# Patient Record
Sex: MALE | Race: WHITE | NOT HISPANIC OR LATINO | Employment: OTHER | URBAN - METROPOLITAN AREA
[De-identification: names, ages, dates, MRNs, and addresses within clinical notes are randomized per-mention and may not be internally consistent; named-entity substitution may affect disease eponyms.]

---

## 2019-04-15 ENCOUNTER — OFFICE VISIT (OUTPATIENT)
Dept: ORTHOPEDIC SURGERY | Facility: CLINIC | Age: 84
End: 2019-04-15

## 2019-04-15 VITALS — HEART RATE: 56 BPM | OXYGEN SATURATION: 98 % | BODY MASS INDEX: 29.8 KG/M2 | WEIGHT: 220 LBS | HEIGHT: 72 IN

## 2019-04-15 DIAGNOSIS — M16.11 PRIMARY OSTEOARTHRITIS OF RIGHT HIP: Primary | ICD-10-CM

## 2019-04-15 PROCEDURE — 99203 OFFICE O/P NEW LOW 30 MIN: CPT | Performed by: ORTHOPAEDIC SURGERY

## 2019-04-15 RX ORDER — RANOLAZINE 500 MG/1
500 TABLET, EXTENDED RELEASE ORAL 2 TIMES DAILY
COMMUNITY

## 2019-04-15 RX ORDER — ASPIRIN 81 MG/1
81 TABLET, CHEWABLE ORAL DAILY
COMMUNITY

## 2019-04-15 RX ORDER — EZETIMIBE 10 MG/1
TABLET ORAL
COMMUNITY
Start: 2019-03-19

## 2019-04-15 RX ORDER — METOPROLOL SUCCINATE 25 MG
TABLET, EXTENDED RELEASE 24 HR ORAL
COMMUNITY
Start: 2019-04-10

## 2019-04-15 RX ORDER — ROSUVASTATIN CALCIUM 20 MG/1
TABLET, COATED ORAL
COMMUNITY
Start: 2019-03-19

## 2019-04-15 NOTE — PROGRESS NOTES
[facility] Note    Subjective     Chief Complaint   Patient presents with   • Right Hip - Pain     Hip Pain, had pain for years. No previous surg on right hip. No recent injections or physical therapy.        HPI    Mukund Pool is a 89 y.o. male.  Presents today for evaluation of right hip pain.  Pain has been present for at least 2 years, following no particular injury.  Pain is 8 out of 10, dull and achy, and worsening over time.  No improvement with Tylenol.  He is not interested in surgical intervention.      There is no problem list on file for this patient.    History reviewed. No pertinent past medical history.   History reviewed. No pertinent surgical history.   Family History   Problem Relation Age of Onset   • Cancer Mother      Social History     Socioeconomic History   • Marital status:      Spouse name: Not on file   • Number of children: Not on file   • Years of education: Not on file   • Highest education level: Not on file   Tobacco Use   • Smoking status: Never Smoker   • Smokeless tobacco: Never Used   Substance and Sexual Activity   • Alcohol use: No     Frequency: Never   • Drug use: No   • Sexual activity: Defer      Current Outpatient Medications on File Prior to Visit   Medication Sig Dispense Refill   • aspirin 81 MG chewable tablet Chew 81 mg Daily.     • ranolazine (RANEXA) 500 MG 12 hr tablet Take 500 mg by mouth 2 (Two) Times a Day.     • ezetimibe (ZETIA) 10 MG tablet      • rosuvastatin (CRESTOR) 20 MG tablet      • TOPROL XL 25 MG 24 hr tablet        No current facility-administered medications on file prior to visit.       No Known Allergies     Review of Systems   Constitutional: Positive for activity change.   HENT: Positive for hearing loss.    Eyes: Negative.    Respiratory: Negative.    Cardiovascular: Negative.    Gastrointestinal: Negative.    Endocrine: Negative.    Genitourinary: Negative.    Musculoskeletal: Positive for arthralgias (right  "hip) and joint swelling.   Skin: Negative.    Allergic/Immunologic: Positive for environmental allergies.   Neurological: Negative.    Hematological: Negative.    Psychiatric/Behavioral: Negative.         Objective      Physical Exam  Pulse 56   Ht 182.9 cm (72\")   Wt 99.8 kg (220 lb)   SpO2 98%   BMI 29.84 kg/m²     Body mass index is 29.84 kg/m².    General:   Mental Status:  Alert   Appearance: Cooperative, in no acute distress   Build and Nutrition: Well-nourished well-developed male   Orientation: Alert and oriented to person, place and time   Posture: Normal    Integument:   Right hip: No skin lesions, no rash, no ecchymosis    Neurologic:   Sensation:    Right foot: Intact to light touch on the dorsal and plantar aspect   Motor:  Right lower extremity: 5/5 quadriceps, hamstrings, ankle dorsiflexors, and ankle plantar flexors    Vascular:   Right lower extremity: 2+ dorsalis pedis pulse, prompt capillary refill    Lower Extremities:   Right Hip:    Tenderness:  None    Swelling: None    Crepitus:  None    Atrophy:  None    Range of motion:  External Rotation: 30°       Internal Rotation: 30°       Flexion:  100°       Extension:  0°   Instability:  None  Deformities:  None  Functional testing: Negative Stinchfield    No leg length discrepancy      Imaging/Studies      Imaging Results (last 24 hours)     Procedure Component Value Units Date/Time    XR Hip With or Without Pelvis 1 View Right [[id number]] Resulted:  04/15/19 1050     Updated:  04/15/19 1051    Narrative:       Right Hip Radiographs  Indication: right hip pain  Views: low AP pelvis and lateral of the right hip    Comparison: AP view only from 4/10/2015    Findings:   Arthritic changes are seen, with joint space narrowing, osteophyte   formation, no acute bony abnormalities.  Slight worsening compared to the   previous films.    Impression: Right hip arthritis.          Assessment and Plan     Mukund was seen today for pain.    Diagnoses and all " orders for this visit:    Primary osteoarthritis of right hip  -     XR Hip With or Without Pelvis 1 View Right  -     External Facility Surgical/Procedural Request; Future        1. Primary osteoarthritis of right hip        I reviewed my findings with the patient today.  He does have right hip arthritis, and we discussed treatment options today.  He would like to try an intra-articular injection, and we will schedule that at a mutually convenient time in the near future.  He may be a candidate for hip replacement surgery in the future.    Return in about 4 weeks (around 5/13/2019) for After Hip Injection.      Medical Decision Making  Management Options : prescription/IM medicine  Data/Risk: radiology tests and independent visualization of imaging, lab tests, or EMG/NCV      Roman Barth MD  04/15/19  12:22 PM

## 2020-03-09 ENCOUNTER — OFFICE (OUTPATIENT)
Dept: URBAN - METROPOLITAN AREA CLINIC 4 | Facility: CLINIC | Age: 85
End: 2020-03-09

## 2020-03-09 VITALS — SYSTOLIC BLOOD PRESSURE: 136 MMHG | WEIGHT: 216 LBS | DIASTOLIC BLOOD PRESSURE: 72 MMHG

## 2020-03-09 DIAGNOSIS — F45.8 OTHER SOMATOFORM DISORDERS: ICD-10-CM

## 2020-03-09 DIAGNOSIS — K21.9 GASTRO-ESOPHAGEAL REFLUX DISEASE WITHOUT ESOPHAGITIS: ICD-10-CM

## 2020-03-09 DIAGNOSIS — R07.89 OTHER CHEST PAIN: ICD-10-CM

## 2020-03-09 DIAGNOSIS — K30 FUNCTIONAL DYSPEPSIA: ICD-10-CM

## 2020-03-09 DIAGNOSIS — R13.10 DYSPHAGIA, UNSPECIFIED: ICD-10-CM

## 2020-03-09 DIAGNOSIS — R05 COUGH: ICD-10-CM

## 2020-03-09 PROCEDURE — 99214 OFFICE O/P EST MOD 30 MIN: CPT | Performed by: INTERNAL MEDICINE

## 2021-03-19 ENCOUNTER — APPOINTMENT (OUTPATIENT)
Dept: PREADMISSION TESTING | Facility: HOSPITAL | Age: 86
End: 2021-03-19

## 2021-03-19 PROCEDURE — U0004 COV-19 TEST NON-CDC HGH THRU: HCPCS

## 2021-03-19 PROCEDURE — C9803 HOPD COVID-19 SPEC COLLECT: HCPCS

## 2021-03-20 LAB — SARS-COV-2 RNA NOSE QL NAA+PROBE: NOT DETECTED

## 2021-03-22 ENCOUNTER — TRANSCRIBE ORDERS (OUTPATIENT)
Dept: ADMINISTRATIVE | Facility: HOSPITAL | Age: 86
End: 2021-03-22

## 2021-03-22 DIAGNOSIS — C32.1 MALIGNANT NEOPLASM OF SUPRAGLOTTIS (HCC): Primary | ICD-10-CM

## 2021-03-26 ENCOUNTER — HOSPITAL ENCOUNTER (OUTPATIENT)
Dept: PET IMAGING | Facility: HOSPITAL | Age: 86
Discharge: HOME | End: 2021-03-26

## 2021-03-26 DIAGNOSIS — C32.1 MALIGNANT NEOPLASM OF SUPRAGLOTTIS (HCC): ICD-10-CM

## 2021-03-26 LAB — GLUCOSE BLDC GLUCOMTR-MCNC: 90 MG/DL (ref 70–130)

## 2021-03-26 PROCEDURE — 82962 GLUCOSE BLOOD TEST: CPT

## 2021-03-26 PROCEDURE — 78815 PET IMAGE W/CT SKULL-THIGH: CPT

## 2021-03-26 PROCEDURE — 0 FLUDEOXYGLUCOSE F18 SOLUTION: Performed by: OTOLARYNGOLOGY

## 2021-03-26 PROCEDURE — A9552 F18 FDG: HCPCS | Performed by: OTOLARYNGOLOGY

## 2021-03-26 RX ADMIN — FLUDEOXYGLUCOSE F18 1 DOSE: 300 INJECTION INTRAVENOUS at 14:15

## 2021-03-31 ENCOUNTER — OFFICE VISIT (OUTPATIENT)
Dept: RADIATION ONCOLOGY | Facility: HOSPITAL | Age: 86
End: 2021-03-31

## 2021-03-31 ENCOUNTER — HOSPITAL ENCOUNTER (OUTPATIENT)
Dept: RADIATION ONCOLOGY | Facility: HOSPITAL | Age: 86
Setting detail: RADIATION/ONCOLOGY SERIES
Discharge: HOME | End: 2021-03-31

## 2021-03-31 VITALS
RESPIRATION RATE: 16 BRPM | WEIGHT: 183.2 LBS | HEIGHT: 72 IN | OXYGEN SATURATION: 94 % | TEMPERATURE: 97.6 F | HEART RATE: 63 BPM | SYSTOLIC BLOOD PRESSURE: 136 MMHG | BODY MASS INDEX: 24.81 KG/M2 | DIASTOLIC BLOOD PRESSURE: 63 MMHG

## 2021-03-31 DIAGNOSIS — C32.0 VOCAL CORD CANCER (HCC): Primary | ICD-10-CM

## 2021-03-31 PROCEDURE — G0463 HOSPITAL OUTPT CLINIC VISIT: HCPCS

## 2021-03-31 RX ORDER — MIRTAZAPINE 15 MG/1
TABLET, FILM COATED ORAL
COMMUNITY
Start: 2021-01-07 | End: 2022-02-25

## 2021-03-31 RX ORDER — GABAPENTIN 300 MG/1
CAPSULE ORAL
COMMUNITY
Start: 2021-01-07

## 2021-03-31 RX ORDER — OMEPRAZOLE 20 MG/1
CAPSULE, DELAYED RELEASE ORAL
COMMUNITY
Start: 2021-03-30

## 2021-03-31 RX ORDER — NITROGLYCERIN 0.4 MG/1
TABLET SUBLINGUAL
COMMUNITY
Start: 2021-03-16

## 2021-03-31 NOTE — PROGRESS NOTES
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 male  with recently diagnosed laryngeal cancer.  He presented with a 1 year history of persistent hoarseness which patient initially attributed to his acid reflux symptoms.  Direct laryngoscopy eventually revealed an exophytic tumor extensively involving the right true vocal cord and false cord extending from the anterior commissure to the posterior aspect.  There was fixation of the cord.  He underwent debulking biopsy 3/22/2021.  Pathology showed well-differentiated squamous cell carcinoma.  P16 pending.  He is healing well and voice has already improved modestly since the procedure.  PET/CT performed 3/26/2021 shows a solitary focus of hypermetabolic uptake, SUV 7.4, involving the area of thickening along the right true vocal cord.  There was no evidence of pathologic cervical lymphadenopathy.  No evidence of distant metastasis.    Allergy: No Known Allergies    Social History:   Social History     Socioeconomic History   • Marital status:      Spouse name: Not on file   • Number of children: Not on file   • Years of education: Not on file   • Highest education level: Not on file   Tobacco Use   • Smoking status: Never Smoker   • Smokeless tobacco: Never Used   Substance and Sexual Activity   • Alcohol use: No   • Drug use: No   • Sexual activity: Defer       Past Medical History:   Past Medical History:   Diagnosis Date   • Arthritis    • Chronic kidney disease     kidney stones   • GERD (gastroesophageal reflux disease)    • Prostate cancer (CMS/HCC)    • Vocal cord cancer (CMS/HCC)        Family History: family  "history includes Breast cancer in his mother; Cancer in his mother; Heart attack in his father; Ovarian cancer in his sister.     Surgical History:   Past Surgical History:   Procedure Laterality Date   • CHOLECYSTECTOMY     • CORONARY ARTERY BYPASS GRAFT     • PROSTATE SURGERY     • VOCAL CORD MASS EXCISION  03/22/2021        Review of Systems:   Review of Systems   Constitutional: Positive for appetite change, fatigue and unexpected weight change (down 40lbs since last summer).   HENT:   Positive for trouble swallowing.    Musculoskeletal: Positive for neck stiffness.   Neurological: Positive for dizziness, headaches and light-headedness.           Objective   VITAL SIGNS:   Vitals:    03/31/21 1306   BP: 136/63   Pulse: 63   Resp: 16   Temp: 97.6 °F (36.4 °C)   SpO2: 94%  Comment: RA   Weight: 83.1 kg (183 lb 3.2 oz)   Height: 182.9 cm (72\")   PainSc: 0-No pain        Karnofsky score: 80      Physical Exam:   Physical Exam  Vitals and nursing note reviewed.   Constitutional:       Appearance: He is well-developed.   HENT:      Head: Normocephalic and atraumatic.   Cardiovascular:      Rate and Rhythm: Normal rate and regular rhythm.      Heart sounds: Normal heart sounds. No murmur heard.     Pulmonary:      Effort: Pulmonary effort is normal.      Breath sounds: Normal breath sounds. No wheezing or rales.   Abdominal:      General: Bowel sounds are normal. There is no distension.      Palpations: Abdomen is soft.      Tenderness: There is no abdominal tenderness.   Musculoskeletal:         General: No tenderness. Normal range of motion.      Cervical back: Normal range of motion and neck supple. No tenderness ( Thyroid cartilage appears normal.  No fullness.  Normal mobility.).   Lymphadenopathy:      Cervical: No cervical adenopathy.      Upper Body:      Right upper body: No supraclavicular adenopathy.      Left upper body: No supraclavicular adenopathy.   Skin:     General: Skin is warm and dry. "   Neurological:      Mental Status: He is alert and oriented to person, place, and time.      Sensory: No sensory deficit.   Psychiatric:         Behavior: Behavior normal.         Thought Content: Thought content normal.         Judgment: Judgment normal.              The following portions of the patient's history were reviewed and updated as appropriate: allergies, current medications, past family history, past medical history, past social history, past surgical history and problem list.    Assessment:   Assessment      Right true vocal cord squamous carcinoma, clinical stage III (T3, N0, M0).    He appears to be healing well 1 1/2 weeks after debulking biopsy procedure.  Prognosis should be very good in spite of regional spread to the false cord and fixation of the vocal cord.  This is an exophytic tumor and is well differentiated.  There is no evidence of regional or distant metastasis on PET/CT.  He has been offered treatment options of partial laryngectomy versus radiotherapy.  We reviewed radiotherapy treatment option in detail today and all questions were answered.  Patient would like to proceed with definitive radiotherapy.    RECOMMENDATIONS: Return for CT simulation early next week.  Radiotherapy would likely begin the following week, at least 3 weeks following his biopsy procedure.  The larynx will receive a dose of 66 Gray delivered in 33 fractions over 6 weeks with IMRT technique.    Follow Up:   Return in about 6 days (around 4/6/2021) for Simulation.  Diagnoses and all orders for this visit:    1. Vocal cord cancer (CMS/HCC) (Primary)  -     Ambulatory Referral to Speech Therapy for Head/Neck Cancer Services  -     Ambulatory Referral to OP ONC Nutrition Services  -     FL Video Swallow With Speech; Future  -     Dental evaluation         Chano Licona MD

## 2021-04-06 ENCOUNTER — HOSPITAL ENCOUNTER (OUTPATIENT)
Dept: RADIATION ONCOLOGY | Facility: HOSPITAL | Age: 86
Discharge: HOME | End: 2021-04-06

## 2021-04-06 ENCOUNTER — HOSPITAL ENCOUNTER (OUTPATIENT)
Dept: RADIATION ONCOLOGY | Facility: HOSPITAL | Age: 86
Setting detail: RADIATION/ONCOLOGY SERIES
Discharge: HOME | End: 2021-04-06

## 2021-04-06 PROCEDURE — 77334 RADIATION TREATMENT AID(S): CPT | Performed by: RADIOLOGY

## 2021-04-07 ENCOUNTER — DOCUMENTATION (OUTPATIENT)
Dept: NUTRITION | Facility: HOSPITAL | Age: 86
End: 2021-04-07

## 2021-04-07 NOTE — PROGRESS NOTES
"Outpatient Oncology Nutrition     Reason for Visit:     Oncology Nutrition Screening, Nutritional Assessment and Patient Education    Patient Name:  Mukund Pool    :  1930    MRN:  [id number]    Date of Encounter: 2021    Nutrition Assessment     Cancer Dx:  Newly diagnosed laryngeal cancer - clinical stage III / exophytic tumor extensively involving the right true vocal cord and false cord extending from the anterior commissure to the posterior aspect / well-differentiated squamous cell carcinoma    Type of Cancer Treatment:     Surgery:  Debulking biopsy 3/22/2021   Radiation:   66 Choudhary delivered in 33 fractions over 6 weeks with IMRT technique       Patient Active Problem List   Diagnosis   • 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)       Food / Nutrition Related History       Hydration Status     Goal:  90 ounces per day    Enteral Feeding     N/A  Anthropometric Measurements     Height:    Ht Readings from Last 1 Encounters:   21 182.9 cm (72\")       Weight:    Wt Readings from Last 1 Encounters:   21 83.1 kg (183 lb 3.2 oz)       BMI: 24.85 / normal    Weight Change: Patient unintentionally lost 40 lbs last year / weight loss has been stabilized for past 6 months with no additional weight loss    Review of Lab Data (Time Frame - 1 month / 2 month)   Reviewed 21    Medication Review   Reviewed 21    Nutrition Focused Physical Findings       Nutrition Impact Symptoms   Appetite changes  Fatigue  Dysphagia  Physical Activity      Not feeling up to most things, but in bed less than half the day    Current Nutritional Intake     Oral diet:  Regular diet    Malnutrition Risk Assessment     Recent weight loss over the past 6 months:  Yes    How much weight loss:  4 = 34 or more lbs    Eating poorly because of a decreased appetite:  1 = Yes    Malnutrition Screening Score:     MST = 2 more Patient at risk for malnutrition     Nutrition Diagnosis     Problem    Etiology  "   Signs / Symptoms      Nutrition Intervention   Initial consultation with patient following completion of his simulation today.  Patient lives at [facility] with his wife.  Their meals are prepared for them and they eat in the dining room for breakfast, lunch and dinner.  They have a small kitchenette in their apartment for preparation of snacks.  He generally eats 3 meals per day / snacks very little throughout the day.    Discussed the nutritional considerations for H&N cancer diagnosis including importance of maintaining a healthy focus on food choices with small frequent meals and snacks throughout the day; weight maintenance.  Discussed the swallowing difficulties that the patient may experience with progression of treatment; indication for modification of consistencies and textures for ease of swallowing and possible indication for placement of a feeding tube in the event the patient is unable sustain nutritional / hydration goals with oral intake.  Taste changes and dry mouth were also discussed as potential symptoms that patient may experience.    Patient was here today for simulation and will return for treatment when plan has been completed.  Goal     1. To maintain weight and prevent additional weight loss during chemoradiation.  2. Achieve nutrient and hydration goals via oral intake and / or alternate source of nutritional support.  3. Provide patient education for management of nutritionally related side effects of treatment.  Monitoring / Evaluation   Will continue to follow closely.

## 2021-04-08 ENCOUNTER — HOSPITAL ENCOUNTER (OUTPATIENT)
Dept: SPEECH THERAPY | Facility: HOSPITAL | Age: 86
Setting detail: THERAPIES SERIES
Discharge: HOME | End: 2021-04-08

## 2021-04-08 DIAGNOSIS — C32.0 MALIGNANT NEOPLASM OF RIGHT VOCAL CORD (HCC): Primary | ICD-10-CM

## 2021-04-08 PROCEDURE — 77338 DESIGN MLC DEVICE FOR IMRT: CPT | Performed by: RADIOLOGY

## 2021-04-08 PROCEDURE — 77301 RADIOTHERAPY DOSE PLAN IMRT: CPT | Performed by: RADIOLOGY

## 2021-04-08 PROCEDURE — 92611 MOTION FLUOROSCOPY/SWALLOW: CPT

## 2021-04-08 PROCEDURE — 77300 RADIATION THERAPY DOSE PLAN: CPT | Performed by: RADIOLOGY

## 2021-04-08 NOTE — THERAPY EVALUATION
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 Pt states that he is having pain at times with swallowing and has increased mucus in the mornings. Pt does have hoarseness and that has been evident for almost a year.   -    Care Plan Review  evaluation/treatment results reviewed  -    Patient Effort  excellent  -       General Information    Patient Profile Reviewed  yes  -    Pertinent History Of Current Problem  Pt is a 89 year old male with recent dx of laryngeal cancer, involving the area of thickening along the right true vocal cord. Pt s/p a debulking procedure.  -HG    Current Method of Nutrition  soft textures;thin liquids  -    Precautions/Limitations, Vision  WFL with corrective lenses  -    Precautions/Limitations, Hearing  WFL;for purposes of eval  -    Prior Level of Function-Communication  WFL  -    Prior Level of Function-Swallowing   no diet consistency restrictions  -HG    Plans/Goals Discussed with  patient  -    Barriers to Rehab  none identified  -HG       Oral Motor Structure and Function    Dentition Assessment  natural, present and adequate  -HG    Secretion Management  WNL/WFL  -HG    Mucosal Quality  moist, healthy  -HG    Gag Response  WFL  -HG    Volitional Swallow  WFL  -HG    Volitional Cough  WFL  -HG       Oral Musculature and Cranial Nerve Assessment    Oral Motor General Assessment  WFL  -HG       General Eating/Swallowing Observations    Respiratory Support Currently in Use  room air  -HG    Eating/Swallowing Skills  self-fed  -HG    Positioning During Eating  upright 90 degree;upright in chair  -HG    Utensils Used  spoon;cup;straw  -HG    Consistencies Trialed  regular textures;mixed consistency;thin liquids  -HG       Clinical Swallow Eval    Oral Prep Phase  WFL  -HG    Oral Transit  WFL  -HG    Oral Residue  WFL  -HG    Pharyngeal Phase  suspected pharyngeal impairment  -HG    Esophageal Phase  unremarkable  -HG    Clinical Swallow Evaluation Summary  CSE completed this date. Pt tolerated all consistencies trialed this date with only throat clearing present on solids. Water via cup and straw, applesauce, mixed fruit all with no s/s of dysphagia or aspiration. Pt exhibited no difficulty masticating the josi cracker however caused pt to throat clear several times over the course of multiple swallows. Pt has chosen soft foods at this time. SLP RECs baseline MBS and prophylatic swallow tx.   -HG       Pharyngeal Phase Concerns    Pharyngeal Phase Concerns  throat clear  -HG    Throat Clear  regular consistencies  -    Row Name 04/08/21 1400       Rehab Evaluation    Document Type  --  -HG    Subjective Information  --  -HG    Patient Observations  --  -HG    Care Plan Review  --  -HG    Patient Effort  --  -HG       General Information    Patient Profile Reviewed  --  -HG    Pertinent History Of Current Problem  --  -HG     Current Method of Nutrition  --  -HG    Precautions/Limitations, Vision  --  -HG    Precautions/Limitations, Hearing  --  -HG    Prior Level of Function-Communication  --  -HG    Prior Level of Function-Swallowing  --  -HG    Plans/Goals Discussed with  --  -HG    Barriers to Rehab  --  -HG       Oral Motor Structure and Function    Dentition Assessment  --  -HG    Secretion Management  --  -HG    Mucosal Quality  --  -HG    Gag Response  --  -HG    Volitional Swallow  --  -HG    Volitional Cough  --  -HG       Oral Musculature and Cranial Nerve Assessment    Oral Motor General Assessment  --  -HG       General Eating/Swallowing Observations    Respiratory Support Currently in Use  --  -HG    Eating/Swallowing Skills  --  -HG    Positioning During Eating  --  -HG    Utensils Used  --  -HG    Consistencies Trialed  --  -HG       Clinical Swallow Eval    Oral Prep Phase  --  -HG    Oral Transit  --  -HG    Oral Residue  --  -HG    Pharyngeal Phase  --  -HG    Esophageal Phase  --  -HG    Clinical Swallow Evaluation Summary  --  -HG       Pharyngeal Phase Concerns    Pharyngeal Phase Concerns  --  -HG    Throat Clear  --  -HG      User Key  (r) = Recorded By, (t) = Taken By, (c) = Cosigned By    Initials Name Provider Type     Moni Mayes MS CCC-SLP Speech and Language Pathologist                        OP SLP Education     Row Name 04/08/21 1500       Education    Barriers to Learning  No barriers identified  -    Education Provided  Described results of evaluation;Patient expressed understanding of evaluation;Patient participated in establishing goals and treatment plan;Patient demonstrated recommended strategies;Patient requires further education on strategies, risks  -    Assessed  Learning needs;Learning motivation;Learning preferences;Learning readiness  -    Learning Motivation  Strong  -    Learning Method  Explanation;Demonstration;Teach back;Written materials  -    Teaching Response   Verbalized understanding;Demonstrated understanding;Reinforcement needed  -HG    Education Comments  Pt education for purpose of SLP services, plan for MBS and SLP demonstrated with teachback all prophylactic swallowing exercises with handout provided.   -HG      User Key  (r) = Recorded By, (t) = Taken By, (c) = Cosigned By    Initials Name Effective Dates    HG Moni Mayes MS [facility]-SLP 08/09/20 -           SLP OP Goals     Row Name 04/08/21 1500          Goal Type Needed    Goal Type Needed  Dysphagia;Other Adult Goals  -HG        Subjective Comments    Subjective Comments  Pt alert, cooperative, unsure the purpose of SLP services which was explained at beginning of session.  -HG        Dysphagia Goals    Patient will safely consume the recommended diet without complications such as aspiration pneumonia  soft diet and thin liquids.  -HG     Status: Patient will safely consume the recommended diet without complications such as aspiration pneumonia  New  -HG     Patient will increase laryngeal elevation to reduce residue that might fall into airway by completing  Mendelsohn maneuver;super-supraglottic swallow;falsetto/pitch glide;with cues  -HG     Status: Patient will increase laryngeal elevation to reduce residue that might fall into airway by completing  New  -HG     Patient will increase closure of larynx to keep food from falling into the airway by completing  super-supraglottic swallow;with cues  -HG     Status: Patient will increase closure of larynx to keep food from falling into the airway by completing  New  -HG     Patient will increase strength of tongue base and posterior pharyngeal walls to reduce residue that might fall into airway by completing  effotful swallow;Gi (tongue hold);with cues  -HG     Status: Patient will increase strength of tongue base and posterior pharyngeal walls to reduce residue that might fall into airway by completing  New  -HG     Patient will improve hyolaryngeal  elevation via completing Matt (head lift)  sustained lift (comment #/duration of lifts);repetitive lift (comment #/duration of lifts);with cues 30/15  -HG        Other Goals    Other Adult Goal- 1  Pt will complete stretching exercises in order to improve strength and flexibility of strap muscles.  -HG     Status: Other Adult Goal- 1  New  -HG     Other Adult Goal- 2  Pt will complete baseline MBS with recs to follow as indicated.   -HG        SLP Time Calculation    SLP Goal Re-Cert Due Date  05/08/21  -HG       User Key  (r) = Recorded By, (t) = Taken By, (c) = Cosigned By    Initials Name Provider Type    HG Moni Mayes MS CCC-SLP Speech and Language Pathologist          OP SLP Assessment/Plan - 04/08/21 1500        SLP Assessment    Functional Problems  Swallowing   -HG    Impact on Function: Swallowing  Risk of aspiration;Risk of pneumonia   -HG    Clinical Impression: Swallowing  pharyngeal signs and symptoms observed   -HG    Functional Problems Comment  Pt notes having difficulty with some solids and has voluntarily placed himself on soft foods, soups specifically.   -HG    Clinical Impression Comments  MASA score of 194/200.    -HG    Please refer to paper survey for additional self-reported information  Yes   -HG    Please refer to items scanned into chart for additional diagnostic informaiton and handouts as provided by clinician  Yes   -HG    SLP Diagnosis  Suspect pharyngeal dysphagia   -HG    Prognosis  Good (comment)   -HG    Patient/caregiver participated in establishment of treatment plan and goals  Yes   -HG    Patient would benefit from skilled therapy intervention  Yes   -HG       SLP Plan    Frequency  1x/week   -HG    Duration  12 weeks   -HG    Planned CPT's?  SLP CLINICAL SWALLOW EVAL: 96452;SLP SWALLOW THERAPY: 88237   -HG    Expected Duration of Therapy Session (SLP Eval)  45   -HG    Plan Comments  Complete MBS and initiate prophylactic swallow tx.    -HG      User Key  (r) =  Recorded By, (t) = Taken By, (c) = Cosigned By    Initials Name Provider Type     Moni Mayes MS CCC-SLP Speech and Language Pathologist                    Time Calculation:   SLP Start Time: 1500    Therapy Charges for Today     Code Description Service Date Service Provider Modifiers Qty    34660836480 HC ST MOTION FLUORO EVAL SWALLOW 4 4/8/2021 Moni Mayes MS CCC-SLP GN 1                   Moni Mayes MS CCC-SLP  4/8/2021

## 2021-04-13 ENCOUNTER — APPOINTMENT (OUTPATIENT)
Dept: PREADMISSION TESTING | Facility: HOSPITAL | Age: 86
End: 2021-04-13

## 2021-04-13 PROCEDURE — U0004 COV-19 TEST NON-CDC HGH THRU: HCPCS

## 2021-04-13 PROCEDURE — C9803 HOPD COVID-19 SPEC COLLECT: HCPCS

## 2021-04-14 ENCOUNTER — HOSPITAL ENCOUNTER (OUTPATIENT)
Dept: RADIATION ONCOLOGY | Facility: HOSPITAL | Age: 86
Discharge: HOME | End: 2021-04-14

## 2021-04-14 LAB — SARS-COV-2 RNA PNL SPEC NAA+PROBE: NOT DETECTED

## 2021-04-14 PROCEDURE — 77386: CPT | Performed by: RADIOLOGY

## 2021-04-15 ENCOUNTER — HOSPITAL ENCOUNTER (OUTPATIENT)
Dept: RADIATION ONCOLOGY | Facility: HOSPITAL | Age: 86
Discharge: HOME | End: 2021-04-15

## 2021-04-15 PROCEDURE — 77386: CPT | Performed by: RADIOLOGY

## 2021-04-16 ENCOUNTER — HOSPITAL ENCOUNTER (OUTPATIENT)
Dept: GENERAL RADIOLOGY | Facility: HOSPITAL | Age: 86
Discharge: HOME | End: 2021-04-16
Admitting: RADIOLOGY

## 2021-04-16 ENCOUNTER — HOSPITAL ENCOUNTER (OUTPATIENT)
Dept: RADIATION ONCOLOGY | Facility: HOSPITAL | Age: 86
Discharge: HOME | End: 2021-04-16

## 2021-04-16 DIAGNOSIS — R13.10 DYSPHAGIA, UNSPECIFIED TYPE: Primary | ICD-10-CM

## 2021-04-16 DIAGNOSIS — C32.0 VOCAL CORD CANCER (HCC): ICD-10-CM

## 2021-04-16 PROCEDURE — 77386: CPT | Performed by: RADIOLOGY

## 2021-04-16 PROCEDURE — 77336 RADIATION PHYSICS CONSULT: CPT | Performed by: RADIOLOGY

## 2021-04-16 PROCEDURE — 74230 X-RAY XM SWLNG FUNCJ C+: CPT

## 2021-04-16 PROCEDURE — 92611 MOTION FLUOROSCOPY/SWALLOW: CPT

## 2021-04-16 RX ADMIN — BARIUM SULFATE 100 ML: 0.81 POWDER, FOR SUSPENSION ORAL at 11:34

## 2021-04-16 RX ADMIN — BARIUM SULFATE 20 ML: 400 PASTE ORAL at 11:34

## 2021-04-16 NOTE — MBS/VFSS/FEES
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 Dysphagia, unspecified type  R13.10 787.20   2. Vocal cord cancer (CMS/HCC)  C32.0 161.0            SLP SLC Evaluation - 21 1120        General Information    Pertinent History Of Current Problem  Patient was referred for modified barium swallow study.  recent dx of laryngeal cancer, involving the area of thickening along the right true vocal cord. Patient s/p a debulking procedure. He has started radiation treatment. Complains of coughing up excess mucous at night. Reported history of GERD.   -AC    Precautions/Limitations, Vision  WFL;for purposes of eval   -AC    Precautions/Limitations, Hearing  WFL;for purposes of eval   -AC      User Key  (r) = Recorded By, (t) = Taken By, (c) = Cosigned By    Initials Name Provider Type    AC Pattie Giles MS CCC-SLP Speech and Language Pathologist          SLP Adult Swallow Evaluation     Row Name 21 1120       Rehab Evaluation    Document Type  evaluation  -AC    Subjective Information  no complaints  -AC    Patient Observations  alert;cooperative  -AC    Patient Effort  good  -AC       General Information    Patient Profile Reviewed  yes  -AC    Current Method of Nutrition  soft  textures;whole;thin liquids  -AC    Plans/Goals Discussed with  patient;agreed upon  -    Barriers to Rehab  none identified  -AC       Pain    Additional Documentation  Pain Scale: FACES Pre/Post-Treatment (Group)  -AC       Pain Scale: FACES Pre/Post-Treatment    Pain: FACES Scale, Pretreatment  0-->no hurt  -AC    Posttreatment Pain Rating  0-->no hurt  -AC       General Eating/Swallowing Observations    Respiratory Support Currently in Use  room air  -    Eating/Swallowing Skills  fed by SLP;self-fed;appropriate self-feeding skills observed  -    Positioning During Eating  upright 90 degree;upright in chair  -AC       MBS/VFSS    Utensils Used  spoon;cup;straw  -    Consistencies Trialed  thin liquids;pudding thick;regular textures  -AC       MBS/VFSS Interpretation    Oral Prep Phase  WFL  -AC    Oral Transit Phase  WFL  -AC    Oral Residue  WFL  -AC       Initiation of Pharyngeal Swallow    Initiation of Pharyngeal Swallow  WFL  -AC    Pharyngeal Phase  impaired pharyngeal phase of swallowing  -AC    Rosenbek's Scale  all consistencies:;1--->level 1  -AC    Pharyngeal Residue  all consistencies tested;diffuse within pharynx;secondary to reduced laryngeal elevation;secondary to reduced hyolaryngeal excursion;secondary to reduced posterior pharyngeal wall stripping  -AC    Response to Residue  cleared residue with spontaneous subsequent swallow  -AC    Pharyngeal Phase, Comment  Mastication adequate and no significant oral residue. Timing of pharyngeal swallow initiation was functional. No penetration/aspiration appreciated with any consistency tested, even when patient pushed with consecutive drinks. There was mild residual coating diffusely in pharynx following all trials; however, easily cleared with subequent swallows and did not appear to pose safety risk. Noted mildly occluded bolus flow through UES, which resulted in minimal retention/retrograde flow to pyriforms. Cleared with subsequent swallows.   -AC       Clinical Impression    SLP Swallowing Diagnosis  functional oral phase;mild;pharyngeal dysphagia  -AC    Functional Impact  risk of aspiration/pneumonia;risk of malnutrition;risk of dehydration during/after radiation therapy  -    Rehab Potential/Prognosis, Swallowing  good, to achieve stated therapy goals  -    Swallow Criteria for Skilled Therapeutic Interventions Met  demonstrates skilled criteria  -AC       Recommendations    SLP Diet Recommendation  regular textures;thin liquids;other (see comments) may be more comfortable with soft/cohesive textures  -    Recommended Precautions and Strategies  upright posture during/after eating;general aspiration precautions;reflux precautions  -    Oral Care Recommendations  Oral Care BID/PRN  -    SLP Rec. for Method of Medication Administration  meds whole;with thin liquids;with pudding or applesauce;as tolerated  -      User Key  (r) = Recorded By, (t) = Taken By, (c) = Cosigned By    Initials Name Provider Type    Pattie Jane MS CCC-SLP Speech and Language Pathologist            OP SLP Education     Row Name 04/16/21 1158       Education    Barriers to Learning  No barriers identified  -    Education Provided  Described results of evaluation;Patient expressed understanding of evaluation  -    Assessed  Learning needs;Learning motivation;Learning preferences;Learning readiness  -    Learning Motivation  Strong  -    Learning Method  Explanation;Written materials  -    Teaching Response  Verbalized understanding  -    Education Comments  Provided info re: behavioral/lifestyle mgt of reflux.  -      User Key  (r) = Recorded By, (t) = Taken By, (c) = Cosigned By    Initials Name Effective Dates    Pattie Jane MS CCC-SLP 07/27/17 -             OP SLP Assessment/Plan - 04/16/21 1158        SLP Assessment    Functional Problems  Swallowing   -    Impact on Function: Swallowing  Risk of malnourishment;Risk of dehydration;Risk  of aspiration;Risk of pneumonia   -AC    Clinical Impression: Swallowing  Mild:;pharyngeal phase dysphagia   -AC    Prognosis  Good (comment)   -AC    Patient would benefit from skilled therapy intervention  Yes   -AC      User Key  (r) = Recorded By, (t) = Taken By, (c) = Cosigned By    Initials Name Provider Type    AC Pattie Giles MS CCC-SLP Speech and Language Pathologist               Time Calculation:   SLP Start Time: 1120    Therapy Charges for Today     Code Description Service Date Service Provider Modifiers Qty    34667814506 HC ST MOTION FLUORO EVAL SWALLOW 3 4/16/2021 Pattie Giles MS CCC-SLP GN 1        Patient was not wearing a face mask and did not exhibit coughing during this therapy encounter.  Procedure performed was aerosolizing, involved close contact (within 6 feet for at least 15 minutes or longer), and did not involve contact with infectious secretions or specimens.  Therapist used appropriate personal protective equipment including gloves, standard procedure mask and eye protection.  Appropriate PPE was worn during the entire therapy session.  Hand hygiene was completed before and after therapy session.              Pattie Giles MS CCC-SLP  4/16/2021

## 2021-04-19 ENCOUNTER — HOSPITAL ENCOUNTER (OUTPATIENT)
Dept: RADIATION ONCOLOGY | Facility: HOSPITAL | Age: 86
Discharge: HOME | End: 2021-04-19

## 2021-04-19 PROCEDURE — 77386: CPT | Performed by: RADIOLOGY

## 2021-04-20 ENCOUNTER — HOSPITAL ENCOUNTER (OUTPATIENT)
Dept: RADIATION ONCOLOGY | Facility: HOSPITAL | Age: 86
Discharge: HOME | End: 2021-04-20

## 2021-04-20 VITALS — WEIGHT: 183.3 LBS | BODY MASS INDEX: 24.86 KG/M2

## 2021-04-20 PROCEDURE — 77386: CPT | Performed by: RADIOLOGY

## 2021-04-21 ENCOUNTER — DOCUMENTATION (OUTPATIENT)
Dept: NUTRITION | Facility: HOSPITAL | Age: 86
End: 2021-04-21

## 2021-04-21 ENCOUNTER — HOSPITAL ENCOUNTER (OUTPATIENT)
Dept: RADIATION ONCOLOGY | Facility: HOSPITAL | Age: 86
Discharge: HOME | End: 2021-04-21

## 2021-04-21 PROCEDURE — 77386: CPT | Performed by: RADIOLOGY

## 2021-04-21 NOTE — PROGRESS NOTES
ONC Nutrition     Diagnosis:  Newly diagnosed laryngeal cancer - clinical stage III / exophytic tumor extensively involving the right true vocal cord and false cord extending from the anterior commissure to the posterior aspect / well-differentiated squamous cell carcinoma  Surgery:  Debulking biopsy 3/22/2021  Radiation:   66 Choudhary delivered in 33 fractions over 6 weeks with IMRT technique / patient has completed 5/28    Weight 183.3 lbs / stable weight    Follow up consult with patient during RAD ONC status checks.  Patient doing well with 5 treatments completed / denies any side effects of treatment at this point.  Reviewed possible nutritional impact symptoms that may be experienced with treatment progression. Discussed and reinforced the importance of mouth care; patient presently using commercial mouth wash which may possibly contain alcohol.  Advised patient that alcohol containing mouthwashes should be avoided; recommended the use of the baking soda, salt and water mouth rinses numerous times during the day; written patient education with recipe provided.

## 2021-04-22 ENCOUNTER — HOSPITAL ENCOUNTER (OUTPATIENT)
Dept: RADIATION ONCOLOGY | Facility: HOSPITAL | Age: 86
Discharge: HOME | End: 2021-04-22

## 2021-04-22 PROCEDURE — 77386: CPT | Performed by: RADIOLOGY

## 2021-04-23 ENCOUNTER — HOSPITAL ENCOUNTER (OUTPATIENT)
Dept: RADIATION ONCOLOGY | Facility: HOSPITAL | Age: 86
Discharge: HOME | End: 2021-04-23

## 2021-04-23 PROCEDURE — 77386: CPT | Performed by: RADIOLOGY

## 2021-04-23 PROCEDURE — 77336 RADIATION PHYSICS CONSULT: CPT | Performed by: RADIOLOGY

## 2021-04-26 ENCOUNTER — HOSPITAL ENCOUNTER (OUTPATIENT)
Dept: SPEECH THERAPY | Facility: HOSPITAL | Age: 86
Setting detail: THERAPIES SERIES
Discharge: HOME | End: 2021-04-26

## 2021-04-26 ENCOUNTER — HOSPITAL ENCOUNTER (OUTPATIENT)
Dept: RADIATION ONCOLOGY | Facility: HOSPITAL | Age: 86
Discharge: HOME | End: 2021-04-26

## 2021-04-26 DIAGNOSIS — C32.0 MALIGNANT NEOPLASM OF RIGHT VOCAL CORD (HCC): Primary | ICD-10-CM

## 2021-04-26 PROCEDURE — 92526 ORAL FUNCTION THERAPY: CPT

## 2021-04-26 PROCEDURE — 77386: CPT | Performed by: RADIOLOGY

## 2021-04-26 NOTE — THERAPY TREATMENT NOTE
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  No pain reported at this time. Pt reports that his voice is better than it's been in a few weeks.   -HG        Dysphagia Goals    Patient will safely consume the recommended diet without complications such as aspiration pneumonia  soft diet and thin liquids.  -HG     Status: Patient will safely consume the recommended diet without complications such as aspiration pneumonia  Progressing as expected  -HG     Comments: Patient will safely consume the recommended diet without complications such as aspiration pneumonia  21: Pt is avoiding milk, some tomato based products and fried foods.   -HG     Patient will increase laryngeal elevation to reduce residue that might fall into airway by completing  Mendelsohn maneuver;super-supraglottic swallow;falsetto/pitch glide;with cues  -HG     Status: Patient will increase laryngeal elevation to reduce residue that might fall into airway by completing  Progressing as expected  -HG     Comments: Patient will increase laryngeal elevation to reduce residue that might fall into airway by completing  21: Reviewed exercises with pt and encouraged to complete at least once a day.   -HG     Patient will increase closure of larynx to keep food from falling into the airway by  completing  super-supraglottic swallow;with cues  -HG     Status: Patient will increase closure of larynx to keep food from falling into the airway by completing  Progressing as expected  -HG     Comments: Patient will increase closure of larynx to keep food from falling into the airway by completing  4/26/21: Reviewed exercises with pt and encouraged to complete at least once a day.  -HG     Patient will increase strength of tongue base and posterior pharyngeal walls to reduce residue that might fall into airway by completing  effotful swallow;Gi (tongue hold);with cues  -HG     Status: Patient will increase strength of tongue base and posterior pharyngeal walls to reduce residue that might fall into airway by completing  Progressing as expected  -HG     Comments: Patient will increase strength of tongue base and posterior pharyngeal walls to reduce residue that might fall into airway by completing  4/26/21: Reviewed exercises with pt and encouraged to complete at least once a day.  -HG     Patient will improve hyolaryngeal elevation via completing Matt (head lift)  sustained lift (comment #/duration of lifts);repetitive lift (comment #/duration of lifts);with cues 30/15  -HG     Status: Patient will improve hyolaryngeal elevation via completing Matt (head lift)  Progressing as expected  -HG     Comments: Patient will improve hyolaryngeal elevation via completing Matt (head lift)  4/26/21: Reviewed exercises with pt and encouraged to complete at least once a day. Demonstrated the CTAR for the pt.   -HG        Other Goals    Other Adult Goal- 1  Pt will complete stretching exercises in order to improve strength and flexibility of strap muscles.  -HG     Status: Other Adult Goal- 1  New  -HG     Comments: Other Adult Goal- 1  4/26/21: Pt reports not needing to complete this exercise as of yet.   -HG     Other Adult Goal- 2  Pt will complete baseline MBS with recs to follow as indicated.   -HG        SLP  Time Calculation    SLP Goal Re-Cert Due Date  05/08/21  -       User Key  (r) = Recorded By, (t) = Taken By, (c) = Cosigned By    Initials Name Provider Type    Moni Bell MS CCC-SLP Speech and Language Pathologist        OP SLP Education     Row Name 04/26/21 1500       Education    Education Comments  Education for completion of exercises.  Reviewed all exercises with pt.   -      User Key  (r) = Recorded By, (t) = Taken By, (c) = Cosigned By    Initials Name Effective Dates    Moni Bell MS CCC-SLP 08/09/20 -         OP SLP Assessment/Plan - 04/26/21 1500        SLP Plan    Plan Comments  Cont with Prophylactic swallowing tx.    -      User Key  (r) = Recorded By, (t) = Taken By, (c) = Cosigned By    Initials Name Provider Type    Moni Bell MS CCC-SLP Speech and Language Pathologist              Time Calculation:   SLP Start Time: 1500  Untimed Charges  [id number] Treatment Swallow Minutes: 15  Total Minutes  Untimed Charges Total Minutes: 15   Total Minutes: 15    Therapy Charges for Today     Code Description Service Date Service Provider Modifiers Qty    76936830824 HC ST TREATMENT SWALLOW 1 4/26/2021 Moni Mayes MS CCC-SLP GN 1                   MS ZAY Sanchez  4/26/2021

## 2021-04-27 ENCOUNTER — HOSPITAL ENCOUNTER (OUTPATIENT)
Dept: RADIATION ONCOLOGY | Facility: HOSPITAL | Age: 86
Discharge: HOME | End: 2021-04-27

## 2021-04-27 VITALS — WEIGHT: 184.7 LBS | BODY MASS INDEX: 25.05 KG/M2

## 2021-04-27 PROCEDURE — 77386: CPT | Performed by: RADIOLOGY

## 2021-04-28 ENCOUNTER — HOSPITAL ENCOUNTER (OUTPATIENT)
Dept: RADIATION ONCOLOGY | Facility: HOSPITAL | Age: 86
Discharge: HOME | End: 2021-04-28

## 2021-04-28 ENCOUNTER — DOCUMENTATION (OUTPATIENT)
Dept: NUTRITION | Facility: HOSPITAL | Age: 86
End: 2021-04-28

## 2021-04-28 PROCEDURE — 77386: CPT | Performed by: RADIOLOGY

## 2021-04-28 NOTE — PROGRESS NOTES
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  He states that he is becoming more fatigued with progression of treatment, but continues able to eat and swallow a soft diet.  Discussed the tips for management of fatigue including higher protein intake distributed throughout the day, adequate hydration and trying to stay as physically active as possible.    Will continue to follow closely.

## 2021-04-29 ENCOUNTER — HOSPITAL ENCOUNTER (OUTPATIENT)
Dept: RADIATION ONCOLOGY | Facility: HOSPITAL | Age: 86
Discharge: HOME | End: 2021-04-29

## 2021-04-29 PROCEDURE — 77386: CPT | Performed by: RADIOLOGY

## 2021-04-30 ENCOUNTER — HOSPITAL ENCOUNTER (OUTPATIENT)
Dept: RADIATION ONCOLOGY | Facility: HOSPITAL | Age: 86
Discharge: HOME | End: 2021-04-30

## 2021-04-30 PROCEDURE — 77386: CPT | Performed by: RADIOLOGY

## 2021-04-30 PROCEDURE — 77336 RADIATION PHYSICS CONSULT: CPT | Performed by: RADIOLOGY

## 2021-05-03 ENCOUNTER — HOSPITAL ENCOUNTER (OUTPATIENT)
Dept: RADIATION ONCOLOGY | Facility: HOSPITAL | Age: 86
Setting detail: RADIATION/ONCOLOGY SERIES
Discharge: HOME | End: 2021-05-03

## 2021-05-03 ENCOUNTER — HOSPITAL ENCOUNTER (OUTPATIENT)
Dept: RADIATION ONCOLOGY | Facility: HOSPITAL | Age: 86
Discharge: HOME | End: 2021-05-03

## 2021-05-03 PROCEDURE — 77386: CPT | Performed by: RADIOLOGY

## 2021-05-04 ENCOUNTER — HOSPITAL ENCOUNTER (OUTPATIENT)
Dept: RADIATION ONCOLOGY | Facility: HOSPITAL | Age: 86
Discharge: HOME | End: 2021-05-04

## 2021-05-04 ENCOUNTER — DOCUMENTATION (OUTPATIENT)
Dept: NUTRITION | Facility: HOSPITAL | Age: 86
End: 2021-05-04

## 2021-05-04 VITALS — WEIGHT: 184.1 LBS | BODY MASS INDEX: 24.97 KG/M2

## 2021-05-04 PROCEDURE — 77386: CPT | Performed by: RADIOLOGY

## 2021-05-04 NOTE — PROGRESS NOTES
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  He states that he maintains a good appetite and continues to eat well, \"just being a little more careful about certain foods\", though does not express difficulty swallowing He is experiencing \"pooling\" of secretions when he wakes up in the morning and in the evening; encouraged adequate hydration to keep thinned and easier to expectorate.     "

## 2021-05-05 ENCOUNTER — HOSPITAL ENCOUNTER (OUTPATIENT)
Dept: RADIATION ONCOLOGY | Facility: HOSPITAL | Age: 86
Discharge: HOME | End: 2021-05-05

## 2021-05-05 PROCEDURE — 77386: CPT | Performed by: RADIOLOGY

## 2021-05-06 ENCOUNTER — HOSPITAL ENCOUNTER (OUTPATIENT)
Dept: RADIATION ONCOLOGY | Facility: HOSPITAL | Age: 86
Discharge: HOME | End: 2021-05-06

## 2021-05-06 PROCEDURE — 77386: CPT | Performed by: RADIOLOGY

## 2021-05-07 PROCEDURE — 77336 RADIATION PHYSICS CONSULT: CPT | Performed by: RADIOLOGY

## 2021-05-10 ENCOUNTER — HOSPITAL ENCOUNTER (OUTPATIENT)
Dept: RADIATION ONCOLOGY | Facility: HOSPITAL | Age: 86
Discharge: HOME | End: 2021-05-10

## 2021-05-10 ENCOUNTER — HOSPITAL ENCOUNTER (OUTPATIENT)
Dept: SPEECH THERAPY | Facility: HOSPITAL | Age: 86
Setting detail: THERAPIES SERIES
Discharge: HOME | End: 2021-05-10

## 2021-05-10 DIAGNOSIS — C32.0 MALIGNANT NEOPLASM OF RIGHT VOCAL CORD (HCC): Primary | ICD-10-CM

## 2021-05-10 PROCEDURE — 77386: CPT | Performed by: RADIOLOGY

## 2021-05-10 PROCEDURE — 92526 ORAL FUNCTION THERAPY: CPT

## 2021-05-10 NOTE — THERAPY PROGRESS REPORT/RE-CERT
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   -HG        Dysphagia Goals    Patient will safely consume the recommended diet without complications such as aspiration pneumonia  soft diet and thin liquids.  -HG     Status: Patient will safely consume the recommended diet without complications such as aspiration pneumonia  Progressing as expected  -HG     Comments: Patient will safely consume the recommended diet without complications such as aspiration pneumonia  5/10/12: Pt is avoiding roast beef and pork unless very tender. This date, pt has a protein shake, chicken salad and oatmeal- no difficulties reported. 21: Pt is avoiding milk, some tomato based products and fried foods.   -HG     Patient will increase laryngeal elevation to reduce residue that might fall into airway by completing  Mendelsohn maneuver;super-supraglottic swallow;falsetto/pitch glide;with cues  -HG     Status: Patient will increase laryngeal elevation to reduce residue that might fall into airway by completing  Progressing as expected  -HG     Comments: Patient will increase laryngeal elevation to reduce residue that might fall into airway by completing  5/10/21: Pt reports completing exercises at home. 21: Reviewed exercises with pt and encouraged to complete at least once a day.   -HG     Patient will increase  closure of larynx to keep food from falling into the airway by completing  super-supraglottic swallow;with cues  -HG     Status: Patient will increase closure of larynx to keep food from falling into the airway by completing  Progressing as expected  -HG     Comments: Patient will increase closure of larynx to keep food from falling into the airway by completing  5/10/21: Pt reports completing exercises at home.  4/26/21: Reviewed exercises with pt and encouraged to complete at least once a day.  -HG     Patient will increase strength of tongue base and posterior pharyngeal walls to reduce residue that might fall into airway by completing  effotful swallow;Gi (tongue hold);with cues  -HG     Status: Patient will increase strength of tongue base and posterior pharyngeal walls to reduce residue that might fall into airway by completing  Progressing as expected  -HG     Comments: Patient will increase strength of tongue base and posterior pharyngeal walls to reduce residue that might fall into airway by completing  5/10/21: Pt reports completing exercises at home.  4/26/21: Reviewed exercises with pt and encouraged to complete at least once a day.  -HG     Patient will improve hyolaryngeal elevation via completing Matt (head lift)  sustained lift (comment #/duration of lifts);repetitive lift (comment #/duration of lifts);with cues 30/15  -HG     Status: Patient will improve hyolaryngeal elevation via completing Matt (head lift)  Progressing as expected  -HG     Comments: Patient will improve hyolaryngeal elevation via completing Matt (head lift)  5/10/21: Pt reports completing exercises at home.  4/26/21: Reviewed exercises with pt and encouraged to complete at least once a day. Demonstrated the CTAR for the pt.   -HG        Other Goals    Other Adult Goal- 1  Pt will complete stretching exercises in order to improve strength and flexibility of strap muscles.  -HG     Status: Other Adult Goal- 1  New  -HG      Comments: Other Adult Goal- 1  5/10/21: Pt reports completing exercises at home. 4/26/21: Pt reports not needing to complete this exercise as of yet.   -HG     Other Adult Goal- 2  Pt will complete baseline MBS with recs to follow as indicated.   -HG     Status: Other Adult Goal- 2  Progressing as expected  -HG     Comments: Other Adult Goal- 2  5/10/21: MBS completed on 4/16/21 and revealed Mastication adequate and no significant oral residue. Timing of pharyngeal swallow initiation was functional. No penetration/aspiration appreciated with any consistency tested, even when patient pushed with consecutive drinks. REC regular (soft if more comfortable) diet and thin liquids.   -HG        SLP Time Calculation    SLP Goal Re-Cert Due Date  06/09/21  -HG       User Key  (r) = Recorded By, (t) = Taken By, (c) = Cosigned By    Initials Name Provider Type    Moni Bell MS CCC-SLP Speech and Language Pathologist        OP SLP Education     Row Name 05/10/21 1430       Education    Education Comments  Education for continuation of swallowing exercises as well as encouargement to maintain water and solid intake as tolerated.  Educated about the product xylimelts for dry mouth.   -HG      User Key  (r) = Recorded By, (t) = Taken By, (c) = Cosigned By    Initials Name Effective Dates    Moni Bell MS CCC-SLP 08/09/20 -         OP SLP Assessment/Plan - 05/10/21 1430        SLP Assessment    Functional Problems  Swallowing   -    Impact on Function: Swallowing  Risk of dehydration;Risk of malnourishment   -HG    Functional Problems Comment  Pt is avoiding certains solids due to difficulty with swallowing.   -HG    Clinical Impression Comments  Recent MBS revealed functional swallow.   -HG       SLP Plan    Frequency  1x/week   -HG    Duration  8 weeks   -HG    Planned CPT's?  SLP SWALLOW THERAPY: 22611   -HG    Expected Duration of Therapy Session (SLP Eval)  30   -HG    Plan Comments  Cont with  Prophylactic swallowing tx.    -      User Key  (r) = Recorded By, (t) = Taken By, (c) = Cosigned By    Initials Name Provider Type     Moni Mayes MS CCC-SLP Speech and Language Pathologist              Time Calculation:   SLP Start Time: 1430  Untimed Charges  [id number] Treatment Swallow Minutes: 15  Total Minutes  Untimed Charges Total Minutes: 15   Total Minutes: 15    Therapy Charges for Today     Code Description Service Date Service Provider Modifiers Qty    [id number]  ST TREATMENT SWALLOW 1 5/10/2021 Moni Mayes MS CCC-SLP GN 1                   Moni Mayes MS CCC-SLP  5/10/2021

## 2021-05-11 ENCOUNTER — HOSPITAL ENCOUNTER (OUTPATIENT)
Dept: RADIATION ONCOLOGY | Facility: HOSPITAL | Age: 86
Discharge: HOME | End: 2021-05-11

## 2021-05-11 VITALS — WEIGHT: 182.4 LBS | BODY MASS INDEX: 24.74 KG/M2

## 2021-05-11 PROCEDURE — 77386: CPT | Performed by: RADIOLOGY

## 2021-05-12 ENCOUNTER — HOSPITAL ENCOUNTER (OUTPATIENT)
Dept: RADIATION ONCOLOGY | Facility: HOSPITAL | Age: 86
Discharge: HOME | End: 2021-05-12

## 2021-05-12 ENCOUNTER — DOCUMENTATION (OUTPATIENT)
Dept: NUTRITION | Facility: HOSPITAL | Age: 86
End: 2021-05-12

## 2021-05-12 PROCEDURE — 77386: CPT | Performed by: RADIOLOGY

## 2021-05-12 NOTE — PROGRESS NOTES
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  He states that he continues to eat fairly good, being careful about his food choices and choosing very soft moist consistencies and textures.  He states that living in the assisted living facility, it offers him the option of \"special requests\" for his meals.  He tries to supplement oral intake with ONS, but intake is generally limited to a meal when he is not going to eat anything else.  Strongly encouraged patient to increase hydration to approximately 90 ounces per day, especially in light of increased secretions and fatigue.  Fatigue was very evident today; encouragement provided.    Patient also expressed concern that he is waking up every night with a headache.  Anxious for treatment to complete.   "

## 2021-05-13 ENCOUNTER — HOSPITAL ENCOUNTER (OUTPATIENT)
Dept: RADIATION ONCOLOGY | Facility: HOSPITAL | Age: 86
Discharge: HOME | End: 2021-05-13

## 2021-05-13 PROCEDURE — 77386: CPT | Performed by: RADIOLOGY

## 2021-05-14 ENCOUNTER — HOSPITAL ENCOUNTER (OUTPATIENT)
Dept: RADIATION ONCOLOGY | Facility: HOSPITAL | Age: 86
Discharge: HOME | End: 2021-05-14

## 2021-05-14 PROCEDURE — 77336 RADIATION PHYSICS CONSULT: CPT | Performed by: RADIOLOGY

## 2021-05-14 PROCEDURE — 77386: CPT | Performed by: RADIOLOGY

## 2021-05-17 ENCOUNTER — HOSPITAL ENCOUNTER (OUTPATIENT)
Dept: RADIATION ONCOLOGY | Facility: HOSPITAL | Age: 86
Discharge: HOME | End: 2021-05-17

## 2021-05-17 PROCEDURE — 77386: CPT | Performed by: RADIOLOGY

## 2021-05-18 ENCOUNTER — HOSPITAL ENCOUNTER (OUTPATIENT)
Dept: RADIATION ONCOLOGY | Facility: HOSPITAL | Age: 86
Discharge: HOME | End: 2021-05-18

## 2021-05-18 VITALS — BODY MASS INDEX: 25.2 KG/M2 | WEIGHT: 185.8 LBS

## 2021-05-18 PROCEDURE — 77386: CPT | Performed by: RADIOLOGY

## 2021-05-19 ENCOUNTER — HOSPITAL ENCOUNTER (OUTPATIENT)
Dept: RADIATION ONCOLOGY | Facility: HOSPITAL | Age: 86
Discharge: HOME | End: 2021-05-19

## 2021-05-19 PROCEDURE — 77386: CPT | Performed by: RADIOLOGY

## 2021-05-20 ENCOUNTER — HOSPITAL ENCOUNTER (OUTPATIENT)
Dept: RADIATION ONCOLOGY | Facility: HOSPITAL | Age: 86
Discharge: HOME | End: 2021-05-20

## 2021-05-20 PROCEDURE — 77386: CPT | Performed by: RADIOLOGY

## 2021-05-20 PROCEDURE — 77336 RADIATION PHYSICS CONSULT: CPT | Performed by: RADIOLOGY

## 2021-05-21 ENCOUNTER — HOSPITAL ENCOUNTER (OUTPATIENT)
Dept: RADIATION ONCOLOGY | Facility: HOSPITAL | Age: 86
Discharge: HOME | End: 2021-05-21

## 2021-05-21 PROCEDURE — 77386: CPT | Performed by: RADIOLOGY

## 2021-05-24 ENCOUNTER — HOSPITAL ENCOUNTER (OUTPATIENT)
Dept: SPEECH THERAPY | Facility: HOSPITAL | Age: 86
Setting detail: THERAPIES SERIES
Discharge: HOME | End: 2021-05-24

## 2021-05-24 ENCOUNTER — HOSPITAL ENCOUNTER (OUTPATIENT)
Dept: RADIATION ONCOLOGY | Facility: HOSPITAL | Age: 86
Discharge: HOME | End: 2021-05-24

## 2021-05-24 DIAGNOSIS — C32.0 MALIGNANT NEOPLASM OF RIGHT VOCAL CORD (HCC): Primary | ICD-10-CM

## 2021-05-24 PROCEDURE — 77386: CPT | Performed by: RADIOLOGY

## 2021-05-24 PROCEDURE — 92526 ORAL FUNCTION THERAPY: CPT

## 2021-05-24 NOTE — THERAPY TREATMENT NOTE
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   -HG        Subjective Pain    Able to rate subjective pain?  yes  -HG     Pre-Treatment Pain Level  5  -HG     Subjective Pain Comment  Bilateral sides of neck.   -HG        Dysphagia Goals    Patient will safely consume the recommended diet without complications such as aspiration pneumonia  soft diet and thin liquids.  -HG     Status: Patient will safely consume the recommended diet without complications such as aspiration pneumonia  Progressing as expected  -HG     Comments: Patient will safely consume the recommended diet without complications such as aspiration pneumonia  21: Pt reports eating chicken casserole, chicken noodle soup, vegetable soup, scrambled eggs, pancakes, all tolerated. Pt avoiding tomato based due to spicy. 5/10/12: Pt is avoiding roast beef and pork unless very tender. This date, pt has a protein shake, chicken salad and oatmeal- no difficulties reported. 21: Pt is avoiding milk, some tomato based products and fried foods.   -HG     Patient will increase laryngeal elevation to reduce residue that might fall into airway by completing  Mendelsohn maneuver;super-supraglottic swallow;falsetto/pitch glide;with cues  -HG     Status: Patient will increase laryngeal elevation to reduce residue that might fall into airway by completing  Progressing  as expected  -HG     Comments: Patient will increase laryngeal elevation to reduce residue that might fall into airway by completing  5/24/21: Pt reports completing swallowing exercises at home as tolerated.  5/10/21: Pt reports completing exercises at home. 4/26/21: Reviewed exercises with pt and encouraged to complete at least once a day.   -HG     Patient will increase closure of larynx to keep food from falling into the airway by completing  super-supraglottic swallow;with cues  -HG     Status: Patient will increase closure of larynx to keep food from falling into the airway by completing  Progressing as expected  -HG     Comments: Patient will increase closure of larynx to keep food from falling into the airway by completing  5/24/21: Pt reports completing swallowing exercises at home as tolerated. 5/10/21: Pt reports completing exercises at home.  4/26/21: Reviewed exercises with pt and encouraged to complete at least once a day.  -HG     Patient will increase strength of tongue base and posterior pharyngeal walls to reduce residue that might fall into airway by completing  effotful swallow;Gi (tongue hold);with cues  -HG     Status: Patient will increase strength of tongue base and posterior pharyngeal walls to reduce residue that might fall into airway by completing  Progressing as expected  -HG     Comments: Patient will increase strength of tongue base and posterior pharyngeal walls to reduce residue that might fall into airway by completing  5/24/21: Pt reports completing swallowing exercises at home as tolerated. 5/10/21: Pt reports completing exercises at home.  4/26/21: Reviewed exercises with pt and encouraged to complete at least once a day.  -HG     Patient will improve hyolaryngeal elevation via completing Matt (head lift)  sustained lift (comment #/duration of lifts);repetitive lift (comment #/duration of lifts);with cues 30/15  -HG     Status: Patient will improve hyolaryngeal elevation via  completing Matt (head lift)  Progressing as expected  -HG     Comments: Patient will improve hyolaryngeal elevation via completing Matt (head lift)  5/24/21: Pt reports completing swallowing exercises at home as tolerated. 5/10/21: Pt reports completing exercises at home.  4/26/21: Reviewed exercises with pt and encouraged to complete at least once a day. Demonstrated the CTAR for the pt.   -HG        Other Goals    Other Adult Goal- 1  Pt will complete stretching exercises in order to improve strength and flexibility of strap muscles.  -HG     Status: Other Adult Goal- 1  New  -HG     Comments: Other Adult Goal- 1  5/24/21: Pt reports completing swallowing exercises at home as tolerated. 5/10/21: Pt reports completing exercises at home. 4/26/21: Pt reports not needing to complete this exercise as of yet.   -HG     Other Adult Goal- 2  Pt will complete baseline MBS with recs to follow as indicated.   -HG     Status: Other Adult Goal- 2  Progressing as expected  -HG     Comments: Other Adult Goal- 2  5/10/21: MBS completed on 4/16/21 and revealed Mastication adequate and no significant oral residue. Timing of pharyngeal swallow initiation was functional. No penetration/aspiration appreciated with any consistency tested, even when patient pushed with consecutive drinks. REC regular (soft if more comfortable) diet and thin liquids.   -HG        SLP Time Calculation    SLP Goal Re-Cert Due Date  06/09/21  -HG       User Key  (r) = Recorded By, (t) = Taken By, (c) = Cosigned By    Initials Name Provider Type     Moni Mayes MS CCC-SLP Speech and Language Pathologist        OP SLP Education     Row Name 05/24/21 7400       Education    Education Comments  Education for continuation of prophylactic swallowing exercises in order to maintain safe swallow fxn.  Education for previous MBS and if the need arises for SLP services, referring physician will re-consult.   -HG      User Key  (r) = Recorded By, (t) =  Taken By, (c) = Cosigned By    Initials Name Effective Dates    HG Moni Mayes MS CCC-SLP 08/09/20 -         OP SLP Assessment/Plan - 05/24/21 1430        SLP Plan    Plan Comments  Cont with Prophylactic swallowing exercise.  Plan to call pt in 2 weeks for follow up as this is pt's last day of radiation tx.    -      User Key  (r) = Recorded By, (t) = Taken By, (c) = Cosigned By    Initials Name Provider Type    Moni Bell MS CCC-SLP Speech and Language Pathologist              Time Calculation:   SLP Start Time: 1430  Untimed Charges  [id number] Treatment Swallow Minutes: 30  Total Minutes  Untimed Charges Total Minutes: 30   Total Minutes: 30    Therapy Charges for Today     Code Description Service Date Service Provider Modifiers Qty    00865162137 HC ST TREATMENT SWALLOW 2 5/24/2021 Moni Mayes MS CCC-SLP GN 1                   Moni Mayes MS CCC-SLP  5/24/2021

## 2021-05-25 NOTE — RADIATION COMPLETION NOTES
RADIATION ONCOLOGY COMPLETION NOTE    PATIENT:   Mukund Pool  MEDICAL RECORD:  [id number]  :    1930  COMPLETION DATE: 2021  DIAGNOSIS:   Laryngeal cancer  Stage III (cT3, cN0, cM0)      BRIEF HISTORY:  This 89 y.o. patient completed radiotherapy.  He has a diagnosis of right true vocal cord squamous carcinoma, clinical stage III (T3, N0, M0).        TREATMENT COURSE:  The larynx received a dose of 63 Choudhary delivered in 28 daily fractions of 2.25 Gray using 6 MV photons with helical Israel therapy technique.    DATES OF TREATMENT: 2021 through 2021    TOLERANCE:   typical for treatment site and no unexpected difficulties.     STATUS:  too early to determine response    DISPOSITION:  Follow up in Radiation Oncology in approximately 1 month.        Chano Licona MD

## 2021-06-07 ENCOUNTER — TELEPHONE (OUTPATIENT)
Dept: RADIATION ONCOLOGY | Facility: HOSPITAL | Age: 86
End: 2021-06-07

## 2021-06-07 DIAGNOSIS — C32.0 MALIGNANT NEOPLASM OF RIGHT VOCAL CORD (HCC): Primary | ICD-10-CM

## 2021-06-07 NOTE — TELEPHONE ENCOUNTER
Pt's wife called stating he has copious amounts of mucus.  She states he is unable to lay down at night.  He has to sleep in the recliner and is very uncomfortable.  Discussed with Dr. Licona and called wife back and explained he wants pt to try mucinex otc as directed on box.  Wife verbalized understanding.

## 2021-06-24 ENCOUNTER — OFFICE VISIT (OUTPATIENT)
Dept: RADIATION ONCOLOGY | Facility: HOSPITAL | Age: 86
End: 2021-06-24

## 2021-06-24 ENCOUNTER — HOSPITAL ENCOUNTER (OUTPATIENT)
Dept: RADIATION ONCOLOGY | Facility: HOSPITAL | Age: 86
Setting detail: RADIATION/ONCOLOGY SERIES
Discharge: HOME | End: 2021-06-24

## 2021-06-24 VITALS
BODY MASS INDEX: 25.17 KG/M2 | RESPIRATION RATE: 16 BRPM | HEART RATE: 67 BPM | WEIGHT: 185.8 LBS | DIASTOLIC BLOOD PRESSURE: 65 MMHG | TEMPERATURE: 98.9 F | SYSTOLIC BLOOD PRESSURE: 134 MMHG | HEIGHT: 72 IN | OXYGEN SATURATION: 98 %

## 2021-06-24 DIAGNOSIS — C32.0 MALIGNANT NEOPLASM OF RIGHT VOCAL CORD (HCC): ICD-10-CM

## 2021-06-24 PROCEDURE — G0463 HOSPITAL OUTPT CLINIC VISIT: HCPCS

## 2021-06-24 NOTE — PROGRESS NOTES
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  He underwent debulking biopsy 3/22/2021.  Pathology showed well-differentiated squamous cell carcinoma.  Staging PET/CT shows solitary focus of hypermetabolic uptake, SUV 7.4, involving the area of thickening along the right true vocal cord.  No evidence of pathologic cervical adenopathy or distant metastasis.  He underwent definitive radiotherapy to the larynx of 63 Choudhary in 28 fractions.  He tolerated treatment well.  Grade 1 fatigue is reportedly ongoing.  He continues to endorse increased thick white secretions, particularly with supine positioning.  He developed brisk erythema within the treatment field that has since resolved.  He reports hoarseness is gradually improving.  Mild dysphagia similarly is abating, and he is tolerating a wide range of solids and liquids.    He otherwise denies odynophagia, decreased appetite, weight loss, esophagitis, oral lesions, chest pain, or other acute concerns today.  Overall, he feels well.      MEDICATIONS: Medication reconciliation for the patient was reviewed and confirmed in the electronic medical record.    Review of Systems   Constitutional: Positive for fatigue.   HENT:   Positive for trouble swallowing (improved) and voice change (+dysphonia).    Respiratory: Positive for cough (white sputum).    Musculoskeletal: Positive for arthralgias and neck stiffness.   All other systems reviewed and are negative.      KPS 80%    Physical Exam  Vitals and nursing note reviewed.   Constitutional:       General: He is not in acute distress.     Appearance:  "He is well-developed.   HENT:      Head: Normocephalic and atraumatic.      Mouth/Throat:      Comments: Posterior oropharynx without erythema, lesions, or ulceration.  Mucous membranes moist.  Eyes:      Conjunctiva/sclera: Conjunctivae normal.      Pupils: Pupils are equal, round, and reactive to light.   Neck:      Comments: Thyroid cartilage appears normal.  No fullness.  Good ROM.  Cardiovascular:      Rate and Rhythm: Normal rate and regular rhythm.      Heart sounds: No murmur heard.   No friction rub.   Pulmonary:      Effort: Pulmonary effort is normal.      Breath sounds: Normal breath sounds. No wheezing.   Abdominal:      General: Bowel sounds are normal. There is no distension.      Palpations: Abdomen is soft. There is no mass.      Tenderness: There is no abdominal tenderness.   Musculoskeletal:         General: Normal range of motion.      Cervical back: Normal range of motion and neck supple.      Comments: +Kyphosis   Lymphadenopathy:      Cervical: No cervical adenopathy.      Upper Body:      Right upper body: No supraclavicular adenopathy.      Left upper body: No supraclavicular adenopathy.   Skin:     General: Skin is warm and dry.   Neurological:      Mental Status: He is alert and oriented to person, place, and time.   Psychiatric:         Behavior: Behavior normal.         Thought Content: Thought content normal.         Judgment: Judgment normal.         VITAL SIGNS:   Vitals:    06/24/21 1356   BP: 134/65   Pulse: 67   Resp: 16   Temp: 98.9 °F (37.2 °C)   SpO2: 98%  Comment: RA   Weight: 84.3 kg (185 lb 12.8 oz)   Height: 182.9 cm (72\")   PainSc: 0-No pain       The following portions of the patient's history were reviewed and updated as appropriate: allergies, current medications, past family history, past medical history, past social history, past surgical history and problem list.         Diagnoses and all orders for this visit:    1. 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  He is now 1 month status post radiotherapy to the larynx.  He tolerated treatment well.  Acute residual radiation-related toxicities continue to fiona.  Continue Mucinex and increased PO fluid intake for increased secretions.  He will return for 3-month restaging PET/CT scan to evaluate response to treatment.  In the interim, he is scheduled to return to Dr. Lu for repeat clinical exam and flexible laryngoscopy.  We reviewed follow-up intervals, surveillance recommendations, and expectations for response to treatment.    RECOMMENDATIONS:   Patient will keep his follow-up with Dr. Lu 7/22/2021.  He will return to our clinic in late August following repeat imaging for ongoing surveillance.    Return in about 2 months (around 8/24/2021) for Office Visit, Imaging - See orders.    Ann Chery, APRN

## 2021-07-29 ENCOUNTER — DOCUMENTATION (OUTPATIENT)
Dept: SPEECH THERAPY | Facility: HOSPITAL | Age: 86
End: 2021-07-29

## 2021-07-29 DIAGNOSIS — C32.0 MALIGNANT NEOPLASM OF RIGHT VOCAL CORD (HCC): Primary | ICD-10-CM

## 2021-08-25 ENCOUNTER — HOSPITAL ENCOUNTER (OUTPATIENT)
Dept: PET IMAGING | Facility: HOSPITAL | Age: 86
Discharge: HOME | End: 2021-08-25

## 2021-08-25 ENCOUNTER — OFFICE VISIT (OUTPATIENT)
Dept: RADIATION ONCOLOGY | Facility: HOSPITAL | Age: 86
End: 2021-08-25

## 2021-08-25 ENCOUNTER — HOSPITAL ENCOUNTER (OUTPATIENT)
Dept: RADIATION ONCOLOGY | Facility: HOSPITAL | Age: 86
Setting detail: RADIATION/ONCOLOGY SERIES
Discharge: HOME | End: 2021-08-25

## 2021-08-25 VITALS
TEMPERATURE: 96.6 F | BODY MASS INDEX: 25.61 KG/M2 | SYSTOLIC BLOOD PRESSURE: 190 MMHG | RESPIRATION RATE: 20 BRPM | WEIGHT: 188.8 LBS | HEART RATE: 78 BPM | OXYGEN SATURATION: 90 % | DIASTOLIC BLOOD PRESSURE: 86 MMHG

## 2021-08-25 DIAGNOSIS — C32.0 MALIGNANT NEOPLASM OF RIGHT VOCAL CORD (HCC): ICD-10-CM

## 2021-08-25 DIAGNOSIS — C32.0 MALIGNANT NEOPLASM OF RIGHT VOCAL CORD (HCC): Primary | ICD-10-CM

## 2021-08-25 LAB — GLUCOSE BLDC GLUCOMTR-MCNC: 100 MG/DL (ref 70–130)

## 2021-08-25 PROCEDURE — A9552 F18 FDG: HCPCS | Performed by: NURSE PRACTITIONER

## 2021-08-25 PROCEDURE — 78815 PET IMAGE W/CT SKULL-THIGH: CPT

## 2021-08-25 PROCEDURE — 0 FLUDEOXYGLUCOSE F18 SOLUTION: Performed by: NURSE PRACTITIONER

## 2021-08-25 PROCEDURE — G0463 HOSPITAL OUTPT CLINIC VISIT: HCPCS

## 2021-08-25 PROCEDURE — 82962 GLUCOSE BLOOD TEST: CPT

## 2021-08-25 RX ADMIN — FLUDEOXYGLUCOSE F18 1 DOSE: 300 INJECTION INTRAVENOUS at 12:09

## 2021-08-25 NOTE — PROGRESS NOTES
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  After debulking surgery he underwent a course of definitive radiotherapy.  He is currently swallowing most foods well in spite of chronic acid reflux.  Patient normally mouth is fairly moist.  He is maintaining body weight.  He is slowly regaining energy and strength.  He suffered a fall shortly after treatment and uses a rolling walker for stability.  Voice continues to increase in strength and quality.    PET/CT evaluation earlier today shows resolution of the previous hypermetabolic tumor in the right larynx.  There is now low-level diffuse activity more consistent with postradiation healing.  There is no pathologic lymphadenopathy.  No evidence of distant metastasis.    Blood pressure is elevated at the time of this visit.  Patient reports missing his daily dose of antihypertensive medication today while getting ready for his appointments.  He will take his medication when he gets home.    MEDICATIONS: Medication reconciliation for the patient was reviewed and confirmed in the electronic medical record.    Review of Systems   Constitutional: Positive for fatigue.   HENT:   Positive for voice change.    Neurological: Positive for dizziness, headaches and light-headedness.       KPS 70%    Physical Exam  Vitals and nursing note reviewed.   Constitutional:       Appearance: He is well-developed.   HENT:      Head: Normocephalic and atraumatic.      Comments: Skin of the neck is well-healed.  No dermatitis.  No intraoral lesions.  Thyroid cartilage is normal  and nontender without mass.  Cardiovascular:      Rate and Rhythm: Normal rate and regular rhythm.      Heart sounds: Normal heart sounds. No murmur heard.     Pulmonary:      Effort: Pulmonary effort is normal.      Breath sounds: Normal breath sounds. No wheezing or rales.   Abdominal:      General: Bowel sounds are normal. There is no distension.      Palpations: Abdomen is soft.      Tenderness: There is no abdominal tenderness.   Musculoskeletal:         General: No tenderness. Normal range of motion.      Cervical back: Normal range of motion and neck supple.   Lymphadenopathy:      Cervical: No cervical adenopathy.      Upper Body:      Right upper body: No supraclavicular adenopathy.      Left upper body: No supraclavicular adenopathy.   Skin:     General: Skin is warm and dry.   Neurological:      Mental Status: He is alert and oriented to person, place, and time.      Sensory: No sensory deficit.   Psychiatric:         Behavior: Behavior normal.         Thought Content: Thought content normal.         Judgment: Judgment normal.         VITAL SIGNS:   Vitals:    08/25/21 1342   BP: (!) 190/86   Pulse: 78   Resp: 20   Temp: 96.6 °F (35.9 °C)   TempSrc: Temporal   SpO2: 90%   Weight: 85.6 kg (188 lb 12.8 oz)   PainSc: 0-No pain                   The following portions of the patient's history were reviewed and updated as appropriate: allergies, current medications, past family history, past medical history, past social history, past surgical history and problem list.         Diagnoses and all orders for this visit:    1. 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  3-month status post radiotherapy.  He tolerated treatment fairly well, in spite of his age.  Swallowing is fairly good and voice quality continues to improve.  PET/CT restaging today shows complete resolution of the hypermetabolic right  laryngeal tumor indicating complete radiographic remission status.    RECOMMENDATIONS: He will continue to have close ENT follow-up with Dr. Lu.  I will see him back for follow-up in approximately 6 months.    Return in about 6 months (around 2/25/2022) for Office Visit.    Chano Licona MD

## 2022-02-25 ENCOUNTER — HOSPITAL ENCOUNTER (OUTPATIENT)
Dept: RADIATION ONCOLOGY | Facility: HOSPITAL | Age: 87
Setting detail: RADIATION/ONCOLOGY SERIES
Discharge: HOME | End: 2022-02-25

## 2022-02-25 ENCOUNTER — OFFICE VISIT (OUTPATIENT)
Dept: RADIATION ONCOLOGY | Facility: HOSPITAL | Age: 87
End: 2022-02-25

## 2022-02-25 VITALS
DIASTOLIC BLOOD PRESSURE: 78 MMHG | TEMPERATURE: 96.8 F | SYSTOLIC BLOOD PRESSURE: 181 MMHG | HEIGHT: 72 IN | BODY MASS INDEX: 25.76 KG/M2 | WEIGHT: 190.2 LBS | OXYGEN SATURATION: 97 % | RESPIRATION RATE: 18 BRPM | HEART RATE: 77 BPM

## 2022-02-25 DIAGNOSIS — C32.0: Primary | ICD-10-CM

## 2022-02-25 NOTE — PROGRESS NOTES
FOLLOW UP NOTE    PATIENT:                                                      Mukund Pool  MEDICAL RECORD #:                        [id number]  :                                                          1930  COMPLETION DATE:   2021  DIAGNOSIS:     Malignant neoplasm of right vocal cord (HCC)  - Stage III (cT3, cN0, cM0)      BRIEF HISTORY:    Routine follow-up visit.  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  After debulking surgery he underwent a course of definitive radiotherapy.  He is swallowing most foods well in spite of chronic acid reflux.  Mouth is fairly moist.  He is maintaining body weight.  He is still fatigued, but slowly regaining energy and strength, going to exercise classes 3 times weekly.  He uses a rolling walker for stability only on longer outings.  Voice continues to slowly increase in strength and quality.  Posttreatment PET/CT showed complete resolution of the hypermetabolic right laryngeal mass.  There was no evidence of hypermetabolic lymphadenopathy.  Specifically, the residual firm subcutaneous left upper neck nodule is not hypermetabolic.  He continues ENT surveillance under the care of Dr. Lu.  His last exam in 2021 showed no residual mass.  No evidence of neoplasm.  No leukoplakia or ulceration.    MEDICATIONS: Medication reconciliation for the patient was reviewed and confirmed in the electronic medical record.    Review of Systems   Constitutional: Positive for fatigue.   HENT:   Positive for trouble swallowing and voice change.    Respiratory: Positive for cough.    Musculoskeletal: Positive for gait problem.   Neurological: Positive for gait problem and headaches.             Physical Exam  Vitals and nursing note reviewed.   Constitutional:       Appearance: He is well-developed.   HENT:      Head: Normocephalic and atraumatic.      Comments: Skin on the neck is well-healed.  Thyroid cartilage is mobile and  "nontender.  No mass-effect.  No pathologic lymphadenopathy.  There is a firm ovoid 1 x 1.5 cm subcutaneous nodule below the left angle of the jaw overlying the submandibular gland.  This is mobile.  This is nontender.  Cardiovascular:      Rate and Rhythm: Normal rate and regular rhythm.      Heart sounds: Normal heart sounds. No murmur heard.      Pulmonary:      Effort: Pulmonary effort is normal.      Breath sounds: Normal breath sounds. No wheezing or rales.   Chest:   Breasts:      Right: No supraclavicular adenopathy.      Left: No supraclavicular adenopathy.       Abdominal:      General: Bowel sounds are normal. There is no distension.      Palpations: Abdomen is soft.      Tenderness: There is no abdominal tenderness.   Musculoskeletal:         General: No tenderness. Normal range of motion.      Cervical back: Normal range of motion and neck supple.   Lymphadenopathy:      Cervical: No cervical adenopathy.      Upper Body:      Right upper body: No supraclavicular adenopathy.      Left upper body: No supraclavicular adenopathy.   Skin:     General: Skin is warm and dry.   Neurological:      Mental Status: He is alert and oriented to person, place, and time.      Sensory: No sensory deficit.   Psychiatric:         Behavior: Behavior normal.         Thought Content: Thought content normal.         Judgment: Judgment normal.         VITAL SIGNS:   Vitals:    02/25/22 0856   BP: (!) 181/78   Pulse: 77   Resp: 18   Temp: 96.8 °F (36 °C)   SpO2: 97%  Comment: RA   Weight: 86.3 kg (190 lb 3.2 oz)   Height: 182.9 cm (72\")   PainSc: 0-No pain                   KSP %:  80    The following portions of the patient's history were reviewed and updated as appropriate: allergies, current medications, past family history, past medical history, past social history, past surgical history and problem list.         Diagnoses and all orders for this visit:    1. Malignant neoplasm of right vocal cord (HCC) (Primary)       "   IMPRESSION:  Right true vocal cord squamous carcinoma, clinical stage III (T3, N0, M0).  9-month status post radiotherapy.  He had a complete clinical and radiographic response to treatment.  He tolerated treatment fairly well, in spite of his age.  Swallowing is fairly good and voice quality continue to improve.  There is a stable, presumably benign, contralateral left upper neck nodule overlying the submandibular gland which patient reports has been stable for the past 2 years.  This was not hypermetabolic on PET imaging.    RECOMMENDATIONS: He will continue ENT surveillance under the care of Dr. Lu.  I will see him back again in 6 months.    Return in about 6 months (around 8/25/2022) for Office Visit.    Chano Licona MD    20 minutes was spent with patient and reviewing records.

## 2022-09-02 ENCOUNTER — HOSPITAL ENCOUNTER (OUTPATIENT)
Dept: RADIATION ONCOLOGY | Facility: HOSPITAL | Age: 87
Setting detail: RADIATION/ONCOLOGY SERIES
Discharge: HOME | End: 2022-09-02

## 2022-09-09 ENCOUNTER — OFFICE VISIT (OUTPATIENT)
Dept: RADIATION ONCOLOGY | Facility: HOSPITAL | Age: 87
End: 2022-09-09

## 2022-09-09 VITALS
DIASTOLIC BLOOD PRESSURE: 70 MMHG | SYSTOLIC BLOOD PRESSURE: 157 MMHG | TEMPERATURE: 97.4 F | RESPIRATION RATE: 16 BRPM | WEIGHT: 185 LBS | OXYGEN SATURATION: 92 % | BODY MASS INDEX: 25.06 KG/M2 | HEIGHT: 72 IN | HEART RATE: 71 BPM

## 2022-09-09 DIAGNOSIS — C32.0: Primary | ICD-10-CM

## 2022-09-09 NOTE — PROGRESS NOTES
FOLLOW UP NOTE    PATIENT:                                                      Mukund Pool  MEDICAL RECORD #:                        [id number]  :                                                          1930  COMPLETION DATE:   2021  DIAGNOSIS:     Malignant neoplasm of right vocal cord (HCC)  - Stage III (cT3, cN0, cM0)      BRIEF HISTORY:    Routine follow-up visit.  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  After debulking surgery he underwent a course of definitive radiotherapy.  He tolerated treatment well.  From a symptom standpoint, he notes stable voice quality.  He describes voice fatigue towards the end of the day.  He denies dysphagia or odynophagia.  He is swallowing most foods well in spite of chronic acid reflux.  Mouth is fairly moist.  He is maintaining body weight.  He notes recent extraction of several teeth and is waiting on a partial plate to be made.  He denies oral lesions or pain.  He reports chronic, daily fatigue.  Posttreatment PET/CTs showed complete resolution of the hypermetabolic right laryngeal mass.  There was no evidence of hypermetabolic lymphadenopathy.  Specifically, the residual firm subcutaneous left upper neck nodule is not hypermetabolic.  He continues ENT surveillance under the care of Dr. Lu.  His last exam in 2022 showed no residual mass.  No evidence of neoplasm.  No leukoplakia or ulceration.        MEDICATIONS: Medication reconciliation for the patient was reviewed and confirmed in the electronic medical record.    Review of Systems   Constitutional: Positive for fatigue.   HENT:   Positive for voice change (stable).    Musculoskeletal: Positive for gait problem (uses walker for distances).   Neurological: Positive for gait problem (uses walker for distances).   All other systems reviewed and are negative.           KPS 80%    Physical Exam  Vitals and nursing note reviewed.   Constitutional:       General: He is  "not in acute distress.     Appearance: Normal appearance. He is well-developed.   HENT:      Head: Normocephalic and atraumatic.      Mouth/Throat:      Mouth: Mucous membranes are moist.      Pharynx: No oropharyngeal exudate or posterior oropharyngeal erythema.   Eyes:      Conjunctiva/sclera: Conjunctivae normal.      Pupils: Pupils are equal, round, and reactive to light.   Neck:      Comments: Thyroid cartilage is mobile and nontender.  No suspicious lymphadenopathy.  There is a firm ovoid 1 x 1.5 cm subcutaneous nodule below the left angle of the jaw overlying the submandibular gland.  This is mobile, nontender, and stable.  Cardiovascular:      Rate and Rhythm: Normal rate and regular rhythm.      Heart sounds: No murmur heard.    No friction rub.   Pulmonary:      Effort: Pulmonary effort is normal.      Breath sounds: Normal breath sounds. No wheezing.   Abdominal:      General: Bowel sounds are normal. There is no distension.      Palpations: Abdomen is soft. There is no mass.      Tenderness: There is no abdominal tenderness.   Musculoskeletal:         General: Normal range of motion.      Cervical back: Normal range of motion and neck supple.      Comments: + kyphosis.  Uses cane with ambulation.   Lymphadenopathy:      Cervical: No cervical adenopathy.   Skin:     General: Skin is warm and dry.   Neurological:      Mental Status: He is alert and oriented to person, place, and time.   Psychiatric:         Behavior: Behavior normal.         Thought Content: Thought content normal.         Judgment: Judgment normal.         VITAL SIGNS:   Vitals:    09/09/22 1012   BP: 157/70   Pulse: 71   Resp: 16   Temp: 97.4 °F (36.3 °C)   TempSrc: Temporal   SpO2: 92%   Weight: 83.9 kg (185 lb)   Height: 182.9 cm (72\")   PainSc: 0-No pain             The following portions of the patient's history were reviewed and updated as appropriate: allergies, current medications, past family history, past medical history, past " social history, past surgical history and problem list.         Diagnoses and all orders for this visit:    1. Malignant neoplasm of right vocal cord (HCC) (Primary)         IMPRESSION:  Right true vocal cord squamous carcinoma, clinical stage III (T3, N0, M0).  1 year, 3 months status post radiotherapy.  He has had a complete clinical and radiographic response to treatment.  He tolerated treatment fairly well, in spite of his age.  Swallowing is fairly good and voice quality is stable.  There is a stable, presumably benign, contralateral left upper neck nodule overlying the submandibular gland which patient reports has been stable for the past several years.  This was not hypermetabolic on PET imaging.  We again reviewed follow-up intervals, ENT surveillance with repeat flexible laryngoscopy, and continued expectations for response to treatment.      RECOMMENDATIONS:  He will continue ENT surveillance under the care of Dr. Lu.  I will see him back again in 6 months.      Return in about 6 months (around 3/9/2023) for Office Visit.    CYNDEE Eckert      I spent a total of 30 minutes on today's visit, with more than 12 minutes in direct face to face communication, and the remainder of the time spent in reviewing the relevant history, records, available imaging, and for documentation.

## 2023-03-27 ENCOUNTER — HOSPITAL ENCOUNTER (OUTPATIENT)
Dept: RADIATION ONCOLOGY | Facility: HOSPITAL | Age: 88
Setting detail: RADIATION/ONCOLOGY SERIES
Discharge: HOME | End: 2023-03-27
Payer: MEDICARE

## 2023-04-19 ENCOUNTER — HOSPITAL ENCOUNTER (OUTPATIENT)
Dept: RADIATION ONCOLOGY | Facility: HOSPITAL | Age: 88
Setting detail: RADIATION/ONCOLOGY SERIES
Discharge: HOME | End: 2023-04-19
Payer: MEDICARE

## 2023-04-19 ENCOUNTER — OFFICE VISIT (OUTPATIENT)
Dept: RADIATION ONCOLOGY | Facility: HOSPITAL | Age: 88
End: 2023-04-19
Payer: MEDICARE

## 2023-04-19 VITALS
SYSTOLIC BLOOD PRESSURE: 101 MMHG | DIASTOLIC BLOOD PRESSURE: 68 MMHG | RESPIRATION RATE: 20 BRPM | TEMPERATURE: 97 F | OXYGEN SATURATION: 97 % | HEART RATE: 135 BPM | WEIGHT: 189 LBS | BODY MASS INDEX: 25.63 KG/M2

## 2023-04-19 DIAGNOSIS — C32.0: Primary | ICD-10-CM

## 2023-04-19 PROCEDURE — G0463 HOSPITAL OUTPT CLINIC VISIT: HCPCS

## 2023-04-19 RX ORDER — RANOLAZINE 500 MG/1
500 TABLET, EXTENDED RELEASE ORAL DAILY
COMMUNITY
Start: 2022-11-28

## 2023-04-19 RX ORDER — EZETIMIBE 10 MG/1
10 TABLET ORAL DAILY
COMMUNITY
Start: 2022-11-28

## 2023-04-19 NOTE — PROGRESS NOTES
"Spoke with JULI Mills, for , informed , /68 sitting, 119/76 standing, sat 97% on room air. Informed pt had episode earlier today and felt like he was going to \"blackout\".  Pt states he feels weak.  Gene states the office will close in 20min and pt needs to report to ER at [facility].  He states he will call ahead to hospitalist to have pt admitted for afib/flutter.   Pt taken to car in wheelchair.   from assisted living instructed to take pt to [facility] ER.   verbalized understanding.   "

## 2023-04-19 NOTE — PROGRESS NOTES
FOLLOW UP NOTE    PATIENT:                                                      Mukund Pool  MEDICAL RECORD #:                        [id number]  :                                                          1930  COMPLETION DATE:   2021  DIAGNOSIS:     Malignant neoplasm of right vocal cord  - Stage III (cT3, cN0, cM0)      BRIEF HISTORY:    Routine follow-up visit.  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  After debulking surgery he underwent a course of definitive radiotherapy.  He tolerated treatment well.  From a symptom standpoint, he notes stable voice quality.   He denies dysphagia or odynophagia.  He is swallowing most foods well in spite of chronic acid reflux.  Mouth is fairly moist.  He is maintaining body weight.  He notes recent extraction of several teeth and is waiting on a partial plate to be made.  He denies oral lesions or pain.  He reports chronic, daily fatigue.  Posttreatment PET/CTs showed complete resolution of the hypermetabolic right laryngeal mass.  There was no evidence of hypermetabolic lymphadenopathy.  Specifically, the residual firm subcutaneous left upper neck nodule is not hypermetabolic.  He continues ENT surveillance under the care of Dr. Lu.    ENT exams have shown no residual mass.  No evidence of neoplasm.  No leukoplakia or ulceration.    Patient has recently experienced several falls with near syncope.  He has seen cardiology and electrophysiologist at [facility].  He currently has a loop recorder device.  He has had significant fatigue and dyspnea on exertion today but without diaphoresis or chest pain.    MEDICATIONS: Medication reconciliation for the patient was reviewed and confirmed in the electronic medical record.    Review of Systems   Constitutional: Positive for appetite change and fatigue.   HENT:   Positive for hearing loss.    Cardiovascular:        Pt states he was in the hospital at [facility]  week and had to have a loop recorder placed because he had blacked out.  Pt reports earlier today he felt like he was going to black out.  Pulse thready, irregular and tachycardic.  Dr. Licona informed. Pt's cardiologist,  Dr. Childress's PA Gene kenney.    -135, BP sitting 101/68, standing 119/76,  Sat 97% on room air.    Musculoskeletal: Positive for gait problem.   Neurological: Positive for dizziness, gait problem and light-headedness.   All other systems reviewed and are negative.            Physical Exam  Vitals and nursing note reviewed.   Constitutional:       Appearance: He is well-developed.   HENT:      Head: Normocephalic and atraumatic.      Comments: Stable 1.5 cm soft subcutaneous left upper neck lesion below the leg along mandible.  No suspicious neck masses.  Thyroid cartilage is mobile and without tenderness or mass.  Intraoral lesion mouth is moist  Cardiovascular:      Rate and Rhythm: Normal rate and regular rhythm.      Heart sounds: Normal heart sounds. No murmur heard.  Pulmonary:      Effort: Pulmonary effort is normal.      Breath sounds: Normal breath sounds. No wheezing or rales.   Abdominal:      General: Bowel sounds are normal. There is no distension.      Palpations: Abdomen is soft.      Tenderness: There is no abdominal tenderness.   Musculoskeletal:         General: No tenderness. Normal range of motion.      Cervical back: Normal range of motion and neck supple.   Lymphadenopathy:      Cervical: No cervical adenopathy.      Upper Body:      Right upper body: No supraclavicular adenopathy.      Left upper body: No supraclavicular adenopathy.   Skin:     General: Skin is warm and dry.   Neurological:      Mental Status: He is alert and oriented to person, place, and time.      Sensory: No sensory deficit.   Psychiatric:         Behavior: Behavior normal.         Thought Content: Thought content normal.         Judgment: Judgment normal.         VITAL SIGNS:   Vitals:    04/19/23  1509   BP: 101/68  Comment: sitting, 119/76 standing.   Pulse: (!) 135  Comment: irregular pulse   Resp: 20   Temp: 97 °F (36.1 °C)   TempSrc: Skin   SpO2: 97%   Weight: 85.7 kg (189 lb)   PainSc: 0-No pain                   KSP %:  60    The following portions of the patient's history were reviewed and updated as appropriate: allergies, current medications, past family history, past medical history, past social history, past surgical history and problem list.         Diagnoses and all orders for this visit:    1. Malignant neoplasm of right vocal cord (Primary)         IMPRESSION:  Right true vocal cord squamous carcinoma, clinical stage III (T3, N0, M0).  1 year, 10 months status post radiotherapy.  He has had a complete clinical and radiographic response to treatment.  He tolerated treatment fairly well, in spite of his age.  Swallowing is fairly good and voice quality is stable.  There is a stable, presumably benign, contralateral left upper neck nodule overlying the submandibular gland which patient reports has been stable for the past several years.  This was not hypermetabolic on PET imaging.    He has had recent syncopal episodes, wearing a loop recorder, and today feels very weak.  He has tachyarrhythmia.  We contacted his cardiologist/electrophysiologist office and they advised to send him to the emergency room for evaluation since they were not able to see him today.  Patient and  were advised to do what they instructed.    RECOMMENDATIONS: Regarding the vocal cord tumor, in remission, I will be happy to be available if needed, since he is being followed closely by his ENT physician, Dr. Lu.    I spent a total of 15 minutes on todays visit, with more than 10 minutes in direct face to face communication, and the remainder of the time spent in reviewing the relevant history, records, available imaging, and for documentation.    Return if symptoms worsen or fail to improve.    Chano Licona,  MD

## 2023-09-29 ENCOUNTER — APPOINTMENT (OUTPATIENT)
Dept: CT IMAGING | Facility: HOSPITAL | Age: 88
DRG: 291 | End: 2023-09-29
Payer: MEDICARE

## 2023-09-29 ENCOUNTER — APPOINTMENT (OUTPATIENT)
Dept: GENERAL RADIOLOGY | Facility: HOSPITAL | Age: 88
DRG: 291 | End: 2023-09-29
Payer: MEDICARE

## 2023-09-29 ENCOUNTER — HOSPITAL ENCOUNTER (INPATIENT)
Facility: HOSPITAL | Age: 88
LOS: 1 days | Discharge: HOME HEALTH | DRG: 291 | End: 2023-10-05
Attending: EMERGENCY MEDICINE | Admitting: INTERNAL MEDICINE
Payer: MEDICARE

## 2023-09-29 DIAGNOSIS — J81.0 ACUTE PULMONARY EDEMA: ICD-10-CM

## 2023-09-29 DIAGNOSIS — D63.8 ANEMIA, CHRONIC DISEASE: ICD-10-CM

## 2023-09-29 DIAGNOSIS — R91.8 BILATERAL PULMONARY INFILTRATES: ICD-10-CM

## 2023-09-29 DIAGNOSIS — J18.9 PNEUMONIA OF BOTH LOWER LOBES DUE TO INFECTIOUS ORGANISM: Primary | ICD-10-CM

## 2023-09-29 DIAGNOSIS — N28.9 RENAL INSUFFICIENCY: ICD-10-CM

## 2023-09-29 DIAGNOSIS — I48.0 PAROXYSMAL ATRIAL FIBRILLATION: ICD-10-CM

## 2023-09-29 DIAGNOSIS — R13.12 OROPHARYNGEAL DYSPHAGIA: ICD-10-CM

## 2023-09-29 DIAGNOSIS — Z86.79 HISTORY OF ATRIAL FIBRILLATION: ICD-10-CM

## 2023-09-29 DIAGNOSIS — I25.119 CORONARY ARTERY DISEASE INVOLVING NATIVE CORONARY ARTERY OF NATIVE HEART WITH ANGINA PECTORIS: ICD-10-CM

## 2023-09-29 DIAGNOSIS — R55 SYNCOPE, UNSPECIFIED SYNCOPE TYPE: ICD-10-CM

## 2023-09-29 DIAGNOSIS — Z74.09 IMPAIRED FUNCTIONAL MOBILITY, BALANCE, GAIT, AND ENDURANCE: ICD-10-CM

## 2023-09-29 DIAGNOSIS — I50.21 ACUTE HFREF (HEART FAILURE WITH REDUCED EJECTION FRACTION): ICD-10-CM

## 2023-09-29 DIAGNOSIS — R07.9 CHEST PAIN, UNSPECIFIED TYPE: ICD-10-CM

## 2023-09-29 DIAGNOSIS — R09.02 HYPOXIA: ICD-10-CM

## 2023-09-29 DIAGNOSIS — I50.23 ACUTE ON CHRONIC HFREF (HEART FAILURE WITH REDUCED EJECTION FRACTION): ICD-10-CM

## 2023-09-29 PROBLEM — N18.32 STAGE 3B CHRONIC KIDNEY DISEASE: Status: ACTIVE | Noted: 2023-09-29

## 2023-09-29 PROBLEM — Z85.89 HISTORY OF HEAD AND NECK CANCER: Status: ACTIVE | Noted: 2021-01-01

## 2023-09-29 PROBLEM — R63.4 WEIGHT LOSS: Status: ACTIVE | Noted: 2023-09-29

## 2023-09-29 LAB
ALBUMIN SERPL-MCNC: 3.9 G/DL (ref 3.5–5.2)
ALBUMIN/GLOB SERPL: 1.1 G/DL
ALP SERPL-CCNC: 103 U/L (ref 39–117)
ALT SERPL W P-5'-P-CCNC: 17 U/L (ref 1–41)
ANION GAP SERPL CALCULATED.3IONS-SCNC: 10 MMOL/L (ref 5–15)
AST SERPL-CCNC: 19 U/L (ref 1–40)
BASOPHILS # BLD AUTO: 0.07 10*3/MM3 (ref 0–0.2)
BASOPHILS NFR BLD AUTO: 0.9 % (ref 0–1.5)
BILIRUB SERPL-MCNC: 1.1 MG/DL (ref 0–1.2)
BUN SERPL-MCNC: 22 MG/DL (ref 8–23)
BUN/CREAT SERPL: 13.4 (ref 7–25)
CALCIUM SPEC-SCNC: 8.9 MG/DL (ref 8.2–9.6)
CHLORIDE SERPL-SCNC: 103 MMOL/L (ref 98–107)
CO2 SERPL-SCNC: 25 MMOL/L (ref 22–29)
CREAT SERPL-MCNC: 1.64 MG/DL (ref 0.76–1.27)
CRP SERPL-MCNC: 0.54 MG/DL (ref 0–0.5)
D DIMER PPP FEU-MCNC: 0.6 MCGFEU/ML (ref 0–0.93)
D-LACTATE SERPL-SCNC: 1.8 MMOL/L (ref 0.5–2)
DEPRECATED RDW RBC AUTO: 53.1 FL (ref 37–54)
EGFRCR SERPLBLD CKD-EPI 2021: 38.8 ML/MIN/1.73
EOSINOPHIL # BLD AUTO: 0.03 10*3/MM3 (ref 0–0.4)
EOSINOPHIL NFR BLD AUTO: 0.4 % (ref 0.3–6.2)
ERYTHROCYTE [DISTWIDTH] IN BLOOD BY AUTOMATED COUNT: 13.5 % (ref 12.3–15.4)
FERRITIN SERPL-MCNC: 129.8 NG/ML (ref 30–400)
FLUAV RNA RESP QL NAA+PROBE: NOT DETECTED
FLUBV RNA RESP QL NAA+PROBE: NOT DETECTED
GLOBULIN UR ELPH-MCNC: 3.4 GM/DL
GLUCOSE SERPL-MCNC: 133 MG/DL (ref 65–99)
HCT VFR BLD AUTO: 35.2 % (ref 37.5–51)
HGB BLD-MCNC: 11.3 G/DL (ref 13–17.7)
HOLD SPECIMEN: NORMAL
IMM GRANULOCYTES # BLD AUTO: 0.04 10*3/MM3 (ref 0–0.05)
IMM GRANULOCYTES NFR BLD AUTO: 0.5 % (ref 0–0.5)
LDH SERPL-CCNC: 178 U/L (ref 135–225)
LYMPHOCYTES # BLD AUTO: 1.39 10*3/MM3 (ref 0.7–3.1)
LYMPHOCYTES NFR BLD AUTO: 17.5 % (ref 19.6–45.3)
MCH RBC QN AUTO: 34.1 PG (ref 26.6–33)
MCHC RBC AUTO-ENTMCNC: 32.1 G/DL (ref 31.5–35.7)
MCV RBC AUTO: 106.3 FL (ref 79–97)
MONOCYTES # BLD AUTO: 0.68 10*3/MM3 (ref 0.1–0.9)
MONOCYTES NFR BLD AUTO: 8.5 % (ref 5–12)
NEUTROPHILS NFR BLD AUTO: 5.75 10*3/MM3 (ref 1.7–7)
NEUTROPHILS NFR BLD AUTO: 72.2 % (ref 42.7–76)
NRBC BLD AUTO-RTO: 0 /100 WBC (ref 0–0.2)
NT-PROBNP SERPL-MCNC: 5453 PG/ML (ref 0–1800)
PLATELET # BLD AUTO: 258 10*3/MM3 (ref 140–450)
PMV BLD AUTO: 9.1 FL (ref 6–12)
POTASSIUM SERPL-SCNC: 5.1 MMOL/L (ref 3.5–5.2)
PROCALCITONIN SERPL-MCNC: 0.05 NG/ML (ref 0–0.25)
PROT SERPL-MCNC: 7.3 G/DL (ref 6–8.5)
RBC # BLD AUTO: 3.31 10*6/MM3 (ref 4.14–5.8)
SARS-COV-2 RNA RESP QL NAA+PROBE: NOT DETECTED
SODIUM SERPL-SCNC: 138 MMOL/L (ref 136–145)
TROPONIN T SERPL HS-MCNC: 40 NG/L
TSH SERPL DL<=0.05 MIU/L-ACNC: 3.14 UIU/ML (ref 0.27–4.2)
WBC NRBC COR # BLD: 7.96 10*3/MM3 (ref 3.4–10.8)
WHOLE BLOOD HOLD COAG: NORMAL
WHOLE BLOOD HOLD SPECIMEN: NORMAL

## 2023-09-29 PROCEDURE — 71250 CT THORAX DX C-: CPT

## 2023-09-29 PROCEDURE — 82607 VITAMIN B-12: CPT | Performed by: INTERNAL MEDICINE

## 2023-09-29 PROCEDURE — 84443 ASSAY THYROID STIM HORMONE: CPT | Performed by: INTERNAL MEDICINE

## 2023-09-29 PROCEDURE — G0378 HOSPITAL OBSERVATION PER HR: HCPCS

## 2023-09-29 PROCEDURE — 84484 ASSAY OF TROPONIN QUANT: CPT | Performed by: EMERGENCY MEDICINE

## 2023-09-29 PROCEDURE — 80053 COMPREHEN METABOLIC PANEL: CPT | Performed by: EMERGENCY MEDICINE

## 2023-09-29 PROCEDURE — 25010000002 FUROSEMIDE PER 20 MG: Performed by: PHYSICIAN ASSISTANT

## 2023-09-29 PROCEDURE — 83615 LACTATE (LD) (LDH) ENZYME: CPT | Performed by: INTERNAL MEDICINE

## 2023-09-29 PROCEDURE — 25010000002 CEFTRIAXONE PER 250 MG: Performed by: PHYSICIAN ASSISTANT

## 2023-09-29 PROCEDURE — 94799 UNLISTED PULMONARY SVC/PX: CPT

## 2023-09-29 PROCEDURE — 82746 ASSAY OF FOLIC ACID SERUM: CPT | Performed by: INTERNAL MEDICINE

## 2023-09-29 PROCEDURE — 74176 CT ABD & PELVIS W/O CONTRAST: CPT

## 2023-09-29 PROCEDURE — 93005 ELECTROCARDIOGRAM TRACING: CPT | Performed by: INTERNAL MEDICINE

## 2023-09-29 PROCEDURE — 87040 BLOOD CULTURE FOR BACTERIA: CPT | Performed by: PHYSICIAN ASSISTANT

## 2023-09-29 PROCEDURE — 83880 ASSAY OF NATRIURETIC PEPTIDE: CPT | Performed by: EMERGENCY MEDICINE

## 2023-09-29 PROCEDURE — 93010 ELECTROCARDIOGRAM REPORT: CPT | Performed by: INTERNAL MEDICINE

## 2023-09-29 PROCEDURE — 94664 DEMO&/EVAL PT USE INHALER: CPT

## 2023-09-29 PROCEDURE — 93005 ELECTROCARDIOGRAM TRACING: CPT | Performed by: EMERGENCY MEDICINE

## 2023-09-29 PROCEDURE — 94640 AIRWAY INHALATION TREATMENT: CPT

## 2023-09-29 PROCEDURE — 85379 FIBRIN DEGRADATION QUANT: CPT | Performed by: INTERNAL MEDICINE

## 2023-09-29 PROCEDURE — 85025 COMPLETE CBC W/AUTO DIFF WBC: CPT | Performed by: EMERGENCY MEDICINE

## 2023-09-29 PROCEDURE — 99285 EMERGENCY DEPT VISIT HI MDM: CPT

## 2023-09-29 PROCEDURE — 86140 C-REACTIVE PROTEIN: CPT | Performed by: INTERNAL MEDICINE

## 2023-09-29 PROCEDURE — 87636 SARSCOV2 & INF A&B AMP PRB: CPT | Performed by: PHYSICIAN ASSISTANT

## 2023-09-29 PROCEDURE — 83605 ASSAY OF LACTIC ACID: CPT | Performed by: PHYSICIAN ASSISTANT

## 2023-09-29 PROCEDURE — 92610 EVALUATE SWALLOWING FUNCTION: CPT

## 2023-09-29 PROCEDURE — 82728 ASSAY OF FERRITIN: CPT | Performed by: INTERNAL MEDICINE

## 2023-09-29 PROCEDURE — 36415 COLL VENOUS BLD VENIPUNCTURE: CPT

## 2023-09-29 PROCEDURE — 71045 X-RAY EXAM CHEST 1 VIEW: CPT

## 2023-09-29 PROCEDURE — 84145 PROCALCITONIN (PCT): CPT | Performed by: PHYSICIAN ASSISTANT

## 2023-09-29 RX ORDER — GABAPENTIN 300 MG/1
300 CAPSULE ORAL 3 TIMES DAILY
Status: DISCONTINUED | OUTPATIENT
Start: 2023-09-29 | End: 2023-10-05 | Stop reason: HOSPADM

## 2023-09-29 RX ORDER — CHOLECALCIFEROL (VITAMIN D3) 125 MCG
5 CAPSULE ORAL NIGHTLY PRN
Status: DISCONTINUED | OUTPATIENT
Start: 2023-09-29 | End: 2023-10-05 | Stop reason: HOSPADM

## 2023-09-29 RX ORDER — AMOXICILLIN 250 MG
2 CAPSULE ORAL 2 TIMES DAILY
Status: DISCONTINUED | OUTPATIENT
Start: 2023-09-29 | End: 2023-10-05 | Stop reason: HOSPADM

## 2023-09-29 RX ORDER — MULTIPLE VITAMINS W/ MINERALS TAB 9MG-400MCG
1 TAB ORAL DAILY
COMMUNITY

## 2023-09-29 RX ORDER — NITROGLYCERIN 0.4 MG/1
0.4 TABLET SUBLINGUAL
Status: DISCONTINUED | OUTPATIENT
Start: 2023-09-29 | End: 2023-10-05 | Stop reason: HOSPADM

## 2023-09-29 RX ORDER — BISACODYL 10 MG
10 SUPPOSITORY, RECTAL RECTAL DAILY PRN
Status: DISCONTINUED | OUTPATIENT
Start: 2023-09-29 | End: 2023-10-05 | Stop reason: HOSPADM

## 2023-09-29 RX ORDER — ASPIRIN 81 MG/1
81 TABLET ORAL DAILY
Status: DISCONTINUED | OUTPATIENT
Start: 2023-09-29 | End: 2023-10-05 | Stop reason: HOSPADM

## 2023-09-29 RX ORDER — METOPROLOL SUCCINATE 25 MG/1
37.5 TABLET, EXTENDED RELEASE ORAL DAILY
Status: DISCONTINUED | OUTPATIENT
Start: 2023-09-29 | End: 2023-09-30

## 2023-09-29 RX ORDER — PANTOPRAZOLE SODIUM 40 MG/1
40 TABLET, DELAYED RELEASE ORAL
Status: DISCONTINUED | OUTPATIENT
Start: 2023-09-30 | End: 2023-10-05 | Stop reason: HOSPADM

## 2023-09-29 RX ORDER — SODIUM CHLORIDE 0.9 % (FLUSH) 0.9 %
10 SYRINGE (ML) INJECTION AS NEEDED
Status: DISCONTINUED | OUTPATIENT
Start: 2023-09-29 | End: 2023-10-05 | Stop reason: HOSPADM

## 2023-09-29 RX ORDER — METOPROLOL SUCCINATE 25 MG/1
37.5 TABLET, EXTENDED RELEASE ORAL DAILY
COMMUNITY
End: 2023-10-05 | Stop reason: HOSPADM

## 2023-09-29 RX ORDER — IPRATROPIUM BROMIDE AND ALBUTEROL SULFATE 2.5; .5 MG/3ML; MG/3ML
3 SOLUTION RESPIRATORY (INHALATION)
Status: DISCONTINUED | OUTPATIENT
Start: 2023-09-29 | End: 2023-10-03

## 2023-09-29 RX ORDER — POLYETHYLENE GLYCOL 3350 17 G/17G
17 POWDER, FOR SOLUTION ORAL DAILY PRN
Status: DISCONTINUED | OUTPATIENT
Start: 2023-09-29 | End: 2023-10-05 | Stop reason: HOSPADM

## 2023-09-29 RX ORDER — BISACODYL 5 MG/1
5 TABLET, DELAYED RELEASE ORAL DAILY PRN
Status: DISCONTINUED | OUTPATIENT
Start: 2023-09-29 | End: 2023-10-05 | Stop reason: HOSPADM

## 2023-09-29 RX ORDER — ACETAMINOPHEN 325 MG/1
650 TABLET ORAL EVERY 6 HOURS PRN
Status: DISCONTINUED | OUTPATIENT
Start: 2023-09-29 | End: 2023-10-05 | Stop reason: HOSPADM

## 2023-09-29 RX ORDER — FUROSEMIDE 10 MG/ML
40 INJECTION INTRAMUSCULAR; INTRAVENOUS ONCE
Status: COMPLETED | OUTPATIENT
Start: 2023-09-29 | End: 2023-09-29

## 2023-09-29 RX ORDER — AMIODARONE HYDROCHLORIDE 200 MG/1
100 TABLET ORAL
Status: DISCONTINUED | OUTPATIENT
Start: 2023-09-29 | End: 2023-10-05 | Stop reason: HOSPADM

## 2023-09-29 RX ORDER — MULTIPLE VITAMINS W/ MINERALS TAB 9MG-400MCG
1 TAB ORAL DAILY
Status: DISCONTINUED | OUTPATIENT
Start: 2023-09-29 | End: 2023-10-05 | Stop reason: HOSPADM

## 2023-09-29 RX ORDER — AMIODARONE HYDROCHLORIDE 100 MG/1
1 TABLET ORAL DAILY
COMMUNITY

## 2023-09-29 RX ADMIN — Medication 5 MG: at 23:32

## 2023-09-29 RX ADMIN — DOXYCYCLINE 100 MG: 100 INJECTION, POWDER, LYOPHILIZED, FOR SOLUTION INTRAVENOUS at 18:22

## 2023-09-29 RX ADMIN — MULTIPLE VITAMINS W/ MINERALS TAB 1 TABLET: TAB at 16:53

## 2023-09-29 RX ADMIN — METOPROLOL SUCCINATE 37.5 MG: 25 TABLET, EXTENDED RELEASE ORAL at 16:54

## 2023-09-29 RX ADMIN — NITROGLYCERIN 1 INCH: 20 OINTMENT TOPICAL at 14:26

## 2023-09-29 RX ADMIN — SENNOSIDES AND DOCUSATE SODIUM 2 TABLET: 50; 8.6 TABLET ORAL at 21:32

## 2023-09-29 RX ADMIN — GABAPENTIN 300 MG: 300 CAPSULE ORAL at 16:53

## 2023-09-29 RX ADMIN — FUROSEMIDE 40 MG: 10 INJECTION, SOLUTION INTRAMUSCULAR; INTRAVENOUS at 14:21

## 2023-09-29 RX ADMIN — IPRATROPIUM BROMIDE AND ALBUTEROL SULFATE 3 ML: 2.5; .5 SOLUTION RESPIRATORY (INHALATION) at 19:42

## 2023-09-29 RX ADMIN — CEFTRIAXONE 2000 MG: 2 INJECTION, POWDER, FOR SOLUTION INTRAMUSCULAR; INTRAVENOUS at 14:47

## 2023-09-29 RX ADMIN — ACETAMINOPHEN 650 MG: 325 TABLET ORAL at 21:32

## 2023-09-29 RX ADMIN — ASPIRIN 81 MG: 81 TABLET, COATED ORAL at 16:53

## 2023-09-29 RX ADMIN — APIXABAN 2.5 MG: 2.5 TABLET, FILM COATED ORAL at 20:34

## 2023-09-29 RX ADMIN — GABAPENTIN 300 MG: 300 CAPSULE ORAL at 20:34

## 2023-09-29 RX ADMIN — AMIODARONE HYDROCHLORIDE 100 MG: 200 TABLET ORAL at 16:53

## 2023-09-29 NOTE — H&P
[facility]  HISTORY AND PHYSICAL    Patient Name: Mukund Polo  : 1930  MRN: [id number]  Primary Care Physician: Slick Hubbard MD    Subjective   Subjective     Chief Complaint:chest pain    HPI:  Mukund Pool is a 89 y.o. male who  has a past medical history of Arthritis, Chronic kidney disease, CAD with CABG 20 years ago, GERD (gastroesophageal reflux disease), History of radiation therapy (2021), Prostate cancer, and Vocal cord cancer. who presented to the ED with several days of feeling poorly and one day of chest pain that made it impossible to rest last night. He was found to be hypoxic in the ED. He denies productive cough or fever.   In the ED his troponin was mildly elevated, BNP elevated as well as his creatinine and potassium. He is anemic with an MCV of 016      Review of Systems   Patient denies  headaches, changes in vision, fever, chills, sore throat,  nausea or vomiting, diarrhea, abdominal pain or distension, change in urine output or habits, joint pain, rash, itching, numbness/tingling, weakness or bleeding.  He has lost 40 pounds since spring 2023  Otherwise complete ROS is negative except as mentioned in the HPI.    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       PMH: He  has a past medical history of Arthritis, Chronic kidney disease, GERD  (gastroesophageal reflux disease), History of radiation therapy (05/24/2021), Prostate cancer, and Vocal cord cancer.   PSxH: He  has a past surgical history that includes Prostate surgery; Coronary artery bypass graft; Cholecystectomy; VOCAL CORD MASS EXCISION (03/22/2021); and Cardiac electrophysiology procedure (04/2023).         FH: His family history includes Breast cancer in his mother; Cancer in his mother; Heart attack in his father; Ovarian cancer in his sister.   SH: He  reports that he has never smoked. He has never used smokeless tobacco. He reports that he does not drink alcohol and does not use drugs.   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 His nephew is with him in the ED    Medications:  amiodarone, apixaban, aspirin, ezetimibe, gabapentin, metoprolol succinate XL, multivitamin with minerals, nitroglycerin, and omeprazole    No Known Allergies    Objective   Objective     Vital Signs:   Temp:  [97.6 °F (36.4 °C)] 97.6 °F (36.4 °C)  Heart Rate:  [109-111] 110  Resp:  [18] 18  BP: (128-148)/(90-95) 133/94  Flow (L/min):  [2] 2    Constitutional: Awake, alert, interactive and pleasant, younger than his stated age, bright and cheerful  Eyes: clear sclerae, no conjunctival injection  HENT: NCAT, mucous membranes dry  Neck: no masses or lymphadenopathy, trachea midline  Respiratory: coarse breath sounds bilaterally, respirations slightly labored, better with oxygen  Cardiovascular: RRR, no murmurs appreciated, palpable peripheral pulses  Abdomen:  soft, no HSM or masses palpable, not tender or distended  Musculoskeletal: No peripheral edema, clubbing or cyanosis  Neurologic: Oriented x 3,                       Strength symmetric in all extremities                     Cranial Nerves grossly intact, speech clear  Skin: No rashes or jaundice  Psychiatric: Appropriate mood, insight      Result Review:  I have personally  reviewed the results from the time of this admission   to 9/29/2023 16:03 EDT and agree with these findings:  [x]  Laboratory  [x]  Microbiology  [x]  Radiology  [x]  EKG/Telemetry   []  Cardiology/Vascular   []  Pathology  []  Old records  []  Other:  Most notable findings include: chronic anemia and creatinine elevation as well as elevated troponin, impressive bilateral pulmonary infiltrates  MRCP 6/23  Cystic lesions associated with the pancreas which likely communicates   to the pancreatic duct. 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   4/23  NITIN/CV DX paroxysmal afib i48.0    Normal sized left ventricle. Moderate left ventricular hypertrophy. Normal    left ventricular systolic function. Visually estimated ejection fraction    55% +/- 5%.No left ventricular masses or thrombi.    Direct current cardioversion performed.Successful cardioversion with    restoration of sinus rhythm with 200 joules of biphasic energy X 1 attempt   LAB RESULTS:      Lab 09/29/23  1425 09/29/23  1257   WBC  --  7.96   HEMOGLOBIN  --  11.3*   HEMATOCRIT  --  35.2*   PLATELETS  --  258   NEUTROS ABS  --  5.75   IMMATURE GRANS (ABS)  --  0.04   LYMPHS ABS  --  1.39   MONOS ABS  --  0.68   EOS ABS  --  0.03   MCV  --  106.3*   CRP  --  0.54*   PROCALCITONIN  --  0.05   LACTATE 1.8  --    LDH  --  178   D DIMER QUANT  --  0.60         Lab 09/29/23  1257   SODIUM 138   POTASSIUM 5.1   CHLORIDE 103   CO2 25.0   ANION GAP 10.0   BUN 22   CREATININE 1.64*   EGFR 38.8*   GLUCOSE 133*   CALCIUM 8.9         Lab 09/29/23  1257   TOTAL PROTEIN 7.3   ALBUMIN 3.9   GLOBULIN 3.4   ALT (SGPT) 17   AST (SGOT) 19   BILIRUBIN 1.1   ALK PHOS 103         Lab 09/29/23  1257   PROBNP 5,453.0*   HSTROP T 40*             Lab 09/29/23  1257   FERRITIN 129.80         Brief Urine Lab Results       None          COVID19   Date Value Ref Range Status   09/29/2023 Not Detected Not Detected - Ref. Range Final       XR Chest 1  View    Result Date: 9/29/2023  XR CHEST 1 VW Date of Exam: 9/29/2023 1:01 PM EDT Indication: SOA triage protocol Comparison: PET/CT 8/25/2021  Findings: Prior sternotomy and CABG. Patient rotated side to left. Cardiac loop recorder noted. Central pulmonary vascular congestion with interstitial thickening most compatible with pulmonary edema. Bibasilar airspace opacities with small bilateral pleural effusions. No pneumothorax.     Impression: Impression: 1. Pulmonary edema. 2. Bibasilar airspace disease may relate to atelectasis and/or pneumonia with small bilateral pleural effusions. Electronically Signed: Soto Matos MD  9/29/2023 1:31 PM EDT  Workstation ID: [id number]         The patient has started, but not completed, their COVID-19 vaccination series.    Assessment & Plan   Assessment / Plan       Chest pain    Bilateral pulmonary infiltrates    Hypoxia    Weight loss    History of head and neck cancer    Paroxysmal atrial fibrillation    Stage 3b chronic kidney disease    Anemia, chronic disease      Assessment & Plan:  Delightful 89-year-old man with history of coronary artery disease, head neck cancer 2021, coronary disease with prior CABG, paroxysmal A-fib on amiodarone who presents with chest pain, shortness of breath, hypoxia and 40 pound weight loss      Chest pain with history of coronary disease, CABG  -Continue aspirin, trend troponin, EKG as needed chest pain, continue Nitropaste    Acute hypoxia  Bilateral pulmonary infiltrates  Differential includes amiodarone toxicity, pneumonia, possible aspiration, heart failure, malignancy  -Check CT scan of the chest  -Check echocardiogram  -COVID is negative  -Diuresis tolerated  -Try neb-  -wean oxygen as tolerated    40 pound weight loss  Previous abnormal pancreas on outside scan  -Check CA 19-9  -CT of the abdomen and pelvis    Paroxysmal A-fib  -Rate controlled, on chronic Eliquis    DVT prophylaxis:  Medical DVT prophylaxis orders are present.    CODE  STATUS:  Code Status (Patient has no pulse and is not breathing): CPR (Attempt to Resuscitate)  Medical Interventions (Patient has pulse or is breathing): Full    Expected Discharge  Expected Discharge Date: 10/2/2023; Expected Discharge Time:     Electronically signed by Lacey Brooks MD 09/29/23 16:03 EDT

## 2023-09-29 NOTE — ED PROVIDER NOTES
Subjective   History of Present Illness  Pt is a 89 yo male presenting to ED with complaints of SOB and chest pain. PMHx significant for CABG (1980), PAfib (Eliquis), HTN, HLD, CKD, LBBB, Prostate cancer, Vocal cord cancer, Kidney stones and Arthritis. Pt and family report intermittent SOB and chest pain for the past 2-3 weeks. He has had multiple syncopal episodes recently as well with most recent 2 weeks ago. He had mid sternal chest pain that began last night around 11pm and continued until 6am today but resolved after taking Nitro. He currently only complains of SOB in ED. He does not wear O2 at home and on RA 90-92%. He reports some increased swelling to bilateral lower legs but no leg pain. His family reports he is followed by Cardiologist Dr. Rodriguez at [facility] there were medication changes yesterday at the office. He came to [facility] for a 2nd opinion. He denies tobacco, drug or ETOH use.     History provided by:  Patient, relative and medical records    Review of Systems   Constitutional:  Positive for fatigue. Negative for chills and fever.   HENT:  Negative for congestion.    Eyes:  Negative for visual disturbance.   Respiratory:  Positive for shortness of breath. Negative for cough.    Cardiovascular:  Positive for chest pain and leg swelling.   Gastrointestinal:  Negative for abdominal pain, diarrhea, nausea and vomiting.   Genitourinary:  Negative for difficulty urinating and dysuria.   Musculoskeletal:  Negative for arthralgias and back pain.   Neurological:  Positive for syncope. Negative for dizziness, weakness, numbness and headaches.   Psychiatric/Behavioral:  Negative for confusion.      Past Medical History:   Diagnosis Date    Arthritis     Chronic kidney disease     kidney stones    GERD (gastroesophageal reflux disease)     History of radiation therapy 05/24/2021    laryngeal mass    Prostate cancer     Vocal cord cancer        No Known Allergies    Past Surgical History:   Procedure Laterality  Date    CARDIAC ELECTROPHYSIOLOGY PROCEDURE  04/2023    cardiac loop recorder    CHOLECYSTECTOMY      CORONARY ARTERY BYPASS GRAFT      PROSTATE SURGERY      VOCAL CORD MASS EXCISION  03/22/2021       Family History   Problem Relation Age of Onset    Cancer Mother     Breast cancer Mother     Heart attack Father     Ovarian cancer Sister        Social History     Socioeconomic History    Marital status:    Tobacco Use    Smoking status: Never    Smokeless tobacco: Never   Substance and Sexual Activity    Alcohol use: No    Drug use: No    Sexual activity: Defer           Objective   Physical Exam  Vitals and nursing note reviewed.   Constitutional:       Appearance: He is well-developed.   HENT:      Head: Atraumatic.      Nose: Nose normal.   Eyes:      General: Lids are normal.      Conjunctiva/sclera: Conjunctivae normal.      Pupils: Pupils are equal, round, and reactive to light.   Cardiovascular:      Rate and Rhythm: Normal rate and regular rhythm.      Heart sounds: Normal heart sounds.   Pulmonary:      Effort: Pulmonary effort is normal. No tachypnea.      Breath sounds: Normal breath sounds. No decreased breath sounds or wheezing.   Abdominal:      General: There is no distension.      Palpations: Abdomen is soft.      Tenderness: There is no abdominal tenderness. There is no guarding or rebound.   Musculoskeletal:         General: No tenderness or deformity. Normal range of motion.      Cervical back: Normal range of motion and neck supple.   Skin:     General: Skin is warm and dry.   Neurological:      Mental Status: He is alert and oriented to person, place, and time.      Sensory: No sensory deficit.   Psychiatric:         Behavior: Behavior normal.       Procedures           ED Course      Recent Results (from the past 24 hour(s))   Comprehensive Metabolic Panel    Collection Time: 09/29/23 12:57 PM    Specimen: Blood   Result Value Ref Range    Glucose 133 (H) 65 - 99 mg/dL    BUN 22 8 - 23  mg/dL    Creatinine 1.64 (H) 0.76 - 1.27 mg/dL    Sodium 138 136 - 145 mmol/L    Potassium 5.1 3.5 - 5.2 mmol/L    Chloride 103 98 - 107 mmol/L    CO2 25.0 22.0 - 29.0 mmol/L    Calcium 8.9 8.2 - 9.6 mg/dL    Total Protein 7.3 6.0 - 8.5 g/dL    Albumin 3.9 3.5 - 5.2 g/dL    ALT (SGPT) 17 1 - 41 U/L    AST (SGOT) 19 1 - 40 U/L    Alkaline Phosphatase 103 39 - 117 U/L    Total Bilirubin 1.1 0.0 - 1.2 mg/dL    Globulin 3.4 gm/dL    A/G Ratio 1.1 g/dL    BUN/Creatinine Ratio 13.4 7.0 - 25.0    Anion Gap 10.0 5.0 - 15.0 mmol/L    eGFR 38.8 (L) >60.0 mL/min/1.73   BNP    Collection Time: 09/29/23 12:57 PM    Specimen: Blood   Result Value Ref Range    proBNP 5,453.0 (H) 0.0 - 1,800.0 pg/mL   Single High Sensitivity Troponin T    Collection Time: 09/29/23 12:57 PM    Specimen: Blood   Result Value Ref Range    HS Troponin T 40 (H) <15 ng/L   Green Top (Gel)    Collection Time: 09/29/23 12:57 PM   Result Value Ref Range    Extra Tube Hold for add-ons.    Lavender Top    Collection Time: 09/29/23 12:57 PM   Result Value Ref Range    Extra Tube hold for add-on    Gold Top - SST    Collection Time: 09/29/23 12:57 PM   Result Value Ref Range    Extra Tube Hold for add-ons.    Light Blue Top    Collection Time: 09/29/23 12:57 PM   Result Value Ref Range    Extra Tube Hold for add-ons.    CBC Auto Differential    Collection Time: 09/29/23 12:57 PM    Specimen: Blood   Result Value Ref Range    WBC 7.96 3.40 - 10.80 10*3/mm3    RBC 3.31 (L) 4.14 - 5.80 10*6/mm3    Hemoglobin 11.3 (L) 13.0 - 17.7 g/dL    Hematocrit 35.2 (L) 37.5 - 51.0 %    .3 (H) 79.0 - 97.0 fL    MCH 34.1 (H) 26.6 - 33.0 pg    MCHC 32.1 31.5 - 35.7 g/dL    RDW 13.5 12.3 - 15.4 %    RDW-SD 53.1 37.0 - 54.0 fl    MPV 9.1 6.0 - 12.0 fL    Platelets 258 140 - 450 10*3/mm3    Neutrophil % 72.2 42.7 - 76.0 %    Lymphocyte % 17.5 (L) 19.6 - 45.3 %    Monocyte % 8.5 5.0 - 12.0 %    Eosinophil % 0.4 0.3 - 6.2 %    Basophil % 0.9 0.0 - 1.5 %    Immature Grans % 0.5  0.0 - 0.5 %    Neutrophils, Absolute 5.75 1.70 - 7.00 10*3/mm3    Lymphocytes, Absolute 1.39 0.70 - 3.10 10*3/mm3    Monocytes, Absolute 0.68 0.10 - 0.90 10*3/mm3    Eosinophils, Absolute 0.03 0.00 - 0.40 10*3/mm3    Basophils, Absolute 0.07 0.00 - 0.20 10*3/mm3    Immature Grans, Absolute 0.04 0.00 - 0.05 10*3/mm3    nRBC 0.0 0.0 - 0.2 /100 WBC   Procalcitonin    Collection Time: 09/29/23 12:57 PM    Specimen: Blood   Result Value Ref Range    Procalcitonin 0.05 0.00 - 0.25 ng/mL   C-reactive Protein    Collection Time: 09/29/23 12:57 PM    Specimen: Blood   Result Value Ref Range    C-Reactive Protein 0.54 (H) 0.00 - 0.50 mg/dL   Lactate Dehydrogenase    Collection Time: 09/29/23 12:57 PM    Specimen: Blood   Result Value Ref Range     135 - 225 U/L   D-dimer, Quantitative    Collection Time: 09/29/23 12:57 PM    Specimen: Blood   Result Value Ref Range    D-Dimer, Quantitative 0.60 0.00 - 0.93 MCGFEU/mL   Ferritin    Collection Time: 09/29/23 12:57 PM    Specimen: Blood   Result Value Ref Range    Ferritin 129.80 30.00 - 400.00 ng/mL   COVID-19 and FLU A/B PCR - Swab, Nasopharynx    Collection Time: 09/29/23 12:59 PM    Specimen: Nasopharynx; Swab   Result Value Ref Range    COVID19 Not Detected Not Detected - Ref. Range    Influenza A PCR Not Detected Not Detected    Influenza B PCR Not Detected Not Detected   ECG 12 Lead ED Triage Standing Order; SOA    Collection Time: 09/29/23  1:56 PM   Result Value Ref Range    QT Interval 428 ms    QTC Interval 582 ms   Lactic Acid, Plasma    Collection Time: 09/29/23  2:25 PM    Specimen: Blood   Result Value Ref Range    Lactate 1.8 0.5 - 2.0 mmol/L     Note: In addition to lab results from this visit, the labs listed above may include labs taken at another facility or during a different encounter within the last 24 hours. Please correlate lab times with ED admission and discharge times for further clarification of the services performed during this visit.    XR  Chest 1 View   Final Result   Impression:   1. Pulmonary edema.   2. Bibasilar airspace disease may relate to atelectasis and/or pneumonia with small bilateral pleural effusions.            Electronically Signed: Soto Matos MD     9/29/2023 1:31 PM EDT     Workstation ID: [id number]      CT Abdomen Pelvis Without Contrast    (Results Pending)   CT Chest Without Contrast Diagnostic    (Results Pending)     Vitals:    09/29/23 1300 09/29/23 1358 09/29/23 1430 09/29/23 1459   BP: 144/90 128/94 133/94    BP Location:       Patient Position:       Pulse: 111 111 109 110   Resp:    18   Temp:       TempSrc:       SpO2: 92% 95% 94% 95%   Weight:       Height:         Medications   sodium chloride 0.9 % flush 10 mL (has no administration in time range)   doxycycline (VIBRAMYCIN) 100 mg in sodium chloride 0.9 % 100 mL IVPB-VTB (has no administration in time range)   ipratropium-albuterol (DUO-NEB) nebulizer solution 3 mL (has no administration in time range)   aspirin EC tablet 81 mg (has no administration in time range)   gabapentin (NEURONTIN) capsule 300 mg (has no administration in time range)   nitroglycerin (NITROSTAT) SL tablet 0.4 mg (has no administration in time range)   multivitamin with minerals 1 tablet (has no administration in time range)   metoprolol succinate XL (TOPROL-XL) 24 hr tablet 37.5 mg (has no administration in time range)   apixaban (ELIQUIS) tablet 2.5 mg (has no administration in time range)   amiodarone (PACERONE) tablet 100 mg (has no administration in time range)   pantoprazole (PROTONIX) EC tablet 40 mg (has no administration in time range)   cefTRIAXone (ROCEPHIN) 1000 mg/100 mL 0.9% NS (MBP) (has no administration in time range)   furosemide (LASIX) injection 40 mg (40 mg Intravenous Given 9/29/23 1421)   nitroglycerin (NITROSTAT) ointment 1 inch (1 inch Topical Given 9/29/23 1426)   cefTRIAXone (ROCEPHIN) 2000 mg/100 mL 0.9% NS IVPB (MBP) (2,000 mg Intravenous New Bag 9/29/23 1447)      ECG/EMG Results (last 24 hours)       Procedure Component Value Units Date/Time    ECG 12 Lead ED Triage Standing Order; SOA [[id number]] Collected: 09/29/23 1356     Updated: 09/29/23 1353     QT Interval 428 ms      QTC Interval 582 ms     Narrative:      Test Reason : ED Triage Standing Order~  Blood Pressure :   */*   mmHG  Vent. Rate : 111 BPM     Atrial Rate : 111 BPM     P-R Int : 140 ms          QRS Dur : 168 ms      QT Int : 428 ms       P-R-T Axes :  20  31 218 degrees     QTc Int : 582 ms    Sinus tachycardia  Left bundle branch block  Abnormal ECG  No previous ECGs available    Referred By: EDMD           Confirmed By:           ECG 12 Lead ED Triage Standing Order; SOA   Preliminary Result   Test Reason : ED Triage Standing Order~   Blood Pressure :   */*   mmHG   Vent. Rate : 111 BPM     Atrial Rate : 111 BPM      P-R Int : 140 ms          QRS Dur : 168 ms       QT Int : 428 ms       P-R-T Axes :  20  31 218 degrees      QTc Int : 582 ms      Sinus tachycardia   Left bundle branch block   Abnormal ECG   No previous ECGs available      Referred By: EDMD           Confirmed By:                                                Medical Decision Making  Pt is a 89 yo male presenting to ED with complaints of SOB and CP. Labs notable for WBC 7.4, Cr 1.64, Glucose 133, HS Trop 40 and BNP 5,453. CXR pulmonary edema and bibasilar pneumonia vs atelectasis. EKG with LBBB and sinus tach. O2 on RA 90%. Discussed results and tx plan for admission for further evaluation. Patient and family agreeable with plan. Started on Nitro paste and Lasix in ED. Started on Rocephin and Doxycyline.     Discussed patient with Dr. Cronin who is agreeable with ED course and tx plan.   Discussed admission with hospitalist Dr. Brooks    DDx  ACS, NSTEMI, CHF, Pneumonia, Covid, Flu, Electrolyte abnormality, Renal failure, Arrhythmia     Problems Addressed:  Acute pulmonary edema: complicated acute illness or injury  History of atrial  fibrillation: chronic illness or injury  Pneumonia of both lower lobes due to infectious organism: complicated acute illness or injury  Renal insufficiency: complicated acute illness or injury  Syncope, unspecified syncope type: complicated acute illness or injury    Amount and/or Complexity of Data Reviewed  Independent Historian:      Details: Nephew  External Data Reviewed: labs, radiology, ECG and notes.     Details: [facility] Cards, PCP, [facility] Admission  Labs: ordered. Decision-making details documented in ED Course.  Radiology: ordered. Decision-making details documented in ED Course.  ECG/medicine tests: ordered. Decision-making details documented in ED Course.    Risk  Prescription drug management.  Decision regarding hospitalization.        Final diagnoses:   Pneumonia of both lower lobes due to infectious organism   Acute pulmonary edema   Renal insufficiency   History of atrial fibrillation   Syncope, unspecified syncope type       ED Disposition  ED Disposition       ED Disposition   Decision to Admit    Condition   --    Comment   Level of Care: Telemetry [5]   Diagnosis: Chest pain [[id number]]   Admitting Physician: JOLENE KERNS [[id number]]   Attending Physician: JOLENE KERNS [[id number]]                 No follow-up provider specified.       Medication List        ASK your doctor about these medications      metoprolol succinate XL 25 MG 24 hr tablet  Commonly known as: TOPROL-XL  Ask about: Which instructions should I use?                 July Mena PA  09/29/23 2429

## 2023-09-29 NOTE — THERAPY EVALUATION
Acute Care - Speech Language Pathology   Swallow Initial Evaluation [facility]  Clinical Swallow Evaluation       Patient Name: Mukund Pool  : 1930  MRN: [id number]  Today's Date: 2023               Admit Date: 2023    Visit Dx:     ICD-10-CM ICD-9-CM   1. Pneumonia of both lower lobes due to infectious organism  J18.9 486   2. Acute pulmonary edema  J81.0 518.4   3. Renal insufficiency  N28.9 593.9   4. History of atrial fibrillation  Z86.79 V12.59   5. Syncope, unspecified syncope type  R55 780.2     Patient Active Problem List   Diagnosis    Malignant neoplasm of right vocal cord    Chest pain    History of head and neck cancer    Bilateral pulmonary infiltrates    Paroxysmal atrial fibrillation    Hypoxia    Stage 3b chronic kidney disease    Anemia, chronic disease    Weight loss     Past Medical History:   Diagnosis Date    Arthritis     Chronic kidney disease     kidney stones    GERD (gastroesophageal reflux disease)     History of radiation therapy 2021    laryngeal mass    Prostate cancer     Vocal cord cancer      Past Surgical History:   Procedure Laterality Date    CARDIAC ELECTROPHYSIOLOGY PROCEDURE  2023    cardiac loop recorder    CHOLECYSTECTOMY      CORONARY ARTERY BYPASS GRAFT      PROSTATE SURGERY      VOCAL CORD MASS EXCISION  2021       SLP Recommendation and Plan  SLP Swallowing Diagnosis: suspected pharyngeal dysphagia (23)  SLP Diet Recommendation: regular textures, nectar thick liquids, no mixed consistencies (23)  Recommended Precautions and Strategies: upright posture during/after eating, general aspiration precautions (23)  SLP Rec. for Method of Medication Administration: with thick liquids, with puree, as tolerated (23)     Monitor for Signs of Aspiration: notify SLP if any concerns (23)  Recommended Diagnostics: VFSS (MBS) (23)  Swallow Criteria for Skilled Therapeutic  Interventions Met: demonstrates skilled criteria (09/29/23 1545)  Anticipated Discharge Disposition (SLP): anticipate therapy at next level of care (09/29/23 1545)        Predicted Duration Therapy Intervention (Days): until discharge (09/29/23 1545)  Oral Care Recommendations: Oral Care BID/PRN (09/29/23 1545)                                      Oral Care Recommendations: Oral Care BID/PRN (09/29/23 1545)    Plan of Care Reviewed With: patient      SWALLOW EVALUATION (last 72 hours)       SLP Adult Swallow Evaluation       Row Name 09/29/23 1545                   Rehab Evaluation    Document Type evaluation  -DV        Subjective Information no complaints  -DV        Patient Observations alert;cooperative  -DV        Patient/Family/Caregiver Comments/Observations family present for few minutes before going home  -DV        Patient Effort excellent  -DV        Symptoms Noted During/After Treatment none  -DV           General Information    Patient Profile Reviewed yes  -DV        Pertinent History Of Current Problem 89yoM adm w/ chest pain - imaging shows PNA vs atelectasis vs pleural effusions. 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  -DV        Current Method of Nutrition regular textures;thin liquids  -DV        Precautions/Limitations, Vision WFL with corrective lenses;for purposes of eval  -DV        Precautions/Limitations, Hearing WFL;for purposes of eval  -DV        Prior Level of Function-Communication WFL  -DV        Prior Level of Function-Swallowing no diet consistency restrictions  -DV        Plans/Goals Discussed with patient;agreed upon  -DV        Barriers to Rehab none identified  -DV        Patient's Goals for Discharge patient did not state  -DV           Pain    Additional Documentation Pain Scale: Numbers Pre/Post-Treatment (Group)  -DV           Pain Scale: Numbers Pre/Post-Treatment    Pretreatment Pain Rating 0/10 - no pain   -DV        Posttreatment Pain Rating 0/10 - no pain  -DV           Oral Motor Structure and Function    Dentition Assessment natural, present and adequate  -DV        Secretion Management wet vocal quality;other (see comments)  vs rough vocal quality  -DV        Mucosal Quality moist, healthy  -DV        Volitional Cough WFL  -DV           Oral Musculature and Cranial Nerve Assessment    Oral Motor General Assessment WFL  -DV           General Eating/Swallowing Observations    Respiratory Support Currently in Use nasal cannula  -DV        O2 Liters 2L  -DV        Eating/Swallowing Skills self-fed  -DV        Positioning During Eating upright 90 degree  -DV        Utensils Used spoon;cup;straw  -DV        Consistencies Trialed thin liquids;pureed;regular textures  -DV           Respiratory    Respiratory Status WFL  -DV           Clinical Swallow Eval    Oral Prep Phase WFL  -DV        Oral Transit WFL  -DV        Oral Residue WFL  -DV        Pharyngeal Phase suspected pharyngeal impairment  -DV        Esophageal Phase unremarkable  -DV        Clinical Swallow Evaluation Summary Pt presented w/ ?wet vocal quality at baseline vs rough vocal quality, but wet vocal quality did appear to increase w/ trials of thin liquid. Recommend nectar-thick liquids until MBS tomorrow.  -DV           Pharyngeal Phase Concerns    Pharyngeal Phase Concerns wet vocal quality  -DV        Wet Vocal Quality thin  -DV           SLP Evaluation Clinical Impression    SLP Swallowing Diagnosis suspected pharyngeal dysphagia  -DV        Functional Impact risk of aspiration/pneumonia  -DV        Swallow Criteria for Skilled Therapeutic Interventions Met demonstrates skilled criteria  -DV           Recommendations    Predicted Duration Therapy Intervention (Days) until discharge  -DV        SLP Diet Recommendation regular textures;nectar thick liquids;no mixed consistencies  -DV        Recommended Diagnostics VFSS (MBS)  -DV        Recommended  Precautions and Strategies upright posture during/after eating;general aspiration precautions  -DV        Oral Care Recommendations Oral Care BID/PRN  -DV        SLP Rec. for Method of Medication Administration with thick liquids;with puree;as tolerated  -DV        Monitor for Signs of Aspiration notify SLP if any concerns  -DV        Anticipated Discharge Disposition (SLP) anticipate therapy at next level of care  -DV                  User Key  (r) = Recorded By, (t) = Taken By, (c) = Cosigned By      Initials Name Effective Dates    Rach Almonte MS CCC-SLP 06/16/21 -                     EDUCATION  The patient has been educated in the following areas:   Dysphagia (Swallowing Impairment) Modified Diet Instruction.              Time Calculation:    Time Calculation- SLP       Row Name 09/29/23 1635             Time Calculation- SLP    SLP Start Time 1345  -DV      SLP Received On 09/29/23  -DV         Untimed Charges    SLP Eval/Re-eval  ST Eval Oral Pharyng Swallow - 17838  -DV      [id number] Eval Oral Pharyng Swallow Minutes 55  -DV         Total Minutes    Untimed Charges Total Minutes 55  -DV       Total Minutes 55  -DV                User Key  (r) = Recorded By, (t) = Taken By, (c) = Cosigned By      Initials Name Provider Type    Rach Almonte MS CCC-SLP Speech and Language Pathologist                    Therapy Charges for Today       Code Description Service Date Service Provider Modifiers Qty    [id number] HC ST EVAL ORAL PHARYNG SWALLOW 4 9/29/2023 Rach Licea MS CCC-SLP GN 1                 MS ZAY Mckay  9/29/2023

## 2023-09-29 NOTE — Clinical Note
Level of Care: Telemetry [5]   Diagnosis: Chest pain [548325]   Admitting Physician: JOLENE KERNS [[id number]]   Attending Physician: JOLENE KERNS [[id number]]

## 2023-09-29 NOTE — PLAN OF CARE
Problem: Adult Inpatient Plan of Care  Goal: Plan of Care Review  Outcome: Ongoing, Progressing  Flowsheets (Taken 9/29/2023 1633)  Plan of Care Reviewed With: patient   Goal Outcome Evaluation:  Plan of Care Reviewed With: patient            SLP evaluation completed. Will address dysphagia concerns with MBS tomorrow. Please see note for further details and recommendations.

## 2023-09-30 ENCOUNTER — APPOINTMENT (OUTPATIENT)
Dept: GENERAL RADIOLOGY | Facility: HOSPITAL | Age: 88
DRG: 291 | End: 2023-09-30
Payer: MEDICARE

## 2023-09-30 ENCOUNTER — APPOINTMENT (OUTPATIENT)
Dept: CARDIOLOGY | Facility: HOSPITAL | Age: 88
DRG: 291 | End: 2023-09-30
Payer: MEDICARE

## 2023-09-30 PROBLEM — R09.02 HYPOXIA: Status: RESOLVED | Noted: 2023-09-29 | Resolved: 2023-09-30

## 2023-09-30 PROBLEM — I50.21 ACUTE HFREF (HEART FAILURE WITH REDUCED EJECTION FRACTION): Status: ACTIVE | Noted: 2023-09-30

## 2023-09-30 PROBLEM — I44.7 LBBB (LEFT BUNDLE BRANCH BLOCK): Status: ACTIVE | Noted: 2023-09-30

## 2023-09-30 PROBLEM — E78.5 HYPERLIPIDEMIA LDL GOAL <70: Status: ACTIVE | Noted: 2023-09-30

## 2023-09-30 PROBLEM — I25.10 CORONARY ARTERY DISEASE INVOLVING NATIVE CORONARY ARTERY OF NATIVE HEART: Status: ACTIVE | Noted: 2023-09-30

## 2023-09-30 PROBLEM — R07.9 CHEST PAIN: Status: RESOLVED | Noted: 2023-09-29 | Resolved: 2023-09-30

## 2023-09-30 PROBLEM — I25.119 CORONARY ARTERY DISEASE INVOLVING NATIVE CORONARY ARTERY OF NATIVE HEART WITH ANGINA PECTORIS: Status: ACTIVE | Noted: 2023-09-30

## 2023-09-30 PROBLEM — I50.31 ACUTE HEART FAILURE WITH PRESERVED EJECTION FRACTION (HFPEF): Status: ACTIVE | Noted: 2023-09-30

## 2023-09-30 PROBLEM — R91.8 BILATERAL PULMONARY INFILTRATES: Status: RESOLVED | Noted: 2023-09-29 | Resolved: 2023-09-30

## 2023-09-30 LAB
ANION GAP SERPL CALCULATED.3IONS-SCNC: 7 MMOL/L (ref 5–15)
BASOPHILS # BLD AUTO: 0.04 10*3/MM3 (ref 0–0.2)
BASOPHILS NFR BLD AUTO: 0.8 % (ref 0–1.5)
BH CV ECHO MEAS - AO MAX PG: 6.9 MMHG
BH CV ECHO MEAS - AO MEAN PG: 4 MMHG
BH CV ECHO MEAS - AO ROOT AREA (BSA CORRECTED): 1.9 CM2
BH CV ECHO MEAS - AO ROOT DIAM: 3.8 CM
BH CV ECHO MEAS - AO V2 MAX: 131 CM/SEC
BH CV ECHO MEAS - AO V2 VTI: 16 CM
BH CV ECHO MEAS - AVA(I,D): 2 CM2
BH CV ECHO MEAS - EDV(CUBED): 175.6 ML
BH CV ECHO MEAS - EDV(MOD-SP2): 141 ML
BH CV ECHO MEAS - EDV(MOD-SP4): 130 ML
BH CV ECHO MEAS - EF(MOD-BP): 23.2 %
BH CV ECHO MEAS - EF(MOD-SP2): 20.6 %
BH CV ECHO MEAS - EF(MOD-SP4): 23.4 %
BH CV ECHO MEAS - ESV(CUBED): 91.1 ML
BH CV ECHO MEAS - ESV(MOD-SP2): 112 ML
BH CV ECHO MEAS - ESV(MOD-SP4): 99.6 ML
BH CV ECHO MEAS - FS: 19.6 %
BH CV ECHO MEAS - IVS/LVPW: 1.18 CM
BH CV ECHO MEAS - IVSD: 1.3 CM
BH CV ECHO MEAS - LA DIMENSION: 5.3 CM
BH CV ECHO MEAS - LV DIASTOLIC VOL/BSA (35-75): 63.8 CM2
BH CV ECHO MEAS - LV MASS(C)D: 280.5 GRAMS
BH CV ECHO MEAS - LV MAX PG: 2.8 MMHG
BH CV ECHO MEAS - LV MEAN PG: 1 MMHG
BH CV ECHO MEAS - LV SYSTOLIC VOL/BSA (12-30): 48.9 CM2
BH CV ECHO MEAS - LV V1 MAX: 84.4 CM/SEC
BH CV ECHO MEAS - LV V1 VTI: 12.6 CM
BH CV ECHO MEAS - LVIDD: 5.6 CM
BH CV ECHO MEAS - LVIDS: 4.5 CM
BH CV ECHO MEAS - LVOT AREA: 2.5 CM2
BH CV ECHO MEAS - LVOT DIAM: 1.8 CM
BH CV ECHO MEAS - LVPWD: 1.1 CM
BH CV ECHO MEAS - MR MAX PG: 74 MMHG
BH CV ECHO MEAS - MR MAX VEL: 430 CM/SEC
BH CV ECHO MEAS - MR MEAN PG: 47 MMHG
BH CV ECHO MEAS - MR MEAN VEL: 322 CM/SEC
BH CV ECHO MEAS - MR VTI: 119 CM
BH CV ECHO MEAS - MV MAX PG: 6.3 MMHG
BH CV ECHO MEAS - MV MEAN PG: 3 MMHG
BH CV ECHO MEAS - MV V2 VTI: 18.3 CM
BH CV ECHO MEAS - MVA(VTI): 1.75 CM2
BH CV ECHO MEAS - PA ACC TIME: 0.07 SEC
BH CV ECHO MEAS - PI END-D VEL: 241 CM/SEC
BH CV ECHO MEAS - RAP SYSTOLE: 8 MMHG
BH CV ECHO MEAS - RVSP: 31 MMHG
BH CV ECHO MEAS - SI(MOD-SP2): 14.2 ML/M2
BH CV ECHO MEAS - SI(MOD-SP4): 14.9 ML/M2
BH CV ECHO MEAS - SV(LVOT): 32.1 ML
BH CV ECHO MEAS - SV(MOD-SP2): 29 ML
BH CV ECHO MEAS - SV(MOD-SP4): 30.4 ML
BH CV ECHO MEAS - TAPSE (>1.6): 0.99 CM
BH CV ECHO MEAS - TR MAX PG: 22.7 MMHG
BH CV ECHO MEAS - TR MAX VEL: 237.7 CM/SEC
BH CV XLRA - RV BASE: 4.8 CM
BH CV XLRA - RV LENGTH: 8.2 CM
BH CV XLRA - RV MID: 3.5 CM
BH CV XLRA - TDI S': 4.8 CM/SEC
BUN SERPL-MCNC: 27 MG/DL (ref 8–23)
BUN/CREAT SERPL: 17.9 (ref 7–25)
CALCIUM SPEC-SCNC: 8.5 MG/DL (ref 8.2–9.6)
CANCER AG19-9 SERPL-ACNC: 10.8 U/ML
CHLORIDE SERPL-SCNC: 105 MMOL/L (ref 98–107)
CHOLEST SERPL-MCNC: 132 MG/DL (ref 0–200)
CO2 SERPL-SCNC: 26 MMOL/L (ref 22–29)
CREAT SERPL-MCNC: 1.51 MG/DL (ref 0.76–1.27)
DEPRECATED RDW RBC AUTO: 51.1 FL (ref 37–54)
EGFRCR SERPLBLD CKD-EPI 2021: 42.8 ML/MIN/1.73
EOSINOPHIL # BLD AUTO: 0.06 10*3/MM3 (ref 0–0.4)
EOSINOPHIL NFR BLD AUTO: 1.3 % (ref 0.3–6.2)
ERYTHROCYTE [DISTWIDTH] IN BLOOD BY AUTOMATED COUNT: 13.2 % (ref 12.3–15.4)
FOLATE SERPL-MCNC: 9.59 NG/ML (ref 4.78–24.2)
GEN 5 2HR TROPONIN T REFLEX: 45 NG/L
GLUCOSE SERPL-MCNC: 96 MG/DL (ref 65–99)
HCT VFR BLD AUTO: 30.1 % (ref 37.5–51)
HDLC SERPL-MCNC: 38 MG/DL (ref 40–60)
HGB BLD-MCNC: 9.8 G/DL (ref 13–17.7)
IMM GRANULOCYTES # BLD AUTO: 0.01 10*3/MM3 (ref 0–0.05)
IMM GRANULOCYTES NFR BLD AUTO: 0.2 % (ref 0–0.5)
LDLC SERPL CALC-MCNC: 83 MG/DL (ref 0–100)
LDLC/HDLC SERPL: 2.21 {RATIO}
LEFT ATRIUM VOLUME INDEX: 45.7 ML/M2
LV EF 2D ECHO EST: 25 %
LYMPHOCYTES # BLD AUTO: 1.52 10*3/MM3 (ref 0.7–3.1)
LYMPHOCYTES NFR BLD AUTO: 32.3 % (ref 19.6–45.3)
MACROCYTES BLD QL SMEAR: NORMAL
MAGNESIUM SERPL-MCNC: 2.1 MG/DL (ref 1.7–2.3)
MCH RBC QN AUTO: 33.9 PG (ref 26.6–33)
MCHC RBC AUTO-ENTMCNC: 32.6 G/DL (ref 31.5–35.7)
MCV RBC AUTO: 104.2 FL (ref 79–97)
MONOCYTES # BLD AUTO: 0.55 10*3/MM3 (ref 0.1–0.9)
MONOCYTES NFR BLD AUTO: 11.7 % (ref 5–12)
NEUTROPHILS NFR BLD AUTO: 2.53 10*3/MM3 (ref 1.7–7)
NEUTROPHILS NFR BLD AUTO: 53.7 % (ref 42.7–76)
NRBC BLD AUTO-RTO: 0 /100 WBC (ref 0–0.2)
PLAT MORPH BLD: NORMAL
PLATELET # BLD AUTO: 215 10*3/MM3 (ref 140–450)
PMV BLD AUTO: 9.2 FL (ref 6–12)
POTASSIUM SERPL-SCNC: 4.6 MMOL/L (ref 3.5–5.2)
QT INTERVAL: 428 MS
QTC INTERVAL: 582 MS
RBC # BLD AUTO: 2.89 10*6/MM3 (ref 4.14–5.8)
SODIUM SERPL-SCNC: 138 MMOL/L (ref 136–145)
TRIGL SERPL-MCNC: 51 MG/DL (ref 0–150)
TROPONIN T DELTA: 0 NG/L
TROPONIN T SERPL HS-MCNC: 45 NG/L
VIT B12 BLD-MCNC: 1278 PG/ML (ref 211–946)
VLDLC SERPL-MCNC: 11 MG/DL (ref 5–40)
WBC MORPH BLD: NORMAL
WBC NRBC COR # BLD: 4.71 10*3/MM3 (ref 3.4–10.8)

## 2023-09-30 PROCEDURE — 80048 BASIC METABOLIC PNL TOTAL CA: CPT | Performed by: INTERNAL MEDICINE

## 2023-09-30 PROCEDURE — 94799 UNLISTED PULMONARY SVC/PX: CPT

## 2023-09-30 PROCEDURE — 85007 BL SMEAR W/DIFF WBC COUNT: CPT | Performed by: INTERNAL MEDICINE

## 2023-09-30 PROCEDURE — 93306 TTE W/DOPPLER COMPLETE: CPT

## 2023-09-30 PROCEDURE — 80061 LIPID PANEL: CPT | Performed by: INTERNAL MEDICINE

## 2023-09-30 PROCEDURE — 84484 ASSAY OF TROPONIN QUANT: CPT | Performed by: INTERNAL MEDICINE

## 2023-09-30 PROCEDURE — 93306 TTE W/DOPPLER COMPLETE: CPT | Performed by: INTERNAL MEDICINE

## 2023-09-30 PROCEDURE — 74230 X-RAY XM SWLNG FUNCJ C+: CPT

## 2023-09-30 PROCEDURE — 25010000002 FUROSEMIDE PER 20 MG: Performed by: INTERNAL MEDICINE

## 2023-09-30 PROCEDURE — G0378 HOSPITAL OBSERVATION PER HR: HCPCS

## 2023-09-30 PROCEDURE — 83735 ASSAY OF MAGNESIUM: CPT | Performed by: INTERNAL MEDICINE

## 2023-09-30 PROCEDURE — 85025 COMPLETE CBC W/AUTO DIFF WBC: CPT | Performed by: INTERNAL MEDICINE

## 2023-09-30 PROCEDURE — 86301 IMMUNOASSAY TUMOR CA 19-9: CPT | Performed by: INTERNAL MEDICINE

## 2023-09-30 PROCEDURE — 25010000002 CEFTRIAXONE PER 250 MG: Performed by: INTERNAL MEDICINE

## 2023-09-30 PROCEDURE — 99222 1ST HOSP IP/OBS MODERATE 55: CPT | Performed by: INTERNAL MEDICINE

## 2023-09-30 PROCEDURE — 94761 N-INVAS EAR/PLS OXIMETRY MLT: CPT

## 2023-09-30 PROCEDURE — 92611 MOTION FLUOROSCOPY/SWALLOW: CPT

## 2023-09-30 RX ORDER — METOPROLOL SUCCINATE 25 MG/1
25 TABLET, EXTENDED RELEASE ORAL DAILY
Status: DISCONTINUED | OUTPATIENT
Start: 2023-10-01 | End: 2023-10-03

## 2023-09-30 RX ORDER — FUROSEMIDE 10 MG/ML
40 INJECTION INTRAMUSCULAR; INTRAVENOUS EVERY 12 HOURS
Status: DISCONTINUED | OUTPATIENT
Start: 2023-09-30 | End: 2023-10-02

## 2023-09-30 RX ORDER — FUROSEMIDE 10 MG/ML
40 INJECTION INTRAMUSCULAR; INTRAVENOUS ONCE
Status: DISCONTINUED | OUTPATIENT
Start: 2023-09-30 | End: 2023-09-30

## 2023-09-30 RX ADMIN — ASPIRIN 81 MG: 81 TABLET, COATED ORAL at 08:30

## 2023-09-30 RX ADMIN — PANTOPRAZOLE SODIUM 40 MG: 40 TABLET, DELAYED RELEASE ORAL at 05:45

## 2023-09-30 RX ADMIN — GABAPENTIN 300 MG: 300 CAPSULE ORAL at 20:25

## 2023-09-30 RX ADMIN — BARIUM SULFATE 100 ML: 0.81 POWDER, FOR SUSPENSION ORAL at 11:10

## 2023-09-30 RX ADMIN — SODIUM CHLORIDE 1000 MG: 900 INJECTION INTRAVENOUS at 08:29

## 2023-09-30 RX ADMIN — FUROSEMIDE 40 MG: 10 INJECTION, SOLUTION INTRAMUSCULAR; INTRAVENOUS at 16:34

## 2023-09-30 RX ADMIN — IPRATROPIUM BROMIDE AND ALBUTEROL SULFATE 3 ML: 2.5; .5 SOLUTION RESPIRATORY (INHALATION) at 19:18

## 2023-09-30 RX ADMIN — GABAPENTIN 300 MG: 300 CAPSULE ORAL at 08:30

## 2023-09-30 RX ADMIN — APIXABAN 2.5 MG: 2.5 TABLET, FILM COATED ORAL at 08:29

## 2023-09-30 RX ADMIN — APIXABAN 2.5 MG: 2.5 TABLET, FILM COATED ORAL at 20:25

## 2023-09-30 RX ADMIN — SENNOSIDES AND DOCUSATE SODIUM 2 TABLET: 50; 8.6 TABLET ORAL at 08:30

## 2023-09-30 RX ADMIN — IPRATROPIUM BROMIDE AND ALBUTEROL SULFATE 3 ML: 2.5; .5 SOLUTION RESPIRATORY (INHALATION) at 17:10

## 2023-09-30 RX ADMIN — Medication 10 ML: at 20:26

## 2023-09-30 RX ADMIN — GABAPENTIN 300 MG: 300 CAPSULE ORAL at 16:34

## 2023-09-30 RX ADMIN — Medication 5 MG: at 20:25

## 2023-09-30 RX ADMIN — MULTIPLE VITAMINS W/ MINERALS TAB 1 TABLET: TAB at 08:30

## 2023-09-30 RX ADMIN — IPRATROPIUM BROMIDE AND ALBUTEROL SULFATE 3 ML: 2.5; .5 SOLUTION RESPIRATORY (INHALATION) at 13:46

## 2023-09-30 RX ADMIN — AMIODARONE HYDROCHLORIDE 100 MG: 200 TABLET ORAL at 08:30

## 2023-09-30 RX ADMIN — METOPROLOL SUCCINATE 37.5 MG: 25 TABLET, EXTENDED RELEASE ORAL at 08:29

## 2023-09-30 RX ADMIN — BARIUM SULFATE 20 ML: 400 PASTE ORAL at 11:10

## 2023-09-30 NOTE — CONSULTS
Inpatient Cardiology Consult  Consult performed by: Wallace Espinoza IV, MD  Consult ordered by: Lacey Rivera MD  Reason for consult: CAD with chest painCHF        [facility]  Cardiovascular Consultation Note    Primary cardiologist: Domingo Rodriguez MD    Active Hospital Problems    Diagnosis  POA    **Acute HFrEF (heart failure with reduced ejection fraction) [I50.21]  Yes     Priority: High     Echo (2/23): LVEF EF 60%.  LVH with grade 2 DD.  No significant valvular disease  [facility] admission with FC IV heart failure symptoms, pulmonary edema on chest x-ray, and elevated proBNP, 9/30/2023  Echo (9/30/2023): LVEF 25%.  Moderate MR.  RVSP <35 mmHg      Coronary artery disease involving native coronary artery of native heart with angina pectoris [I25.119]  Yes     Priority: Medium     CABG 1980  Echo (2/23): LVEF EF 60%.  LVH with grade 2 DD.  No significant valvular disease  Pharmacologic nuclear stress (2/10/2023): Inferior infarct.  No ischemia.  LVEF 55%      Hyperlipidemia LDL goal <70 [E78.5]  Yes     Priority: Medium    Paroxysmal atrial fibrillation [I48.0]  Yes     Priority: Medium     [id number] 4 (age >75, CAD, HTN)  4% A-fib burden on recent loop recorder interrogation      Anemia, chronic disease [D63.8]  Yes     Priority: Medium    LBBB (left bundle branch block) [I44.7]  Yes    Stage 3b chronic kidney disease [N18.32]  Yes    Weight loss [R63.4]  Yes    History of head and neck cancer [Z85.89]  Not Applicable            Patient is a 89-year-old gentleman with a history of coronary artery disease status post CABG approximately 20 years ago, chronic kidney disease.  Over the last month, he has been experiencing increased shortness of breath with ambulation and orthopnea at night.  Last evening, the patient came so scared about his breathing that he presented to the emergency room.  He does not take diuretics at home.  In the ER, proBNP elevated and  chest x-ray with evidence of pulmonary vascular congestion and pleural effusions.    Cardiac risk factors: Advanced age,    Past medical and surgical history, social and family history reviewed in EMR.    REVIEW OF SYSTEMS:   H&P ROS reviewed and pertinent CV ROS as noted in HPI.         Vital Sign Min/Max for last 24 hours  Temp  Min: 97.2 °F (36.2 °C)  Max: 97.9 °F (36.6 °C)   BP  Min: 98/65  Max: 132/89   Pulse  Min: 95  Max: 110   Resp  Min: 18  Max: 20   SpO2  Min: 90 %  Max: 97 %   No data recorded      Intake/Output Summary (Last 24 hours) at 9/30/2023 1614  Last data filed at 9/30/2023 1300  Gross per 24 hour   Intake 720 ml   Output 600 ml   Net 120 ml           Constitutional:       Appearance: Healthy appearance.   Eyes:      General: No scleral icterus.  Neck:      Thyroid: No thyroid mass.      Vascular: No carotid bruit or JVD. JVD normal.   Pulmonary:      Effort: Pulmonary effort is normal.      Breath sounds: Normal breath sounds.   Cardiovascular:      Normal rate. Regular rhythm.      Murmurs: There is no murmur.      No gallop.    Edema:     Peripheral edema absent.   Skin:     General: Skin is warm. There is no cyanosis.   Neurological:      General: No focal deficit present.      Mental Status: Alert.   Psychiatric:         Attention and Perception: Attention normal.        EKG (9/29/2023): Sinus at 109 bpm.  LBBB.    Echo (9/30/2023): LVEF 25% with dilated left ventricle.  Moderate mitral regurgitation (functional MR).  Normal RVSP    Chest x-ray (9/29/2023): Increased pulmonary vasculature and bilateral pleural effusions        Lab Review:   Labs reviewed in the electronic medical record.  Pertinent findings include:    Lab Results   Component Value Date    GLUCOSE 96 09/30/2023    BUN 27 (H) 09/30/2023    CREATININE 1.51 (H) 09/30/2023    EGFR 42.8 (L) 09/30/2023    BCR 17.9 09/30/2023    K 4.6 09/30/2023    CO2 26.0 09/30/2023    CALCIUM 8.5 09/30/2023    ALBUMIN 3.9 09/29/2023    BILITOT  1.1 09/29/2023    AST 19 09/29/2023    ALT 17 09/29/2023     Lab Results   Component Value Date    WBC 4.71 09/30/2023    HGB 9.8 (L) 09/30/2023    HCT 30.1 (L) 09/30/2023    .2 (H) 09/30/2023     09/30/2023     Lab Results   Component Value Date    CHOL 132 09/30/2023    TRIG 51 09/30/2023    HDL 38 (L) 09/30/2023    LDL 83 09/30/2023     Lab Results   Component Value Date    TROPONINT 45 (H) 09/30/2023    TROPONINT 45 (H) 09/30/2023    TROPONINT 40 (H) 09/29/2023         Lab 09/29/23  1257   PROBNP 5,453.0*              Acute HFrEF with LVEF 25%  Newly reduced ejection fraction compared to February 2023  Functional class IV symptoms  Elevated proBNP and pulmonary edema on chest x-ray  Start IV diuretics for HFpEF  Monitor renal function closely  Continue metoprolol succinate 25 mg daily  Consider SGL 2 inhibitor if eGFR remains >30    Coronary artery disease  Flat troponin elevation not consistent with ACS  Ischemic evaluation in February consistent with inferior infarct  Invasive ischemic evaluation be considered once euvolemic.  High risk for PCI given age, CKD, reduced LVEF, anemia, and ancient bypass grafts.  High risk of JARAD    Paroxysmal atrial fibrillation  Presently in sinus  Continue amiodarone  Continue apixaban 2.5 mg twice daily    Hyperlipidemia with goal LDL <70  Statin intolerant  Resume ezetimibe at discharge           Furosemide 40 mg IV twice daily  Monitor renal function closely  Will consider eventual invasive ischemic evaluation once euvolemic

## 2023-09-30 NOTE — PLAN OF CARE
Problem: Adult Inpatient Plan of Care  Goal: Plan of Care Review  Outcome: Ongoing, Progressing  Flowsheets (Taken 9/30/2023 1539)  Progress: improving  Plan of Care Reviewed With: patient  Goal: Patient-Specific Goal (Individualized)  Outcome: Ongoing, Progressing  Goal: Absence of Hospital-Acquired Illness or Injury  Outcome: Ongoing, Progressing  Intervention: Identify and Manage Fall Risk  Recent Flowsheet Documentation  Taken 9/30/2023 1400 by Ailyn Collins RN  Safety Promotion/Fall Prevention: activity supervised  Taken 9/30/2023 1200 by Ailyn Collins RN  Safety Promotion/Fall Prevention:   safety round/check completed   room organization consistent   fall prevention program maintained   assistive device/personal items within reach   activity supervised   clutter free environment maintained  Taken 9/30/2023 1000 by Ailyn Collins RN  Safety Promotion/Fall Prevention:   safety round/check completed   room organization consistent   nonskid shoes/slippers when out of bed   assistive device/personal items within reach   clutter free environment maintained  Taken 9/30/2023 0800 by Ailyn Collins RN  Safety Promotion/Fall Prevention:   activity supervised   assistive device/personal items within reach   clutter free environment maintained   fall prevention program maintained   safety round/check completed   room organization consistent  Intervention: Prevent Skin Injury  Recent Flowsheet Documentation  Taken 9/30/2023 1400 by Ailyn Collins RN  Body Position: position changed independently  Skin Protection:   adhesive use limited   incontinence pads utilized   tubing/devices free from skin contact   transparent dressing maintained  Taken 9/30/2023 1200 by Ailyn Collins RN  Body Position: position changed independently  Skin Protection:   adhesive use limited   incontinence pads utilized   tubing/devices free from skin contact   transparent dressing maintained  Taken 9/30/2023 1000 by  McElfresh, Ailyn, RN  Body Position: position changed independently  Skin Protection:   adhesive use limited   incontinence pads utilized   tubing/devices free from skin contact   transparent dressing maintained  Taken 9/30/2023 0800 by Ailyn Collins RN  Body Position: position changed independently  Skin Protection:   adhesive use limited   incontinence pads utilized   transparent dressing maintained   tubing/devices free from skin contact  Intervention: Prevent and Manage VTE (Venous Thromboembolism) Risk  Recent Flowsheet Documentation  Taken 9/30/2023 1400 by Ailyn Collins RN  Activity Management: activity encouraged  Taken 9/30/2023 1000 by Ailyn Collins RN  Activity Management: ambulated in room  Taken 9/30/2023 0800 by Ailyn Collins RN  Activity Management: activity encouraged  VTE Prevention/Management:   bilateral   sequential compression devices off  Range of Motion: active ROM (range of motion) encouraged  Intervention: Prevent Infection  Recent Flowsheet Documentation  Taken 9/30/2023 1400 by Ailyn Collins RN  Infection Prevention: environmental surveillance performed  Taken 9/30/2023 1200 by Ailyn Collins RN  Infection Prevention: environmental surveillance performed  Taken 9/30/2023 1000 by Ailyn Collins RN  Infection Prevention: environmental surveillance performed  Taken 9/30/2023 0800 by Ailyn Collins RN  Infection Prevention: environmental surveillance performed  Goal: Optimal Comfort and Wellbeing  Outcome: Ongoing, Progressing  Intervention: Provide Person-Centered Care  Recent Flowsheet Documentation  Taken 9/30/2023 0800 by Ailyn Collins RN  Trust Relationship/Rapport:   care explained   choices provided   emotional support provided   empathic listening provided   questions encouraged   questions answered   reassurance provided   thoughts/feelings acknowledged  Goal: Readiness for Transition of Care  Outcome: Ongoing, Progressing     Problem:  Osteoarthritis Comorbidity  Goal: Maintenance of Osteoarthritis Symptom Control  Outcome: Ongoing, Progressing  Intervention: Maintain Osteoarthritis Symptom Control  Recent Flowsheet Documentation  Taken 9/30/2023 1400 by Ailyn Collins RN  Activity Management: activity encouraged  Medication Review/Management: medications reviewed  Taken 9/30/2023 1200 by Ailyn Collins RN  Medication Review/Management: medications reviewed  Taken 9/30/2023 1000 by Ailyn Collins RN  Activity Management: ambulated in room  Medication Review/Management: medications reviewed  Taken 9/30/2023 0800 by Ailyn Collins RN  Activity Management: activity encouraged  Assistive Device Utilized: walker  Medication Review/Management: medications reviewed     Problem: Pain Acute  Goal: Acceptable Pain Control and Functional Ability  Outcome: Ongoing, Progressing  Intervention: Prevent or Manage Pain  Recent Flowsheet Documentation  Taken 9/30/2023 1400 by Ailyn Collins RN  Medication Review/Management: medications reviewed     Problem: Fall Injury Risk  Goal: Absence of Fall and Fall-Related Injury  Outcome: Ongoing, Progressing  Intervention: Identify and Manage Contributors  Recent Flowsheet Documentation  Taken 9/30/2023 1400 by Ailyn Collins RN  Medication Review/Management: medications reviewed  Intervention: Promote Injury-Free Environment  Recent Flowsheet Documentation  Taken 9/30/2023 1400 by Ailyn Collins RN  Safety Promotion/Fall Prevention: activity supervised     Problem: Skin Injury Risk Increased  Goal: Skin Health and Integrity  Outcome: Ongoing, Progressing  Intervention: Optimize Skin Protection  Recent Flowsheet Documentation  Taken 9/30/2023 1400 by Ailyn Collins RN  Head of Bed (HOB) Positioning: HOB elevated  Skin Protection:   adhesive use limited   incontinence pads utilized   tubing/devices free from skin contact   transparent dressing maintained   Goal Outcome Evaluation:  Plan of  Care Reviewed With: patient        Progress: improving

## 2023-09-30 NOTE — MBS/VFSS/FEES
Acute Care - Speech Language Pathology   Swallow Re-Evaluation  [place]  Modified Barium Swallow Study (MBS)       Patient Name: Mukund Pool  : 1930  MRN: [id number]  Today's Date: 2023               Admit Date: 2023    Visit Dx:     ICD-10-CM ICD-9-CM   1. Pneumonia of both lower lobes due to infectious organism  J18.9 486   2. Acute pulmonary edema  J81.0 518.4   3. Renal insufficiency  N28.9 593.9   4. History of atrial fibrillation  Z86.79 V12.59   5. Syncope, unspecified syncope type  R55 780.2   6. Oropharyngeal dysphagia  R13.12 787.22     Patient Active Problem List   Diagnosis    Malignant neoplasm of right vocal cord    Chest pain    History of head and neck cancer    Bilateral pulmonary infiltrates    Paroxysmal atrial fibrillation    Hypoxia    Stage 3b chronic kidney disease    Anemia, chronic disease    Weight loss     Past Medical History:   Diagnosis Date    Arthritis     Chronic kidney disease     kidney stones    GERD (gastroesophageal reflux disease)     History of radiation therapy 2021    laryngeal mass    Prostate cancer     Vocal cord cancer      Past Surgical History:   Procedure Laterality Date    CARDIAC ELECTROPHYSIOLOGY PROCEDURE  2023    cardiac loop recorder    CHOLECYSTECTOMY      CORONARY ARTERY BYPASS GRAFT      PROSTATE SURGERY      VOCAL CORD MASS EXCISION  2021       SLP Recommendation and Plan  SLP Swallowing Diagnosis: mild, oral dysphagia, pharyngeal dysphagia (23 1040)  SLP Diet Recommendation: regular textures, thin liquids (23 1040)  Recommended Precautions and Strategies: upright posture during/after eating, general aspiration precautions (23 1040)  SLP Rec. for Method of Medication Administration: meds whole, with thin liquids, as tolerated (23 1040)     Monitor for Signs of Aspiration: yes, notify SLP if any concerns (23 1040)     Swallow Criteria for Skilled Therapeutic Interventions Met:  demonstrates skilled criteria (09/30/23 1040)  Anticipated Discharge Disposition (SLP): home with OP services, anticipate therapy at next level of care (09/30/23 1040)     Therapy Frequency (Swallow): 3 days per week (09/30/23 1040)  Predicted Duration Therapy Intervention (Days): until discharge (09/30/23 1040)  Oral Care Recommendations: Oral Care BID/PRN, Toothbrush (09/30/23 1040)                                      Oral Care Recommendations: Oral Care BID/PRN, Toothbrush (09/30/23 1040)    Plan of Care Reviewed With: patient      SWALLOW EVALUATION (last 72 hours)       SLP Adult Swallow Evaluation       Row Name 09/30/23 1040 09/29/23 1277                Rehab Evaluation    Document Type re-evaluation  -VO evaluation  -DV       Subjective Information no complaints  -VO no complaints  -DV       Patient Observations -- alert;cooperative  -DV       Patient/Family/Caregiver Comments/Observations -- family present for few minutes before going home  -DV       Patient Effort good  -VO excellent  -DV       Symptoms Noted During/After Treatment -- none  -DV          General Information    Patient Profile Reviewed -- yes  -DV       Pertinent History Of Current Problem -- 89yoM adm w/ chest pain - imaging shows PNA vs atelectasis vs pleural effusions. 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  -DV       Current Method of Nutrition -- regular textures;thin liquids  -DV       Precautions/Limitations, Vision -- WFL with corrective lenses;for purposes of eval  -DV       Precautions/Limitations, Hearing -- WFL;for purposes of eval  -DV       Prior Level of Function-Communication -- WFL  -DV       Prior Level of Function-Swallowing -- no diet consistency restrictions  -DV       Plans/Goals Discussed with -- patient;agreed upon  -DV       Barriers to Rehab -- none identified  -DV       Patient's Goals for Discharge -- patient did not state  -DV          Pain     Additional Documentation -- Pain Scale: Numbers Pre/Post-Treatment (Group)  -DV          Pain Scale: Numbers Pre/Post-Treatment    Pretreatment Pain Rating 0/10 - no pain  -VO 0/10 - no pain  -DV       Posttreatment Pain Rating 0/10 - no pain  -VO 0/10 - no pain  -DV          Oral Motor Structure and Function    Dentition Assessment -- natural, present and adequate  -DV       Secretion Management -- wet vocal quality;other (see comments)  vs rough vocal quality  -DV       Mucosal Quality -- moist, healthy  -DV       Volitional Cough -- WFL  -DV          Oral Musculature and Cranial Nerve Assessment    Oral Motor General Assessment -- WFL  -DV          General Eating/Swallowing Observations    Respiratory Support Currently in Use -- nasal cannula  -DV       O2 Liters -- 2L  -DV       Eating/Swallowing Skills -- self-fed  -DV       Positioning During Eating -- upright 90 degree  -DV       Utensils Used -- spoon;cup;straw  -DV       Consistencies Trialed -- thin liquids;pureed;regular textures  -DV          Respiratory    Respiratory Status -- WFL  -DV          Clinical Swallow Eval    Oral Prep Phase -- WFL  -DV       Oral Transit -- WFL  -DV       Oral Residue -- WFL  -DV       Pharyngeal Phase -- suspected pharyngeal impairment  -DV       Esophageal Phase -- unremarkable  -DV       Clinical Swallow Evaluation Summary -- Pt presented w/ ?wet vocal quality at baseline vs rough vocal quality, but wet vocal quality did appear to increase w/ trials of thin liquid. Recommend nectar-thick liquids until MBS tomorrow.  -DV          Pharyngeal Phase Concerns    Pharyngeal Phase Concerns -- wet vocal quality  -DV       Wet Vocal Quality -- thin  -DV          MBS/VFSS    Utensils Used spoon;cup;straw  -VO --       Consistencies Trialed regular textures;thin liquids;pudding thick  -VO --          MBS/VFSS Interpretation    Oral Prep Phase WFL  -VO --       Oral Transit Phase WFL  -VO --       Oral Residue WFL  -VO --       VFSS  Summary MBS completed this AM. Pt w/ mild oral and pharyngeal phase of swallow that are safe and adequate for regular diet textures, thin liquids. There was one instance of laryngeal penetration w/ thin liquids via straw during the swallow that cleared w/ completion of the swallow. There was mild vallecular residue with all consistencies 2' reduced base of tongue retraction that partially cleared w/ spontaneous 2nd swallow and did not pose aspiration risk. Will upgrade from nectar thick liquids. Would benefit from general/prophylactic pharyngeal strengthening given medical history and current deficits.  -VO --          Initiation of Pharyngeal Swallow    Initiation of Pharyngeal Swallow bolus in valleculae  -VO --       Pharyngeal Phase impaired pharyngeal phase of swallowing  -VO --       Penetration During the Swallow thin liquids;secondary to delayed swallow initiation or mistiming;secondary to reduced vestibular closure  w/ straw x1 only  -VO --       Response to Penetration Yes  -VO --       Responsed to penetration with --  cleared w/ completion of swallow  -VO --       Rosenbek's Scale thin:;2--->level 2;pudding/puree:;regular textures:;1--->level 1  -VO --       Pharyngeal Residue thin liquids;pudding/puree;regular textures;valleculae;secondary to reduced base of tongue retraction  -VO --       Response to Residue partial residue clearance;cleared residue with spontaneous subsequent swallow  -VO --          SLP Evaluation Clinical Impression    SLP Swallowing Diagnosis mild;oral dysphagia;pharyngeal dysphagia  -VO suspected pharyngeal dysphagia  -DV       Functional Impact risk of aspiration/pneumonia  -VO risk of aspiration/pneumonia  -DV       Swallow Criteria for Skilled Therapeutic Interventions Met demonstrates skilled criteria  -VO demonstrates skilled criteria  -DV          Recommendations    Therapy Frequency (Swallow) 3 days per week  -VO --       Predicted Duration Therapy Intervention (Days) until  discharge  -VO until discharge  -DV       SLP Diet Recommendation regular textures;thin liquids  -VO regular textures;nectar thick liquids;no mixed consistencies  -DV       Recommended Diagnostics -- VFSS (MBS)  -DV       Recommended Precautions and Strategies upright posture during/after eating;general aspiration precautions  -VO upright posture during/after eating;general aspiration precautions  -DV       Oral Care Recommendations Oral Care BID/PRN;Toothbrush  -VO Oral Care BID/PRN  -DV       SLP Rec. for Method of Medication Administration meds whole;with thin liquids;as tolerated  -VO with thick liquids;with puree;as tolerated  -DV       Monitor for Signs of Aspiration yes;notify SLP if any concerns  -VO notify SLP if any concerns  -DV       Anticipated Discharge Disposition (SLP) home with OP services;anticipate therapy at next level of care  -VO anticipate therapy at next level of care  -DV                 User Key  (r) = Recorded By, (t) = Taken By, (c) = Cosigned By      Initials Name Effective Dates    VO Yanna Bey MA,CCC-SLP 06/16/21 -     Rach Almonte MS CCC-SLP 06/16/21 -                     EDUCATION  The patient has been educated in the following areas:   Dysphagia (Swallowing Impairment).        SLP GOALS       Row Name 09/30/23 1040             (LTG) Patient will demonstrate functional swallow for    Diet Texture (Demonstrate functional swallow) regular textures  -VO      Liquid viscosity (Demonstrate functional swallow) thin liquids  -VO      [place] (Demonstrate functional swallow) independently (over 90% accuracy)  -VO      Time Frame (Demonstrate functional swallow) by discharge  -VO      Progress/Outcomes (Demonstrate functional swallow) new goal  -VO         (STG) Pharyngeal Strengthening Exercise Goal 1 (SLP)    Activity (Pharyngeal Strengthening Goal 1, SLP) increase timing;increase closure at entrance to airway/closure of airway at glottis;increase tongue base retraction   -VO      Increase Timing prepping - 3 second prep or suck swallow or 3-step swallow  -VO      Increase Closure at Entrance to Airway/Closure of Airway at Glottis supraglottic swallow  -VO      Increase Tongue Base Retraction hard effortful swallow  -VO      [place]/Accuracy (Pharyngeal Strengthening Goal 1, SLP) independently (over 90% accuracy)  -VO      Time Frame (Pharyngeal Strengthening Goal 1, SLP) short term goal (STG)  -VO      Progress/Outcomes (Pharyngeal Strengthening Goal 1, SLP) new goal  -VO                User Key  (r) = Recorded By, (t) = Taken By, (c) = Cosigned By      Initials Name Provider Type    VO Yanna Bey MA,CCC-SLP Speech and Language Pathologist                       Time Calculation:    Time Calculation- SLP       Row Name 09/30/23 1311             Time Calculation- SLP    SLP Start Time 1040  -VO      SLP Received On 09/30/23  -VO         Untimed Charges    14842-DZ Motion Fluoro Eval Swallow Minutes 67  -VO         Total Minutes    Untimed Charges Total Minutes 67  -VO       Total Minutes 67  -VO                User Key  (r) = Recorded By, (t) = Taken By, (c) = Cosigned By      Initials Name Provider Type    VO Yanna Bey MA,CCC-SLP Speech and Language Pathologist                    Therapy Charges for Today       Code Description Service Date Service Provider Modifiers Qty    99191509212 HC ST MOTION FLUORO EVAL SWALLOW 4 9/30/2023 Yanna Bey MA,CCC-SLP GN 1                 Yanna Bey MA,CCC-SLP  9/30/2023

## 2023-09-30 NOTE — PROGRESS NOTES
Clinical Nutrition   Nutrition Support Assessment  Reason for Visit: Identified at risk by screening criteria, MST score 2+      Patient Name: Mukund Pool  YOB: 1930  MRN: [id number]  Date of Encounter: 09/30/23 12:51 EDT  Admission date: 9/29/2023    Comments:  Nutrition consult for weight loss of 40lb.  Patient reports weight loss was in 2021 during initial cancer dx and not recent. Please change in problem list to history of weight loss vs recent.  This was confirmed with notes from oncology RD and documented weight in EMR.  Weight stable x 1 year (few pounds changes).  Overall good appetite.       No nutrition concerns at this time.     Nutrition Assessment   Admission Diagnosis:  Chest pain [R07.9]      Problem List:    Chest pain    History of head and neck cancer    Bilateral pulmonary infiltrates    Paroxysmal atrial fibrillation    Hypoxia    Stage 3b chronic kidney disease    Anemia, chronic disease    Weight loss    PMH:   He  has a past medical history of Arthritis, Chronic kidney disease, GERD (gastroesophageal reflux disease), History of radiation therapy (05/24/2021), Prostate cancer, and Vocal cord cancer.    PSH:  He  has a past surgical history that includes Prostate surgery; Coronary artery bypass graft; Cholecystectomy; VOCAL CORD MASS EXCISION (03/22/2021); and Cardiac electrophysiology procedure (04/2023).    Applicable Nutrition Concerns:   Skin:  Oral:  GI:    Applicable Interval History:   Head and neck cancer; Dx 2021. .  Received radiation therapy.   (9/29) SLP eval - Regular diet, NTL  (9/30) MBS- Regular diet, thin liquids     Reported/Observed/Food/Nutrition Related History:   9/30  Just finished eating lunch, watching the [employer] game on TV.  Reports good appetite, tolerating meals.  Denies any recent weight changes.  Reported  40lb weight loss was during initial dx of cancer in 2021. RD confirmed this with notes from oncology RD as well as  "documented weight in EMR.    SLP evaluated patient yesterday, order for regular diet with NTL. MBS completed today with recommendation for regular diet with thin liquids.     Anthropometrics     Flowsheet Rows      Flowsheet Row First Filed Value   Admission Height 182.9 cm (72\") Documented at 09/29/2023 1251   Admission Weight 81.6 kg (180 lb) Documented at 09/29/2023 1251     Height: Height: 182.9 cm (72\")  Last Filed Weight: Weight: 81.6 kg (180 lb) (09/29/23 1251)  Method: Weight Method: Stated  BMI: BMI (Calculated): 24.4  BMI classification: Normal: 18.5-24.9kg/m2  IBW:  178lb    UBW:     Weight       Weight (kg) Weight (lbs) Weight Method Visit Report   4/15/2019 99.791 kg  220 lb   --    3/31/2021 83.099 kg  183 lb 3.2 oz   --    4/20/2021 83.144 kg  183 lb 4.8 oz      4/27/2021 83.779 kg  184 lb 11.2 oz      5/4/2021 83.507 kg  184 lb 1.6 oz      5/11/2021 82.736 kg  182 lb 6.4 oz      5/18/2021 84.278 kg  185 lb 12.8 oz      6/24/2021 84.278 kg  185 lb 12.8 oz   --    8/25/2021 85.639 kg  188 lb 12.8 oz   --    2/25/2022 86.274 kg  190 lb 3.2 oz   --    9/9/2022 83.915 kg  185 lb   --    4/19/2023 85.73 kg  189 lb   --    9/29/2023 81.647 kg  180 lb  Stated       Weight change:   5lb weight loss x 1 year, not nutritionally significant.     Nutrition Focused Physical Exam     Date:  9/30       Unable to perform exam due to: Defer pending indication    patient does not meet criteria for MSA at this time.     Current Nutrition Prescription   PO: Diet: Regular/House Diet; Texture: Regular Texture (IDDSI 7); Fluid Consistency: Thin (IDDSI 0)  Oral Nutrition Supplement:   Intake: Insufficient data      Nutrition Diagnosis   Date:  9/30            Updated:    Problem No nutrition diagnosis at this time   Etiology    Signs/Symptoms    Status:     Goal:   General: Nutrition support treatment  PO: Establish PO  EN/PN: N/A    Nutrition Intervention      Follow treatment progress, Care plan reviewed, Interview for " preferences        Monitoring/Evaluation:   Per protocol, I&O, PO intake      Soni Thomason, TARI  Time Spent: 25min

## 2023-09-30 NOTE — NURSING NOTE
"VSS throughout the day despite consistent tachycardia in the low 100's. No complaints of pain throughout the day.    Patient alert and oriented but is exhibiting some short term forgetfulness.     Patient states he feels better and can \"breath better\" than this morning.    Diet was advanced from nectar thick liquids to thin liquids per SLP after modified barium swallow test.     Blanchable redness on left buttocks shows no changes through the day. Patient states he has had this for years but it gets worse when he sits for long periods of times. Patient states he sees a dermatologist for it.   "

## 2023-09-30 NOTE — PLAN OF CARE
Problem: Adult Inpatient Plan of Care  Goal: Plan of Care Review  Outcome: Ongoing, Progressing  Flowsheets  Taken 9/30/2023 0634 by Kelsie Banegas RN  Progress: no change  Outcome Evaluation:   VSS throughout shift. Maintained O2 well on 2L NC. Complaints of pain medicated per PRN MAR   effective per pt. Pt requested melatonin to help sleep   effective per pt. No acute events during shift.  Taken 9/29/2023 1633 by Rach Licea MS CCC-SLP  Plan of Care Reviewed With: patient  Goal: Patient-Specific Goal (Individualized)  Outcome: Ongoing, Progressing  Goal: Absence of Hospital-Acquired Illness or Injury  Outcome: Ongoing, Progressing  Intervention: Identify and Manage Fall Risk  Recent Flowsheet Documentation  Taken 9/30/2023 0400 by Kelsie Banegas RN  Safety Promotion/Fall Prevention:   activity supervised   assistive device/personal items within reach   clutter free environment maintained   nonskid shoes/slippers when out of bed   room organization consistent   safety round/check completed  Taken 9/30/2023 0200 by Kelsie Banegas RN  Safety Promotion/Fall Prevention:   activity supervised   assistive device/personal items within reach   clutter free environment maintained   nonskid shoes/slippers when out of bed   room organization consistent   safety round/check completed  Taken 9/30/2023 0016 by Kelsie Banegas RN  Safety Promotion/Fall Prevention:   activity supervised   clutter free environment maintained   assistive device/personal items within reach   nonskid shoes/slippers when out of bed   room organization consistent   safety round/check completed  Taken 9/29/2023 2202 by Kelsie Banegas RN  Safety Promotion/Fall Prevention:   activity supervised   assistive device/personal items within reach   clutter free environment maintained   nonskid shoes/slippers when out of bed   room organization consistent   safety round/check completed  Taken 9/29/2023 2025 by Kelsie Banegas  RN  Safety Promotion/Fall Prevention:   activity supervised   assistive device/personal items within reach   clutter free environment maintained   nonskid shoes/slippers when out of bed   room organization consistent   safety round/check completed  Intervention: Prevent Skin Injury  Recent Flowsheet Documentation  Taken 9/30/2023 0400 by Kelsie Banegas RN  Body Position: position changed independently  Skin Protection:   adhesive use limited   incontinence pads utilized   transparent dressing maintained   tubing/devices free from skin contact  Taken 9/30/2023 0200 by Kelsie Banegas RN  Body Position: position changed independently  Skin Protection:   adhesive use limited   incontinence pads utilized   transparent dressing maintained   tubing/devices free from skin contact  Taken 9/30/2023 0016 by Kelsie Banegas RN  Body Position: position changed independently  Skin Protection:   adhesive use limited   incontinence pads utilized   transparent dressing maintained   tubing/devices free from skin contact  Taken 9/29/2023 2202 by Kelsie Banegas RN  Body Position: position changed independently  Skin Protection:   adhesive use limited   incontinence pads utilized   transparent dressing maintained   tubing/devices free from skin contact  Taken 9/29/2023 2025 by Kelsie Banegas RN  Body Position: position changed independently  Skin Protection:   adhesive use limited   incontinence pads utilized   transparent dressing maintained   tubing/devices free from skin contact  Intervention: Prevent and Manage VTE (Venous Thromboembolism) Risk  Recent Flowsheet Documentation  Taken 9/30/2023 0400 by Kelsie Banegas RN  Activity Management: activity minimized  Taken 9/30/2023 0200 by Kelsie Banegas RN  Activity Management: activity minimized  Taken 9/30/2023 0016 by Kelsie Banegas RN  Activity Management: activity minimized  Taken 9/29/2023 2202 by Kelsie Banegas RN  Activity  Management: activity minimized  Taken 9/29/2023 2025 by Kelsie Banegas RN  Activity Management: activity encouraged  VTE Prevention/Management:   bilateral   sequential compression devices off  Range of Motion: active ROM (range of motion) encouraged  Intervention: Prevent Infection  Recent Flowsheet Documentation  Taken 9/30/2023 0400 by Kelsie Banegas RN  Infection Prevention:   environmental surveillance performed   hand hygiene promoted   rest/sleep promoted  Taken 9/30/2023 0200 by Kelsie Banegas RN  Infection Prevention:   environmental surveillance performed   hand hygiene promoted   rest/sleep promoted  Taken 9/30/2023 0016 by Kelsie Banegas RN  Infection Prevention:   environmental surveillance performed   hand hygiene promoted   rest/sleep promoted  Taken 9/29/2023 2202 by Kelsie Banegas RN  Infection Prevention:   environmental surveillance performed   hand hygiene promoted   rest/sleep promoted  Taken 9/29/2023 2025 by Kelsie Banegas RN  Infection Prevention:   environmental surveillance performed   hand hygiene promoted   rest/sleep promoted  Goal: Optimal Comfort and Wellbeing  Outcome: Ongoing, Progressing  Intervention: Monitor Pain and Promote Comfort  Recent Flowsheet Documentation  Taken 9/29/2023 2132 by Kelsie Banegas RN  Pain Management Interventions:   see MAR   pillow support provided   position adjusted  Taken 9/29/2023 2025 by Kelsie Banegas RN  Pain Management Interventions: pillow support provided  Intervention: Provide Person-Centered Care  Recent Flowsheet Documentation  Taken 9/29/2023 2025 by Kelsie Banegas RN  Trust Relationship/Rapport:   care explained   choices provided   thoughts/feelings acknowledged  Goal: Readiness for Transition of Care  Outcome: Ongoing, Progressing  Intervention: Mutually Develop Transition Plan  Recent Flowsheet Documentation  Taken 9/29/2023 2028 by Kelsie Banegas RN  Equipment Currently Used at  Home:   walker, rolling   cane, quad  Taken 9/29/2023 2026 by Kelsie Banegas, RN  Transportation Anticipated: family or friend will provide  Patient/Family Anticipated Services at Transition:   Patient/Family Anticipates Transition to: home with family     Problem: Osteoarthritis Comorbidity  Goal: Maintenance of Osteoarthritis Symptom Control  Outcome: Ongoing, Progressing  Intervention: Maintain Osteoarthritis Symptom Control  Recent Flowsheet Documentation  Taken 9/30/2023 0400 by Kelsie Banegas RN  Activity Management: activity minimized  Assistive Device Utilized: walker  Taken 9/30/2023 0200 by Kelsie Banegas RN  Activity Management: activity minimized  Assistive Device Utilized: walker  Taken 9/30/2023 0016 by Kelsie Banegas RN  Activity Management: activity minimized  Assistive Device Utilized: walker  Taken 9/29/2023 2202 by Kelsie Banegas, RN  Activity Management: activity minimized  Assistive Device Utilized: walker  Taken 9/29/2023 2025 by Kelsie Banegas RN  Activity Management: activity encouraged  Assistive Device Utilized: walker  Medication Review/Management: medications reviewed   Goal Outcome Evaluation:           Progress: no change  Outcome Evaluation: VSS throughout shift. Maintained O2 well on 2L NC. Complaints of pain medicated per PRN MAR; effective per pt. Pt requested melatonin to help sleep; effective per pt. No acute events during shift.

## 2023-09-30 NOTE — PROGRESS NOTES
[facility] Medicine Services  PROGRESS NOTE    Patient Name: Mukund Pool  : [id number]  MRN: [id number]    Date of Admission: 2023  Primary Care Physician: Slick Hubbard MD    Subjective   Subjective     CC:  Shortness of breath    HPI:  Shortness of breath improved after getting diuresis, had good urine output.  Overall just feels weak.  Reports that he has a good appetite.  Went to see cardiology here at [facility], previously followed at [facility] but do not feel like they are getting answers.  Edema resolved      Objective   Objective     Vital Signs:   Temp:  [97.2 °F (36.2 °C)-98.2 °F (36.8 °C)] 97.9 °F (36.6 °C)  Heart Rate:  [] 104  Resp:  [16-18] 18  BP: ()/(66-99) 107/74  Flow (L/min):  [2] 2     Physical Exam:  Constitutional: No acute distress, awake, alert, pleasant elderly man  Respiratory: Decreased breath sounds bilaterally, respiratory effort normal on room air  Cardiovascular: RRR  Gastrointestinal: Positive bowel sounds, soft, nontender, nondistended  Musculoskeletal: No bilateral ankle edema  Psychiatric: Appropriate affect, cooperative  Neurologic: Oriented x 3, no gross focal neurological deficits      Results Reviewed:  LAB RESULTS:      Lab 23  0535 23  1425 23  1257   WBC 4.71  --  7.96   HEMOGLOBIN 9.8*  --  11.3*   HEMATOCRIT 30.1*  --  35.2*   PLATELETS 215  --  258   NEUTROS ABS 2.53  --  5.75   IMMATURE GRANS (ABS) 0.01  --  0.04   LYMPHS ABS 1.52  --  1.39   MONOS ABS 0.55  --  0.68   EOS ABS 0.06  --  0.03   .2*  --  106.3*   CRP  --   --  0.54*   PROCALCITONIN  --   --  0.05   LACTATE  --  1.8  --    LDH  --   --  178   D DIMER QUANT  --   --  0.60         Lab 23  0535 23  1257   SODIUM 138 138   POTASSIUM 4.6 5.1   CHLORIDE 105 103   CO2 26.0 25.0   ANION GAP 7.0 10.0   BUN 27* 22   CREATININE 1.51* 1.64*   EGFR 42.8* 38.8*   GLUCOSE 96 133*   CALCIUM 8.5 8.9   TSH  --  3.140          Lab 09/29/23  1257   TOTAL PROTEIN 7.3   ALBUMIN 3.9   GLOBULIN 3.4   ALT (SGPT) 17   AST (SGOT) 19   BILIRUBIN 1.1   ALK PHOS 103         Lab 09/30/23  0724 09/30/23  0535 09/29/23  1257   PROBNP  --   --  5,453.0*   HSTROP T 45* 45* 40*         Lab 09/30/23  0535   CHOLESTEROL 132   LDL CHOL 83   HDL CHOL 38*   TRIGLYCERIDES 51         Lab 09/29/23  1257   FERRITIN 129.80   FOLATE 9.59   VITAMIN B 12 1,278*         Brief Urine Lab Results       None            Microbiology Results Abnormal       Procedure Component Value - Date/Time    COVID PRE-OP / PRE-PROCEDURE SCREENING ORDER (NO ISOLATION) - Swab, Nasopharynx [[id number]]  (Normal) Collected: 09/29/23 1259    Lab Status: Final result Specimen: Swab from Nasopharynx Updated: 09/29/23 1337    Narrative:      The following orders were created for panel order COVID PRE-OP / PRE-PROCEDURE SCREENING ORDER (NO ISOLATION) - Swab, Nasopharynx.  Procedure                               Abnormality         Status                     ---------                               -----------         ------                     COVID-19 and FLU A/B PCR...[[id number]]  Normal              Final result                 Please view results for these tests on the individual orders.    COVID-19 and FLU A/B PCR - Swab, Nasopharynx [[id number]]  (Normal) Collected: 09/29/23 1259    Lab Status: Final result Specimen: Swab from Nasopharynx Updated: 09/29/23 1337     COVID19 Not Detected     Influenza A PCR Not Detected     Influenza B PCR Not Detected    Narrative:      Fact sheet for providers: https://www.fda.gov/media/247030/download    Fact sheet for patients: https://www.fda.gov/media/808132/download    Test performed by PCR.            CT Abdomen Pelvis Without Contrast    Result Date: 9/29/2023  CT ABDOMEN PELVIS WO CONTRAST Date of Exam: 9/29/2023 6:32 PM EDT Indication: Weight loss, unintended prior cystic mass in pancreas. Comparison: PET/CT 5/27/2021 Technique: Axial CT images  were obtained of the abdomen and pelvis without the administration of contrast. Reconstructed coronal and sagittal images were also obtained. Automated exposure control and iterative construction methods were used. Findings: There are new moderate-sized margins. There is dependent atelectasis within the bilateral lower lobes. 1 cm 8.8 cm The liver appears within normal limits. Patient is status post cholecystectomy. No evidence biliary tract obstruction. Again seen within the head of the pancreas is a cystic appearing low-density mass measuring 4.7 x 2.5 cm in size. This is unchanged in size from prior exam. Within the mid body of the pancreas there is an additional well-circumscribed low density mass which appears new. This measures 2.1 x 1.9 cm in size. Pancreatic parenchyma is overall atrophic. No evidence of pancreatic duct obstruction. Spleen appears normal. Bilateral adrenal glands are within normal limits. There is a cyst at the upper pole of the left kidney which is unchanged. There are nonobstructing stones in the lower pole the right kidney. No left renal stones. No evidence of ureteral stone or hydronephrosis. There is no abdominal or retroperitoneal adenopathy. The upper GI tract is within normal limits. Pelvis: Ureters within normal limits. Patient appears to be status post prostatectomy. There are scattered colonic diverticula. No evidence diverticulitis. No free. No free intraperitoneal fluid. Patient is status post total left hip arthroplasty. There is diffuse osteopenia of the bony structures. No lytic or sclerotic bony lesions are identified.     Impression: Impression: 1. There are low-density masses within the pancreas which may represent a pancreatic cyst, pseudocyst, or IPMN. The largest lesion within the head of the pancreas is unchanged. There is a new 2.1 cm cyst within the mid body of the pancreas. 2. Moderate bilateral pleural effusions which are new. 3. Nonobstructing right renal stones.  4. Colonic diverticulosis but no evidence of diverticulitis. Electronically Signed: Jamarcus Loco MD  9/29/2023 7:44 PM EDT  Workstation ID: [id number]    CT Chest Without Contrast Diagnostic    Result Date: 9/29/2023  CT CHEST WO CONTRAST DIAGNOSTIC Date of Exam: 9/29/2023 6:32 PM EDT Indication: Pneumonia, complication suspected, xray done. Comparison: PET/CT 8/25/2021 Technique: Axial CT images were obtained of the chest without contrast administration.  Reconstructed coronal and sagittal images were also obtained. Automated exposure control and iterative construction methods were used. Findings: No mediastinal, hilar, or axillary adenopathy. There is a large modification. Patient is status post median sternotomy for coronary artery bypass grafting. There are new moderate-sized bilateral pleural effusions. There is bilateral lower lobe likely due to compressive atelectasis. No suspicious pulmonary nodule. Bilateral adrenal glands are within normal limits. There is a cyst of the upper pole which is unchanged. There is diffuse osteopenia. No acute fracture identified. No lytic or sclerotic bony lesions. There is confluent ossification of the anterior and posterior longitudinal ligaments compatible changes of ankylosing spondylitis.     Impression: Impression: 1. New moderate-sized bilateral pleural effusions. 2. New bibasilar airspace disease likely due to compressive atelectasis. Electronically Signed: Jamarcus Loco MD  9/29/2023 7:47 PM EDT  Workstation ID: [id number]    XR Chest 1 View    Result Date: 9/29/2023  XR CHEST 1 VW Date of Exam: 9/29/2023 1:01 PM EDT Indication: SOA triage protocol Comparison: PET/CT 8/25/2021  Findings: Prior sternotomy and CABG. Patient rotated side to left. Cardiac loop recorder noted. Central pulmonary vascular congestion with interstitial thickening most compatible with pulmonary edema. Bibasilar airspace opacities with small bilateral pleural effusions. No pneumothorax.      Impression: Impression: 1. Pulmonary edema. 2. Bibasilar airspace disease may relate to atelectasis and/or pneumonia with small bilateral pleural effusions. Electronically Signed: Soto Matos MD  9/29/2023 1:31 PM EDT  Workstation ID: [id number]         Current medications:  Scheduled Meds:amiodarone, 100 mg, Oral, Q24H  apixaban, 2.5 mg, Oral, BID  aspirin, 81 mg, Oral, Daily  cefTRIAXone, 1,000 mg, Intravenous, Q24H  gabapentin, 300 mg, Oral, TID  ipratropium-albuterol, 3 mL, Nebulization, 4x Daily - RT  metoprolol succinate XL, 37.5 mg, Oral, Daily  multivitamin with minerals, 1 tablet, Oral, Daily  pantoprazole, 40 mg, Oral, Q AM  senna-docusate sodium, 2 tablet, Oral, BID      Continuous Infusions:   PRN Meds:.  acetaminophen    senna-docusate sodium **AND** polyethylene glycol **AND** bisacodyl **AND** bisacodyl    melatonin    nitroglycerin    sodium chloride    Assessment & Plan   Assessment & Plan     Active Hospital Problems    Diagnosis  POA    **Chest pain [R07.9]  Yes    Bilateral pulmonary infiltrates [R91.8]  Yes    Paroxysmal atrial fibrillation [I48.0]  Yes    Hypoxia [R09.02]  Yes    Stage 3b chronic kidney disease [N18.32]  Yes    Anemia, chronic disease [D63.8]  Yes    Weight loss [R63.4]  Yes    History of head and neck cancer [Z85.89]  Not Applicable      Resolved Hospital Problems   No resolved problems to display.        Brief Hospital Course to date:  89-year-old man with history of coronary artery disease, head neck cancer 2021, coronary disease with prior CABG, paroxysmal A-fib on amiodarone who presents with chest pain, shortness of breath, hypoxia and 40 pound weight loss     Chest pain with history of coronary disease, CABG  -Troponin stable in the 40s without any significant delta  -BNP elevated 5400, EKG with left bundle branch block  -Echo pending, patient reports that he had an echo previously at [facility]  -Continue aspirin  -Patient was previously seen at [facility] for his  cardiology needs, wants to transfer his care here to [facility].      Acute hypoxia, resolved  Heart failure exacerbation  -Requiring 2 L of oxygen this morning with mild tachycardia  -CT of the chest without contrast showed new moderate-sized bilateral pleural effusions with likely compressive atelectasis  -Received 40 of IV Lasix yesterday. Reviewed BMP, creatinine elevated on presentation at 1.64, trended down after diuresis which is near his baseline (obtained from [facility]'s doctors notes). Additional diuretics per cardiology. Will be cautious given age and CKD as he is no longer requiring oxygen  -Echo pending  -Repeat BMP in the a.m. to check creatinine and electrolytes  - speech to evaluate     40 pound weight loss  Previous abnormal pancreas on outside scan  -Check CA 19-9  -CT of the abdomen and pelvis with low-density masses within the pancreas which may represent a cyst, pseudocyst or IPMN the largest lesion within the head of the pancreas is unchanged, new 2.1 cm cyst within the mid body of the pancreas.  - Patient reports this has been followed for this and they have decided not to undergo treatment given his age.      Paroxysmal A-fib  -Rate controlled, on chronic Eliquis  -Continue amiodarone    CKD  - Reviewed [facility] records in [facility]. Previously had creatinine of 1.7 on 4/22/23 noted in Dr. Childress's office note.   - Monitor while on diuretics       Expected Discharge Location and Transportation: home vs rehab  Expected Discharge   Expected Discharge Date: 10/2/2023; Expected Discharge Time:      DVT prophylaxis:  Medical DVT prophylaxis orders are present.     AM-PAC 6 Clicks Score (PT): 18 (09/30/23 0800)    CODE STATUS:   Code Status and Medical Interventions:   Ordered at: 09/29/23 2945     Code Status (Patient has no pulse and is not breathing):    CPR (Attempt to Resuscitate)     Medical Interventions (Patient has pulse or is breathing):    Full     This patient's problems and plans  were partially entered by my partner and updated as appropriate by me 09/30/23. Today is my first day evaluating this patient's active medical problems. I Personally reviewed chart and adjusted note to reflect daily changes in management/clinical condition. Copied text in this note has been reviewed and is accurate as of  09/30/23      Lacey Rivera MD  09/30/23

## 2023-10-01 LAB
ANION GAP SERPL CALCULATED.3IONS-SCNC: 8 MMOL/L (ref 5–15)
BUN SERPL-MCNC: 32 MG/DL (ref 8–23)
BUN/CREAT SERPL: 18.3 (ref 7–25)
CALCIUM SPEC-SCNC: 8.2 MG/DL (ref 8.2–9.6)
CHLORIDE SERPL-SCNC: 104 MMOL/L (ref 98–107)
CO2 SERPL-SCNC: 26 MMOL/L (ref 22–29)
CREAT SERPL-MCNC: 1.75 MG/DL (ref 0.76–1.27)
EGFRCR SERPLBLD CKD-EPI 2021: 35.9 ML/MIN/1.73
GLUCOSE SERPL-MCNC: 98 MG/DL (ref 65–99)
MAGNESIUM SERPL-MCNC: 2 MG/DL (ref 1.7–2.3)
POTASSIUM SERPL-SCNC: 4.5 MMOL/L (ref 3.5–5.2)
QT INTERVAL: 416 MS
QTC INTERVAL: 560 MS
SODIUM SERPL-SCNC: 138 MMOL/L (ref 136–145)

## 2023-10-01 PROCEDURE — 94664 DEMO&/EVAL PT USE INHALER: CPT

## 2023-10-01 PROCEDURE — 99232 SBSQ HOSP IP/OBS MODERATE 35: CPT | Performed by: INTERNAL MEDICINE

## 2023-10-01 PROCEDURE — 83735 ASSAY OF MAGNESIUM: CPT | Performed by: INTERNAL MEDICINE

## 2023-10-01 PROCEDURE — 97162 PT EVAL MOD COMPLEX 30 MIN: CPT

## 2023-10-01 PROCEDURE — 80048 BASIC METABOLIC PNL TOTAL CA: CPT | Performed by: INTERNAL MEDICINE

## 2023-10-01 PROCEDURE — G0378 HOSPITAL OBSERVATION PER HR: HCPCS

## 2023-10-01 PROCEDURE — 94799 UNLISTED PULMONARY SVC/PX: CPT

## 2023-10-01 PROCEDURE — 25010000002 FUROSEMIDE PER 20 MG: Performed by: INTERNAL MEDICINE

## 2023-10-01 RX ADMIN — SENNOSIDES AND DOCUSATE SODIUM 2 TABLET: 50; 8.6 TABLET ORAL at 20:10

## 2023-10-01 RX ADMIN — APIXABAN 2.5 MG: 2.5 TABLET, FILM COATED ORAL at 20:10

## 2023-10-01 RX ADMIN — ACETAMINOPHEN 650 MG: 325 TABLET ORAL at 08:19

## 2023-10-01 RX ADMIN — GABAPENTIN 300 MG: 300 CAPSULE ORAL at 08:19

## 2023-10-01 RX ADMIN — APIXABAN 2.5 MG: 2.5 TABLET, FILM COATED ORAL at 08:19

## 2023-10-01 RX ADMIN — IPRATROPIUM BROMIDE AND ALBUTEROL SULFATE 3 ML: 2.5; .5 SOLUTION RESPIRATORY (INHALATION) at 19:36

## 2023-10-01 RX ADMIN — PANTOPRAZOLE SODIUM 40 MG: 40 TABLET, DELAYED RELEASE ORAL at 05:37

## 2023-10-01 RX ADMIN — AMIODARONE HYDROCHLORIDE 100 MG: 200 TABLET ORAL at 08:19

## 2023-10-01 RX ADMIN — FUROSEMIDE 40 MG: 10 INJECTION, SOLUTION INTRAMUSCULAR; INTRAVENOUS at 05:37

## 2023-10-01 RX ADMIN — IPRATROPIUM BROMIDE AND ALBUTEROL SULFATE 3 ML: 2.5; .5 SOLUTION RESPIRATORY (INHALATION) at 07:47

## 2023-10-01 RX ADMIN — IPRATROPIUM BROMIDE AND ALBUTEROL SULFATE 3 ML: 2.5; .5 SOLUTION RESPIRATORY (INHALATION) at 15:59

## 2023-10-01 RX ADMIN — SENNOSIDES AND DOCUSATE SODIUM 2 TABLET: 50; 8.6 TABLET ORAL at 08:19

## 2023-10-01 RX ADMIN — ASPIRIN 81 MG: 81 TABLET, COATED ORAL at 08:19

## 2023-10-01 RX ADMIN — GABAPENTIN 300 MG: 300 CAPSULE ORAL at 20:10

## 2023-10-01 RX ADMIN — Medication 5 MG: at 23:39

## 2023-10-01 RX ADMIN — MULTIPLE VITAMINS W/ MINERALS TAB 1 TABLET: TAB at 08:19

## 2023-10-01 NOTE — PLAN OF CARE
Problem: Adult Inpatient Plan of Care  Goal: Plan of Care Review  Outcome: Ongoing, Progressing  Flowsheets (Taken 10/1/2023 0514)  Progress: improving  Plan of Care Reviewed With: patient  Outcome Evaluation: VSS throughout shift. Maintained O2 well on 2L NC. No complaints of pain. Pt rested well. No acute events during shift.  Goal: Patient-Specific Goal (Individualized)  Outcome: Ongoing, Progressing  Goal: Absence of Hospital-Acquired Illness or Injury  Outcome: Ongoing, Progressing  Intervention: Identify and Manage Fall Risk  Recent Flowsheet Documentation  Taken 10/1/2023 0200 by Kelsie Banegas RN  Safety Promotion/Fall Prevention:   activity supervised   assistive device/personal items within reach   clutter free environment maintained   nonskid shoes/slippers when out of bed   room organization consistent   safety round/check completed  Taken 10/1/2023 0013 by Kelsie Banegas RN  Safety Promotion/Fall Prevention:   activity supervised   assistive device/personal items within reach   clutter free environment maintained   nonskid shoes/slippers when out of bed   room organization consistent   safety round/check completed  Taken 9/30/2023 2200 by Kelsie Banegas RN  Safety Promotion/Fall Prevention:   assistive device/personal items within reach   activity supervised   clutter free environment maintained   nonskid shoes/slippers when out of bed   room organization consistent   safety round/check completed  Taken 9/30/2023 2022 by Kelsie Banegas RN  Safety Promotion/Fall Prevention:   activity supervised   assistive device/personal items within reach   clutter free environment maintained   nonskid shoes/slippers when out of bed   room organization consistent   safety round/check completed  Intervention: Prevent Skin Injury  Recent Flowsheet Documentation  Taken 10/1/2023 0200 by Kelsie Banegas RN  Body Position: position changed independently  Skin Protection:   adhesive use  limited   incontinence pads utilized   transparent dressing maintained   tubing/devices free from skin contact  Taken 10/1/2023 0013 by Kelsie Banegas RN  Body Position: position changed independently  Skin Protection:   adhesive use limited   incontinence pads utilized   transparent dressing maintained   tubing/devices free from skin contact  Taken 9/30/2023 2200 by Kelsie Banegas RN  Body Position: position changed independently  Skin Protection:   adhesive use limited   incontinence pads utilized   transparent dressing maintained   tubing/devices free from skin contact  Taken 9/30/2023 2022 by Kelsie Banegas RN  Body Position: position changed independently  Skin Protection:   adhesive use limited   incontinence pads utilized   transparent dressing maintained   tubing/devices free from skin contact  Intervention: Prevent and Manage VTE (Venous Thromboembolism) Risk  Recent Flowsheet Documentation  Taken 10/1/2023 0200 by Kelsie Banegas RN  Activity Management: activity encouraged  Taken 10/1/2023 0013 by Kelsie Banegas RN  Activity Management: activity encouraged  Taken 9/30/2023 2200 by Klesie Banegas RN  Activity Management: activity encouraged  Taken 9/30/2023 2022 by Kelsie Banegas RN  Activity Management: activity encouraged  VTE Prevention/Management:   bilateral   sequential compression devices off  Range of Motion: active ROM (range of motion) encouraged  Intervention: Prevent Infection  Recent Flowsheet Documentation  Taken 10/1/2023 0200 by Kelsie Banegas RN  Infection Prevention:   environmental surveillance performed   hand hygiene promoted   rest/sleep promoted  Taken 10/1/2023 0013 by Kelsie Banegas RN  Infection Prevention:   environmental surveillance performed   hand hygiene promoted   rest/sleep promoted  Taken 9/30/2023 2200 by Kelsie Banegas RN  Infection Prevention:   environmental surveillance performed   hand hygiene promoted    rest/sleep promoted  Taken 9/30/2023 2022 by Kelsie Banegas RN  Infection Prevention:   environmental surveillance performed   hand hygiene promoted   rest/sleep promoted  Goal: Optimal Comfort and Wellbeing  Outcome: Ongoing, Progressing  Intervention: Provide Person-Centered Care  Recent Flowsheet Documentation  Taken 9/30/2023 2022 by Kelsie Banegas RN  Trust Relationship/Rapport:   care explained   choices provided   thoughts/feelings acknowledged  Goal: Readiness for Transition of Care  Outcome: Ongoing, Progressing     Problem: Osteoarthritis Comorbidity  Goal: Maintenance of Osteoarthritis Symptom Control  Outcome: Ongoing, Progressing  Intervention: Maintain Osteoarthritis Symptom Control  Recent Flowsheet Documentation  Taken 10/1/2023 0200 by Kelsie Banegas RN  Activity Management: activity encouraged  Assistive Device Utilized: walker  Taken 10/1/2023 0013 by Kelsie Banegas RN  Activity Management: activity encouraged  Assistive Device Utilized: walker  Taken 9/30/2023 2200 by Kelsie Banegas RN  Activity Management: activity encouraged  Assistive Device Utilized: walker  Taken 9/30/2023 2022 by Kelsie Banegas RN  Activity Management: activity encouraged  Assistive Device Utilized: walker  Medication Review/Management: medications reviewed     Problem: Pain Acute  Goal: Acceptable Pain Control and Functional Ability  Outcome: Ongoing, Progressing  Intervention: Prevent or Manage Pain  Recent Flowsheet Documentation  Taken 9/30/2023 2022 by Kelsie Banegas RN  Sensory Stimulation Regulation: care clustered  Bowel Elimination Promotion: adequate fluid intake promoted  Sleep/Rest Enhancement:   awakenings minimized   regular sleep/rest pattern promoted   relaxation techniques promoted   room darkened  Medication Review/Management: medications reviewed  Intervention: Optimize Psychosocial Wellbeing  Recent Flowsheet Documentation  Taken 9/30/2023 2022 by Makenzie  Kelsie STOKES RN  Supportive Measures: active listening utilized  Diversional Activities: television     Problem: Fall Injury Risk  Goal: Absence of Fall and Fall-Related Injury  Outcome: Ongoing, Progressing  Intervention: Identify and Manage Contributors  Recent Flowsheet Documentation  Taken 9/30/2023 2022 by Kelsie Banegas RN  Medication Review/Management: medications reviewed  Self-Care Promotion: independence encouraged  Intervention: Promote Injury-Free Environment  Recent Flowsheet Documentation  Taken 10/1/2023 0200 by Kelsie Banegas RN  Safety Promotion/Fall Prevention:   activity supervised   assistive device/personal items within reach   clutter free environment maintained   nonskid shoes/slippers when out of bed   room organization consistent   safety round/check completed  Taken 10/1/2023 0013 by Kelsie Banegas RN  Safety Promotion/Fall Prevention:   activity supervised   assistive device/personal items within reach   clutter free environment maintained   nonskid shoes/slippers when out of bed   room organization consistent   safety round/check completed  Taken 9/30/2023 2200 by Kelsie Banegas RN  Safety Promotion/Fall Prevention:   assistive device/personal items within reach   activity supervised   clutter free environment maintained   nonskid shoes/slippers when out of bed   room organization consistent   safety round/check completed  Taken 9/30/2023 2022 by Kelsie Banegas RN  Safety Promotion/Fall Prevention:   activity supervised   assistive device/personal items within reach   clutter free environment maintained   nonskid shoes/slippers when out of bed   room organization consistent   safety round/check completed     Problem: Skin Injury Risk Increased  Goal: Skin Health and Integrity  Outcome: Ongoing, Progressing  Intervention: Promote and Optimize Oral Intake  Recent Flowsheet Documentation  Taken 9/30/2023 2022 by Kelsie Banegas RN  Oral Nutrition Promotion:  rest periods promoted  Intervention: Optimize Skin Protection  Recent Flowsheet Documentation  Taken 10/1/2023 0200 by Kelsie Banegas RN  Pressure Reduction Techniques: frequent weight shift encouraged  Head of Bed (\[facility]\"") Positioning: \[facility]\"" elevated  Pressure Reduction Devices: pressure-redistributing mattress utilized  Skin Protection:   adhesive use limited   incontinence pads utilized   transparent dressing maintained   tubing/devices free from skin contact  Taken 10/1/2023 0013 by Kelsie Banegas RN  Pressure Reduction Techniques: frequent weight shift encouraged  Head of Bed (\[facility]\"") Positioning: \[facility]\"" elevated  Pressure Reduction Devices: pressure-redistributing mattress utilized  Skin Protection:   adhesive use limited   incontinence pads utilized   transparent dressing maintained   tubing/devices free from skin contact  Taken 9/30/2023 2200 by Kelsie Banegas RN  Pressure Reduction Techniques: frequent weight shift encouraged  Head of Bed (\[facility]\"") Positioning: \[facility]\"" elevated  Pressure Reduction Devices: pressure-redistributing mattress utilized  Skin Protection:   adhesive use limited   incontinence pads utilized   transparent dressing maintained   tubing/devices free from skin contact  Taken 9/30/2023 2022 by Kelsie Banegas RN  Pressure Reduction Techniques: frequent weight shift encouraged  Head of Bed (\[facility]\"") Positioning: \[facility]\"" elevated  Pressure Reduction Devices: pressure-redistributing mattress utilized  Skin Protection:   adhesive use limited   incontinence pads utilized   transparent dressing maintained   tubing/devices free from skin contact   Goal Outcome Evaluation:  Plan of Care Reviewed With: patient        Progress: improving  Outcome Evaluation: VSS throughout shift. Maintained O2 well on 2L NC. No complaints of pain. Pt rested well. No acute events during shift.

## 2023-10-01 NOTE — PLAN OF CARE
Goal Outcome Evaluation:  Plan of Care Reviewed With: patient           Outcome Evaluation: Pt is a pleasant 89 y.o.M presenting with weakness, decreased activity doni, impaired balance, and difficulty walking and is below baseline level of function for mobility. Pt amb in hallway with RW and CGA dem gait abnormality. PT rec d/c IRF.      Anticipated Discharge Disposition (PT): inpatient rehabilitation facility

## 2023-10-01 NOTE — PROGRESS NOTES
Cardiology Progress Note      Reason for visit:    Acute on chronic systolic heart failure  Coronary artery disease  Paroxysmal atrial fibrillation    IDENTIFICATION: 89-year-old gentleman who resides in [place], [place]    Active Hospital Problems    Diagnosis  POA    **Acute HFrEF (heart failure with reduced ejection fraction) [I50.21]  Yes     Priority: High     Echo (2/23): LVEF EF 60%.  LVH with grade 2 DD.  No significant valvular disease  [facility] admission with FC IV heart failure symptoms, pulmonary edema on chest x-ray, and elevated proBNP, 9/30/2023  Echo (9/30/2023): LVEF 25%.  Moderate MR.  RVSP <35 mmHg      Coronary artery disease involving native coronary artery of native heart with angina pectoris [I25.119]  Yes     Priority: Medium     CABG 1980  Echo (2/23): LVEF EF 60%.  LVH with grade 2 DD.  No significant valvular disease  Pharmacologic nuclear stress (2/10/2023): Inferior infarct.  No ischemia.  LVEF 55%      Hyperlipidemia LDL goal <70 [E78.5]  Yes     Priority: Medium    Paroxysmal atrial fibrillation [I48.0]  Yes     Priority: Medium     [id number] 4 (age >75, CAD, HTN)  4% A-fib burden on recent loop recorder interrogation      Anemia, chronic disease [D63.8]  Yes     Priority: Medium    LBBB (left bundle branch block) [I44.7]  Yes    Stage 3b chronic kidney disease [N18.32]  Yes    Weight loss [R63.4]  Yes            Breathing improved with IV Lasix  Patient wants to switch cardiology providers to [facility]  Discussed echo findings showing LVEF 25%  Spoke with patient and sons regarding pros and cons of repeat ischemic evaluation.           Vital Sign Min/Max for last 24 hours  Temp  Min: 97.3 °F (36.3 °C)  Max: 97.8 °F (36.6 °C)   BP  Min: 90/68  Max: 104/76   Pulse  Min: 91  Max: 111   Resp  Min: 18  Max: 20   SpO2  Min: 90 %  Max: 97 %   Flow (L/min)  Min: 2  Max: 2      Intake/Output Summary (Last 24 hours) at 10/1/2023 1143  Last data filed at 10/1/2023 0819  Gross per 24 hour    Intake 1080 ml   Output 875 ml   Net 205 ml           Physical Exam  Constitutional:       General: He is awake.   Neck:      Vascular: No JVD.   Cardiovascular:      Rate and Rhythm: Normal rate and regular rhythm.      Heart sounds: Murmur heard.   Pulmonary:      Effort: Pulmonary effort is normal.      Breath sounds: Decreased breath sounds present.   Musculoskeletal:      Right lower leg: No edema.      Left lower leg: No edema.   Skin:     General: Skin is warm and dry.   Neurological:      Mental Status: He is alert.   Psychiatric:         Behavior: Behavior is cooperative.       Tele: Normal sinus rhythm    Results Review (reviewed the patient's recent labs in the electronic medical record):     EKG (9/29/2023): Sinus tachycardia at 109 bpm    CT of the chest (9/29/2023): New moderate size bilateral pleural effusions.    ECHO (9/30/2023): LVEF 25%.  Moderate MR.  Small less than 1 cm pericardial effusion.  Bilateral pleural effusions    Results from last 7 days   Lab Units 10/01/23  0401 09/30/23  1831 09/30/23  0535 09/29/23  1257   SODIUM mmol/L 138  --  138 138   POTASSIUM mmol/L 4.5  --  4.6 5.1   CHLORIDE mmol/L 104  --  105 103   BUN mg/dL 32*  --  27* 22   CREATININE mg/dL 1.75*  --  1.51* 1.64*   MAGNESIUM mg/dL 2.0 2.1  --   --      Results from last 7 days   Lab Units 09/30/23  0724 09/30/23  0535 09/29/23  1257   HSTROP T ng/L 45* 45* 40*     Results from last 7 days   Lab Units 09/30/23  0535 09/29/23  1257   WBC 10*3/mm3 4.71 7.96   HEMOGLOBIN g/dL 9.8* 11.3*   HEMATOCRIT % 30.1* 35.2*   PLATELETS 10*3/mm3 215 258       Lab Results   Component Value Date    HGBA1C 6.0 04/07/2015       Lab Results   Component Value Date    CHOL 132 09/30/2023    TRIG 51 09/30/2023    HDL 38 (L) 09/30/2023    LDL 83 09/30/2023              Acute HFrEF with LVEF 25%  Functional class IV symptoms with elevated proBNP, pulmonary edema on admission  Symptomatic improvement with IV furosemide  Continue metoprolol  "succinate 25 mg daily  Consider SGL 2 inhibitor if GFR >30  No ARB or MRA due to CKD receiving IV diuretic       Coronary artery disease  Flat troponin elevation not consistent with ACS  Ischemic evaluation in February consistent with inferior infarct  Invasive ischemic evaluation be considered once euvolemic.  Patient is high risk for coronary angiography and PCI given age, CKD, reduced LVEF, anemia, and ancient bypass grafts and unlikeliness of PCI \"targets\".  High risk of JARAD  Patient would like to defer ischemic evaluation for now, particularly since not having chest pain     Paroxysmal atrial fibrillation  Maintaining sinus on amiodarone 100 mg daily  Continue apixaban 2.5 mg twice daily for stroke prophylaxis     Hyperlipidemia with goal LDL <70  Statin intolerant  Resume Zetia at discharge     Left bundle branch block  Chronic    Chronic kidney disease stage IIIb  Presently at baseline  Monitor closely while administering IV diuretic         Continue IV furosemide today as renal function and BP tolerate  Continue metoprolol succinate  Defer ARB/MRA due to CKD with ongoing IV diuretic  Defer invasive ischemic evaluation  We will need to transfer loop recorder transmissions to our office    Electronically signed by Wallace Espinoza IV, MD, 10/01/23, 11:47 AM EDT.    "

## 2023-10-01 NOTE — PROGRESS NOTES
Cardiology Progress Note      Reason for visit:    Acute on chronic systolic heart failure  Coronary artery disease  Paroxysmal atrial fibrillation    IDENTIFICATION: 89-year-old gentleman who resides in [place], [place]    Active Hospital Problems    Diagnosis  POA    **Acute HFrEF (heart failure with reduced ejection fraction) [I50.21]  Yes     Echo (2/23): LVEF EF 60%.  LVH with grade 2 DD.  No significant valvular disease  [facility] admission with FC IV heart failure symptoms, pulmonary edema on chest x-ray, and elevated proBNP, 9/30/2023  Echo (9/30/2023): LVEF 25%.  Moderate MR.  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  LVH with grade 2 DD.  No significant valvular disease  Pharmacologic nuclear stress (2/10/2023): Inferior infarct.  No ischemia.  LVEF 55%      Paroxysmal atrial fibrillation [I48.0]  Yes     Priority: High     [id number] 4 (age >75, CAD, HTN)  4% A-fib burden on recent loop recorder interrogation      Stage 3b chronic kidney disease [N18.32]  Yes     Priority: High    Anemia, chronic disease [D63.8]  Yes     Priority: Medium    Hyperlipidemia LDL goal <70 [E78.5]  Yes     Priority: Low    Weight loss [R63.4]  Yes     Priority: Low    LBBB (left bundle branch block) [I44.7]  Yes            Patient sitting up in the bed breathing easy on O2 at 2 L by nasal cannula.  He reports his breathing is greatly improved since admission.  He is even able to lie flat now.  He denies any chest pain.  His main complaint was weakness on admission and he feels he is getting stronger.           Vital Sign Min/Max for last 24 hours  Temp  Min: 97.3 °F (36.3 °C)  Max: 97.8 °F (36.6 °C)   BP  Min: 90/68  Max: 106/77   Pulse  Min: 91  Max: 107   Resp  Min: 18  Max: 20   SpO2  Min: 90 %  Max: 97 %   Flow (L/min)  Min: 2  Max: 2      Intake/Output Summary (Last 24 hours)  at 10/1/2023 1001  Last data filed at 10/1/2023 0819  Gross per 24 hour   Intake 1080 ml   Output 875 ml   Net 205 ml           Physical Exam  Constitutional:       General: He is awake.   Neck:      Vascular: No JVD.   Cardiovascular:      Rate and Rhythm: Normal rate and regular rhythm.      Heart sounds: Murmur heard.   Pulmonary:      Effort: Pulmonary effort is normal.      Breath sounds: Decreased breath sounds present.   Musculoskeletal:      Right lower leg: No edema.      Left lower leg: No edema.   Skin:     General: Skin is warm and dry.   Neurological:      Mental Status: He is alert.   Psychiatric:         Behavior: Behavior is cooperative.       Tele: Normal sinus rhythm    Results Review (reviewed the patient's recent labs in the electronic medical record):     EKG (9/29/2023): Sinus tachycardia at 109 bpm    CT of the chest (9/29/2023): New moderate size bilateral pleural effusions.    ECHO (9/30/2023): LVEF 25%.  Moderate MR.  Small less than 1 cm pericardial effusion.  Bilateral pleural effusions    Results from last 7 days   Lab Units 10/01/23  0401 09/30/23  1831 09/30/23  0535 09/29/23  1257   SODIUM mmol/L 138  --  138 138   POTASSIUM mmol/L 4.5  --  4.6 5.1   CHLORIDE mmol/L 104  --  105 103   BUN mg/dL 32*  --  27* 22   CREATININE mg/dL 1.75*  --  1.51* 1.64*   MAGNESIUM mg/dL 2.0 2.1  --   --      Results from last 7 days   Lab Units 09/30/23  0724 09/30/23  0535 09/29/23  1257   HSTROP T ng/L 45* 45* 40*     Results from last 7 days   Lab Units 09/30/23  0535 09/29/23  1257   WBC 10*3/mm3 4.71 7.96   HEMOGLOBIN g/dL 9.8* 11.3*   HEMATOCRIT % 30.1* 35.2*   PLATELETS 10*3/mm3 215 258       Lab Results   Component Value Date    HGBA1C 6.0 04/07/2015       Lab Results   Component Value Date    CHOL 132 09/30/2023    TRIG 51 09/30/2023    HDL 38 (L) 09/30/2023    LDL 83 09/30/2023              Acute HFrEF with LVEF 25%  Newly reduced ejection fraction compared to February 2023  Functional class  IV symptoms  Elevated proBNP and pulmonary edema on chest x-ray  Lasix 40 mg IV twice daily  Strict I's and O's and daily weights  Metoprolol succinate 25 mg daily  Consider SGL 2 inhibitor if GFR remains greater than 30       Coronary artery disease  Flat troponin elevation not consistent with ACS  Ischemic evaluation in February consistent with inferior infarct  Invasive ischemic evaluation be considered once euvolemic.  High risk for PCI given age, CKD, reduced LVEF, anemia, and ancient bypass grafts.  High risk of JARAD     Paroxysmal atrial fibrillation  Maintaining normal sinus rhythm has left bundle branch block  Amiodarone 100 mg daily  Eliquis 2.5 mg twice daily     Hyperlipidemia with goal LDL <70  Statin intolerant  Resume Zetia at discharge       Left bundle branch block  Could it be contributing to his reduced LVEF?    Chronic kidney disease  Current creatinine 1.75  Baseline around 1.6-1.7         Continue to diurese with IV Lasix today and transition to p.o. tomorrow  Defer statin therapy due to intolerance  Treat systolic heart failure medically and defer cardiac catheterization for now.  Currently maintaining normal sinus rhythm  Continue amiodarone and Eliquis for paroxysmal atrial fibrillation.  Currently maintaining NSR    Electronically signed by CYNDEE Castaneda, 10/01/23, 10:01 AM EDT.

## 2023-10-01 NOTE — THERAPY EVALUATION
Patient Name: Mukund Pool  : 1930    MRN: [id number]                              Today's Date: 10/1/2023       Admit Date: 2023    Visit Dx:     ICD-10-CM ICD-9-CM   1. Pneumonia of both lower lobes due to infectious organism  J18.9 486   2. Acute pulmonary edema  J81.0 518.4   3. Renal insufficiency  N28.9 593.9   4. History of atrial fibrillation  Z86.79 V12.59   5. Syncope, unspecified syncope type  R55 780.2   6. Oropharyngeal dysphagia  R13.12 787.22   7. Impaired functional mobility, balance, gait, and endurance  Z74.09 V49.89     Patient Active Problem List   Diagnosis    Malignant neoplasm of right vocal cord    History of head and neck cancer    Paroxysmal atrial fibrillation    Stage 3b chronic kidney disease    Anemia, chronic disease    Weight loss    Coronary artery disease involving native coronary artery of native heart with angina pectoris    Hyperlipidemia LDL goal <70    LBBB (left bundle branch block)    Acute HFrEF (heart failure with reduced ejection fraction)     Past Medical History:   Diagnosis Date    Arthritis     Chronic kidney disease     kidney stones    GERD (gastroesophageal reflux disease)     History of radiation therapy 2021    laryngeal mass    Prostate cancer     Vocal cord cancer      Past Surgical History:   Procedure Laterality Date    CARDIAC ELECTROPHYSIOLOGY PROCEDURE  2023    cardiac loop recorder    CHOLECYSTECTOMY      CORONARY ARTERY BYPASS GRAFT      PROSTATE SURGERY      VOCAL CORD MASS EXCISION  2021      General Information       Row Name 10/01/23 1058          Physical Therapy Time and Intention    Document Type evaluation  -SD       Row Name 10/01/23 1058          General Information    Patient Profile Reviewed yes  -SD     Prior Level of Function independent:;all household mobility;gait;transfer;bed mobility;ADL's  pt independent within [facility] facility with 3-wheeled walker  -SD     Existing Precautions/Restrictions fall;oxygen  therapy device and L/min  hx of falls, syncope  -SD     Barriers to Rehab previous functional deficit  -SD       Row Name 10/01/23 1058          Living Environment    People in Home facility resident  Kadeem Villagomez [facility]  -SD       Row Name 10/01/23 1058          Home Main Entrance    Number of Stairs, Main Entrance none  -SD       Row Name 10/01/23 1058          Stairs Within Home, Primary    Number of Stairs, Within Home, Primary none  -SD       Row Name 10/01/23 1058          Cognition    Orientation Status (Cognition) oriented x 4  -SD       Row Name 10/01/23 1058          Safety Issues, Functional Mobility    Safety Issues Affecting Function (Mobility) awareness of need for assistance;impulsivity;insight into deficits/self-awareness;safety precaution awareness  -SD     Impairments Affecting Function (Mobility) balance;endurance/activity tolerance;strength;shortness of breath;postural/trunk control  -SD               User Key  (r) = Recorded By, (t) = Taken By, (c) = Cosigned By      Initials Name Provider Type    Wendi Arthur PT Physical Therapist                   Mobility       Row Name 10/01/23 1145          Bed Mobility    Bed Mobility supine-sit  -SD     Supine-Sit [place] (Bed Mobility) minimum assist (75% patient effort);1 person assist;verbal cues  -SD     Assistive Device (Bed Mobility) bed rails;head of bed elevated  -SD     Comment, (Bed Mobility) assist needed for trunk  -SD       Row Name 10/01/23 1145          Transfers    Comment, (Transfers) STS from EOB x3 reps with cues to limit impulsivity and for safety awareness.  -SD       Row Name 10/01/23 1145          Sit-Stand Transfer    Sit-Stand [place] (Transfers) contact guard;1 person assist;verbal cues  -SD     Assistive Device (Sit-Stand Transfers) walker, front-wheeled  -SD       Row Name 10/01/23 1145          Gait/Stairs (Locomotion)    [place] Level (Gait) contact guard;1 person assist;verbal cues  -SD      Assistive Device (Gait) walker, front-wheeled  -SD     Distance in Feet (Gait) 70ft  -SD     Deviations/Abnormal Patterns (Gait) bilateral deviations;base of support, narrow;gait speed decreased  -SD     Bilateral Gait Deviations forward flexed posture;heel strike decreased  -SD     Comment, (Gait/Stairs) pt amb in hallway with RW and CGA, dem forward flexed posture. Distance limited by fatigue and SOA. O2sat 94% on RA.  -SD               User Key  (r) = Recorded By, (t) = Taken By, (c) = Cosigned By      Initials Name Provider Type    Wendi Arthur PT Physical Therapist                   Obj/Interventions       Row Name 10/01/23 1148          Range of Motion Comprehensive    General Range of Motion bilateral lower extremity ROM WFL  -SD       Row Name 10/01/23 1148          Strength Comprehensive (MMT)    General Manual Muscle Testing (MMT) Assessment lower extremity strength deficits identified  -SD     Comment, General Manual Muscle Testing (MMT) Assessment BLE's 4+/5  -SD       Row Name 10/01/23 1148          Balance    Balance Assessment standing static balance;standing dynamic balance  -SD     Static Standing Balance standby assist  -SD     Dynamic Standing Balance minimal assist  -SD     Position/Device Used, Standing Balance supported;walker, front-wheeled  -SD               User Key  (r) = Recorded By, (t) = Taken By, (c) = Cosigned By      Initials Name Provider Type    Wendi Arthur PT Physical Therapist                   Goals/Plan       Row Name 10/01/23 1150          Bed Mobility Goal 1 (PT)    Activity/Assistive Device (Bed Mobility Goal 1, PT) sit to supine;supine to sit  -SD     [place] Level/Cues Needed (Bed Mobility Goal 1, PT) modified independence  -SD     Time Frame (Bed Mobility Goal 1, PT) long term goal (LTG)  -SD       Row Name 10/01/23 1150          Transfer Goal 1 (PT)    Activity/Assistive Device (Transfer Goal 1, PT)  sit-to-stand/stand-to-sit;bed-to-chair/chair-to-bed  -SD     [place] Level/Cues Needed (Transfer Goal 1, PT) modified independence  -SD     Time Frame (Transfer Goal 1, PT) long term goal (LTG);2 weeks  -SD       Row Name 10/01/23 1150          Gait Training Goal 1 (PT)    Activity/Assistive Device (Gait Training Goal 1, PT) gait (walking locomotion);increase endurance/gait distance;walker, rolling  -SD     [place] Level (Gait Training Goal 1, PT) supervision required  -SD     Distance (Gait Training Goal 1, PT) 200  -SD     Time Frame (Gait Training Goal 1, PT) long term goal (LTG)  -SD       Row Name 10/01/23 1150          Patient Education Goal (PT)    Activity (Patient Education Goal, PT) HEP  -SD     [place]/Cues/Accuracy (Memory Goal 2, PT) demonstrates adequately;verbalizes understanding  -SD     Time Frame (Patient Education Goal, PT) long term goal (LTG);2 weeks  -SD       Row Name 10/01/23 1159          Therapy Assessment/Plan (PT)    Planned Therapy Interventions (PT) balance training;bed mobility training;gait training;home exercise program;postural re-education;patient/family education;neuromuscular re-education;transfer training;strengthening  -SD               User Key  (r) = Recorded By, (t) = Taken By, (c) = Cosigned By      Initials Name Provider Type    Wendi Arthur, PT Physical Therapist                   Clinical Impression       Row Name 10/01/23 1149          Pain    Pretreatment Pain Rating 0/10 - no pain  -SD     Posttreatment Pain Rating 0/10 - no pain  -SD       Row Name 10/01/23 1141          Plan of Care Review    Plan of Care Reviewed With patient  -SD     Outcome Evaluation Pt is a pleasant 89 y.o.M presenting with weakness, decreased activity doni, impaired balance, and difficulty walking and is below baseline level of function for mobility. Pt amb in hallway with RW and CGA dem gait abnormality. PT rec d/c IRF.  -SD       Row Name 10/01/23 4433           Therapy Assessment/Plan (PT)    Patient/Family Therapy Goals Statement (PT) return home  -SD     Rehab Potential (PT) good, to achieve stated therapy goals  -SD     Criteria for Skilled Interventions Met (PT) yes;skilled treatment is necessary  -SD     Therapy Frequency (PT) daily  -SD     Predicted Duration of Therapy Intervention (PT) 2wks  -SD       Row Name 10/01/23 1148          Vital Signs    Pre Systolic BP Rehab 99  -SD     Pre Treatment Diastolic BP 77  -SD     Post Systolic BP Rehab 92  -SD     Post Treatment Diastolic BP 74  -SD     Pretreatment Heart Rate (beats/min) 106  -SD     Posttreatment Heart Rate (beats/min) 108  -SD     Pre SpO2 (%) 96  -SD     O2 Delivery Pre Treatment nasal cannula  -SD     Post SpO2 (%) 93  -SD     O2 Delivery Post Treatment room air  -SD     Pre Patient Position Supine  -SD     Post Patient Position Sitting  -SD       Row Name 10/01/23 1148          Positioning and Restraints    Pre-Treatment Position in bed  -SD     Post Treatment Position chair  -SD     In Chair notified nsg;reclined;call light within reach;encouraged to call for assist;exit alarm on;waffle cushion  -SD               User Key  (r) = Recorded By, (t) = Taken By, (c) = Cosigned By      Initials Name Provider Type    Wendi Arthur, PT Physical Therapist                   Outcome Measures       Row Name 10/01/23 1151          How much help from another person do you currently need...    Turning from your back to your side while in flat bed without using bedrails? 4  -SD     Moving from lying on back to sitting on the side of a flat bed without bedrails? 3  -SD     Moving to and from a bed to a chair (including a wheelchair)? 3  -SD     Standing up from a chair using your arms (e.g., wheelchair, bedside chair)? 3  -SD     Climbing 3-5 steps with a railing? 3  -SD     To walk in hospital room? 3  -SD     AM-PAC 6 Clicks Score (PT) 19  -SD     Highest level of mobility 6 --> Walked 10 steps or more  -SD        Row Name 10/01/23 1151          Functional Assessment    Outcome Measure Options AM-PAC 6 Clicks Basic Mobility (PT)  -SD               User Key  (r) = Recorded By, (t) = Taken By, (c) = Cosigned By      Initials Name Provider Type    SD Wendi Onofre PT Physical Therapist                                 Physical Therapy Education       Title: PT OT SLP Therapies (Done)       Topic: Physical Therapy (Done)       Point: Mobility training (Done)       Learning Progress Summary             Patient Acceptance, E, VU by SD at 10/1/2023 1151                         Point: Home exercise program (Done)       Learning Progress Summary             Patient Acceptance, E, VU by SD at 10/1/2023 1151                         Point: Body mechanics (Done)       Learning Progress Summary             Patient Acceptance, E, VU by SD at 10/1/2023 1151                         Point: Precautions (Done)       Learning Progress Summary             Patient Acceptance, E, VU by SD at 10/1/2023 1151                                         User Key       Initials Effective Dates Name Provider Type Discipline    SD 03/13/23 -  Wendi Onofre PT Physical Therapist PT                  PT Recommendation and Plan  Planned Therapy Interventions (PT): balance training, bed mobility training, gait training, home exercise program, postural re-education, patient/family education, neuromuscular re-education, transfer training, strengthening  Plan of Care Reviewed With: patient  Outcome Evaluation: Pt is a pleasant 89 y.o.M presenting with weakness, decreased activity doni, impaired balance, and difficulty walking and is below baseline level of function for mobility. Pt amb in hallway with RW and CGA dem gait abnormality. PT rec d/c IRF.     Time Calculation:   PT Evaluation Complexity  History, PT Evaluation Complexity: 3 or more personal factors and/or comorbidities  Examination of Body Systems (PT Eval Complexity): total of 3 or more  elements  Clinical Presentation (PT Evaluation Complexity): evolving  Clinical Decision Making (PT Evaluation Complexity): moderate complexity  Overall Complexity (PT Evaluation Complexity): moderate complexity     PT Charges       Row Name 10/01/23 1152             Time Calculation    Start Time 1058  -SD      PT Non-Billable Time (min) 54 min  -SD      PT Received On 10/01/23  -SD      PT Goal Re-Cert Due Date 10/11/23  -SD                User Key  (r) = Recorded By, (t) = Taken By, (c) = Cosigned By      Initials Name Provider Type    Wendi Arthur, PT Physical Therapist                  Therapy Charges for Today       Code Description Service Date Service Provider Modifiers Qty    [id number] HC PT EVAL MOD COMPLEXITY 4 10/1/2023 Wendi Onofre, PT GP 1            PT G-Codes  Outcome Measure Options: AM-PAC 6 Clicks Basic Mobility (PT)  AM-PAC 6 Clicks Score (PT): 19  PT Discharge Summary  Anticipated Discharge Disposition (PT): inpatient rehabilitation facility    Wendi Onofre PT  10/1/2023

## 2023-10-01 NOTE — PROGRESS NOTES
[facility] Medicine Services  PROGRESS NOTE    Patient Name: Mukund Pool  : 1930  MRN: [id number]    Date of Admission: 2023  Primary Care Physician: Slick Hubbard MD    Subjective   Subjective     CC:  Heart failure    HPI:  Patient is feeling better this morning.  Breathing is better.  Blood pressures with systolic in the 90s.  Otherwise has no complaints.      Objective   Objective     Vital Signs:   Temp:  [97.3 °F (36.3 °C)-97.8 °F (36.6 °C)] 97.4 °F (36.3 °C)  Heart Rate:  [] 103  Resp:  [18-20] 20  BP: ()/(61-77) 99/77  Flow (L/min):  [2] 2     Physical Exam:  Constitutional: Pleasant elderly gentleman, no acute distress  Respiratory: Decreased breath sounds bilaterally, normal respiratory effort on 2 L  Cardiovascular: RRR  Gastrointestinal: Positive bowel sounds, soft, nontender, nondistended  Musculoskeletal: No bilateral ankle edema  Psychiatric: Appropriate affect, cooperative  Neurologic: Oriented x 3, no gross focal neurological deficits        Results Reviewed:  LAB RESULTS:      Lab 23  0535 23  1425 23  1257   WBC 4.71  --  7.96   HEMOGLOBIN 9.8*  --  11.3*   HEMATOCRIT 30.1*  --  35.2*   PLATELETS 215  --  258   NEUTROS ABS 2.53  --  5.75   IMMATURE GRANS (ABS) 0.01  --  0.04   LYMPHS ABS 1.52  --  1.39   MONOS ABS 0.55  --  0.68   EOS ABS 0.06  --  0.03   .2*  --  106.3*   CRP  --   --  0.54*   PROCALCITONIN  --   --  0.05   LACTATE  --  1.8  --    LDH  --   --  178   D DIMER QUANT  --   --  0.60         Lab 10/01/23  0401 23  1831 23  0535 23  1257   SODIUM 138  --  138 138   POTASSIUM 4.5  --  4.6 5.1   CHLORIDE 104  --  105 103   CO2 26.0  --  26.0 25.0   ANION GAP 8.0  --  7.0 10.0   BUN 32*  --  27* 22   CREATININE 1.75*  --  1.51* 1.64*   EGFR 35.9*  --  42.8* 38.8*   GLUCOSE 98  --  96 133*   CALCIUM 8.2  --  8.5 8.9   MAGNESIUM 2.0 2.1  --   --    TSH  --   --   --  3.140          Lab 09/29/23  1257   TOTAL PROTEIN 7.3   ALBUMIN 3.9   GLOBULIN 3.4   ALT (SGPT) 17   AST (SGOT) 19   BILIRUBIN 1.1   ALK PHOS 103         Lab 09/30/23  0724 09/30/23  0535 09/29/23  1257   PROBNP  --   --  5,453.0*   HSTROP T 45* 45* 40*         Lab 09/30/23  0535   CHOLESTEROL 132   LDL CHOL 83   HDL CHOL 38*   TRIGLYCERIDES 51         Lab 09/29/23  1257   FERRITIN 129.80   FOLATE 9.59   VITAMIN B 12 1,278*         Brief Urine Lab Results       None            Microbiology Results Abnormal       Procedure Component Value - Date/Time    Blood Culture - Blood, Arm, Left [[id number]]  (Normal) Collected: 09/29/23 1445    Lab Status: Preliminary result Specimen: Blood from Arm, Left Updated: 09/30/23 1515     Blood Culture No growth at 24 hours    Blood Culture - Blood, Arm, Right [[id number]]  (Normal) Collected: 09/29/23 1425    Lab Status: Preliminary result Specimen: Blood from Arm, Right Updated: 09/30/23 1445     Blood Culture No growth at 24 hours    COVID PRE-OP / PRE-PROCEDURE SCREENING ORDER (NO ISOLATION) - Swab, Nasopharynx [[id number]]  (Normal) Collected: 09/29/23 1259    Lab Status: Final result Specimen: Swab from Nasopharynx Updated: 09/29/23 1337    Narrative:      The following orders were created for panel order COVID PRE-OP / PRE-PROCEDURE SCREENING ORDER (NO ISOLATION) - Swab, Nasopharynx.  Procedure                               Abnormality         Status                     ---------                               -----------         ------                     COVID-19 and FLU A/B PCR...[[id number]]  Normal              Final result                 Please view results for these tests on the individual orders.    COVID-19 and FLU A/B PCR - Swab, Nasopharynx [[id number]]  (Normal) Collected: 09/29/23 1259    Lab Status: Final result Specimen: Swab from Nasopharynx Updated: 09/29/23 1337     COVID19 Not Detected     Influenza A PCR Not Detected     Influenza B PCR Not Detected    Narrative:      Fact  sheet for providers: https://www.fda.gov/media/866507/download    Fact sheet for patients: https://www.fda.gov/media/583413/download    Test performed by PCR.            Adult Transthoracic Echo Complete W/ Cont if Necessary Per Protocol    Result Date: 9/30/2023    The left ventricle is mildly dilated and globally hypokinetic.  Left ventricular systolic function is moderately decreased. Estimated left ventricular EF = 25%   Aortic valve is calcified with no significant stenosis or regurgitation.   Moderate mitral valve regurgitation is present.   Estimated right ventricular systolic pressure from tricuspid regurgitation is normal (<35 mmHg).   There is a small (<1cm) pericardial effusion adjacent to the left ventricle.   Bilateral pleural effusions present     CT Abdomen Pelvis Without Contrast    Result Date: 9/29/2023  CT ABDOMEN PELVIS WO CONTRAST Date of Exam: 9/29/2023 6:32 PM EDT Indication: Weight loss, unintended prior cystic mass in pancreas. Comparison: PET/CT 5/27/2021 Technique: Axial CT images were obtained of the abdomen and pelvis without the administration of contrast. Reconstructed coronal and sagittal images were also obtained. Automated exposure control and iterative construction methods were used. Findings: There are new moderate-sized margins. There is dependent atelectasis within the bilateral lower lobes. 1 cm 8.8 cm The liver appears within normal limits. Patient is status post cholecystectomy. No evidence biliary tract obstruction. Again seen within the head of the pancreas is a cystic appearing low-density mass measuring 4.7 x 2.5 cm in size. This is unchanged in size from prior exam. Within the mid body of the pancreas there is an additional well-circumscribed low density mass which appears new. This measures 2.1 x 1.9 cm in size. Pancreatic parenchyma is overall atrophic. No evidence of pancreatic duct obstruction. Spleen appears normal. Bilateral adrenal glands are within normal limits.  There is a cyst at the upper pole of the left kidney which is unchanged. There are nonobstructing stones in the lower pole the right kidney. No left renal stones. No evidence of ureteral stone or hydronephrosis. There is no abdominal or retroperitoneal adenopathy. The upper GI tract is within normal limits. Pelvis: Ureters within normal limits. Patient appears to be status post prostatectomy. There are scattered colonic diverticula. No evidence diverticulitis. No free. No free intraperitoneal fluid. Patient is status post total left hip arthroplasty. There is diffuse osteopenia of the bony structures. No lytic or sclerotic bony lesions are identified.     Impression: Impression: 1. There are low-density masses within the pancreas which may represent a pancreatic cyst, pseudocyst, or IPMN. The largest lesion within the head of the pancreas is unchanged. There is a new 2.1 cm cyst within the mid body of the pancreas. 2. Moderate bilateral pleural effusions which are new. 3. Nonobstructing right renal stones. 4. Colonic diverticulosis but no evidence of diverticulitis. Electronically Signed: Jamarcus Loco MD  9/29/2023 7:44 PM EDT  Workstation ID: [id number]    CT Chest Without Contrast Diagnostic    Result Date: 9/29/2023  CT CHEST WO CONTRAST DIAGNOSTIC Date of Exam: 9/29/2023 6:32 PM EDT Indication: Pneumonia, complication suspected, xray done. Comparison: PET/CT 8/25/2021 Technique: Axial CT images were obtained of the chest without contrast administration.  Reconstructed coronal and sagittal images were also obtained. Automated exposure control and iterative construction methods were used. Findings: No mediastinal, hilar, or axillary adenopathy. There is a large modification. Patient is status post median sternotomy for coronary artery bypass grafting. There are new moderate-sized bilateral pleural effusions. There is bilateral lower lobe likely due to compressive atelectasis. No suspicious pulmonary nodule.  Bilateral adrenal glands are within normal limits. There is a cyst of the upper pole which is unchanged. There is diffuse osteopenia. No acute fracture identified. No lytic or sclerotic bony lesions. There is confluent ossification of the anterior and posterior longitudinal ligaments compatible changes of ankylosing spondylitis.     Impression: Impression: 1. New moderate-sized bilateral pleural effusions. 2. New bibasilar airspace disease likely due to compressive atelectasis. Electronically Signed: Jamarcus Loco MD  9/29/2023 7:47 PM EDT  Workstation ID: [id number]    FL Video Swallow With Speech Single Contrast    Result Date: 9/30/2023  FL VIDEO SWALLOW W SPEECH SINGLE-CONTRAST Date of Exam: 9/30/2023 10:56 AM EDT Indication: r/o dysphagia. Comparison: None available. TECHNIQUE: 54 seconds of fluoroscopic time was used for this exam. 6 associated fluoroscopic loops were saved. The patient was evaluated in the seated lateral position while taking a variety of consistencies of barium by mouth under the direction of speech pathology.  COMPARISON: NONE FINDINGS: 54 seconds of fluoroscopy provided for a modified barium swallow. Please see speech therapy report for full details and recommendations.     Impression: Fluoroscopy provided for a modified barium swallow. Please see speech therapy report for full details and recommendations. Electronically Signed: John Guido MD  9/30/2023 11:58 AM EDT  Workstation ID: [id number]    XR Chest 1 View    Result Date: 9/29/2023  XR CHEST 1 VW Date of Exam: 9/29/2023 1:01 PM EDT Indication: SOA triage protocol Comparison: PET/CT 8/25/2021  Findings: Prior sternotomy and CABG. Patient rotated side to left. Cardiac loop recorder noted. Central pulmonary vascular congestion with interstitial thickening most compatible with pulmonary edema. Bibasilar airspace opacities with small bilateral pleural effusions. No pneumothorax.     Impression: Impression: 1. Pulmonary edema. 2.  Bibasilar airspace disease may relate to atelectasis and/or pneumonia with small bilateral pleural effusions. Electronically Signed: Soto Matos MD  9/29/2023 1:31 PM EDT  Workstation ID: [id number]     Results for orders placed during the hospital encounter of 09/29/23    Adult Transthoracic Echo Complete W/ Cont if Necessary Per Protocol    Interpretation Summary    The left ventricle is mildly dilated and globally hypokinetic.  Left ventricular systolic function is moderately decreased. Estimated left ventricular EF = 25%    Aortic valve is calcified with no significant stenosis or regurgitation.    Moderate mitral valve regurgitation is present.    Estimated right ventricular systolic pressure from tricuspid regurgitation is normal (<35 mmHg).    There is a small (<1cm) pericardial effusion adjacent to the left ventricle.    Bilateral pleural effusions present      Current medications:  Scheduled Meds:amiodarone, 100 mg, Oral, Q24H  apixaban, 2.5 mg, Oral, BID  aspirin, 81 mg, Oral, Daily  furosemide, 40 mg, Intravenous, Q12H  gabapentin, 300 mg, Oral, TID  ipratropium-albuterol, 3 mL, Nebulization, 4x Daily - RT  metoprolol succinate XL, 25 mg, Oral, Daily  multivitamin with minerals, 1 tablet, Oral, Daily  pantoprazole, 40 mg, Oral, Q AM  senna-docusate sodium, 2 tablet, Oral, BID      Continuous Infusions:   PRN Meds:.  acetaminophen    senna-docusate sodium **AND** polyethylene glycol **AND** bisacodyl **AND** bisacodyl    Calcium Replacement - Follow Nurse / BPA Driven Protocol    Magnesium Cardiology Dose Replacement - Follow Nurse / BPA Driven Protocol    melatonin    nitroglycerin    Phosphorus Replacement - Follow Nurse / BPA Driven Protocol    Potassium Replacement - Follow Nurse / BPA Driven Protocol    sodium chloride    Assessment & Plan   Assessment & Plan     Active Hospital Problems    Diagnosis  POA    **Acute HFrEF (heart failure with reduced ejection fraction) [I50.21]  Yes    Coronary artery  disease involving native coronary artery of native heart with angina pectoris [I25.119]  Yes    Hyperlipidemia LDL goal <70 [E78.5]  Yes    LBBB (left bundle branch block) [I44.7]  Yes    Paroxysmal atrial fibrillation [I48.0]  Yes    Stage 3b chronic kidney disease [N18.32]  Yes    Anemia, chronic disease [D63.8]  Yes    Weight loss [R63.4]  Yes      Resolved Hospital Problems    Diagnosis Date Resolved POA    Chest pain [R07.9] 09/30/2023 Yes    Bilateral pulmonary infiltrates [R91.8] 09/30/2023 Yes    Hypoxia [R09.02] 09/30/2023 Yes        Brief Hospital Course to date:  89-year-old man with history of coronary artery disease, head neck cancer 2021, coronary disease with prior CABG, paroxysmal A-fib on amiodarone who presents with chest pain, shortness of breath, hypoxia and 40 pound weight loss     Acute hypoxia, resolved  Heart failure with reduced ejection fraction  Chest pain with history of coronary disease, CABG  - Required 2L on presentation, now resolved  -CT of the chest without contrast showed new moderate-sized bilateral pleural effusions with likely compressive atelectasis  - Troponin stable in the 40s without any significant delta  -BNP elevated 5400, EKG with left bundle branch block  -Echo obtained, newly reduced ejection fraction of 25%.  Previously normal in February 2023  -Continue aspirin  -Continue IV Lasix, creatinine at baseline of 1.7.  ( Prior cardiology notes with creatinine of 1.7 in March)  -Continue metoprolol  -Consider Jardiance  -Need consider ischemic evaluation, however given his age and elevated creatinine this is high risk.  - Discussed personally with Chio Billingsley with cardiology. Patient with reduced heart failure, had a heart cath in 2/2023. Needs medical therapy or [facility].      40 pound weight loss  Previous abnormal pancreas on outside scan  -Ca 19-9 normal  -CT of the abdomen and pelvis with low-density masses within the pancreas which may represent a cyst, pseudocyst or IPMN  the largest lesion within the head of the pancreas is unchanged, new 2.1 cm cyst within the mid body of the pancreas.  - Patient reports this has been followed for this and they have decided not to undergo treatment given his age.      Paroxysmal A-fib  -Rate controlled, on chronic Eliquis  -Continue amiodarone     CKD  Previously had creatinine of 1.7 on 4/22/23 noted in Dr. Childress's office note.   - Monitor while on diuretics        Expected Discharge Location and Transportation: home vs rehab  Expected Discharge   Expected Discharge Date: 10/2/2023; Expected Discharge Time:      DVT prophylaxis:  Medical DVT prophylaxis orders are present.     AM-PAC 6 Clicks Score (PT): 18 (09/30/23 2022)    CODE STATUS:   Code Status and Medical Interventions:   Ordered at: 09/29/23 1451     Code Status (Patient has no pulse and is not breathing):    CPR (Attempt to Resuscitate)     Medical Interventions (Patient has pulse or is breathing):    Full       Lacey Rivera MD  10/01/23

## 2023-10-02 LAB
ANION GAP SERPL CALCULATED.3IONS-SCNC: 10 MMOL/L (ref 5–15)
BUN SERPL-MCNC: 39 MG/DL (ref 8–23)
BUN/CREAT SERPL: 18.8 (ref 7–25)
CALCIUM SPEC-SCNC: 8.2 MG/DL (ref 8.2–9.6)
CHLORIDE SERPL-SCNC: 101 MMOL/L (ref 98–107)
CO2 SERPL-SCNC: 26 MMOL/L (ref 22–29)
CREAT SERPL-MCNC: 2.07 MG/DL (ref 0.76–1.27)
EGFRCR SERPLBLD CKD-EPI 2021: 29.3 ML/MIN/1.73
GLUCOSE SERPL-MCNC: 104 MG/DL (ref 65–99)
MAGNESIUM SERPL-MCNC: 2.1 MG/DL (ref 1.7–2.3)
POTASSIUM SERPL-SCNC: 4.5 MMOL/L (ref 3.5–5.2)
QT INTERVAL: 418 MS
QTC INTERVAL: 555 MS
SODIUM SERPL-SCNC: 137 MMOL/L (ref 136–145)

## 2023-10-02 PROCEDURE — 94799 UNLISTED PULMONARY SVC/PX: CPT

## 2023-10-02 PROCEDURE — 93005 ELECTROCARDIOGRAM TRACING: CPT | Performed by: NURSE PRACTITIONER

## 2023-10-02 PROCEDURE — 83735 ASSAY OF MAGNESIUM: CPT | Performed by: INTERNAL MEDICINE

## 2023-10-02 PROCEDURE — G0378 HOSPITAL OBSERVATION PER HR: HCPCS

## 2023-10-02 PROCEDURE — 99232 SBSQ HOSP IP/OBS MODERATE 35: CPT | Performed by: NURSE PRACTITIONER

## 2023-10-02 PROCEDURE — 80048 BASIC METABOLIC PNL TOTAL CA: CPT | Performed by: INTERNAL MEDICINE

## 2023-10-02 RX ADMIN — PANTOPRAZOLE SODIUM 40 MG: 40 TABLET, DELAYED RELEASE ORAL at 05:03

## 2023-10-02 RX ADMIN — IPRATROPIUM BROMIDE AND ALBUTEROL SULFATE 3 ML: 2.5; .5 SOLUTION RESPIRATORY (INHALATION) at 15:41

## 2023-10-02 RX ADMIN — ASPIRIN 81 MG: 81 TABLET, COATED ORAL at 08:08

## 2023-10-02 RX ADMIN — IPRATROPIUM BROMIDE AND ALBUTEROL SULFATE 3 ML: 2.5; .5 SOLUTION RESPIRATORY (INHALATION) at 13:20

## 2023-10-02 RX ADMIN — SENNOSIDES AND DOCUSATE SODIUM 2 TABLET: 50; 8.6 TABLET ORAL at 08:08

## 2023-10-02 RX ADMIN — GABAPENTIN 300 MG: 300 CAPSULE ORAL at 15:07

## 2023-10-02 RX ADMIN — GABAPENTIN 300 MG: 300 CAPSULE ORAL at 20:15

## 2023-10-02 RX ADMIN — Medication 5 MG: at 22:20

## 2023-10-02 RX ADMIN — IPRATROPIUM BROMIDE AND ALBUTEROL SULFATE 3 ML: 2.5; .5 SOLUTION RESPIRATORY (INHALATION) at 08:39

## 2023-10-02 RX ADMIN — APIXABAN 2.5 MG: 2.5 TABLET, FILM COATED ORAL at 20:15

## 2023-10-02 RX ADMIN — IPRATROPIUM BROMIDE AND ALBUTEROL SULFATE 3 ML: 2.5; .5 SOLUTION RESPIRATORY (INHALATION) at 18:55

## 2023-10-02 RX ADMIN — SENNOSIDES AND DOCUSATE SODIUM 2 TABLET: 50; 8.6 TABLET ORAL at 20:15

## 2023-10-02 RX ADMIN — APIXABAN 2.5 MG: 2.5 TABLET, FILM COATED ORAL at 08:08

## 2023-10-02 RX ADMIN — METOPROLOL SUCCINATE 25 MG: 25 TABLET, EXTENDED RELEASE ORAL at 08:08

## 2023-10-02 RX ADMIN — MULTIPLE VITAMINS W/ MINERALS TAB 1 TABLET: TAB at 08:08

## 2023-10-02 RX ADMIN — AMIODARONE HYDROCHLORIDE 100 MG: 200 TABLET ORAL at 08:08

## 2023-10-02 RX ADMIN — GABAPENTIN 300 MG: 300 CAPSULE ORAL at 08:07

## 2023-10-02 RX ADMIN — Medication 10 ML: at 08:09

## 2023-10-02 NOTE — PROGRESS NOTES
Discharge Planning Assessment  [facility]     Patient Name: Mukund Pool  MRN: [id number]  Today's Date: 10/2/2023    Admit Date: 9/29/2023        Discharge Needs Assessment       Row Name 10/02/23 1627       Living Environment    Quality of Family Relationships helpful       Transition Planning    Patient/Family Anticipates Transition to home with family    Patient/Family Anticipated Services at Transition     Transportation Anticipated family or friend will provide       Discharge Needs Assessment    Outpatient/Agency/Support Group Needs assisted living facility                   Discharge Plan       Row Name 10/02/23 1627       Plan    Plan Comments Met with Mr. Pool and he lives at [facility] social work called and left a voicemail for the nursing dept at [facility] [phone] and left a message for the director of nursing and also provided a list of rehab facilities for short term rehab placement.                  Continued Care and Services - Admitted Since 9/29/2023    Coordination has not been started for this encounter.       Expected Discharge Date and Time       Expected Discharge Date Expected Discharge Time    Oct 2, 2023            Demographic Summary    No documentation.                  Functional Status    No documentation.                  Psychosocial    No documentation.                  Abuse/Neglect    No documentation.                  Legal    No documentation.                  Substance Abuse    No documentation.                  Patient Forms    No documentation.                              COLLEEN Acosta

## 2023-10-02 NOTE — PLAN OF CARE
Problem: Adult Inpatient Plan of Care  Goal: Plan of Care Review  Outcome: Ongoing, Progressing  Goal: Patient-Specific Goal (Individualized)  Outcome: Ongoing, Progressing  Goal: Absence of Hospital-Acquired Illness or Injury  Outcome: Ongoing, Progressing  Intervention: Identify and Manage Fall Risk  Recent Flowsheet Documentation  Taken 10/2/2023 0400 by Lizy Evans RN  Safety Promotion/Fall Prevention:   safety round/check completed   room organization consistent   nonskid shoes/slippers when out of bed   lighting adjusted   mobility aid in reach   fall prevention program maintained   clutter free environment maintained   assistive device/personal items within reach   activity supervised  Taken 10/2/2023 0200 by Lizy Evans RN  Safety Promotion/Fall Prevention:   safety round/check completed   room organization consistent   fall prevention program maintained   clutter free environment maintained   activity supervised  Taken 10/2/2023 0000 by Lizy Evans RN  Safety Promotion/Fall Prevention:   safety round/check completed   room organization consistent  Taken 10/1/2023 2200 by Lizy Evans RN  Safety Promotion/Fall Prevention:   safety round/check completed   room organization consistent   nonskid shoes/slippers when out of bed   mobility aid in reach   lighting adjusted   fall prevention program maintained   clutter free environment maintained   assistive device/personal items within reach   activity supervised  Taken 10/1/2023 2000 by Lizy Evans RN  Safety Promotion/Fall Prevention:   activity supervised   fall prevention program maintained   clutter free environment maintained   assistive device/personal items within reach   lighting adjusted   mobility aid in reach   nonskid shoes/slippers when out of bed   room organization consistent   safety round/check completed  Intervention: Prevent Skin Injury  Recent Flowsheet Documentation  Taken 10/2/2023 0400 by Lizy Evans RN  Body  Position: weight shifting  Skin Protection:   incontinence pads utilized   adhesive use limited  Taken 10/2/2023 0200 by Lizy Evans RN  Body Position: weight shifting  Skin Protection:   adhesive use limited   incontinence pads utilized  Taken 10/2/2023 0000 by Lizy Evans RN  Body Position: weight shifting  Skin Protection:   adhesive use limited   incontinence pads utilized  Taken 10/1/2023 2200 by Lizy Evans RN  Body Position: weight shifting  Skin Protection:   adhesive use limited   incontinence pads utilized  Taken 10/1/2023 2000 by Lizy Evans RN  Body Position: weight shifting  Skin Protection:   adhesive use limited   incontinence pads utilized  Intervention: Prevent and Manage VTE (Venous Thromboembolism) Risk  Recent Flowsheet Documentation  Taken 10/2/2023 0400 by Lizy Evans RN  Activity Management: activity encouraged  Taken 10/2/2023 0200 by Lizy Evans RN  Activity Management: activity encouraged  Taken 10/2/2023 0000 by Lizy Evans RN  Activity Management: activity encouraged  Taken 10/1/2023 2200 by Lizy Evans RN  Activity Management: activity encouraged  Taken 10/1/2023 2000 by Lizy Evans RN  Activity Management: activity encouraged  VTE Prevention/Management: sequential compression devices off  Range of Motion: active ROM (range of motion) encouraged  Intervention: Prevent Infection  Recent Flowsheet Documentation  Taken 10/2/2023 0400 by Lizy Evans RN  Infection Prevention:   visitors restricted/screened   rest/sleep promoted   hand hygiene promoted   environmental surveillance performed  Taken 10/2/2023 0200 by Lizy Evans RN  Infection Prevention:   visitors restricted/screened   rest/sleep promoted   hand hygiene promoted  Taken 10/2/2023 0000 by Lizy Evans RN  Infection Prevention:   single patient room provided   rest/sleep promoted   hand hygiene promoted  Taken 10/1/2023 2200 by Lizy Evans RN  Infection Prevention:    hand hygiene promoted   personal protective equipment utilized   single patient room provided  Taken 10/1/2023 2000 by Lizy Evans, RN  Infection Prevention:   rest/sleep promoted   single patient room provided   hand hygiene promoted  Goal: Optimal Comfort and Wellbeing  Outcome: Ongoing, Progressing  Intervention: Provide Person-Centered Care  Recent Flowsheet Documentation  Taken 10/1/2023 2000 by Lizy Evans RN  Trust Relationship/Rapport:   care explained   emotional support provided   questions answered   questions encouraged   thoughts/feelings acknowledged  Goal: Readiness for Transition of Care  Outcome: Ongoing, Progressing   Goal Outcome Evaluation:

## 2023-10-02 NOTE — PLAN OF CARE
Goal Outcome Evaluation:     Pt admitted for SOA, exacerbation of his heart failure. Doctors noticed a-fib on loop recorder device recently. Pt in a BBB with no c/o CP or SOA today. Pt was hypotensive with BP's running 94/65, 96/65  tthis afternoon after metoprolol and amiodarone given this morning. Dr Rivera was notified with no intervention.  Pt was asymptomatic when BP was low. EKG was done and QTC was 555 and QT was 418. Cardiology team was notified. PTs creatinine was elevated at 2.07 GFR 29.3 so BMP was ordered in the AM. Nephrotoxic drugs have been discontinued. Pt was ambulating in the [place]inez and around his room. He feels he needs additional home PT and the  has been notified . Overall, a quiet day.

## 2023-10-02 NOTE — PROGRESS NOTES
[facility] Medicine Services  PROGRESS NOTE    Patient Name: Mukund Pool  : [id number]  MRN: [id number]    Date of Admission: 2023  Primary Care Physician: Slick Hubbard MD    Subjective   Subjective     CC:  Heart failure    HPI:  Patient has been borderline hypotensive.  Denies any shortness of breath.  Denies any worsening swelling.  Hoping to go home soon.      Objective   Objective     Vital Signs:   Temp:  [97.3 °F (36.3 °C)-97.7 °F (36.5 °C)] 97.4 °F (36.3 °C)  Heart Rate:  [] 107  Resp:  [16-22] 18  BP: ()/(56-77) 102/71  Flow (L/min):  [2] 2     Physical Exam:  Constitutional: No acute distress, awake, alert  Respiratory: Clear to auscultation bilaterally, respiratory effort normal on room air  Cardiovascular: RRR  Gastrointestinal: Positive bowel sounds, soft, nontender, nondistended  Musculoskeletal: No bilateral ankle edema  Psychiatric: Appropriate affect, cooperative  Neurologic: Oriented x 3, no focal deficits        Results Reviewed:  LAB RESULTS:      Lab 23  0535 23  1425 23  1257   WBC 4.71  --  7.96   HEMOGLOBIN 9.8*  --  11.3*   HEMATOCRIT 30.1*  --  35.2*   PLATELETS 215  --  258   NEUTROS ABS 2.53  --  5.75   IMMATURE GRANS (ABS) 0.01  --  0.04   LYMPHS ABS 1.52  --  1.39   MONOS ABS 0.55  --  0.68   EOS ABS 0.06  --  0.03   .2*  --  106.3*   CRP  --   --  0.54*   PROCALCITONIN  --   --  0.05   LACTATE  --  1.8  --    LDH  --   --  178   D DIMER QUANT  --   --  0.60         Lab 10/02/23  0448 10/01/23  0401 23  1831 23  0535 23  1257   SODIUM 137 138  --  138 138   POTASSIUM 4.5 4.5  --  4.6 5.1   CHLORIDE 101 104  --  105 103   CO2 26.0 26.0  --  26.0 25.0   ANION GAP 10.0 8.0  --  7.0 10.0   BUN 39* 32*  --  27* 22   CREATININE 2.07* 1.75*  --  1.51* 1.64*   EGFR 29.3* 35.9*  --  42.8* 38.8*   GLUCOSE 104* 98  --  96 133*   CALCIUM 8.2 8.2  --  8.5 8.9   MAGNESIUM 2.1 2.0 2.1  --   --     TSH  --   --   --   --  3.140         Lab 09/29/23  1257   TOTAL PROTEIN 7.3   ALBUMIN 3.9   GLOBULIN 3.4   ALT (SGPT) 17   AST (SGOT) 19   BILIRUBIN 1.1   ALK PHOS 103         Lab 09/30/23  0724 09/30/23  0535 09/29/23  1257   PROBNP  --   --  5,453.0*   HSTROP T 45* 45* 40*         Lab 09/30/23  0535   CHOLESTEROL 132   LDL CHOL 83   HDL CHOL 38*   TRIGLYCERIDES 51         Lab 09/29/23  1257   FERRITIN 129.80   FOLATE 9.59   VITAMIN B 12 1,278*         Brief Urine Lab Results       None            Microbiology Results Abnormal       Procedure Component Value - Date/Time    Blood Culture - Blood, Arm, Left [[id number]]  (Normal) Collected: 09/29/23 1445    Lab Status: Preliminary result Specimen: Blood from Arm, Left Updated: 10/01/23 1515     Blood Culture No growth at 2 days    Blood Culture - Blood, Arm, Right [[id number]]  (Normal) Collected: 09/29/23 1425    Lab Status: Preliminary result Specimen: Blood from Arm, Right Updated: 10/01/23 1445     Blood Culture No growth at 2 days    COVID PRE-OP / PRE-PROCEDURE SCREENING ORDER (NO ISOLATION) - Swab, Nasopharynx [[id number]]  (Normal) Collected: 09/29/23 1259    Lab Status: Final result Specimen: Swab from Nasopharynx Updated: 09/29/23 1337    Narrative:      The following orders were created for panel order COVID PRE-OP / PRE-PROCEDURE SCREENING ORDER (NO ISOLATION) - Swab, Nasopharynx.  Procedure                               Abnormality         Status                     ---------                               -----------         ------                     COVID-19 and FLU A/B PCR...[[id number]]  Normal              Final result                 Please view results for these tests on the individual orders.    COVID-19 and FLU A/B PCR - Swab, Nasopharynx [[id number]]  (Normal) Collected: 09/29/23 1259    Lab Status: Final result Specimen: Swab from Nasopharynx Updated: 09/29/23 1337     COVID19 Not Detected     Influenza A PCR Not Detected     Influenza B PCR  Not Detected    Narrative:      Fact sheet for providers: https://www.fda.gov/media/198275/download    Fact sheet for patients: https://www.fda.gov/media/114199/download    Test performed by PCR.            Adult Transthoracic Echo Complete W/ Cont if Necessary Per Protocol    Result Date: 9/30/2023    The left ventricle is mildly dilated and globally hypokinetic.  Left ventricular systolic function is moderately decreased. Estimated left ventricular EF = 25%   Aortic valve is calcified with no significant stenosis or regurgitation.   Moderate mitral valve regurgitation is present.   Estimated right ventricular systolic pressure from tricuspid regurgitation is normal (<35 mmHg).   There is a small (<1cm) pericardial effusion adjacent to the left ventricle.   Bilateral pleural effusions present     FL Video Swallow With Speech Single Contrast    Result Date: 9/30/2023  FL VIDEO SWALLOW W SPEECH SINGLE-CONTRAST Date of Exam: 9/30/2023 10:56 AM EDT Indication: r/o dysphagia. Comparison: None available. TECHNIQUE: 54 seconds of fluoroscopic time was used for this exam. 6 associated fluoroscopic loops were saved. The patient was evaluated in the seated lateral position while taking a variety of consistencies of barium by mouth under the direction of speech pathology.  COMPARISON: NONE FINDINGS: 54 seconds of fluoroscopy provided for a modified barium swallow. Please see speech therapy report for full details and recommendations.     Impression: Fluoroscopy provided for a modified barium swallow. Please see speech therapy report for full details and recommendations. Electronically Signed: John Guido MD  9/30/2023 11:58 AM EDT  Workstation ID: [id number]     Results for orders placed during the hospital encounter of 09/29/23    Adult Transthoracic Echo Complete W/ Cont if Necessary Per Protocol    Interpretation Summary    The left ventricle is mildly dilated and globally hypokinetic.  Left ventricular systolic function  is moderately decreased. Estimated left ventricular EF = 25%    Aortic valve is calcified with no significant stenosis or regurgitation.    Moderate mitral valve regurgitation is present.    Estimated right ventricular systolic pressure from tricuspid regurgitation is normal (<35 mmHg).    There is a small (<1cm) pericardial effusion adjacent to the left ventricle.    Bilateral pleural effusions present      Current medications:  Scheduled Meds:amiodarone, 100 mg, Oral, Q24H  apixaban, 2.5 mg, Oral, BID  aspirin, 81 mg, Oral, Daily  furosemide, 40 mg, Intravenous, Q12H  gabapentin, 300 mg, Oral, TID  ipratropium-albuterol, 3 mL, Nebulization, 4x Daily - RT  metoprolol succinate XL, 25 mg, Oral, Daily  multivitamin with minerals, 1 tablet, Oral, Daily  pantoprazole, 40 mg, Oral, Q AM  senna-docusate sodium, 2 tablet, Oral, BID      Continuous Infusions:   PRN Meds:.  acetaminophen    senna-docusate sodium **AND** polyethylene glycol **AND** bisacodyl **AND** bisacodyl    Calcium Replacement - Follow Nurse / BPA Driven Protocol    Magnesium Cardiology Dose Replacement - Follow Nurse / BPA Driven Protocol    melatonin    nitroglycerin    Phosphorus Replacement - Follow Nurse / BPA Driven Protocol    Potassium Replacement - Follow Nurse / BPA Driven Protocol    sodium chloride    Assessment & Plan   Assessment & Plan     Active Hospital Problems    Diagnosis  POA    **Acute HFrEF (heart failure with reduced ejection fraction) [I50.21]  Yes    Coronary artery disease involving native coronary artery of native heart with angina pectoris [I25.119]  Yes    Hyperlipidemia LDL goal <70 [E78.5]  Yes    LBBB (left bundle branch block) [I44.7]  Yes    Paroxysmal atrial fibrillation [I48.0]  Yes    Stage 3b chronic kidney disease [N18.32]  Yes    Anemia, chronic disease [D63.8]  Yes    Weight loss [R63.4]  Yes      Resolved Hospital Problems    Diagnosis Date Resolved POA    Chest pain [R07.9] 09/30/2023 Yes    Bilateral  pulmonary infiltrates [R91.8] 09/30/2023 Yes    Hypoxia [R09.02] 09/30/2023 Yes        Brief Hospital Course to date:  89-year-old man with history of coronary artery disease, head neck cancer 2021, coronary disease with prior CABG, paroxysmal A-fib on amiodarone who presents with chest pain, shortness of breath, hypoxia and 40 pound weight loss. Required 2L on presentation. CT of the chest without contrast showed new moderate-sized bilateral pleural effusions with likely compressive atelectasis. Troponins elevated but no significant delta. BNP elevated 5400, EKG with left bundle branch block. Echo showed newly reduced EF of 25%.     Acute hypoxia, resolved  Heart failure with reduced ejection fraction, new diagnosis (EF 25%)  Chest pain with history of coronary disease, CABG  - Required 2L on presentation, now intermittently requiring oxygen  -Continue aspirin  -Continue metoprolol  -Holding Entresto spironolactone, ACE inhibitor in the setting of CHRISTIANO  -Patient has elected medical therapy for treatment due to age and kidney disease  -holding IV lasix this morning, creatinine trended up to 2.07    CHRISTIANO on CKD  -Creatinine trended up to 2.07 from 1.75 yesterday.  Suspect secondary to diuresis.  - Prior cardiology notes from [place] with creatinine of 1.7 in March (Dr. Childress's notes)  - hold diuretics     40 pound weight loss  Previous abnormal pancreas on outside scan  -Ca 19-9 normal  -CT of the abdomen and pelvis with low-density masses within the pancreas which may represent a cyst, pseudocyst or IPMN the largest lesion within the head of the pancreas is unchanged, new 2.1 cm cyst within the mid body of the pancreas.  - Patient reports this has been followed for this and they have decided not to undergo treatment given his age.      Paroxysmal A-fib  -Rate controlled, on chronic Eliquis  -Continue amiodarone        Expected Discharge Location and Transportation: home vs rehab  Expected Discharge   Expected  Discharge Date: 10/2/2023; Expected Discharge Time:      DVT prophylaxis:  Medical DVT prophylaxis orders are present.     AM-PAC 6 Clicks Score (PT): 19 (10/01/23 1151)    CODE STATUS:   Code Status and Medical Interventions:   Ordered at: 09/29/23 1141     Code Status (Patient has no pulse and is not breathing):    CPR (Attempt to Resuscitate)     Medical Interventions (Patient has pulse or is breathing):    Full     I have prepared this progress note with copied portions of the prior day's progress note of my own authorship to preserve accuracy and maintain consistency of documentation. I have reviewed these portions and edited them for correctness. I verify that the above documentation accurately and truly represents the evaluation and management performed on today's date.       Lacey Rivera MD  10/02/23

## 2023-10-02 NOTE — PROGRESS NOTES
Cardiology Progress Note      Reason for visit:    Acute on chronic systolic heart failure  Coronary artery disease  Paroxysmal atrial fibrillation    IDENTIFICATION: 89 gentleman who resides in [place], [place]    Active Hospital Problems    Diagnosis  POA    **Acute HFrEF (heart failure with reduced ejection fraction) [I50.21]  Yes     Echo (2/23): LVEF EF 60%.  LVH with grade 2 DD.  No significant valvular disease  [facility] admission with FC IV heart failure symptoms, pulmonary edema on chest x-ray, and elevated proBNP, 9/30/2023  Echo (9/30/2023): LVEF 25%.  Moderate MR.  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  LVH with grade 2 DD.  No significant valvular disease  Pharmacologic nuclear stress (2/10/2023): Inferior infarct.  No ischemia.  LVEF 55%      Paroxysmal atrial fibrillation [I48.0]  Yes     Priority: High     [id number] 4 (age >75, CAD, HTN)  4% A-fib burden on recent loop recorder interrogation      Stage 3b chronic kidney disease [N18.32]  Yes     Priority: High    Anemia, chronic disease [D63.8]  Yes     Priority: Medium    Hyperlipidemia LDL goal <70 [E78.5]  Yes     Priority: Low    Weight loss [R63.4]  Yes     Priority: Low    LBBB (left bundle branch block) [I44.7]  Yes            Patient is sitting up in the bed without complaints.  He is breathing easy on O2 at 1 L via nasal cannula.  Yesterday afternoon he became hypotensive.  This morning his creatinine elevated over 2 and his diuretics were discontinued by hospital medicine.  He is currently mildly tachycardic at 10 1-1 07 on telemetry.  Left bundle branch block is noted.  He has no real complaints today.  He denies any chest pain or shortness of breath.           Vital Sign Min/Max for last 24 hours  Temp  Min: 97.3 °F (36.3 °C)  Max: 97.7 °F (36.5 °C)   BP  Min: 80/59  Max: 109/75   Pulse   Min: 94  Max: 111   Resp  Min: 16  Max: 22   SpO2  Min: 90 %  Max: 100 %   Flow (L/min)  Min: 2  Max: 2      Intake/Output Summary (Last 24 hours) at 10/2/2023 0738  Last data filed at 10/2/2023 0500  Gross per 24 hour   Intake 800 ml   Output 1200 ml   Net -400 ml           Physical Exam  Constitutional:       General: He is awake.   Cardiovascular:      Rate and Rhythm: Rhythm regularly irregular.      Heart sounds: Murmur heard.   Pulmonary:      Effort: Pulmonary effort is normal.      Breath sounds: Normal breath sounds.      Comments: Left bundle branch block noted  Skin:     General: Skin is warm and dry.   Neurological:      Mental Status: He is alert.   Psychiatric:         Behavior: Behavior is cooperative.       Tele: Normal sinus rhythm     Results Review (reviewed the patient's recent labs in the electronic medical record):      EKG (9/29/2023): Sinus tachycardia at 109 bpm     CT of the chest (9/29/2023): New moderate size bilateral pleural effusions.     ECHO (9/30/2023): LVEF 25%.  Moderate MR.  Small less than 1 cm pericardial effusion.  Bilateral pleural effusions    Results from last 7 days   Lab Units 10/02/23  0448 10/01/23  0401 09/30/23  1831 09/30/23  0535 09/29/23  1257   SODIUM mmol/L 137 138  --  138 138   POTASSIUM mmol/L 4.5 4.5  --  4.6 5.1   CHLORIDE mmol/L 101 104  --  105 103   BUN mg/dL 39* 32*  --  27* 22   CREATININE mg/dL 2.07* 1.75*  --  1.51* 1.64*   MAGNESIUM mg/dL 2.1 2.0 2.1  --   --      Results from last 7 days   Lab Units 09/30/23  0724 09/30/23  0535 09/29/23  1257   HSTROP T ng/L 45* 45* 40*     Results from last 7 days   Lab Units 09/30/23  0535 09/29/23  1257   WBC 10*3/mm3 4.71 7.96   HEMOGLOBIN g/dL 9.8* 11.3*   HEMATOCRIT % 30.1* 35.2*   PLATELETS 10*3/mm3 215 258       Lab Results   Component Value Date    HGBA1C 6.0 04/07/2015       Lab Results   Component Value Date    CHOL 132 09/30/2023    TRIG 51 09/30/2023    HDL 38 (L) 09/30/2023    LDL 83 09/30/2023  "             Acute HFrEF with LVEF 25%  Functional class IV symptoms with elevated proBNP, pulmonary edema on admission  Symptomatic improvement with IV furosemide  Continue metoprolol succinate 25 mg daily  Consider SGL 2 inhibitor if GFR >30  No ARB or MRA due to CKD        Coronary artery disease  Flat troponin elevation not consistent with ACS  Ischemic evaluation in February consistent with inferior infarct  Invasive ischemic evaluation be considered once euvolemic.  Patient is high risk for coronary angiography and PCI given age, CKD, reduced LVEF, anemia, and ancient bypass grafts and unlikeliness of PCI \"targets\".  High risk of JARAD  Patient would like to defer ischemic evaluation for now, particularly since not having chest pain     Paroxysmal atrial fibrillation  Maintaining sinus on amiodarone 100 mg daily  Continue apixaban 2.5 mg twice daily for stroke prophylaxis     Hyperlipidemia with goal LDL <70  Statin intolerant  Resume Zetia at discharge     Left bundle branch block  Chronic     Chronic kidney disease stage IIIb  Creatinine increased to  2.07 on 10/2/2023  Diuretics were discontinued       Hypotension  Became hypotensive on evening of 10/1/2023  Blood pressure improved after holding diuretics  Current /71         Defer ischemic evaluation as patient's symptoms have improved with treatment of his heart failure.  We will plan on medical management   No ACE/ARB/Entresto/spironolactone due to chronic kidney disease  Agree with discontinuing diuretics due to elevated creatinine 2.07  Continue Eliquis for stroke prophylaxis and amiodarone for rhythm management.    Patient to work with physical therapy  Recheck creatinine in a.m.    Electronically signed by CYNDEE Castaneda, 10/02/23, 7:38 AM EDT.  "

## 2023-10-03 LAB
ANION GAP SERPL CALCULATED.3IONS-SCNC: 7 MMOL/L (ref 5–15)
BUN SERPL-MCNC: 40 MG/DL (ref 8–23)
BUN/CREAT SERPL: 20.7 (ref 7–25)
CALCIUM SPEC-SCNC: 8.3 MG/DL (ref 8.2–9.6)
CHLORIDE SERPL-SCNC: 103 MMOL/L (ref 98–107)
CO2 SERPL-SCNC: 27 MMOL/L (ref 22–29)
CREAT SERPL-MCNC: 1.93 MG/DL (ref 0.76–1.27)
EGFRCR SERPLBLD CKD-EPI 2021: 31.9 ML/MIN/1.73
GLUCOSE SERPL-MCNC: 100 MG/DL (ref 65–99)
POTASSIUM SERPL-SCNC: 4.6 MMOL/L (ref 3.5–5.2)
SODIUM SERPL-SCNC: 137 MMOL/L (ref 136–145)

## 2023-10-03 PROCEDURE — 94664 DEMO&/EVAL PT USE INHALER: CPT

## 2023-10-03 PROCEDURE — G0378 HOSPITAL OBSERVATION PER HR: HCPCS

## 2023-10-03 PROCEDURE — 94799 UNLISTED PULMONARY SVC/PX: CPT

## 2023-10-03 PROCEDURE — 99232 SBSQ HOSP IP/OBS MODERATE 35: CPT | Performed by: NURSE PRACTITIONER

## 2023-10-03 PROCEDURE — 80048 BASIC METABOLIC PNL TOTAL CA: CPT | Performed by: NURSE PRACTITIONER

## 2023-10-03 PROCEDURE — 92526 ORAL FUNCTION THERAPY: CPT

## 2023-10-03 RX ORDER — METOPROLOL SUCCINATE 50 MG/1
50 TABLET, EXTENDED RELEASE ORAL DAILY
Status: DISCONTINUED | OUTPATIENT
Start: 2023-10-04 | End: 2023-10-05 | Stop reason: HOSPADM

## 2023-10-03 RX ORDER — IPRATROPIUM BROMIDE AND ALBUTEROL SULFATE 2.5; .5 MG/3ML; MG/3ML
3 SOLUTION RESPIRATORY (INHALATION) EVERY 4 HOURS PRN
Status: DISCONTINUED | OUTPATIENT
Start: 2023-10-03 | End: 2023-10-05 | Stop reason: HOSPADM

## 2023-10-03 RX ADMIN — GABAPENTIN 300 MG: 300 CAPSULE ORAL at 20:58

## 2023-10-03 RX ADMIN — MULTIPLE VITAMINS W/ MINERALS TAB 1 TABLET: TAB at 08:20

## 2023-10-03 RX ADMIN — APIXABAN 2.5 MG: 2.5 TABLET, FILM COATED ORAL at 08:20

## 2023-10-03 RX ADMIN — GABAPENTIN 300 MG: 300 CAPSULE ORAL at 15:19

## 2023-10-03 RX ADMIN — AMIODARONE HYDROCHLORIDE 100 MG: 200 TABLET ORAL at 08:20

## 2023-10-03 RX ADMIN — GABAPENTIN 300 MG: 300 CAPSULE ORAL at 08:20

## 2023-10-03 RX ADMIN — ASPIRIN 81 MG: 81 TABLET, COATED ORAL at 08:20

## 2023-10-03 RX ADMIN — METOPROLOL SUCCINATE 25 MG: 25 TABLET, EXTENDED RELEASE ORAL at 08:20

## 2023-10-03 RX ADMIN — APIXABAN 2.5 MG: 2.5 TABLET, FILM COATED ORAL at 20:58

## 2023-10-03 RX ADMIN — SENNOSIDES AND DOCUSATE SODIUM 2 TABLET: 50; 8.6 TABLET ORAL at 08:19

## 2023-10-03 RX ADMIN — SENNOSIDES AND DOCUSATE SODIUM 2 TABLET: 50; 8.6 TABLET ORAL at 20:58

## 2023-10-03 RX ADMIN — IPRATROPIUM BROMIDE AND ALBUTEROL SULFATE 3 ML: 2.5; .5 SOLUTION RESPIRATORY (INHALATION) at 08:02

## 2023-10-03 RX ADMIN — PANTOPRAZOLE SODIUM 40 MG: 40 TABLET, DELAYED RELEASE ORAL at 05:08

## 2023-10-03 NOTE — PROGRESS NOTES
[facility] Medicine Services  PROGRESS NOTE    Patient Name: Mukund Pool  : [id number]  MRN: [id number]    Date of Admission: 2023  Primary Care Physician: Slick Hubbard MD    Subjective   Subjective     CC:  SOA    HPI:  No acute events. States he feels ok. Denies SOA. Wants to go home and declines rehab.       Objective   Objective     Vital Signs:   Temp:  [97.2 °F (36.2 °C)-98 °F (36.7 °C)] 97.5 °F (36.4 °C)  Heart Rate:  [101-107] 107  Resp:  [16-22] 20  BP: ()/(62-83) 100/62     Physical Exam:  Constitutional: No acute distress, awake, alert, elderly/frail  HENT: NCAT, mucous membranes moist  Respiratory: Clear to auscultation bilaterally, respiratory effort normal   Cardiovascular: tachy, no murmurs, rubs, or gallops  Gastrointestinal: Positive bowel sounds, soft, nontender, nondistended  Musculoskeletal: No bilateral ankle edema  Psychiatric: Appropriate affect, cooperative  Neurologic: Oriented x 3, strength symmetric in all extremities, Cranial Nerves grossly intact to confrontation, speech clear  Skin: No rashes      Results Reviewed:  LAB RESULTS:      Lab 23  0535 23  1425 23  1257   WBC 4.71  --  7.96   HEMOGLOBIN 9.8*  --  11.3*   HEMATOCRIT 30.1*  --  35.2*   PLATELETS 215  --  258   NEUTROS ABS 2.53  --  5.75   IMMATURE GRANS (ABS) 0.01  --  0.04   LYMPHS ABS 1.52  --  1.39   MONOS ABS 0.55  --  0.68   EOS ABS 0.06  --  0.03   .2*  --  106.3*   CRP  --   --  0.54*   PROCALCITONIN  --   --  0.05   LACTATE  --  1.8  --    LDH  --   --  178   D DIMER QUANT  --   --  0.60         Lab 10/03/23  0400 10/02/23  0448 10/01/23  0401 23  1831 23  0535 23  1257   SODIUM 137 137 138  --  138 138   POTASSIUM 4.6 4.5 4.5  --  4.6 5.1   CHLORIDE 103 101 104  --  105 103   CO2 27.0 26.0 26.0  --  26.0 25.0   ANION GAP 7.0 10.0 8.0  --  7.0 10.0   BUN 40* 39* 32*  --  27* 22   CREATININE 1.93* 2.07* 1.75*  --   1.51* 1.64*   EGFR 31.9* 29.3* 35.9*  --  42.8* 38.8*   GLUCOSE 100* 104* 98  --  96 133*   CALCIUM 8.3 8.2 8.2  --  8.5 8.9   MAGNESIUM  --  2.1 2.0 2.1  --   --    TSH  --   --   --   --   --  3.140         Lab 09/29/23  1257   TOTAL PROTEIN 7.3   ALBUMIN 3.9   GLOBULIN 3.4   ALT (SGPT) 17   AST (SGOT) 19   BILIRUBIN 1.1   ALK PHOS 103         Lab 09/30/23  0724 09/30/23  0535 09/29/23  1257   PROBNP  --   --  5,453.0*   HSTROP T 45* 45* 40*         Lab 09/30/23  0535   CHOLESTEROL 132   LDL CHOL 83   HDL CHOL 38*   TRIGLYCERIDES 51         Lab 09/29/23  1257   FERRITIN 129.80   FOLATE 9.59   VITAMIN B 12 1,278*         Brief Urine Lab Results       None            Microbiology Results Abnormal       Procedure Component Value - Date/Time    Blood Culture - Blood, Arm, Left [[id number]]  (Normal) Collected: 09/29/23 1445    Lab Status: Preliminary result Specimen: Blood from Arm, Left Updated: 10/02/23 1515     Blood Culture No growth at 3 days    Blood Culture - Blood, Arm, Right [[id number]]  (Normal) Collected: 09/29/23 1425    Lab Status: Preliminary result Specimen: Blood from Arm, Right Updated: 10/02/23 1445     Blood Culture No growth at 3 days    COVID PRE-OP / PRE-PROCEDURE SCREENING ORDER (NO ISOLATION) - Swab, Nasopharynx [[id number]]  (Normal) Collected: 09/29/23 1259    Lab Status: Final result Specimen: Swab from Nasopharynx Updated: 09/29/23 1337    Narrative:      The following orders were created for panel order COVID PRE-OP / PRE-PROCEDURE SCREENING ORDER (NO ISOLATION) - Swab, Nasopharynx.  Procedure                               Abnormality         Status                     ---------                               -----------         ------                     COVID-19 and FLU A/B PCR...[[id number]]  Normal              Final result                 Please view results for these tests on the individual orders.    COVID-19 and FLU A/B PCR - Swab, Nasopharynx [[id number]]  (Normal) Collected: 09/29/23  1259    Lab Status: Final result Specimen: Swab from Nasopharynx Updated: 09/29/23 1337     COVID19 Not Detected     Influenza A PCR Not Detected     Influenza B PCR Not Detected    Narrative:      Fact sheet for providers: https://www.fda.gov/media/670736/download    Fact sheet for patients: https://www.fda.gov/media/809843/download    Test performed by PCR.            No radiology results from the last 24 hrs    Results for orders placed during the hospital encounter of 09/29/23    Adult Transthoracic Echo Complete W/ Cont if Necessary Per Protocol    Interpretation Summary    The left ventricle is mildly dilated and globally hypokinetic.  Left ventricular systolic function is moderately decreased. Estimated left ventricular EF = 25%    Aortic valve is calcified with no significant stenosis or regurgitation.    Moderate mitral valve regurgitation is present.    Estimated right ventricular systolic pressure from tricuspid regurgitation is normal (<35 mmHg).    There is a small (<1cm) pericardial effusion adjacent to the left ventricle.    Bilateral pleural effusions present      Current medications:  Scheduled Meds:amiodarone, 100 mg, Oral, Q24H  apixaban, 2.5 mg, Oral, BID  aspirin, 81 mg, Oral, Daily  gabapentin, 300 mg, Oral, TID  [START ON 10/4/2023] metoprolol succinate XL, 50 mg, Oral, Daily  multivitamin with minerals, 1 tablet, Oral, Daily  pantoprazole, 40 mg, Oral, Q AM  pharmacy consult - [place], , Does not apply, Daily  senna-docusate sodium, 2 tablet, Oral, BID      Continuous Infusions:   PRN Meds:.  acetaminophen    senna-docusate sodium **AND** polyethylene glycol **AND** bisacodyl **AND** bisacodyl    Calcium Replacement - Follow Nurse / BPA Driven Protocol    ipratropium-albuterol    Magnesium Cardiology Dose Replacement - Follow Nurse / BPA Driven Protocol    melatonin    nitroglycerin    Phosphorus Replacement - Follow Nurse / BPA Driven Protocol    sodium chloride    Assessment & Plan   Assessment  & Plan     Active Hospital Problems    Diagnosis  POA    **Acute HFrEF (heart failure with reduced ejection fraction) [I50.21]  Yes    Coronary artery disease involving native coronary artery of native heart with angina pectoris [I25.119]  Yes    Hyperlipidemia LDL goal <70 [E78.5]  Yes    LBBB (left bundle branch block) [I44.7]  Yes    Paroxysmal atrial fibrillation [I48.0]  Yes    Stage 3b chronic kidney disease [N18.32]  Yes    Anemia, chronic disease [D63.8]  Yes    Weight loss [R63.4]  Yes      Resolved Hospital Problems    Diagnosis Date Resolved POA    Chest pain [R07.9] 09/30/2023 Yes    Bilateral pulmonary infiltrates [R91.8] 09/30/2023 Yes    Hypoxia [R09.02] 09/30/2023 Yes        Brief Hospital Course to date:  89-year-old man with history of coronary artery disease, head neck cancer 2021, coronary disease with prior CABG, paroxysmal A-fib on amiodarone who presents with chest pain, shortness of breath, hypoxia and 40 pound weight loss. Required 2L on presentation. CT of the chest without contrast showed new moderate-sized bilateral pleural effusions with likely compressive atelectasis. Troponins elevated but no significant delta. BNP elevated 5400, EKG with left bundle branch block. Echo showed newly reduced EF of 25%.     Acute hypoxia, resolved  Heart failure with reduced ejection fraction, new diagnosis (EF 25%)  Chest pain with history of coronary disease, CABG  - Cards consulted -- appreciate recs   - Required 2L on presentation, now weaned to room air  -Continue aspirin  -Continue metoprolol  -Holding Entresto spironolactone, ACE inhibitor in the setting of CHRISTIANO  -Patient has elected medical therapy for treatment due to age and kidney disease  - holding diuretics per below     CHRISTIANO on CKD  - Creatinine trended up to 2.07 related to diuresis.  - Prior cardiology notes from [facility] with creatinine of 1.7 in March (Dr. Childress's notes)  - improved today. Continue to hold diuretics and possibly resume  tomorrow.      40 pound weight loss  Previous abnormal pancreas on outside scan  -Ca 19-9 normal  -CT of the abdomen and pelvis with low-density masses within the pancreas which may represent a cyst, pseudocyst or IPMN the largest lesion within the head of the pancreas is unchanged, new 2.1 cm cyst within the mid body of the pancreas.  - Patient reports this has been followed for this and they have decided not to undergo treatment given his age.   - Outpatient management      Paroxysmal A-fib  -Continue amiodarone, metoprolol and eliquis      Expected Discharge Location and Transportation: home with home health. Declining rehab  Expected Discharge   Expected Discharge Date: 10/2/2023; Expected Discharge Time:      DVT prophylaxis:  Medical DVT prophylaxis orders are present.     AM-PAC 6 Clicks Score (PT): 18 (10/03/23 0802)    CODE STATUS:   Code Status and Medical Interventions:   Ordered at: 09/29/23 1454     Code Status (Patient has no pulse and is not breathing):    CPR (Attempt to Resuscitate)     Medical Interventions (Patient has pulse or is breathing):    Full       Sunita Laguerre,   10/03/23

## 2023-10-03 NOTE — PROGRESS NOTES
Cardiology Progress Note      Reason for visit:    Acute on chronic systolic heart failure  Coronary artery disease  Paroxysmal atrial fibrillation    IDENTIFICATION: 89 gentleman who resides in [place], [place]    Active Hospital Problems    Diagnosis  POA    **Acute HFrEF (heart failure with reduced ejection fraction) [I50.21]  Yes     Echo (2/23): LVEF EF 60%.  LVH with grade 2 DD.  No significant valvular disease  [facility] admission with FC IV heart failure symptoms, pulmonary edema on chest x-ray, and elevated proBNP, 9/30/2023  Echo (9/30/2023): LVEF 25%.  Moderate MR.  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  LVH with grade 2 DD.  No significant valvular disease  Pharmacologic nuclear stress (2/10/2023): Inferior infarct.  No ischemia.  LVEF 55%      Paroxysmal atrial fibrillation [I48.0]  Yes     Priority: High     [id number] 4 (age >75, CAD, HTN)  4% A-fib burden on recent loop recorder interrogation      Stage 3b chronic kidney disease [N18.32]  Yes     Priority: High    Anemia, chronic disease [D63.8]  Yes     Priority: Medium    Hyperlipidemia LDL goal <70 [E78.5]  Yes     Priority: Low    Weight loss [R63.4]  Yes     Priority: Low    LBBB (left bundle branch block) [I44.7]  Yes            Patient is sitting up in the bed reports having difficulty sleeping last night.  He was able to get some sleep after receiving melatonin.  He remains mildly tachycardic on telemetry.  Renal function declining but still elevated with a creatinine from previously 2.07 yesterday to now 1.93.  He denies any chest pain and is breathing easy on room air.           Vital Sign Min/Max for last 24 hours  Temp  Min: 97.3 °F (36.3 °C)  Max: 98 °F (36.7 °C)   BP  Min: 92/62  Max: 113/83   Pulse  Min: 101  Max: 109   Resp  Min: 16  Max: 22   SpO2  Min: 92 %  Max: 98 %   No data  recorded      Intake/Output Summary (Last 24 hours) at 10/3/2023 0751  Last data filed at 10/3/2023 0500  Gross per 24 hour   Intake 600 ml   Output 700 ml   Net -100 ml           Physical Exam  Constitutional:       General: He is awake.   Cardiovascular:      Rate and Rhythm: Normal rate and regular rhythm.      Comments: Left bundle branch block  Pulmonary:      Effort: Pulmonary effort is normal.      Breath sounds: Normal breath sounds.   Musculoskeletal:         General: No swelling.   Skin:     General: Skin is warm and dry.   Neurological:      Mental Status: He is alert.   Psychiatric:         Behavior: Behavior is cooperative.       Tele: Normal sinus rhythm     Results Review (reviewed the patient's recent labs in the electronic medical record):      EKG (9/29/2023): Sinus tachycardia at 109 bpm     CT of the chest (9/29/2023): New moderate size bilateral pleural effusions.     ECHO (9/30/2023): LVEF 25%.  Moderate MR.  Small less than 1 cm pericardial effusion.  Bilateral pleural effusions    Results from last 7 days   Lab Units 10/03/23  0400 10/02/23  0448 10/01/23  0401 09/30/23  1831 09/30/23  0535 09/29/23  1257   SODIUM mmol/L 137 137 138  --  138 138   POTASSIUM mmol/L 4.6 4.5 4.5  --  4.6 5.1   CHLORIDE mmol/L 103 101 104  --  105 103   BUN mg/dL 40* 39* 32*  --  27* 22   CREATININE mg/dL 1.93* 2.07* 1.75*  --  1.51* 1.64*   MAGNESIUM mg/dL  --  2.1 2.0 2.1  --   --      Results from last 7 days   Lab Units 09/30/23  0724 09/30/23  0535 09/29/23  1257   HSTROP T ng/L 45* 45* 40*     Results from last 7 days   Lab Units 09/30/23  0535 09/29/23  1257   WBC 10*3/mm3 4.71 7.96   HEMOGLOBIN g/dL 9.8* 11.3*   HEMATOCRIT % 30.1* 35.2*   PLATELETS 10*3/mm3 215 258       Lab Results   Component Value Date    HGBA1C 6.0 04/07/2015       Lab Results   Component Value Date    CHOL 132 09/30/2023    TRIG 51 09/30/2023    HDL 38 (L) 09/30/2023    LDL 83 09/30/2023              Acute HFrEF with LVEF  "25%  Functional class IV symptoms with elevated proBNP, pulmonary edema on admission  Symptomatic improvement with IV furosemide  Continue metoprolol succinate 25 mg daily  No ARB or MRA due to CKD  IV diuretics on hold due to elevated creatinine        Coronary artery disease  Flat troponin elevation not consistent with ACS  Ischemic evaluation in February consistent with inferior infarct  Invasive ischemic evaluation be considered once euvolemic.  Patient is high risk for coronary angiography and PCI given age, CKD, reduced LVEF, anemia, and ancient bypass grafts and unlikeliness of PCI \"targets\".  High risk of JARAD  Patient would like to defer ischemic evaluation for now, particularly since not having chest pain  Aspirin 81 mg daily     Paroxysmal atrial fibrillation  Maintaining sinus on amiodarone 100 mg daily  Continue apixaban 2.5 mg twice daily for stroke prophylaxis  Has Loop recorder implant     Hyperlipidemia with goal LDL <70  Statin intolerant  Resume Zetia at discharge     Left bundle branch block  Chronic     Chronic kidney disease stage IIIb  Diuretics discontinued due to bump in creatinine of 2.07  Current creatinine on 10/3 is 1.93        Hypotension  Became hypotensive on evening of 10/1/2023  Blood pressure improved after holding diuretics  Current /69                Continue to hold diuretics.  If creatinine continues to improve can hopefully start low-dose p.o. Lasix tomorrow  Increase metoprolol succinate to 50 mg daily  Continue Eliquis for stroke prophylaxis  Continue amiodarone for rhythm management    Electronically signed by CYNDEE Castaneda, 10/03/23, 7:51 AM EDT.  "

## 2023-10-03 NOTE — PLAN OF CARE
Problem: Adult Inpatient Plan of Care  Goal: Plan of Care Review  Outcome: Ongoing, Progressing     Problem: Adult Inpatient Plan of Care  Goal: Patient-Specific Goal (Individualized)  Outcome: Ongoing, Progressing     Problem: Adult Inpatient Plan of Care  Goal: Absence of Hospital-Acquired Illness or Injury  Outcome: Ongoing, Progressing  Intervention: Identify and Manage Fall Risk  Recent Flowsheet Documentation  Taken 10/3/2023 0400 by Lizy Evans RN  Safety Promotion/Fall Prevention:   safety round/check completed   room organization consistent   fall prevention program maintained   lighting adjusted   clutter free environment maintained   activity supervised  Taken 10/3/2023 0200 by Lizy Evans RN  Safety Promotion/Fall Prevention:   activity supervised   fall prevention program maintained   lighting adjusted   assistive device/personal items within reach   room organization consistent   safety round/check completed  Taken 10/3/2023 0000 by Lizy Evans RN  Safety Promotion/Fall Prevention:   activity supervised   fall prevention program maintained   lighting adjusted   safety round/check completed  Taken 10/2/2023 2000 by Lizy Evans RN  Safety Promotion/Fall Prevention:   safety round/check completed   activity supervised   assistive device/personal items within reach   fall prevention program maintained   mobility aid in reach   lighting adjusted   clutter free environment maintained  Intervention: Prevent Skin Injury  Recent Flowsheet Documentation  Taken 10/3/2023 0400 by Lizy Evans RN  Body Position: position changed independently  Skin Protection:   adhesive use limited   incontinence pads utilized  Taken 10/3/2023 0200 by Lizy Evans RN  Body Position: position changed independently  Skin Protection:   adhesive use limited   incontinence pads utilized  Taken 10/3/2023 0000 by Lizy Evans RN  Body Position: position changed independently  Skin Protection:   adhesive  use limited   incontinence pads utilized  Taken 10/2/2023 2000 by Lizy Evans RN  Body Position: position changed independently  Skin Protection:   adhesive use limited   incontinence pads utilized  Intervention: Prevent and Manage VTE (Venous Thromboembolism) Risk  Recent Flowsheet Documentation  Taken 10/3/2023 0400 by Lizy Evans RN  Activity Management: activity encouraged  Taken 10/3/2023 0200 by Lizy Evans RN  Activity Management: activity encouraged  Taken 10/3/2023 0000 by Lizy Evans RN  Activity Management: activity encouraged  Taken 10/2/2023 2000 by Lizy Evans RN  Activity Management: back to bed  Range of Motion: active ROM (range of motion) encouraged  Intervention: Prevent Infection  Recent Flowsheet Documentation  Taken 10/3/2023 0400 by Lizy Evans RN  Infection Prevention:   rest/sleep promoted   hand hygiene promoted   single patient room provided  Taken 10/3/2023 0200 by Lizy Evans RN  Infection Prevention:   rest/sleep promoted   single patient room provided   hand hygiene promoted  Taken 10/3/2023 0000 by Lizy Evans RN  Infection Prevention:   environmental surveillance performed   hand hygiene promoted   rest/sleep promoted  Taken 10/2/2023 2000 by Lizy Evans RN  Infection Prevention:   single patient room provided   rest/sleep promoted   hand hygiene promoted   Goal Outcome Evaluation:

## 2023-10-03 NOTE — PLAN OF CARE
Goal Outcome Evaluation:  Plan of Care Reviewed With: patient            SLP treatment completed. Will continue to address dysphagia PRN. Please see note for further details and recommendations.

## 2023-10-04 PROBLEM — I50.9 CHF (CONGESTIVE HEART FAILURE): Status: ACTIVE | Noted: 2023-10-04

## 2023-10-04 LAB
ANION GAP SERPL CALCULATED.3IONS-SCNC: 10 MMOL/L (ref 5–15)
BACTERIA SPEC AEROBE CULT: NORMAL
BACTERIA SPEC AEROBE CULT: NORMAL
BUN SERPL-MCNC: 41 MG/DL (ref 8–23)
BUN/CREAT SERPL: 25.6 (ref 7–25)
CALCIUM SPEC-SCNC: 8.5 MG/DL (ref 8.2–9.6)
CHLORIDE SERPL-SCNC: 103 MMOL/L (ref 98–107)
CO2 SERPL-SCNC: 24 MMOL/L (ref 22–29)
CREAT SERPL-MCNC: 1.6 MG/DL (ref 0.76–1.27)
DEPRECATED RDW RBC AUTO: 52.7 FL (ref 37–54)
EGFRCR SERPLBLD CKD-EPI 2021: 39.9 ML/MIN/1.73
ERYTHROCYTE [DISTWIDTH] IN BLOOD BY AUTOMATED COUNT: 13.3 % (ref 12.3–15.4)
GLUCOSE SERPL-MCNC: 93 MG/DL (ref 65–99)
HCT VFR BLD AUTO: 35.4 % (ref 37.5–51)
HGB BLD-MCNC: 11.1 G/DL (ref 13–17.7)
MAGNESIUM SERPL-MCNC: 2.2 MG/DL (ref 1.7–2.3)
MCH RBC QN AUTO: 33.4 PG (ref 26.6–33)
MCHC RBC AUTO-ENTMCNC: 31.4 G/DL (ref 31.5–35.7)
MCV RBC AUTO: 106.6 FL (ref 79–97)
PLATELET # BLD AUTO: 227 10*3/MM3 (ref 140–450)
PMV BLD AUTO: 9.7 FL (ref 6–12)
POTASSIUM SERPL-SCNC: 5.2 MMOL/L (ref 3.5–5.2)
RBC # BLD AUTO: 3.32 10*6/MM3 (ref 4.14–5.8)
SODIUM SERPL-SCNC: 137 MMOL/L (ref 136–145)
WBC NRBC COR # BLD: 7.33 10*3/MM3 (ref 3.4–10.8)

## 2023-10-04 PROCEDURE — 85027 COMPLETE CBC AUTOMATED: CPT | Performed by: INTERNAL MEDICINE

## 2023-10-04 PROCEDURE — 83735 ASSAY OF MAGNESIUM: CPT | Performed by: INTERNAL MEDICINE

## 2023-10-04 PROCEDURE — 80048 BASIC METABOLIC PNL TOTAL CA: CPT | Performed by: INTERNAL MEDICINE

## 2023-10-04 PROCEDURE — 99232 SBSQ HOSP IP/OBS MODERATE 35: CPT | Performed by: NURSE PRACTITIONER

## 2023-10-04 RX ADMIN — APIXABAN 2.5 MG: 2.5 TABLET, FILM COATED ORAL at 20:12

## 2023-10-04 RX ADMIN — ASPIRIN 81 MG: 81 TABLET, COATED ORAL at 08:40

## 2023-10-04 RX ADMIN — SENNOSIDES AND DOCUSATE SODIUM 2 TABLET: 50; 8.6 TABLET ORAL at 08:40

## 2023-10-04 RX ADMIN — GABAPENTIN 300 MG: 300 CAPSULE ORAL at 20:12

## 2023-10-04 RX ADMIN — SENNOSIDES AND DOCUSATE SODIUM 2 TABLET: 50; 8.6 TABLET ORAL at 20:12

## 2023-10-04 RX ADMIN — GABAPENTIN 300 MG: 300 CAPSULE ORAL at 16:43

## 2023-10-04 RX ADMIN — MULTIPLE VITAMINS W/ MINERALS TAB 1 TABLET: TAB at 08:40

## 2023-10-04 RX ADMIN — METOPROLOL SUCCINATE 50 MG: 50 TABLET, EXTENDED RELEASE ORAL at 08:40

## 2023-10-04 RX ADMIN — GABAPENTIN 300 MG: 300 CAPSULE ORAL at 08:40

## 2023-10-04 RX ADMIN — AMIODARONE HYDROCHLORIDE 100 MG: 200 TABLET ORAL at 08:39

## 2023-10-04 RX ADMIN — PANTOPRAZOLE SODIUM 40 MG: 40 TABLET, DELAYED RELEASE ORAL at 05:18

## 2023-10-04 RX ADMIN — ACETAMINOPHEN 650 MG: 325 TABLET ORAL at 03:02

## 2023-10-04 RX ADMIN — APIXABAN 2.5 MG: 2.5 TABLET, FILM COATED ORAL at 08:40

## 2023-10-04 NOTE — PROGRESS NOTES
[facility] Services  PROGRESS NOTE    Patient Name: Mukund Pool  : 1930  MRN: [id number]    Date of Admission: 2023  Primary Care Physician: Slick Hubbard MD    Subjective   Subjective     CC:  SOA    HPI:  No acute events. States he feels ok. Breathing is better.       Objective   Objective     Vital Signs:   Temp:  [97.4 °F (36.3 °C)-98.6 °F (37 °C)] 98.5 °F (36.9 °C)  Heart Rate:  [103-104] 104  Resp:  [18-22] 18  BP: ()/(62-82) 95/66  Flow (L/min):  [2] 2     Physical Exam:  Constitutional: No acute distress, awake, alert; elderly  HENT: NCAT, mucous membranes moist  Respiratory: Clear to auscultation bilaterally, diminished   Cardiovascular: tachycardic, no murmurs, rubs, or gallops  Gastrointestinal: Positive bowel sounds, soft, nontender, nondistended  Musculoskeletal: No bilateral ankle edema  Psychiatric: Appropriate affect, cooperative  Neurologic: Oriented x 3, strength symmetric in all extremities, Cranial Nerves grossly intact to confrontation, speech clear  Skin: No rashes      Results Reviewed:  LAB RESULTS:      Lab 10/04/23  0540 23  0535 23  1425 23  1257   WBC 7.33 4.71  --  7.96   HEMOGLOBIN 11.1* 9.8*  --  11.3*   HEMATOCRIT 35.4* 30.1*  --  35.2*   PLATELETS 227 215  --  258   NEUTROS ABS  --  2.53  --  5.75   IMMATURE GRANS (ABS)  --  0.01  --  0.04   LYMPHS ABS  --  1.52  --  1.39   MONOS ABS  --  0.55  --  0.68   EOS ABS  --  0.06  --  0.03   .6* 104.2*  --  106.3*   CRP  --   --   --  0.54*   PROCALCITONIN  --   --   --  0.05   LACTATE  --   --  1.8  --    LDH  --   --   --  178   D DIMER QUANT  --   --   --  0.60         Lab 10/04/23  0540 10/03/23  0400 10/02/23  0448 10/01/23  0401 23  1831 23  0535 23  1257   SODIUM 137 137 137 138  --  138 138   POTASSIUM 5.2 4.6 4.5 4.5  --  4.6 5.1   CHLORIDE 103 103 101 104  --  105 103   CO2 24.0 27.0 26.0 26.0  --  26.0 25.0    ANION GAP 10.0 7.0 10.0 8.0  --  7.0 10.0   BUN 41* 40* 39* 32*  --  27* 22   CREATININE 1.60* 1.93* 2.07* 1.75*  --  1.51* 1.64*   EGFR 39.9* 31.9* 29.3* 35.9*  --  42.8* 38.8*   GLUCOSE 93 100* 104* 98  --  96 133*   CALCIUM 8.5 8.3 8.2 8.2  --  8.5 8.9   MAGNESIUM 2.2  --  2.1 2.0 2.1  --   --    TSH  --   --   --   --   --   --  3.140         Lab 09/29/23  1257   TOTAL PROTEIN 7.3   ALBUMIN 3.9   GLOBULIN 3.4   ALT (SGPT) 17   AST (SGOT) 19   BILIRUBIN 1.1   ALK PHOS 103         Lab 09/30/23  0724 09/30/23  0535 09/29/23  1257   PROBNP  --   --  5,453.0*   HSTROP T 45* 45* 40*         Lab 09/30/23  0535   CHOLESTEROL 132   LDL CHOL 83   HDL CHOL 38*   TRIGLYCERIDES 51         Lab 09/29/23  1257   FERRITIN 129.80   FOLATE 9.59   VITAMIN B 12 1,278*         Brief Urine Lab Results       None            Microbiology Results Abnormal       Procedure Component Value - Date/Time    Blood Culture - Blood, Arm, Left [[id number]]  (Normal) Collected: 09/29/23 1445    Lab Status: Preliminary result Specimen: Blood from Arm, Left Updated: 10/03/23 1515     Blood Culture No growth at 4 days    Blood Culture - Blood, Arm, Right [[id number]]  (Normal) Collected: 09/29/23 1425    Lab Status: Preliminary result Specimen: Blood from Arm, Right Updated: 10/03/23 1445     Blood Culture No growth at 4 days    COVID PRE-OP / PRE-PROCEDURE SCREENING ORDER (NO ISOLATION) - Swab, Nasopharynx [[id number]]  (Normal) Collected: 09/29/23 1259    Lab Status: Final result Specimen: Swab from Nasopharynx Updated: 09/29/23 1337    Narrative:      The following orders were created for panel order COVID PRE-OP / PRE-PROCEDURE SCREENING ORDER (NO ISOLATION) - Swab, Nasopharynx.  Procedure                               Abnormality         Status                     ---------                               -----------         ------                     COVID-19 and FLU A/B PCR...[[id number]]  Normal              Final result                 Please  view results for these tests on the individual orders.    COVID-19 and FLU A/B PCR - Swab, Nasopharynx [[id number]]  (Normal) Collected: 09/29/23 1259    Lab Status: Final result Specimen: Swab from Nasopharynx Updated: 09/29/23 4782     COVID19 Not Detected     Influenza A PCR Not Detected     Influenza B PCR Not Detected    Narrative:      Fact sheet for providers: https://www.fda.gov/media/746941/download    Fact sheet for patients: https://www.fda.gov/media/266146/download    Test performed by PCR.            No radiology results from the last 24 hrs    Results for orders placed during the hospital encounter of 09/29/23    Adult Transthoracic Echo Complete W/ Cont if Necessary Per Protocol    Interpretation Summary    The left ventricle is mildly dilated and globally hypokinetic.  Left ventricular systolic function is moderately decreased. Estimated left ventricular EF = 25%    Aortic valve is calcified with no significant stenosis or regurgitation.    Moderate mitral valve regurgitation is present.    Estimated right ventricular systolic pressure from tricuspid regurgitation is normal (<35 mmHg).    There is a small (<1cm) pericardial effusion adjacent to the left ventricle.    Bilateral pleural effusions present      Current medications:  Scheduled Meds:amiodarone, 100 mg, Oral, Q24H  apixaban, 2.5 mg, Oral, BID  aspirin, 81 mg, Oral, Daily  gabapentin, 300 mg, Oral, TID  metoprolol succinate XL, 50 mg, Oral, Daily  multivitamin with minerals, 1 tablet, Oral, Daily  pantoprazole, 40 mg, Oral, Q AM  pharmacy consult - [place], , Does not apply, Daily  senna-docusate sodium, 2 tablet, Oral, BID      Continuous Infusions:   PRN Meds:.  acetaminophen    senna-docusate sodium **AND** polyethylene glycol **AND** bisacodyl **AND** bisacodyl    Calcium Replacement - Follow Nurse / BPA Driven Protocol    ipratropium-albuterol    Magnesium Cardiology Dose Replacement - Follow Nurse / BPA Driven Protocol    melatonin     nitroglycerin    Phosphorus Replacement - Follow Nurse / BPA Driven Protocol    sodium chloride    Assessment & Plan   Assessment & Plan     Active Hospital Problems    Diagnosis  POA    **Acute HFrEF (heart failure with reduced ejection fraction) [I50.21]  Yes    CHF (congestive heart failure) [I50.9]  Yes    Coronary artery disease involving native coronary artery of native heart with angina pectoris [I25.119]  Yes    Hyperlipidemia LDL goal <70 [E78.5]  Yes    LBBB (left bundle branch block) [I44.7]  Yes    Paroxysmal atrial fibrillation [I48.0]  Yes    Stage 3b chronic kidney disease [N18.32]  Yes    Anemia, chronic disease [D63.8]  Yes    Weight loss [R63.4]  Yes      Resolved Hospital Problems    Diagnosis Date Resolved POA    Chest pain [R07.9] 09/30/2023 Yes    Bilateral pulmonary infiltrates [R91.8] 09/30/2023 Yes    Hypoxia [R09.02] 09/30/2023 Yes        Brief Hospital Course to date:  89-year-old man with history of coronary artery disease, head neck cancer 2021, coronary disease with prior CABG, paroxysmal A-fib on amiodarone who presents with chest pain, shortness of breath, hypoxia and 40 pound weight loss. Required 2L on presentation. CT of the chest without contrast showed new moderate-sized bilateral pleural effusions with likely compressive atelectasis. Troponins elevated but no significant delta. BNP elevated 5400, EKG with left bundle branch block. Echo showed newly reduced EF of 25%.     Acute hypoxia, resolved  Heart failure with reduced ejection fraction, new diagnosis (EF 25%)  Chest pain with history of coronary disease, CABG  - Cards consulted -- appreciate recs   - Required 2L on presentation, now weaned to room air  -Continue aspirin  -Continue metoprolol -- monitor BP  -Holding Entresto spironolactone, ACE inhibitor in the setting of CHRISTIANO and hypotension   - Patient has elected medical therapy for treatment due to age and kidney disease  - holding diuretics per below     CHRISTIANO on CKD  -  Creatinine trended up to 2.07 related to diuresis.  - Prior cardiology notes from [facility] with creatinine of 1.7 in March (Dr. Childress's notes)  - Cr improved. Holding diuretics      40 pound weight loss  Previous abnormal pancreas on outside scan  -Ca 19-9 normal  -CT of the abdomen and pelvis with low-density masses within the pancreas which may represent a cyst, pseudocyst or IPMN the largest lesion within the head of the pancreas is unchanged, new 2.1 cm cyst within the mid body of the pancreas.  - Patient reports this has been followed for this and they have decided not to undergo treatment given his age.   - Outpatient management      Paroxysmal A-fib  -Continue amiodarone, metoprolol and eliquis     Expected Discharge Location and Transportation: home health  Expected Discharge   Expected Discharge Date: 10/2/2023; Expected Discharge Time:      DVT prophylaxis:  Medical DVT prophylaxis orders are present.     AM-PAC 6 Clicks Score (PT): 19 (10/04/23 0800)    CODE STATUS:   Code Status and Medical Interventions:   Ordered at: 09/29/23 1456     Code Status (Patient has no pulse and is not breathing):    CPR (Attempt to Resuscitate)     Medical Interventions (Patient has pulse or is breathing):    Full       Sunita Laguerre,   10/04/23

## 2023-10-04 NOTE — PROGRESS NOTES
Continued Stay Note  [facility]     Patient Name: Mukund Pool  MRN: [id number]  Today's Date: 10/4/2023    Admit Date: 9/29/2023        Discharge Plan       Row Name 10/04/23 1410       Plan    Plan Comments Social work met with Mr. Pool and his nephew and also called and spoke with his spouse and he is agreeable to being referred to short term rehab. He was given a list of skilled rehab facilities and his first choice was [facility] and SW called them and they said they may be able to assist with shirt term rehab and they are sending the application packet to assist with seeing if Mr. Pool could be accepted for short term rehab placement.                   Discharge Codes    No documentation.                 Expected Discharge Date and Time       Expected Discharge Date Expected Discharge Time    Oct 2, 2023               COLLEEN Acosta

## 2023-10-04 NOTE — PAYOR COMM NOTE
"Ref # [id number]  Mukund Meier (89 y.o. Male)       Date of Birth   1930    Social Security Number       Address   [address] [place] [address]    Home Phone   [phone]    MRN   [id number]       Adventism   None    Marital Status                               Admission Date   23    Admission Type   Emergency    Admitting Provider   Sunita Laguerre DO    Attending Provider   Sunita Laguerre DO    Department, Room/Bed   [address], S524/1       Discharge Date       Discharge Disposition       Discharge Destination                                 Attending Provider: Sunita Laguerre DO    Allergies: Statins    Isolation: None   Infection: None   Code Status: CPR    Ht: 182.9 cm (72.01\")   Wt: 81.2 kg (179 lb 1.6 oz)    Admission Cmt: None   Principal Problem: Acute HFrEF (heart failure with reduced ejection fraction) [I50.21]                   Active Insurance as of 2023       Primary Coverage       Payor Plan Insurance Group Employer/Plan Group    AETNA MEDICARE REPLACEMENT AETNA MEDICARE REPLACEMENT [id number]       Payor Plan Address Payor Plan Phone Number Payor Plan Fax Number Effective Dates    [address] [phone]  2021 - None Entered    [place] [address]         Subscriber Name Subscriber Birth Date Member ID       MUKUND MEIER 1930 [id number]                     Emergency Contacts        (Rel.) Home Phone Work Phone Mobile Phone    RHODA MEIER (Spouse) [phone] -- --                 History & Physical        Lacey Brooks MD at 23 [address] [place] [address] Medicine Services  HISTORY AND PHYSICAL    Patient Name: Mukund Meier  : 1930  MRN: [id number]  Primary Care Physician: Slick Hubbard MD    Subjective  Subjective     Chief Complaint:chest pain    HPI:  Mukund Meier is a 89 y.o. male who  has a past medical history of " Arthritis, Chronic kidney disease, CAD with CABG 20 years ago, GERD (gastroesophageal reflux disease), History of radiation therapy (05/24/2021), Prostate cancer, and Vocal cord cancer. who presented to the ED with several days of feeling poorly and one day of chest pain that made it impossible to rest last night. He was found to be hypoxic in the ED. He denies productive cough or fever.   In the ED his troponin was mildly elevated, BNP elevated as well as his creatinine and potassium. He is anemic with an MCV of 016      Review of Systems   Patient denies  headaches, changes in vision, fever, chills, sore throat,  nausea or vomiting, diarrhea, abdominal pain or distension, change in urine output or habits, joint pain, rash, itching, numbness/tingling, weakness or bleeding.  He has lost 40 pounds since spring 2023  Otherwise complete ROS is negative except as mentioned in the HPI.    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       PMH: He  has a past medical history of Arthritis, Chronic kidney disease, GERD (gastroesophageal reflux disease), History of radiation therapy (05/24/2021), Prostate cancer, and Vocal cord cancer.   PSxH: He  has a past surgical history that includes Prostate surgery; Coronary artery bypass graft; Cholecystectomy; VOCAL CORD MASS EXCISION (03/22/2021); and Cardiac electrophysiology procedure (04/2023).         FH: His family  history includes Breast cancer in his mother; Cancer in his mother; Heart attack in his father; Ovarian cancer in his sister.   SH: He  reports that he has never smoked. He has never used smokeless tobacco. He reports that he does not drink alcohol and does not use drugs.   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 His nephew is with him in the ED    Medications:  amiodarone, apixaban, aspirin, ezetimibe, gabapentin, metoprolol succinate XL, multivitamin with minerals, nitroglycerin, and omeprazole    No Known Allergies    Objective  Objective     Vital Signs:   Temp:  [97.6 °F (36.4 °C)] 97.6 °F (36.4 °C)  Heart Rate:  [109-111] 110  Resp:  [18] 18  BP: (128-148)/(90-95) 133/94  Flow (L/min):  [2] 2    Constitutional: Awake, alert, interactive and pleasant, younger than his stated age, bright and cheerful  Eyes: clear sclerae, no conjunctival injection  HENT: NCAT, mucous membranes dry  Neck: no masses or lymphadenopathy, trachea midline  Respiratory: coarse breath sounds bilaterally, respirations slightly labored, better with oxygen  Cardiovascular: RRR, no murmurs appreciated, palpable peripheral pulses  Abdomen:  soft, no HSM or masses palpable, not tender or distended  Musculoskeletal: No peripheral edema, clubbing or cyanosis  Neurologic: Oriented x 3,                       Strength symmetric in all extremities                     Cranial Nerves grossly intact, speech clear  Skin: No rashes or jaundice  Psychiatric: Appropriate mood, insight      Result Review:  I have personally reviewed the results from the time of this admission   to 9/29/2023 16:03 EDT and agree with these findings:  [x]  Laboratory  [x]  Microbiology  [x]  Radiology  [x]  EKG/Telemetry   []  Cardiology/Vascular   []  Pathology  []  Old records  []  Other:  Most notable findings include: chronic anemia and creatinine elevation as well as elevated troponin,  impressive bilateral pulmonary infiltrates  MRCP 6/23  Cystic lesions associated with the pancreas which likely communicates   to the pancreatic duct. 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   4/23  NITIN/CV DX paroxysmal afib i48.0    Normal sized left ventricle. Moderate left ventricular hypertrophy. Normal    left ventricular systolic function. Visually estimated ejection fraction    55% +/- 5%.No left ventricular masses or thrombi.    Direct current cardioversion performed.Successful cardioversion with    restoration of sinus rhythm with 200 joules of biphasic energy X 1 attempt   LAB RESULTS:      Lab 09/29/23  1425 09/29/23  1257   WBC  --  7.96   HEMOGLOBIN  --  11.3*   HEMATOCRIT  --  35.2*   PLATELETS  --  258   NEUTROS ABS  --  5.75   IMMATURE GRANS (ABS)  --  0.04   LYMPHS ABS  --  1.39   MONOS ABS  --  0.68   EOS ABS  --  0.03   MCV  --  106.3*   CRP  --  0.54*   PROCALCITONIN  --  0.05   LACTATE 1.8  --    LDH  --  178   D DIMER QUANT  --  0.60         Lab 09/29/23  1257   SODIUM 138   POTASSIUM 5.1   CHLORIDE 103   CO2 25.0   ANION GAP 10.0   BUN 22   CREATININE 1.64*   EGFR 38.8*   GLUCOSE 133*   CALCIUM 8.9         Lab 09/29/23  1257   TOTAL PROTEIN 7.3   ALBUMIN 3.9   GLOBULIN 3.4   ALT (SGPT) 17   AST (SGOT) 19   BILIRUBIN 1.1   ALK PHOS 103         Lab 09/29/23  1257   PROBNP 5,453.0*   HSTROP T 40*             Lab 09/29/23  1257   FERRITIN 129.80         Brief Urine Lab Results       None          COVID19   Date Value Ref Range Status   09/29/2023 Not Detected Not Detected - Ref. Range Final       XR Chest 1 View    Result Date: 9/29/2023  XR CHEST 1 VW Date of Exam: 9/29/2023 1:01 PM EDT Indication: SOA triage protocol Comparison: PET/CT 8/25/2021  Findings: Prior sternotomy and CABG. Patient rotated side to left. Cardiac loop recorder noted. Central pulmonary vascular congestion with interstitial thickening most compatible with pulmonary edema. Bibasilar airspace  opacities with small bilateral pleural effusions. No pneumothorax.     Impression: Impression: 1. Pulmonary edema. 2. Bibasilar airspace disease may relate to atelectasis and/or pneumonia with small bilateral pleural effusions. Electronically Signed: Soto Matos MD  9/29/2023 1:31 PM EDT  Workstation ID: [id number]         The patient has started, but not completed, their COVID-19 vaccination series.    Assessment & Plan  Assessment / Plan       Chest pain    Bilateral pulmonary infiltrates    Hypoxia    Weight loss    History of head and neck cancer    Paroxysmal atrial fibrillation    Stage 3b chronic kidney disease    Anemia, chronic disease      Assessment & Plan:  Delightful 89-year-old man with history of coronary artery disease, head neck cancer 2021, coronary disease with prior CABG, paroxysmal A-fib on amiodarone who presents with chest pain, shortness of breath, hypoxia and 40 pound weight loss      Chest pain with history of coronary disease, CABG  -Continue aspirin, trend troponin, EKG as needed chest pain, continue Nitropaste    Acute hypoxia  Bilateral pulmonary infiltrates  Differential includes amiodarone toxicity, pneumonia, possible aspiration, heart failure, malignancy  -Check CT scan of the chest  -Check echocardiogram  -COVID is negative  -Diuresis tolerated  -Try neb-  -wean oxygen as tolerated    40 pound weight loss  Previous abnormal pancreas on outside scan  -Check CA 19-9  -CT of the abdomen and pelvis    Paroxysmal A-fib  -Rate controlled, on chronic Eliquis    DVT prophylaxis:  Medical DVT prophylaxis orders are present.    CODE STATUS:  Code Status (Patient has no pulse and is not breathing): CPR (Attempt to Resuscitate)  Medical Interventions (Patient has pulse or is breathing): Full    Expected Discharge  Expected Discharge Date: 10/2/2023; Expected Discharge Time:     Electronically signed by Lacey Brooks MD 09/29/23 16:03 EDT            Electronically signed by Lacey Brooks  MD at 09/29/23 1619          Physician Progress Notes (all)        Radha Billingsley APRN at 10/04/23 0834          Cardiology Progress Note      Reason for visit:    Acute on chronic systolic heart failure  Coronary artery disease  Paroxysmal atrial fibrillaton    IDENTIFICATION: 89-year-old gentleman who resides in [place], [place]    Active Hospital Problems    Diagnosis  POA    **Acute HFrEF (heart failure with reduced ejection fraction) [I50.21]  Yes     Echo (2/23): LVEF EF 60%.  LVH with grade 2 DD.  No significant valvular disease  [facility] admission with FC IV heart failure symptoms, pulmonary edema on chest x-ray, and elevated proBNP, 9/30/2023  Echo (9/30/2023): LVEF 25%.  Moderate MR.  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  LVH with grade 2 DD.  No significant valvular disease  Pharmacologic nuclear stress (2/10/2023): Inferior infarct.  No ischemia.  LVEF 55%      Paroxysmal atrial fibrillation [I48.0]  Yes     Priority: High     [id number] 4 (age >75, CAD, HTN)  4% A-fib burden on recent loop recorder interrogation      Stage 3b chronic kidney disease [N18.32]  Yes     Priority: High    Anemia, chronic disease [D63.8]  Yes     Priority: Medium    Hyperlipidemia LDL goal <70 [E78.5]  Yes     Priority: Low    Weight loss [R63.4]  Yes     Priority: Low    CHF (congestive heart failure) [I50.9]  Yes    LBBB (left bundle branch block) [I44.7]  Yes       Subjective     No events noted overnight.  He is sitting up in the bed sleeping but arouses easily.  His creatinine has returned to his baseline at 1.6.  His blood pressures are stable but low.  He is asymptomatic with them and tells me he has been able to get up without issues.  He feels like he is back to his baseline.  He would like to be discharged home.        Objective   Vital Sign Min/Max for  last 24 hours  Temp  Min: 97.4 °F (36.3 °C)  Max: 98.6 °F (37 °C)   BP  Min: 100/62  Max: 113/82   Pulse  Min: 103  Max: 107   Resp  Min: 18  Max: 22   SpO2  Min: 89 %  Max: 97 %   Flow (L/min)  Min: 2  Max: 2      Intake/Output Summary (Last 24 hours) at 10/4/2023 0834  Last data filed at 10/3/2023 1350  Gross per 24 hour   Intake 200 ml   Output --   Net 200 ml           Physical Exam  Constitutional:       General: He is awake.   Neck:      Vascular: No carotid bruit or JVD.   Cardiovascular:      Rate and Rhythm: Regular rhythm. Tachycardia present.      Comments: Left bundle branch block  Pulmonary:      Effort: Pulmonary effort is normal.      Breath sounds: Normal breath sounds.   Musculoskeletal:      Right lower leg: No edema.      Left lower leg: No edema.   Skin:     General: Skin is warm and dry.   Neurological:      Mental Status: He is alert.   Psychiatric:         Behavior: Behavior is cooperative.       Tele: Normal sinus rhythm     Results Review (reviewed the patient's recent labs in the electronic medical record):      EKG (10/2/2023): Sinus tachycardia at 1 106 bpm with left bundle branch block     CT of the chest (9/29/2023): New moderate size bilateral pleural effusions.     ECHO (9/30/2023): LVEF 25%.  Moderate MR.  Small less than 1 cm pericardial effusion.  Bilateral pleural effusions    Results from last 7 days   Lab Units 10/04/23  0540 10/03/23  0400 10/02/23  0448 10/01/23  0401 09/30/23  1831 09/30/23  0535 09/29/23  1257   SODIUM mmol/L 137 137 137 138  --  138 138   POTASSIUM mmol/L 5.2 4.6 4.5 4.5  --  4.6 5.1   CHLORIDE mmol/L 103 103 101 104  --  105 103   BUN mg/dL 41* 40* 39* 32*  --  27* 22   CREATININE mg/dL 1.60* 1.93* 2.07* 1.75*  --  1.51* 1.64*   MAGNESIUM mg/dL 2.2  --  2.1 2.0   < >  --   --     < > = values in this interval not displayed.     Results from last 7 days   Lab Units 09/30/23  0724 09/30/23  0535 09/29/23  1257   HSTROP T ng/L 45* 45* 40*     Results from  "last 7 days   Lab Units 10/04/23  0540 09/30/23  0535 09/29/23  1257   WBC 10*3/mm3 7.33 4.71 7.96   HEMOGLOBIN g/dL 11.1* 9.8* 11.3*   HEMATOCRIT % 35.4* 30.1* 35.2*   PLATELETS 10*3/mm3 227 215 258       Lab Results   Component Value Date    HGBA1C 6.0 04/07/2015       Lab Results   Component Value Date    CHOL 132 09/30/2023    TRIG 51 09/30/2023    HDL 38 (L) 09/30/2023    LDL 83 09/30/2023         Assessment       Acute HFrEF   Functional class IV symptoms with elevated proBNP, pulmonary edema on admission  Symptomatic improvement with IV furosemide  No ARB or MRA due to CKD  IV diuretics on hold due to elevated creatinine but now back to baseline at 1.6  Metoprolol succinate increased to 50 mg daily 10/3 due to elevated heart rate        Coronary artery disease  Flat troponin elevation not consistent with ACS  Ischemic evaluation in February consistent with inferior infarct  Invasive ischemic evaluation be considered once euvolemic.  Patient is high risk for coronary angiography and PCI given age, CKD, reduced LVEF, anemia, and ancient bypass grafts and unlikeliness of PCI \"targets\".  High risk of JARAD  Patient would like to defer ischemic evaluation for now, particularly since not having chest pain  Aspirin 81 mg daily     Paroxysmal atrial fibrillation  Maintaining sinus on amiodarone 100 mg daily  Continue apixaban 2.5 mg twice daily for stroke prophylaxis  Has Loop recorder implant     Hyperlipidemia with goal LDL <70  Statin intolerant  Resume Zetia at discharge     Left bundle branch block  Chronic     Chronic kidney disease stage IIIb  Diuretics discontinued due to bump in creatinine of 2.07  Current creatinine now back to baseline at 1.6        Hypotension  Became hypotensive on evening of 10/1/2023  Blood pressure improved after holding diuretics  Current /69        Plan     No ACE/ARB/Entresto/spironolactone due to CKD and hypotension  Defer ischemic evaluation and treat medically.   Continue " amiodarone and Eliquis for paroxysmal atrial fibrillation.  Continue metoprolol.  Higher dose given this morning.  Monitor blood pressure to see if will tolerate.  Hold diuretics due to hypotension    Electronically signed by CYNDEE Castaneda, 10/04/23, 8:34 AM EDT.    Electronically signed by Radha Billingsley APRN at 10/04/23 1124       Sunita Laguerre DO at 10/03/23 1245              [facility] Medicine Services  PROGRESS NOTE    Patient Name: Mukund Pool  : 1930  MRN: [id number]    Date of Admission: 2023  Primary Care Physician: Slick Hubbard MD    Subjective   Subjective     CC:  SOA    HPI:  No acute events. States he feels ok. Denies SOA. Wants to go home and declines rehab.       Objective   Objective     Vital Signs:   Temp:  [97.2 °F (36.2 °C)-98 °F (36.7 °C)] 97.5 °F (36.4 °C)  Heart Rate:  [101-107] 107  Resp:  [16-22] 20  BP: ()/(62-83) 100/62     Physical Exam:  Constitutional: No acute distress, awake, alert, elderly/frail  HENT: NCAT, mucous membranes moist  Respiratory: Clear to auscultation bilaterally, respiratory effort normal   Cardiovascular: tachy, no murmurs, rubs, or gallops  Gastrointestinal: Positive bowel sounds, soft, nontender, nondistended  Musculoskeletal: No bilateral ankle edema  Psychiatric: Appropriate affect, cooperative  Neurologic: Oriented x 3, strength symmetric in all extremities, Cranial Nerves grossly intact to confrontation, speech clear  Skin: No rashes      Results Reviewed:  LAB RESULTS:      Lab 23  0535 23  1425 23  1257   WBC 4.71  --  7.96   HEMOGLOBIN 9.8*  --  11.3*   HEMATOCRIT 30.1*  --  35.2*   PLATELETS 215  --  258   NEUTROS ABS 2.53  --  5.75   IMMATURE GRANS (ABS) 0.01  --  0.04   LYMPHS ABS 1.52  --  1.39   MONOS ABS 0.55  --  0.68   EOS ABS 0.06  --  0.03   .2*  --  106.3*   CRP  --   --  0.54*   PROCALCITONIN  --   --  0.05   LACTATE  --  1.8  --    LDH  --   --   178   D DIMER QUANT  --   --  0.60         Lab 10/03/23  0400 10/02/23  0448 10/01/23  0401 09/30/23  1831 09/30/23  0535 09/29/23  1257   SODIUM 137 137 138  --  138 138   POTASSIUM 4.6 4.5 4.5  --  4.6 5.1   CHLORIDE 103 101 104  --  105 103   CO2 27.0 26.0 26.0  --  26.0 25.0   ANION GAP 7.0 10.0 8.0  --  7.0 10.0   BUN 40* 39* 32*  --  27* 22   CREATININE 1.93* 2.07* 1.75*  --  1.51* 1.64*   EGFR 31.9* 29.3* 35.9*  --  42.8* 38.8*   GLUCOSE 100* 104* 98  --  96 133*   CALCIUM 8.3 8.2 8.2  --  8.5 8.9   MAGNESIUM  --  2.1 2.0 2.1  --   --    TSH  --   --   --   --   --  3.140         Lab 09/29/23  1257   TOTAL PROTEIN 7.3   ALBUMIN 3.9   GLOBULIN 3.4   ALT (SGPT) 17   AST (SGOT) 19   BILIRUBIN 1.1   ALK PHOS 103         Lab 09/30/23  0724 09/30/23  0535 09/29/23  1257   PROBNP  --   --  5,453.0*   HSTROP T 45* 45* 40*         Lab 09/30/23  0535   CHOLESTEROL 132   LDL CHOL 83   HDL CHOL 38*   TRIGLYCERIDES 51         Lab 09/29/23  1257   FERRITIN 129.80   FOLATE 9.59   VITAMIN B 12 1,278*         Brief Urine Lab Results       None            Microbiology Results Abnormal       Procedure Component Value - Date/Time    Blood Culture - Blood, Arm, Left [[id number]]  (Normal) Collected: 09/29/23 1445    Lab Status: Preliminary result Specimen: Blood from Arm, Left Updated: 10/02/23 1515     Blood Culture No growth at 3 days    Blood Culture - Blood, Arm, Right [[id number]]  (Normal) Collected: 09/29/23 1425    Lab Status: Preliminary result Specimen: Blood from Arm, Right Updated: 10/02/23 1445     Blood Culture No growth at 3 days    COVID PRE-OP / PRE-PROCEDURE SCREENING ORDER (NO ISOLATION) - Swab, Nasopharynx [[id number]]  (Normal) Collected: 09/29/23 1259    Lab Status: Final result Specimen: Swab from Nasopharynx Updated: 09/29/23 1333    Narrative:      The following orders were created for panel order COVID PRE-OP / PRE-PROCEDURE SCREENING ORDER (NO ISOLATION) - Swab, Nasopharynx.  Procedure                                Abnormality         Status                     ---------                               -----------         ------                     COVID-19 and FLU A/B PCR...[[id number]]  Normal              Final result                 Please view results for these tests on the individual orders.    COVID-19 and FLU A/B PCR - Swab, Nasopharynx [[id number]]  (Normal) Collected: 09/29/23 1259    Lab Status: Final result Specimen: Swab from Nasopharynx Updated: 09/29/23 1337     COVID19 Not Detected     Influenza A PCR Not Detected     Influenza B PCR Not Detected    Narrative:      Fact sheet for providers: https://www.fda.gov/media/528455/download    Fact sheet for patients: https://www.fda.gov/media/352168/download    Test performed by PCR.            No radiology results from the last 24 hrs    Results for orders placed during the hospital encounter of 09/29/23    Adult Transthoracic Echo Complete W/ Cont if Necessary Per Protocol    Interpretation Summary    The left ventricle is mildly dilated and globally hypokinetic.  Left ventricular systolic function is moderately decreased. Estimated left ventricular EF = 25%    Aortic valve is calcified with no significant stenosis or regurgitation.    Moderate mitral valve regurgitation is present.    Estimated right ventricular systolic pressure from tricuspid regurgitation is normal (<35 mmHg).    There is a small (<1cm) pericardial effusion adjacent to the left ventricle.    Bilateral pleural effusions present      Current medications:  Scheduled Meds:amiodarone, 100 mg, Oral, Q24H  apixaban, 2.5 mg, Oral, BID  aspirin, 81 mg, Oral, Daily  gabapentin, 300 mg, Oral, TID  [START ON 10/4/2023] metoprolol succinate XL, 50 mg, Oral, Daily  multivitamin with minerals, 1 tablet, Oral, Daily  pantoprazole, 40 mg, Oral, Q AM  pharmacy consult - MTM, , Does not apply, Daily  senna-docusate sodium, 2 tablet, Oral, BID      Continuous Infusions:   PRN Meds:.  acetaminophen     senna-docusate sodium **AND** polyethylene glycol **AND** bisacodyl **AND** bisacodyl    Calcium Replacement - Follow Nurse / BPA Driven Protocol    ipratropium-albuterol    Magnesium Cardiology Dose Replacement - Follow Nurse / BPA Driven Protocol    melatonin    nitroglycerin    Phosphorus Replacement - Follow Nurse / BPA Driven Protocol    sodium chloride    Assessment & Plan   Assessment & Plan     Active Hospital Problems    Diagnosis  POA    **Acute HFrEF (heart failure with reduced ejection fraction) [I50.21]  Yes    Coronary artery disease involving native coronary artery of native heart with angina pectoris [I25.119]  Yes    Hyperlipidemia LDL goal <70 [E78.5]  Yes    LBBB (left bundle branch block) [I44.7]  Yes    Paroxysmal atrial fibrillation [I48.0]  Yes    Stage 3b chronic kidney disease [N18.32]  Yes    Anemia, chronic disease [D63.8]  Yes    Weight loss [R63.4]  Yes      Resolved Hospital Problems    Diagnosis Date Resolved POA    Chest pain [R07.9] 09/30/2023 Yes    Bilateral pulmonary infiltrates [R91.8] 09/30/2023 Yes    Hypoxia [R09.02] 09/30/2023 Yes        Brief Hospital Course to date:  89-year-old man with history of coronary artery disease, head neck cancer 2021, coronary disease with prior CABG, paroxysmal A-fib on amiodarone who presents with chest pain, shortness of breath, hypoxia and 40 pound weight loss. Required 2L on presentation. CT of the chest without contrast showed new moderate-sized bilateral pleural effusions with likely compressive atelectasis. Troponins elevated but no significant delta. BNP elevated 5400, EKG with left bundle branch block. Echo showed newly reduced EF of 25%.     Acute hypoxia, resolved  Heart failure with reduced ejection fraction, new diagnosis (EF 25%)  Chest pain with history of coronary disease, CABG  - Cards consulted -- appreciate recs   - Required 2L on presentation, now weaned to room air  -Continue aspirin  -Continue metoprolol  -Holding Entresto  spironolactone, ACE inhibitor in the setting of CHRISTIANO  -Patient has elected medical therapy for treatment due to age and kidney disease  - holding diuretics per below     CHRISTIANO on CKD  - Creatinine trended up to 2.07 related to diuresis.  - Prior cardiology notes from [place] with creatinine of 1.7 in March (Dr. Childress's notes)  - improved today. Continue to hold diuretics and possibly resume tomorrow.      40 pound weight loss  Previous abnormal pancreas on outside scan  -Ca 19-9 normal  -CT of the abdomen and pelvis with low-density masses within the pancreas which may represent a cyst, pseudocyst or IPMN the largest lesion within the head of the pancreas is unchanged, new 2.1 cm cyst within the mid body of the pancreas.  - Patient reports this has been followed for this and they have decided not to undergo treatment given his age.   - Outpatient management      Paroxysmal A-fib  -Continue amiodarone, metoprolol and eliquis      Expected Discharge Location and Transportation: home with home health. Declining rehab  Expected Discharge   Expected Discharge Date: 10/2/2023; Expected Discharge Time:      DVT prophylaxis:  Medical DVT prophylaxis orders are present.     AM-PAC 6 Clicks Score (PT): 18 (10/03/23 0802)    CODE STATUS:   Code Status and Medical Interventions:   Ordered at: 09/29/23 3498     Code Status (Patient has no pulse and is not breathing):    CPR (Attempt to Resuscitate)     Medical Interventions (Patient has pulse or is breathing):    Full       Sunita Laguerre DO  10/03/23        Electronically signed by Sunita Laguerre DO at 10/03/23 1249       Radha Billingsley APRN at 10/03/23 0751          Cardiology Progress Note      Reason for visit:    Acute on chronic systolic heart failure  Coronary artery disease  Paroxysmal atrial fibrillation    IDENTIFICATION: 89 gentleman who resides in [place], [place]    Active Hospital Problems    Diagnosis  POA    **Acute HFrEF (heart failure with reduced ejection  fraction) [I50.21]  Yes     Echo (2/23): LVEF EF 60%.  LVH with grade 2 DD.  No significant valvular disease  [facility] admission with FC IV heart failure symptoms, pulmonary edema on chest x-ray, and elevated proBNP, 9/30/2023  Echo (9/30/2023): LVEF 25%.  Moderate MR.  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  LVH with grade 2 DD.  No significant valvular disease  Pharmacologic nuclear stress (2/10/2023): Inferior infarct.  No ischemia.  LVEF 55%      Paroxysmal atrial fibrillation [I48.0]  Yes     Priority: High     [id number] 4 (age >75, CAD, HTN)  4% A-fib burden on recent loop recorder interrogation      Stage 3b chronic kidney disease [N18.32]  Yes     Priority: High    Anemia, chronic disease [D63.8]  Yes     Priority: Medium    Hyperlipidemia LDL goal <70 [E78.5]  Yes     Priority: Low    Weight loss [R63.4]  Yes     Priority: Low    LBBB (left bundle branch block) [I44.7]  Yes       Subjective     Patient is sitting up in the bed reports having difficulty sleeping last night.  He was able to get some sleep after receiving melatonin.  He remains mildly tachycardic on telemetry.  Renal function declining but still elevated with a creatinine from previously 2.07 yesterday to now 1.93.  He denies any chest pain and is breathing easy on room air.        Objective   Vital Sign Min/Max for last 24 hours  Temp  Min: 97.3 °F (36.3 °C)  Max: 98 °F (36.7 °C)   BP  Min: 92/62  Max: 113/83   Pulse  Min: 101  Max: 109   Resp  Min: 16  Max: 22   SpO2  Min: 92 %  Max: 98 %   No data recorded      Intake/Output Summary (Last 24 hours) at 10/3/2023 0751  Last data filed at 10/3/2023 0500  Gross per 24 hour   Intake 600 ml   Output 700 ml   Net -100 ml           Physical Exam  Constitutional:       General: He is awake.   Cardiovascular:      Rate and Rhythm: Normal rate and  regular rhythm.      Comments: Left bundle branch block  Pulmonary:      Effort: Pulmonary effort is normal.      Breath sounds: Normal breath sounds.   Musculoskeletal:         General: No swelling.   Skin:     General: Skin is warm and dry.   Neurological:      Mental Status: He is alert.   Psychiatric:         Behavior: Behavior is cooperative.       Tele: Normal sinus rhythm     Results Review (reviewed the patient's recent labs in the electronic medical record):      EKG (9/29/2023): Sinus tachycardia at 109 bpm     CT of the chest (9/29/2023): New moderate size bilateral pleural effusions.     ECHO (9/30/2023): LVEF 25%.  Moderate MR.  Small less than 1 cm pericardial effusion.  Bilateral pleural effusions    Results from last 7 days   Lab Units 10/03/23  0400 10/02/23  0448 10/01/23  0401 09/30/23  1831 09/30/23  0535 09/29/23  1257   SODIUM mmol/L 137 137 138  --  138 138   POTASSIUM mmol/L 4.6 4.5 4.5  --  4.6 5.1   CHLORIDE mmol/L 103 101 104  --  105 103   BUN mg/dL 40* 39* 32*  --  27* 22   CREATININE mg/dL 1.93* 2.07* 1.75*  --  1.51* 1.64*   MAGNESIUM mg/dL  --  2.1 2.0 2.1  --   --      Results from last 7 days   Lab Units 09/30/23  0724 09/30/23  0535 09/29/23  1257   HSTROP T ng/L 45* 45* 40*     Results from last 7 days   Lab Units 09/30/23  0535 09/29/23  1257   WBC 10*3/mm3 4.71 7.96   HEMOGLOBIN g/dL 9.8* 11.3*   HEMATOCRIT % 30.1* 35.2*   PLATELETS 10*3/mm3 215 258       Lab Results   Component Value Date    HGBA1C 6.0 04/07/2015       Lab Results   Component Value Date    CHOL 132 09/30/2023    TRIG 51 09/30/2023    HDL 38 (L) 09/30/2023    LDL 83 09/30/2023         Assessment     Acute HFrEF with LVEF 25%  Functional class IV symptoms with elevated proBNP, pulmonary edema on admission  Symptomatic improvement with IV furosemide  Continue metoprolol succinate 25 mg daily  No ARB or MRA due to CKD  IV diuretics on hold due to elevated creatinine        Coronary artery disease  Flat troponin  "elevation not consistent with ACS  Ischemic evaluation in February consistent with inferior infarct  Invasive ischemic evaluation be considered once euvolemic.  Patient is high risk for coronary angiography and PCI given age, CKD, reduced LVEF, anemia, and ancient bypass grafts and unlikeliness of PCI \"targets\".  High risk of JARAD  Patient would like to defer ischemic evaluation for now, particularly since not having chest pain  Aspirin 81 mg daily     Paroxysmal atrial fibrillation  Maintaining sinus on amiodarone 100 mg daily  Continue apixaban 2.5 mg twice daily for stroke prophylaxis  Has Loop recorder implant     Hyperlipidemia with goal LDL <70  Statin intolerant  Resume Zetia at discharge     Left bundle branch block  Chronic     Chronic kidney disease stage IIIb  Diuretics discontinued due to bump in creatinine of 2.07  Current creatinine on 10/3 is 1.93        Hypotension  Became hypotensive on evening of 10/1/2023  Blood pressure improved after holding diuretics  Current /69           Plan     Continue to hold diuretics.  If creatinine continues to improve can hopefully start low-dose p.o. Lasix tomorrow  Increase metoprolol succinate to 50 mg daily  Continue Eliquis for stroke prophylaxis  Continue amiodarone for rhythm management    Electronically signed by CYNDEE Castaneda, 10/03/23, 7:51 AM EDT.    Electronically signed by Radha Billingsley APRN at 10/03/23 0938       Radha Billingsley APRN at 10/02/23 0738       Attestation signed by Wallace Espinoza IV, MD at 10/02/23 4896    I have reviewed this documentation and agree.    Agree with above.  Hold IV diuretics due to creatinine bump and recheck creatinine in a.m.    No ischemic evaluation planned at present    H. C. Kaden Espinoza MD [facility], [facility]  Interventional and General Cardiology                    Cardiology Progress Note      Reason for visit:    Acute on chronic systolic heart failure  Coronary artery disease  Paroxysmal " atrial fibrillation    IDENTIFICATION: 89 gentleman who resides in [place], [place]    Active Hospital Problems    Diagnosis  POA    **Acute HFrEF (heart failure with reduced ejection fraction) [I50.21]  Yes     Echo (2/23): LVEF EF 60%.  LVH with grade 2 DD.  No significant valvular disease  [facility] admission with FC IV heart failure symptoms, pulmonary edema on chest x-ray, and elevated proBNP, 9/30/2023  Echo (9/30/2023): LVEF 25%.  Moderate MR.  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  LVH with grade 2 DD.  No significant valvular disease  Pharmacologic nuclear stress (2/10/2023): Inferior infarct.  No ischemia.  LVEF 55%      Paroxysmal atrial fibrillation [I48.0]  Yes     Priority: High     [id number] 4 (age >75, CAD, HTN)  4% A-fib burden on recent loop recorder interrogation      Stage 3b chronic kidney disease [N18.32]  Yes     Priority: High    Anemia, chronic disease [D63.8]  Yes     Priority: Medium    Hyperlipidemia LDL goal <70 [E78.5]  Yes     Priority: Low    Weight loss [R63.4]  Yes     Priority: Low    LBBB (left bundle branch block) [I44.7]  Yes       Subjective     Patient is sitting up in the bed without complaints.  He is breathing easy on O2 at 1 L via nasal cannula.  Yesterday afternoon he became hypotensive.  This morning his creatinine elevated over 2 and his diuretics were discontinued by hospital medicine.  He is currently mildly tachycardic at 10 1-1 07 on telemetry.  Left bundle branch block is noted.  He has no real complaints today.  He denies any chest pain or shortness of breath.        Objective   Vital Sign Min/Max for last 24 hours  Temp  Min: 97.3 °F (36.3 °C)  Max: 97.7 °F (36.5 °C)   BP  Min: 80/59  Max: 109/75   Pulse  Min: 94  Max: 111   Resp  Min: 16  Max: 22   SpO2  Min: 90 %  Max: 100 %   Flow (L/min)  Min: 2  Max: 2       Intake/Output Summary (Last 24 hours) at 10/2/2023 0738  Last data filed at 10/2/2023 0500  Gross per 24 hour   Intake 800 ml   Output 1200 ml   Net -400 ml           Physical Exam  Constitutional:       General: He is awake.   Cardiovascular:      Rate and Rhythm: Rhythm regularly irregular.      Heart sounds: Murmur heard.   Pulmonary:      Effort: Pulmonary effort is normal.      Breath sounds: Normal breath sounds.      Comments: Left bundle branch block noted  Skin:     General: Skin is warm and dry.   Neurological:      Mental Status: He is alert.   Psychiatric:         Behavior: Behavior is cooperative.       Tele: Normal sinus rhythm     Results Review (reviewed the patient's recent labs in the electronic medical record):      EKG (9/29/2023): Sinus tachycardia at 109 bpm     CT of the chest (9/29/2023): New moderate size bilateral pleural effusions.     ECHO (9/30/2023): LVEF 25%.  Moderate MR.  Small less than 1 cm pericardial effusion.  Bilateral pleural effusions    Results from last 7 days   Lab Units 10/02/23  0448 10/01/23  0401 09/30/23  1831 09/30/23  0535 09/29/23  1257   SODIUM mmol/L 137 138  --  138 138   POTASSIUM mmol/L 4.5 4.5  --  4.6 5.1   CHLORIDE mmol/L 101 104  --  105 103   BUN mg/dL 39* 32*  --  27* 22   CREATININE mg/dL 2.07* 1.75*  --  1.51* 1.64*   MAGNESIUM mg/dL 2.1 2.0 2.1  --   --      Results from last 7 days   Lab Units 09/30/23  0724 09/30/23  0535 09/29/23  1257   HSTROP T ng/L 45* 45* 40*     Results from last 7 days   Lab Units 09/30/23  0535 09/29/23  1257   WBC 10*3/mm3 4.71 7.96   HEMOGLOBIN g/dL 9.8* 11.3*   HEMATOCRIT % 30.1* 35.2*   PLATELETS 10*3/mm3 215 258       Lab Results   Component Value Date    HGBA1C 6.0 04/07/2015       Lab Results   Component Value Date    CHOL 132 09/30/2023    TRIG 51 09/30/2023    HDL 38 (L) 09/30/2023    LDL 83 09/30/2023         Assessment     Acute HFrEF with LVEF 25%  Functional class IV symptoms with elevated proBNP, pulmonary  "edema on admission  Symptomatic improvement with IV furosemide  Continue metoprolol succinate 25 mg daily  Consider SGL 2 inhibitor if GFR >30  No ARB or MRA due to CKD        Coronary artery disease  Flat troponin elevation not consistent with ACS  Ischemic evaluation in February consistent with inferior infarct  Invasive ischemic evaluation be considered once euvolemic.  Patient is high risk for coronary angiography and PCI given age, CKD, reduced LVEF, anemia, and ancient bypass grafts and unlikeliness of PCI \"targets\".  High risk of JARAD  Patient would like to defer ischemic evaluation for now, particularly since not having chest pain     Paroxysmal atrial fibrillation  Maintaining sinus on amiodarone 100 mg daily  Continue apixaban 2.5 mg twice daily for stroke prophylaxis     Hyperlipidemia with goal LDL <70  Statin intolerant  Resume Zetia at discharge     Left bundle branch block  Chronic     Chronic kidney disease stage IIIb  Creatinine increased to  2.07 on 10/2/2023  Diuretics were discontinued       Hypotension  Became hypotensive on evening of 10/1/2023  Blood pressure improved after holding diuretics  Current /71    Plan     Defer ischemic evaluation as patient's symptoms have improved with treatment of his heart failure.  We will plan on medical management   No ACE/ARB/Entresto/spironolactone due to chronic kidney disease  Agree with discontinuing diuretics due to elevated creatinine 2.07  Continue Eliquis for stroke prophylaxis and amiodarone for rhythm management.    Patient to work with physical therapy  Recheck creatinine in a.m.    Electronically signed by CYNDEE Castaneda, 10/02/23, 7:38 AM EDT.    Electronically signed by Wallace Espinoza IV, MD at 10/02/23 7866       Lacey Rivera MD at 10/02/23 4512              [facility] Medicine Services  PROGRESS NOTE    Patient Name: Mukund Pool  : [id number]  MRN: [id number]    Date of Admission: " 9/29/2023  Primary Care Physician: Slick Hubbard MD    Subjective   Subjective     CC:  Heart failure    HPI:  Patient has been borderline hypotensive.  Denies any shortness of breath.  Denies any worsening swelling.  Hoping to go home soon.      Objective   Objective     Vital Signs:   Temp:  [97.3 °F (36.3 °C)-97.7 °F (36.5 °C)] 97.4 °F (36.3 °C)  Heart Rate:  [] 107  Resp:  [16-22] 18  BP: ()/(56-77) 102/71  Flow (L/min):  [2] 2     Physical Exam:  Constitutional: No acute distress, awake, alert  Respiratory: Clear to auscultation bilaterally, respiratory effort normal on room air  Cardiovascular: RRR  Gastrointestinal: Positive bowel sounds, soft, nontender, nondistended  Musculoskeletal: No bilateral ankle edema  Psychiatric: Appropriate affect, cooperative  Neurologic: Oriented x 3, no focal deficits        Results Reviewed:  LAB RESULTS:      Lab 09/30/23  0535 09/29/23  1425 09/29/23  1257   WBC 4.71  --  7.96   HEMOGLOBIN 9.8*  --  11.3*   HEMATOCRIT 30.1*  --  35.2*   PLATELETS 215  --  258   NEUTROS ABS 2.53  --  5.75   IMMATURE GRANS (ABS) 0.01  --  0.04   LYMPHS ABS 1.52  --  1.39   MONOS ABS 0.55  --  0.68   EOS ABS 0.06  --  0.03   .2*  --  106.3*   CRP  --   --  0.54*   PROCALCITONIN  --   --  0.05   LACTATE  --  1.8  --    LDH  --   --  178   D DIMER QUANT  --   --  0.60         Lab 10/02/23  0448 10/01/23  0401 09/30/23  1831 09/30/23  0535 09/29/23  1257   SODIUM 137 138  --  138 138   POTASSIUM 4.5 4.5  --  4.6 5.1   CHLORIDE 101 104  --  105 103   CO2 26.0 26.0  --  26.0 25.0   ANION GAP 10.0 8.0  --  7.0 10.0   BUN 39* 32*  --  27* 22   CREATININE 2.07* 1.75*  --  1.51* 1.64*   EGFR 29.3* 35.9*  --  42.8* 38.8*   GLUCOSE 104* 98  --  96 133*   CALCIUM 8.2 8.2  --  8.5 8.9   MAGNESIUM 2.1 2.0 2.1  --   --    TSH  --   --   --   --  3.140         Lab 09/29/23  1257   TOTAL PROTEIN 7.3   ALBUMIN 3.9   GLOBULIN 3.4   ALT (SGPT) 17   AST (SGOT) 19   BILIRUBIN 1.1   ALK  PHOS 103         Lab 09/30/23  0724 09/30/23  0535 09/29/23  1257   PROBNP  --   --  5,453.0*   HSTROP T 45* 45* 40*         Lab 09/30/23  0535   CHOLESTEROL 132   LDL CHOL 83   HDL CHOL 38*   TRIGLYCERIDES 51         Lab 09/29/23  1257   FERRITIN 129.80   FOLATE 9.59   VITAMIN B 12 1,278*         Brief Urine Lab Results       None            Microbiology Results Abnormal       Procedure Component Value - Date/Time    Blood Culture - Blood, Arm, Left [[id number]]  (Normal) Collected: 09/29/23 1445    Lab Status: Preliminary result Specimen: Blood from Arm, Left Updated: 10/01/23 1515     Blood Culture No growth at 2 days    Blood Culture - Blood, Arm, Right [[id number]]  (Normal) Collected: 09/29/23 1425    Lab Status: Preliminary result Specimen: Blood from Arm, Right Updated: 10/01/23 1445     Blood Culture No growth at 2 days    COVID PRE-OP / PRE-PROCEDURE SCREENING ORDER (NO ISOLATION) - Swab, Nasopharynx [[id number]]  (Normal) Collected: 09/29/23 1259    Lab Status: Final result Specimen: Swab from Nasopharynx Updated: 09/29/23 1337    Narrative:      The following orders were created for panel order COVID PRE-OP / PRE-PROCEDURE SCREENING ORDER (NO ISOLATION) - Swab, Nasopharynx.  Procedure                               Abnormality         Status                     ---------                               -----------         ------                     COVID-19 and FLU A/B PCR...[[id number]]  Normal              Final result                 Please view results for these tests on the individual orders.    COVID-19 and FLU A/B PCR - Swab, Nasopharynx [[id number]]  (Normal) Collected: 09/29/23 1259    Lab Status: Final result Specimen: Swab from Nasopharynx Updated: 09/29/23 1337     COVID19 Not Detected     Influenza A PCR Not Detected     Influenza B PCR Not Detected    Narrative:      Fact sheet for providers: https://www.fda.gov/media/076724/download    Fact sheet for patients:  https://www.fda.gov/media/361831/download    Test performed by PCR.            Adult Transthoracic Echo Complete W/ Cont if Necessary Per Protocol    Result Date: 9/30/2023    The left ventricle is mildly dilated and globally hypokinetic.  Left ventricular systolic function is moderately decreased. Estimated left ventricular EF = 25%   Aortic valve is calcified with no significant stenosis or regurgitation.   Moderate mitral valve regurgitation is present.   Estimated right ventricular systolic pressure from tricuspid regurgitation is normal (<35 mmHg).   There is a small (<1cm) pericardial effusion adjacent to the left ventricle.   Bilateral pleural effusions present     FL Video Swallow With Speech Single Contrast    Result Date: 9/30/2023  FL VIDEO SWALLOW W SPEECH SINGLE-CONTRAST Date of Exam: 9/30/2023 10:56 AM EDT Indication: r/o dysphagia. Comparison: None available. TECHNIQUE: 54 seconds of fluoroscopic time was used for this exam. 6 associated fluoroscopic loops were saved. The patient was evaluated in the seated lateral position while taking a variety of consistencies of barium by mouth under the direction of speech pathology.  COMPARISON: NONE FINDINGS: 54 seconds of fluoroscopy provided for a modified barium swallow. Please see speech therapy report for full details and recommendations.     Impression: Fluoroscopy provided for a modified barium swallow. Please see speech therapy report for full details and recommendations. Electronically Signed: John Guido MD  9/30/2023 11:58 AM EDT  Workstation ID: [id number]     Results for orders placed during the hospital encounter of 09/29/23    Adult Transthoracic Echo Complete W/ Cont if Necessary Per Protocol    Interpretation Summary    The left ventricle is mildly dilated and globally hypokinetic.  Left ventricular systolic function is moderately decreased. Estimated left ventricular EF = 25%    Aortic valve is calcified with no significant stenosis or  regurgitation.    Moderate mitral valve regurgitation is present.    Estimated right ventricular systolic pressure from tricuspid regurgitation is normal (<35 mmHg).    There is a small (<1cm) pericardial effusion adjacent to the left ventricle.    Bilateral pleural effusions present      Current medications:  Scheduled Meds:amiodarone, 100 mg, Oral, Q24H  apixaban, 2.5 mg, Oral, BID  aspirin, 81 mg, Oral, Daily  furosemide, 40 mg, Intravenous, Q12H  gabapentin, 300 mg, Oral, TID  ipratropium-albuterol, 3 mL, Nebulization, 4x Daily - RT  metoprolol succinate XL, 25 mg, Oral, Daily  multivitamin with minerals, 1 tablet, Oral, Daily  pantoprazole, 40 mg, Oral, Q AM  senna-docusate sodium, 2 tablet, Oral, BID      Continuous Infusions:   PRN Meds:.  acetaminophen    senna-docusate sodium **AND** polyethylene glycol **AND** bisacodyl **AND** bisacodyl    Calcium Replacement - Follow Nurse / BPA Driven Protocol    Magnesium Cardiology Dose Replacement - Follow Nurse / BPA Driven Protocol    melatonin    nitroglycerin    Phosphorus Replacement - Follow Nurse / BPA Driven Protocol    Potassium Replacement - Follow Nurse / BPA Driven Protocol    sodium chloride    Assessment & Plan   Assessment & Plan     Active Hospital Problems    Diagnosis  POA    **Acute HFrEF (heart failure with reduced ejection fraction) [I50.21]  Yes    Coronary artery disease involving native coronary artery of native heart with angina pectoris [I25.119]  Yes    Hyperlipidemia LDL goal <70 [E78.5]  Yes    LBBB (left bundle branch block) [I44.7]  Yes    Paroxysmal atrial fibrillation [I48.0]  Yes    Stage 3b chronic kidney disease [N18.32]  Yes    Anemia, chronic disease [D63.8]  Yes    Weight loss [R63.4]  Yes      Resolved Hospital Problems    Diagnosis Date Resolved POA    Chest pain [R07.9] 09/30/2023 Yes    Bilateral pulmonary infiltrates [R91.8] 09/30/2023 Yes    Hypoxia [R09.02] 09/30/2023 Yes        Brief Hospital Course to date:  89-year-old  man with history of coronary artery disease, head neck cancer 2021, coronary disease with prior CABG, paroxysmal A-fib on amiodarone who presents with chest pain, shortness of breath, hypoxia and 40 pound weight loss. Required 2L on presentation. CT of the chest without contrast showed new moderate-sized bilateral pleural effusions with likely compressive atelectasis. Troponins elevated but no significant delta. BNP elevated 5400, EKG with left bundle branch block. Echo showed newly reduced EF of 25%.     Acute hypoxia, resolved  Heart failure with reduced ejection fraction, new diagnosis (EF 25%)  Chest pain with history of coronary disease, CABG  - Required 2L on presentation, now intermittently requiring oxygen  -Continue aspirin  -Continue metoprolol  -Holding Entresto spironolactone, ACE inhibitor in the setting of CHRISTIANO  -Patient has elected medical therapy for treatment due to age and kidney disease  -holding IV lasix this morning, creatinine trended up to 2.07    CHRISTIANO on CKD  -Creatinine trended up to 2.07 from 1.75 yesterday.  Suspect secondary to diuresis.  - Prior cardiology notes from [place] with creatinine of 1.7 in March (Dr. Childress's notes)  - hold diuretics     40 pound weight loss  Previous abnormal pancreas on outside scan  -Ca 19-9 normal  -CT of the abdomen and pelvis with low-density masses within the pancreas which may represent a cyst, pseudocyst or IPMN the largest lesion within the head of the pancreas is unchanged, new 2.1 cm cyst within the mid body of the pancreas.  - Patient reports this has been followed for this and they have decided not to undergo treatment given his age.      Paroxysmal A-fib  -Rate controlled, on chronic Eliquis  -Continue amiodarone        Expected Discharge Location and Transportation: home vs rehab  Expected Discharge   Expected Discharge Date: 10/2/2023; Expected Discharge Time:      DVT prophylaxis:  Medical DVT prophylaxis orders are present.     AM-PAC 6 Clicks  Score (PT): 19 (10/01/23 1151)    CODE STATUS:   Code Status and Medical Interventions:   Ordered at: 09/29/23 0774     Code Status (Patient has no pulse and is not breathing):    CPR (Attempt to Resuscitate)     Medical Interventions (Patient has pulse or is breathing):    Full     I have prepared this progress note with copied portions of the prior day's progress note of my own authorship to preserve accuracy and maintain consistency of documentation. I have reviewed these portions and edited them for correctness. I verify that the above documentation accurately and truly represents the evaluation and management performed on today's date.       Lacey Rivera MD  10/02/23        Electronically signed by Lacey Rivera MD at 10/02/23 0608       Wallace Espinoza IV, MD at 10/01/23 1143          Cardiology Progress Note      Reason for visit:    Acute on chronic systolic heart failure  Coronary artery disease  Paroxysmal atrial fibrillation    IDENTIFICATION: 89-year-old gentleman who resides in [place], [place]    Active Hospital Problems    Diagnosis  POA    **Acute HFrEF (heart failure with reduced ejection fraction) [I50.21]  Yes     Priority: High     Echo (2/23): LVEF EF 60%.  LVH with grade 2 DD.  No significant valvular disease  [facility] admission with FC IV heart failure symptoms, pulmonary edema on chest x-ray, and elevated proBNP, 9/30/2023  Echo (9/30/2023): LVEF 25%.  Moderate MR.  RVSP <35 mmHg      Coronary artery disease involving native coronary artery of native heart with angina pectoris [I25.119]  Yes     Priority: Medium     CABG 1980  Echo (2/23): LVEF EF 60%.  LVH with grade 2 DD.  No significant valvular disease  Pharmacologic nuclear stress (2/10/2023): Inferior infarct.  No ischemia.  LVEF 55%      Hyperlipidemia LDL goal <70 [E78.5]  Yes     Priority: Medium    Paroxysmal atrial fibrillation [I48.0]  Yes     Priority: Medium     FTN1PR1-MZVv 4 (age >75, CAD, HTN)  4% A-fib  burden on recent loop recorder interrogation      Anemia, chronic disease [D63.8]  Yes     Priority: Medium    LBBB (left bundle branch block) [I44.7]  Yes    Stage 3b chronic kidney disease [N18.32]  Yes    Weight loss [R63.4]  Yes       Subjective     Breathing improved with IV Lasix  Patient wants to switch cardiology providers to [facility]  Discussed echo findings showing LVEF 25%  Spoke with patient and sons regarding pros and cons of repeat ischemic evaluation.        Objective   Vital Sign Min/Max for last 24 hours  Temp  Min: 97.3 °F (36.3 °C)  Max: 97.8 °F (36.6 °C)   BP  Min: 90/68  Max: 104/76   Pulse  Min: 91  Max: 111   Resp  Min: 18  Max: 20   SpO2  Min: 90 %  Max: 97 %   Flow (L/min)  Min: 2  Max: 2      Intake/Output Summary (Last 24 hours) at 10/1/2023 1143  Last data filed at 10/1/2023 0819  Gross per 24 hour   Intake 1080 ml   Output 875 ml   Net 205 ml           Physical Exam  Constitutional:       General: He is awake.   Neck:      Vascular: No JVD.   Cardiovascular:      Rate and Rhythm: Normal rate and regular rhythm.      Heart sounds: Murmur heard.   Pulmonary:      Effort: Pulmonary effort is normal.      Breath sounds: Decreased breath sounds present.   Musculoskeletal:      Right lower leg: No edema.      Left lower leg: No edema.   Skin:     General: Skin is warm and dry.   Neurological:      Mental Status: He is alert.   Psychiatric:         Behavior: Behavior is cooperative.       Tele: Normal sinus rhythm    Results Review (reviewed the patient's recent labs in the electronic medical record):     EKG (9/29/2023): Sinus tachycardia at 109 bpm    CT of the chest (9/29/2023): New moderate size bilateral pleural effusions.    ECHO (9/30/2023): LVEF 25%.  Moderate MR.  Small less than 1 cm pericardial effusion.  Bilateral pleural effusions    Results from last 7 days   Lab Units 10/01/23  0401 09/30/23  1831 09/30/23  0535 09/29/23  1257   SODIUM mmol/L 138  --  138 138   POTASSIUM mmol/L  "4.5  --  4.6 5.1   CHLORIDE mmol/L 104  --  105 103   BUN mg/dL 32*  --  27* 22   CREATININE mg/dL 1.75*  --  1.51* 1.64*   MAGNESIUM mg/dL 2.0 2.1  --   --      Results from last 7 days   Lab Units 09/30/23  0724 09/30/23  0535 09/29/23  1257   HSTROP T ng/L 45* 45* 40*     Results from last 7 days   Lab Units 09/30/23  0535 09/29/23  1257   WBC 10*3/mm3 4.71 7.96   HEMOGLOBIN g/dL 9.8* 11.3*   HEMATOCRIT % 30.1* 35.2*   PLATELETS 10*3/mm3 215 258       Lab Results   Component Value Date    HGBA1C 6.0 04/07/2015       Lab Results   Component Value Date    CHOL 132 09/30/2023    TRIG 51 09/30/2023    HDL 38 (L) 09/30/2023    LDL 83 09/30/2023         Assessment     Acute HFrEF with LVEF 25%  Functional class IV symptoms with elevated proBNP, pulmonary edema on admission  Symptomatic improvement with IV furosemide  Continue metoprolol succinate 25 mg daily  Consider SGL 2 inhibitor if GFR >30  No ARB or MRA due to CKD receiving IV diuretic       Coronary artery disease  Flat troponin elevation not consistent with ACS  Ischemic evaluation in February consistent with inferior infarct  Invasive ischemic evaluation be considered once euvolemic.  Patient is high risk for coronary angiography and PCI given age, CKD, reduced LVEF, anemia, and ancient bypass grafts and unlikeliness of PCI \"targets\".  High risk of JARAD  Patient would like to defer ischemic evaluation for now, particularly since not having chest pain     Paroxysmal atrial fibrillation  Maintaining sinus on amiodarone 100 mg daily  Continue apixaban 2.5 mg twice daily for stroke prophylaxis     Hyperlipidemia with goal LDL <70  Statin intolerant  Resume Z[facility]ia at discharge     Left bundle branch block  Chronic    Chronic kidney disease stage IIIb  Presently at baseline  Monitor closely while administering IV diuretic    Plan     Continue IV furosemide today as renal function and BP tolerate  Continue metoprolol succinate  Defer ARB/MRA due to CKD with ongoing IV " diuretic  Defer invasive ischemic evaluation  We will need to transfer loop recorder transmissions to our office    Electronically signed by Wallace KEYS. Alexis BRIONES MD, 10/01/23, 11:47 AM EDT.      Electronically signed by Wallace Espinoza IV, MD at 10/01/23 1148       Lacey Rivera MD at 10/01/23 1008              [facility] Medicine Services  PROGRESS NOTE    Patient Name: Mukund Pool  : [id number]  MRN: [id number]    Date of Admission: 2023  Primary Care Physician: Slick Hubbard MD    Subjective   Subjective     CC:  Heart failure    HPI:  Patient is feeling better this morning.  Breathing is better.  Blood pressures with systolic in the 90s.  Otherwise has no complaints.      Objective   Objective     Vital Signs:   Temp:  [97.3 °F (36.3 °C)-97.8 °F (36.6 °C)] 97.4 °F (36.3 °C)  Heart Rate:  [] 103  Resp:  [18-20] 20  BP: ()/(61-77) 99/77  Flow (L/min):  [2] 2     Physical Exam:  Constitutional: Pleasant elderly gentleman, no acute distress  Respiratory: Decreased breath sounds bilaterally, normal respiratory effort on 2 L  Cardiovascular: RRR  Gastrointestinal: Positive bowel sounds, soft, nontender, nondistended  Musculoskeletal: No bilateral ankle edema  Psychiatric: Appropriate affect, cooperative  Neurologic: Oriented x 3, no gross focal neurological deficits        Results Reviewed:  LAB RESULTS:      Lab 23  0535 23  1425 23  1257   WBC 4.71  --  7.96   HEMOGLOBIN 9.8*  --  11.3*   HEMATOCRIT 30.1*  --  35.2*   PLATELETS 215  --  258   NEUTROS ABS 2.53  --  5.75   IMMATURE GRANS (ABS) 0.01  --  0.04   LYMPHS ABS 1.52  --  1.39   MONOS ABS 0.55  --  0.68   EOS ABS 0.06  --  0.03   .2*  --  106.3*   CRP  --   --  0.54*   PROCALCITONIN  --   --  0.05   LACTATE  --  1.8  --    LDH  --   --  178   D DIMER QUANT  --   --  0.60         Lab 10/01/23  0401 23  1831 23  0535 23  1257   SODIUM  138  --  138 138   POTASSIUM 4.5  --  4.6 5.1   CHLORIDE 104  --  105 103   CO2 26.0  --  26.0 25.0   ANION GAP 8.0  --  7.0 10.0   BUN 32*  --  27* 22   CREATININE 1.75*  --  1.51* 1.64*   EGFR 35.9*  --  42.8* 38.8*   GLUCOSE 98  --  96 133*   CALCIUM 8.2  --  8.5 8.9   MAGNESIUM 2.0 2.1  --   --    TSH  --   --   --  3.140         Lab 09/29/23  1257   TOTAL PROTEIN 7.3   ALBUMIN 3.9   GLOBULIN 3.4   ALT (SGPT) 17   AST (SGOT) 19   BILIRUBIN 1.1   ALK PHOS 103         Lab 09/30/23  0724 09/30/23  0535 09/29/23  1257   PROBNP  --   --  5,453.0*   HSTROP T 45* 45* 40*         Lab 09/30/23  0535   CHOLESTEROL 132   LDL CHOL 83   HDL CHOL 38*   TRIGLYCERIDES 51         Lab 09/29/23  1257   FERRITIN 129.80   FOLATE 9.59   VITAMIN B 12 1,278*         Brief Urine Lab Results       None            Microbiology Results Abnormal       Procedure Component Value - Date/Time    Blood Culture - Blood, Arm, Left [[id number]]  (Normal) Collected: 09/29/23 1445    Lab Status: Preliminary result Specimen: Blood from Arm, Left Updated: 09/30/23 1515     Blood Culture No growth at 24 hours    Blood Culture - Blood, Arm, Right [[id number]]  (Normal) Collected: 09/29/23 1425    Lab Status: Preliminary result Specimen: Blood from Arm, Right Updated: 09/30/23 1445     Blood Culture No growth at 24 hours    COVID PRE-OP / PRE-PROCEDURE SCREENING ORDER (NO ISOLATION) - Swab, Nasopharynx [[id number]]  (Normal) Collected: 09/29/23 1259    Lab Status: Final result Specimen: Swab from Nasopharynx Updated: 09/29/23 1337    Narrative:      The following orders were created for panel order COVID PRE-OP / PRE-PROCEDURE SCREENING ORDER (NO ISOLATION) - Swab, Nasopharynx.  Procedure                               Abnormality         Status                     ---------                               -----------         ------                     COVID-19 and FLU A/B PCR...[[id number]]  Normal              Final result                 Please view results  for these tests on the individual orders.    COVID-19 and FLU A/B PCR - Swab, Nasopharynx [[id number]]  (Normal) Collected: 09/29/23 1259    Lab Status: Final result Specimen: Swab from Nasopharynx Updated: 09/29/23 1337     COVID19 Not Detected     Influenza A PCR Not Detected     Influenza B PCR Not Detected    Narrative:      Fact sheet for providers: https://www.fda.gov/media/370869/download    Fact sheet for patients: https://www.fda.gov/media/680209/download    Test performed by PCR.            Adult Transthoracic Echo Complete W/ Cont if Necessary Per Protocol    Result Date: 9/30/2023    The left ventricle is mildly dilated and globally hypokinetic.  Left ventricular systolic function is moderately decreased. Estimated left ventricular EF = 25%   Aortic valve is calcified with no significant stenosis or regurgitation.   Moderate mitral valve regurgitation is present.   Estimated right ventricular systolic pressure from tricuspid regurgitation is normal (<35 mmHg).   There is a small (<1cm) pericardial effusion adjacent to the left ventricle.   Bilateral pleural effusions present     CT Abdomen Pelvis Without Contrast    Result Date: 9/29/2023  CT ABDOMEN PELVIS WO CONTRAST Date of Exam: 9/29/2023 6:32 PM EDT Indication: Weight loss, unintended prior cystic mass in pancreas. Comparison: PET/CT 5/27/2021 Technique: Axial CT images were obtained of the abdomen and pelvis without the administration of contrast. Reconstructed coronal and sagittal images were also obtained. Automated exposure control and iterative construction methods were used. Findings: There are new moderate-sized margins. There is dependent atelectasis within the bilateral lower lobes. 1 cm 8.8 cm The liver appears within normal limits. Patient is status post cholecystectomy. No evidence biliary tract obstruction. Again seen within the head of the pancreas is a cystic appearing low-density mass measuring 4.7 x 2.5 cm in size. This is unchanged  in size from prior exam. Within the mid body of the pancreas there is an additional well-circumscribed low density mass which appears new. This measures 2.1 x 1.9 cm in size. Pancreatic parenchyma is overall atrophic. No evidence of pancreatic duct obstruction. Spleen appears normal. Bilateral adrenal glands are within normal limits. There is a cyst at the upper pole of the left kidney which is unchanged. There are nonobstructing stones in the lower pole the right kidney. No left renal stones. No evidence of ureteral stone or hydronephrosis. There is no abdominal or retroperitoneal adenopathy. The upper GI tract is within normal limits. Pelvis: Ureters within normal limits. Patient appears to be status post prostatectomy. There are scattered colonic diverticula. No evidence diverticulitis. No free. No free intraperitoneal fluid. Patient is status post total left hip arthroplasty. There is diffuse osteopenia of the bony structures. No lytic or sclerotic bony lesions are identified.     Impression: Impression: 1. There are low-density masses within the pancreas which may represent a pancreatic cyst, pseudocyst, or IPMN. The largest lesion within the head of the pancreas is unchanged. There is a new 2.1 cm cyst within the mid body of the pancreas. 2. Moderate bilateral pleural effusions which are new. 3. Nonobstructing right renal stones. 4. Colonic diverticulosis but no evidence of diverticulitis. Electronically Signed: Jamarcus Loco MD  9/29/2023 7:44 PM EDT  Workstation ID: [id number]    CT Chest Without Contrast Diagnostic    Result Date: 9/29/2023  CT CHEST WO CONTRAST DIAGNOSTIC Date of Exam: 9/29/2023 6:32 PM EDT Indication: Pneumonia, complication suspected, xray done. Comparison: PET/CT 8/25/2021 Technique: Axial CT images were obtained of the chest without contrast administration.  Reconstructed coronal and sagittal images were also obtained. Automated exposure control and iterative construction methods were  used. Findings: No mediastinal, hilar, or axillary adenopathy. There is a large modification. Patient is status post median sternotomy for coronary artery bypass grafting. There are new moderate-sized bilateral pleural effusions. There is bilateral lower lobe likely due to compressive atelectasis. No suspicious pulmonary nodule. Bilateral adrenal glands are within normal limits. There is a cyst of the upper pole which is unchanged. There is diffuse osteopenia. No acute fracture identified. No lytic or sclerotic bony lesions. There is confluent ossification of the anterior and posterior longitudinal ligaments compatible changes of ankylosing spondylitis.     Impression: Impression: 1. New moderate-sized bilateral pleural effusions. 2. New bibasilar airspace disease likely due to compressive atelectasis. Electronically Signed: Jamarcus Loco MD  9/29/2023 7:47 PM EDT  Workstation ID: [id number]    FL Video Swallow With Speech Single Contrast    Result Date: 9/30/2023  FL VIDEO SWALLOW W SPEECH SINGLE-CONTRAST Date of Exam: 9/30/2023 10:56 AM EDT Indication: r/o dysphagia. Comparison: None available. TECHNIQUE: 54 seconds of fluoroscopic time was used for this exam. 6 associated fluoroscopic loops were saved. The patient was evaluated in the seated lateral position while taking a variety of consistencies of barium by mouth under the direction of speech pathology.  COMPARISON: NONE FINDINGS: 54 seconds of fluoroscopy provided for a modified barium swallow. Please see speech therapy report for full details and recommendations.     Impression: Fluoroscopy provided for a modified barium swallow. Please see speech therapy report for full details and recommendations. Electronically Signed: John Guido MD  9/30/2023 11:58 AM EDT  Workstation ID: [id number]    XR Chest 1 View    Result Date: 9/29/2023  XR CHEST 1 VW Date of Exam: 9/29/2023 1:01 PM EDT Indication: SOA triage protocol Comparison: PET/CT 8/25/2021  Findings: Prior  sternotomy and CABG. Patient rotated side to left. Cardiac loop recorder noted. Central pulmonary vascular congestion with interstitial thickening most compatible with pulmonary edema. Bibasilar airspace opacities with small bilateral pleural effusions. No pneumothorax.     Impression: Impression: 1. Pulmonary edema. 2. Bibasilar airspace disease may relate to atelectasis and/or pneumonia with small bilateral pleural effusions. Electronically Signed: Soto Matos MD  9/29/2023 1:31 PM EDT  Workstation ID: [id number]     Results for orders placed during the hospital encounter of 09/29/23    Adult Transthoracic Echo Complete W/ Cont if Necessary Per Protocol    Interpretation Summary    The left ventricle is mildly dilated and globally hypokinetic.  Left ventricular systolic function is moderately decreased. Estimated left ventricular EF = 25%    Aortic valve is calcified with no significant stenosis or regurgitation.    Moderate mitral valve regurgitation is present.    Estimated right ventricular systolic pressure from tricuspid regurgitation is normal (<35 mmHg).    There is a small (<1cm) pericardial effusion adjacent to the left ventricle.    Bilateral pleural effusions present      Current medications:  Scheduled Meds:amiodarone, 100 mg, Oral, Q24H  apixaban, 2.5 mg, Oral, BID  aspirin, 81 mg, Oral, Daily  furosemide, 40 mg, Intravenous, Q12H  gabapentin, 300 mg, Oral, TID  ipratropium-albuterol, 3 mL, Nebulization, 4x Daily - RT  metoprolol succinate XL, 25 mg, Oral, Daily  multivitamin with minerals, 1 tablet, Oral, Daily  pantoprazole, 40 mg, Oral, Q AM  senna-docusate sodium, 2 tablet, Oral, BID      Continuous Infusions:   PRN Meds:.  acetaminophen    senna-docusate sodium **AND** polyethylene glycol **AND** bisacodyl **AND** bisacodyl    Calcium Replacement - Follow Nurse / BPA Driven Protocol    Magnesium Cardiology Dose Replacement - Follow Nurse / BPA Driven Protocol    melatonin    nitroglycerin     Phosphorus Replacement - Follow Nurse / BPA Driven Protocol    Potassium Replacement - Follow Nurse / BPA Driven Protocol    sodium chloride    Assessment & Plan   Assessment & Plan     Active Hospital Problems    Diagnosis  POA    **Acute HFrEF (heart failure with reduced ejection fraction) [I50.21]  Yes    Coronary artery disease involving native coronary artery of native heart with angina pectoris [I25.119]  Yes    Hyperlipidemia LDL goal <70 [E78.5]  Yes    LBBB (left bundle branch block) [I44.7]  Yes    Paroxysmal atrial fibrillation [I48.0]  Yes    Stage 3b chronic kidney disease [N18.32]  Yes    Anemia, chronic disease [D63.8]  Yes    Weight loss [R63.4]  Yes      Resolved Hospital Problems    Diagnosis Date Resolved POA    Chest pain [R07.9] 09/30/2023 Yes    Bilateral pulmonary infiltrates [R91.8] 09/30/2023 Yes    Hypoxia [R09.02] 09/30/2023 Yes        Brief Hospital Course to date:  89-year-old man with history of coronary artery disease, head neck cancer 2021, coronary disease with prior CABG, paroxysmal A-fib on amiodarone who presents with chest pain, shortness of breath, hypoxia and 40 pound weight loss     Acute hypoxia, resolved  Heart failure with reduced ejection fraction  Chest pain with history of coronary disease, CABG  - Required 2L on presentation, now resolved  -CT of the chest without contrast showed new moderate-sized bilateral pleural effusions with likely compressive atelectasis  - Troponin stable in the 40s without any significant delta  -BNP elevated 5400, EKG with left bundle branch block  -Echo obtained, newly reduced ejection fraction of 25%.  Previously normal in February 2023  -Continue aspirin  -Continue IV Lasix, creatinine at baseline of 1.7.  ( Prior cardiology notes with creatinine of 1.7 in March)  -Continue metoprolol  -Consider Jardiance  -Need consider ischemic evaluation, however given his age and elevated creatinine this is high risk.  - Discussed personally with Chio  Jose Cruz with cardiology. Patient with reduced heart failure, had a heart cath in 2/2023. Needs medical therapy or C.      40 pound weight loss  Previous abnormal pancreas on outside scan  -Ca 19-9 normal  -CT of the abdomen and pelvis with low-density masses within the pancreas which may represent a cyst, pseudocyst or IPMN the largest lesion within the head of the pancreas is unchanged, new 2.1 cm cyst within the mid body of the pancreas.  - Patient reports this has been followed for this and they have decided not to undergo treatment given his age.      Paroxysmal A-fib  -Rate controlled, on chronic Eliquis  -Continue amiodarone     CKD  Previously had creatinine of 1.7 on 4/22/23 noted in Dr. Childress's office note.   - Monitor while on diuretics        Expected Discharge Location and Transportation: home vs rehab  Expected Discharge   Expected Discharge Date: 10/2/2023; Expected Discharge Time:      DVT prophylaxis:  Medical DVT prophylaxis orders are present.     AM-PAC 6 Clicks Score (PT): 18 (09/30/23 2022)    CODE STATUS:   Code Status and Medical Interventions:   Ordered at: 09/29/23 1454     Code Status (Patient has no pulse and is not breathing):    CPR (Attempt to Resuscitate)     Medical Interventions (Patient has pulse or is breathing):    Full       Lacey Rivera MD  10/01/23        Electronically signed by Lacey Rivera MD at 10/01/23 1241       Radha Billingsley APRN at 10/01/23 1001          Cardiology Progress Note      Reason for visit:    Acute on chronic systolic heart failure  Coronary artery disease  Paroxysmal atrial fibrillation    IDENTIFICATION: 89-year-old gentleman who resides in [place], [place]    Active Hospital Problems    Diagnosis  POA    **Acute HFrEF (heart failure with reduced ejection fraction) [I50.21]  Yes     Echo (2/23): LVEF EF 60%.  LVH with grade 2 DD.  No significant valvular disease  [facility] admission with FC IV heart failure symptoms, pulmonary edema on  chest x-ray, and elevated proBNP, 9/30/2023  Echo (9/30/2023): LVEF 25%.  Moderate MR.  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  LVH with grade 2 DD.  No significant valvular disease  Pharmacologic nuclear stress (2/10/2023): Inferior infarct.  No ischemia.  LVEF 55%      Paroxysmal atrial fibrillation [I48.0]  Yes     Priority: High     HEH2DM7-HCKn 4 (age >75, CAD, HTN)  4% A-fib burden on recent loop recorder interrogation      Stage 3b chronic kidney disease [N18.32]  Yes     Priority: High    Anemia, chronic disease [D63.8]  Yes     Priority: Medium    Hyperlipidemia LDL goal <70 [E78.5]  Yes     Priority: Low    Weight loss [R63.4]  Yes     Priority: Low    LBBB (left bundle branch block) [I44.7]  Yes       Subjective     Patient sitting up in the bed breathing easy on O2 at 2 L by nasal cannula.  He reports his breathing is greatly improved since admission.  He is even able to lie flat now.  He denies any chest pain.  His main complaint was weakness on admission and he feels he is getting stronger.        Objective   Vital Sign Min/Max for last 24 hours  Temp  Min: 97.3 °F (36.3 °C)  Max: 97.8 °F (36.6 °C)   BP  Min: 90/68  Max: 106/77   Pulse  Min: 91  Max: 107   Resp  Min: 18  Max: 20   SpO2  Min: 90 %  Max: 97 %   Flow (L/min)  Min: 2  Max: 2      Intake/Output Summary (Last 24 hours) at 10/1/2023 1001  Last data filed at 10/1/2023 0819  Gross per 24 hour   Intake 1080 ml   Output 875 ml   Net 205 ml           Physical Exam  Constitutional:       General: He is awake.   Neck:      Vascular: No JVD.   Cardiovascular:      Rate and Rhythm: Normal rate and regular rhythm.      Heart sounds: Murmur heard.   Pulmonary:      Effort: Pulmonary effort is normal.      Breath sounds: Decreased breath sounds present.   Musculoskeletal:      Right lower leg: No edema.       Left lower leg: No edema.   Skin:     General: Skin is warm and dry.   Neurological:      Mental Status: He is alert.   Psychiatric:         Behavior: Behavior is cooperative.       Tele: Normal sinus rhythm    Results Review (reviewed the patient's recent labs in the electronic medical record):     EKG (9/29/2023): Sinus tachycardia at 109 bpm    CT of the chest (9/29/2023): New moderate size bilateral pleural effusions.    ECHO (9/30/2023): LVEF 25%.  Moderate MR.  Small less than 1 cm pericardial effusion.  Bilateral pleural effusions    Results from last 7 days   Lab Units 10/01/23  0401 09/30/23  1831 09/30/23  0535 09/29/23  1257   SODIUM mmol/L 138  --  138 138   POTASSIUM mmol/L 4.5  --  4.6 5.1   CHLORIDE mmol/L 104  --  105 103   BUN mg/dL 32*  --  27* 22   CREATININE mg/dL 1.75*  --  1.51* 1.64*   MAGNESIUM mg/dL 2.0 2.1  --   --      Results from last 7 days   Lab Units 09/30/23  0724 09/30/23  0535 09/29/23  1257   HSTROP T ng/L 45* 45* 40*     Results from last 7 days   Lab Units 09/30/23  0535 09/29/23  1257   WBC 10*3/mm3 4.71 7.96   HEMOGLOBIN g/dL 9.8* 11.3*   HEMATOCRIT % 30.1* 35.2*   PLATELETS 10*3/mm3 215 258       Lab Results   Component Value Date    HGBA1C 6.0 04/07/2015       Lab Results   Component Value Date    CHOL 132 09/30/2023    TRIG 51 09/30/2023    HDL 38 (L) 09/30/2023    LDL 83 09/30/2023         Assessment     Acute HFrEF with LVEF 25%  Newly reduced ejection fraction compared to February 2023  Functional class IV symptoms  Elevated proBNP and pulmonary edema on chest x-ray  Lasix 40 mg IV twice daily  Strict I's and O's and daily weights  Metoprolol succinate 25 mg daily  Consider SGL 2 inhibitor if GFR remains greater than 30       Coronary artery disease  Flat troponin elevation not consistent with ACS  Ischemic evaluation in February consistent with inferior infarct  Invasive ischemic evaluation be considered once euvolemic.  High risk for PCI given age, CKD, reduced LVEF,  anemia, and ancient bypass grafts.  High risk of JARAD     Paroxysmal atrial fibrillation  Maintaining normal sinus rhythm has left bundle branch block  Amiodarone 100 mg daily  Eliquis 2.5 mg twice daily     Hyperlipidemia with goal LDL <70  Statin intolerant  Resume Zetia at discharge       Left bundle branch block  Could it be contributing to his reduced LVEF?    Chronic kidney disease  Current creatinine 1.75  Baseline around 1.6-1.7    Plan     Continue to diurese with IV Lasix today and transition to p.o. tomorrow  Defer statin therapy due to intolerance  Treat systolic heart failure medically and defer cardiac catheterization for now.  Currently maintaining normal sinus rhythm  Continue amiodarone and Eliquis for paroxysmal atrial fibrillation.  Currently maintaining NSR    Electronically signed by CYNDEE Castaneda, 10/01/23, 10:01 AM EDT.    Electronically signed by Radha Billingsley APRN at 10/01/23 1054       Lacey Rivera MD at 23 1004              [facility] Medicine Services  PROGRESS NOTE    Patient Name: Mukund Pool  : [id number]  MRN: [id number]    Date of Admission: 2023  Primary Care Physician: Slick Hubbard MD    Subjective   Subjective     CC:  Shortness of breath    HPI:  Shortness of breath improved after getting diuresis, had good urine output.  Overall just feels weak.  Reports that he has a good appetite.  Went to see cardiology here at [facility], previously followed at [facility] but do not feel like they are getting answers.  Edema resolved      Objective   Objective     Vital Signs:   Temp:  [97.2 °F (36.2 °C)-98.2 °F (36.8 °C)] 97.9 °F (36.6 °C)  Heart Rate:  [] 104  Resp:  [16-18] 18  BP: ()/(66-99) 107/74  Flow (L/min):  [2] 2     Physical Exam:  Constitutional: No acute distress, awake, alert, pleasant elderly man  Respiratory: Decreased breath sounds bilaterally, respiratory effort normal on room  air  Cardiovascular: RRR  Gastrointestinal: Positive bowel sounds, soft, nontender, nondistended  Musculoskeletal: No bilateral ankle edema  Psychiatric: Appropriate affect, cooperative  Neurologic: Oriented x 3, no gross focal neurological deficits      Results Reviewed:  LAB RESULTS:      Lab 09/30/23  0535 09/29/23  1425 09/29/23  1257   WBC 4.71  --  7.96   HEMOGLOBIN 9.8*  --  11.3*   HEMATOCRIT 30.1*  --  35.2*   PLATELETS 215  --  258   NEUTROS ABS 2.53  --  5.75   IMMATURE GRANS (ABS) 0.01  --  0.04   LYMPHS ABS 1.52  --  1.39   MONOS ABS 0.55  --  0.68   EOS ABS 0.06  --  0.03   .2*  --  106.3*   CRP  --   --  0.54*   PROCALCITONIN  --   --  0.05   LACTATE  --  1.8  --    LDH  --   --  178   D DIMER QUANT  --   --  0.60         Lab 09/30/23  0535 09/29/23  1257   SODIUM 138 138   POTASSIUM 4.6 5.1   CHLORIDE 105 103   CO2 26.0 25.0   ANION GAP 7.0 10.0   BUN 27* 22   CREATININE 1.51* 1.64*   EGFR 42.8* 38.8*   GLUCOSE 96 133*   CALCIUM 8.5 8.9   TSH  --  3.140         Lab 09/29/23  1257   TOTAL PROTEIN 7.3   ALBUMIN 3.9   GLOBULIN 3.4   ALT (SGPT) 17   AST (SGOT) 19   BILIRUBIN 1.1   ALK PHOS 103         Lab 09/30/23  0724 09/30/23  0535 09/29/23  1257   PROBNP  --   --  5,453.0*   HSTROP T 45* 45* 40*         Lab 09/30/23  0535   CHOLESTEROL 132   LDL CHOL 83   HDL CHOL 38*   TRIGLYCERIDES 51         Lab 09/29/23  1257   FERRITIN 129.80   FOLATE 9.59   VITAMIN B 12 1,278*         Brief Urine Lab Results       None            Microbiology Results Abnormal       Procedure Component Value - Date/Time    COVID PRE-OP / PRE-PROCEDURE SCREENING ORDER (NO ISOLATION) - Swab, Nasopharynx [[id number]]  (Normal) Collected: 09/29/23 1259    Lab Status: Final result Specimen: Swab from Nasopharynx Updated: 09/29/23 1018    Narrative:      The following orders were created for panel order COVID PRE-OP / PRE-PROCEDURE SCREENING ORDER (NO ISOLATION) - Swab, Nasopharynx.  Procedure                                Abnormality         Status                     ---------                               -----------         ------                     COVID-19 and FLU A/B PCR...[[id number]]  Normal              Final result                 Please view results for these tests on the individual orders.    COVID-19 and FLU A/B PCR - Swab, Nasopharynx [[id number]]  (Normal) Collected: 09/29/23 1259    Lab Status: Final result Specimen: Swab from Nasopharynx Updated: 09/29/23 1337     COVID19 Not Detected     Influenza A PCR Not Detected     Influenza B PCR Not Detected    Narrative:      Fact sheet for providers: https://www.fda.gov/media/842427/download    Fact sheet for patients: https://www.fda.gov/media/497343/download    Test performed by PCR.            CT Abdomen Pelvis Without Contrast    Result Date: 9/29/2023  CT ABDOMEN PELVIS WO CONTRAST Date of Exam: 9/29/2023 6:32 PM EDT Indication: Weight loss, unintended prior cystic mass in pancreas. Comparison: PET/CT 5/27/2021 Technique: Axial CT images were obtained of the abdomen and pelvis without the administration of contrast. Reconstructed coronal and sagittal images were also obtained. Automated exposure control and iterative construction methods were used. Findings: There are new moderate-sized margins. There is dependent atelectasis within the bilateral lower lobes. 1 cm 8.8 cm The liver appears within normal limits. Patient is status post cholecystectomy. No evidence biliary tract obstruction. Again seen within the head of the pancreas is a cystic appearing low-density mass measuring 4.7 x 2.5 cm in size. This is unchanged in size from prior exam. Within the mid body of the pancreas there is an additional well-circumscribed low density mass which appears new. This measures 2.1 x 1.9 cm in size. Pancreatic parenchyma is overall atrophic. No evidence of pancreatic duct obstruction. Spleen appears normal. Bilateral adrenal glands are within normal limits. There is a cyst at the  upper pole of the left kidney which is unchanged. There are nonobstructing stones in the lower pole the right kidney. No left renal stones. No evidence of ureteral stone or hydronephrosis. There is no abdominal or retroperitoneal adenopathy. The upper GI tract is within normal limits. Pelvis: Ureters within normal limits. Patient appears to be status post prostatectomy. There are scattered colonic diverticula. No evidence diverticulitis. No free. No free intraperitoneal fluid. Patient is status post total left hip arthroplasty. There is diffuse osteopenia of the bony structures. No lytic or sclerotic bony lesions are identified.     Impression: Impression: 1. There are low-density masses within the pancreas which may represent a pancreatic cyst, pseudocyst, or IPMN. The largest lesion within the head of the pancreas is unchanged. There is a new 2.1 cm cyst within the mid body of the pancreas. 2. Moderate bilateral pleural effusions which are new. 3. Nonobstructing right renal stones. 4. Colonic diverticulosis but no evidence of diverticulitis. Electronically Signed: Jamarcus Loco MD  9/29/2023 7:44 PM EDT  Workstation ID: [id number]    CT Chest Without Contrast Diagnostic    Result Date: 9/29/2023  CT CHEST WO CONTRAST DIAGNOSTIC Date of Exam: 9/29/2023 6:32 PM EDT Indication: Pneumonia, complication suspected, xray done. Comparison: PET/CT 8/25/2021 Technique: Axial CT images were obtained of the chest without contrast administration.  Reconstructed coronal and sagittal images were also obtained. Automated exposure control and iterative construction methods were used. Findings: No mediastinal, hilar, or axillary adenopathy. There is a large modification. Patient is status post median sternotomy for coronary artery bypass grafting. There are new moderate-sized bilateral pleural effusions. There is bilateral lower lobe likely due to compressive atelectasis. No suspicious pulmonary nodule. Bilateral adrenal glands are  within normal limits. There is a cyst of the upper pole which is unchanged. There is diffuse osteopenia. No acute fracture identified. No lytic or sclerotic bony lesions. There is confluent ossification of the anterior and posterior longitudinal ligaments compatible changes of ankylosing spondylitis.     Impression: Impression: 1. New moderate-sized bilateral pleural effusions. 2. New bibasilar airspace disease likely due to compressive atelectasis. Electronically Signed: Jamarcus Loco MD  9/29/2023 7:47 PM EDT  Workstation ID: [id number]    XR Chest 1 View    Result Date: 9/29/2023  XR CHEST 1 VW Date of Exam: 9/29/2023 1:01 PM EDT Indication: SOA triage protocol Comparison: PET/CT 8/25/2021  Findings: Prior sternotomy and CABG. Patient rotated side to left. Cardiac loop recorder noted. Central pulmonary vascular congestion with interstitial thickening most compatible with pulmonary edema. Bibasilar airspace opacities with small bilateral pleural effusions. No pneumothorax.     Impression: Impression: 1. Pulmonary edema. 2. Bibasilar airspace disease may relate to atelectasis and/or pneumonia with small bilateral pleural effusions. Electronically Signed: Soto Matos MD  9/29/2023 1:31 PM EDT  Workstation ID: [id number]         Current medications:  Scheduled Meds:amiodarone, 100 mg, Oral, Q24H  apixaban, 2.5 mg, Oral, BID  aspirin, 81 mg, Oral, Daily  cefTRIAXone, 1,000 mg, Intravenous, Q24H  gabapentin, 300 mg, Oral, TID  ipratropium-albuterol, 3 mL, Nebulization, 4x Daily - RT  metoprolol succinate XL, 37.5 mg, Oral, Daily  multivitamin with minerals, 1 tablet, Oral, Daily  pantoprazole, 40 mg, Oral, Q AM  senna-docusate sodium, 2 tablet, Oral, BID      Continuous Infusions:   PRN Meds:.  acetaminophen    senna-docusate sodium **AND** polyethylene glycol **AND** bisacodyl **AND** bisacodyl    melatonin    nitroglycerin    sodium chloride    Assessment & Plan   Assessment & Plan     Active Hospital Problems     Diagnosis  POA    **Chest pain [R07.9]  Yes    Bilateral pulmonary infiltrates [R91.8]  Yes    Paroxysmal atrial fibrillation [I48.0]  Yes    Hypoxia [R09.02]  Yes    Stage 3b chronic kidney disease [N18.32]  Yes    Anemia, chronic disease [D63.8]  Yes    Weight loss [R63.4]  Yes    History of head and neck cancer [Z85.89]  Not Applicable      Resolved Hospital Problems   No resolved problems to display.        Brief Hospital Course to date:  89-year-old man with history of coronary artery disease, head neck cancer 2021, coronary disease with prior CABG, paroxysmal A-fib on amiodarone who presents with chest pain, shortness of breath, hypoxia and 40 pound weight loss     Chest pain with history of coronary disease, CABG  -Troponin stable in the 40s without any significant delta  -BNP elevated 5400, EKG with left bundle branch block  -Echo pending, patient reports that he had an echo previously at [facility]  -Continue aspirin  -Patient was previously seen at [facility] for his cardiology needs, wants to transfer his care here to [facility] instead.      Acute hypoxia, resolved  Heart failure exacerbation  -Requiring 2 L of oxygen this morning with mild tachycardia  -CT of the chest without contrast showed new moderate-sized bilateral pleural effusions with likely compressive atelectasis  -Received 40 of IV Lasix yesterday. Reviewed BMP, creatinine elevated on presentation at 1.64, trended down after diuresis which is near his baseline (obtained from [facility] doctors notes). Additional diuretics per cardiology. Will be cautious given age and CKD as he is no longer requiring oxygen  -Echo pending  -Repeat BMP in the a.m. to check creatinine and electrolytes  - speech to evaluate     40 pound weight loss  Previous abnormal pancreas on outside scan  -Check CA 19-9  -CT of the abdomen and pelvis with low-density masses within the pancreas which may represent a cyst, pseudocyst or IPMN the largest lesion within the  head of the pancreas is unchanged, new 2.1 cm cyst within the mid body of the pancreas.  - Patient reports this has been followed for this and they have decided not to undergo treatment given his age.      Paroxysmal A-fib  -Rate controlled, on chronic Eliquis  -Continue amiodarone    CKD  - Reviewed [facility] in [facility]. Previously had creatinine of 1.7 on 4/22/23 noted in Dr. Childress's office note.   - Monitor while on diuretics       Expected Discharge Location and Transportation: home vs rehab  Expected Discharge   Expected Discharge Date: 10/2/2023; Expected Discharge Time:      DVT prophylaxis:  Medical DVT prophylaxis orders are present.     AM-PAC 6 Clicks Score (PT): 18 (09/30/23 0800)    CODE STATUS:   Code Status and Medical Interventions:   Ordered at: 09/29/23 4542     Code Status (Patient has no pulse and is not breathing):    CPR (Attempt to Resuscitate)     Medical Interventions (Patient has pulse or is breathing):    Full     This patient's problems and plans were partially entered by my partner and updated as appropriate by me 09/30/23. Today is my first day evaluating this patient's active medical problems. I Personally reviewed chart and adjusted note to reflect daily changes in management/clinical condition. Copied text in this note has been reviewed and is accurate as of  09/30/23      Lacey Rivera MD  09/30/23        Electronically signed by Lacey Rivera MD at 09/30/23 1216          Consult Notes (all)        Wallace Espinoza IV, MD at 09/30/23 1442        Consult Orders    1. Inpatient Cardiology Consult [[id number]] ordered by Lacey Rivera MD                 Inpatient Cardiology Consult  Consult performed by: Wallace Espinoza IV, MD  Consult ordered by: Lacey Rivera MD  Reason for consult: CAD with chest painCHF        [facility]  Cardiovascular Consultation Note    Primary cardiologist: Domingo Rodriguez MD    Active  Hospital Problems    Diagnosis  POA    **Acute HFrEF (heart failure with reduced ejection fraction) [I50.21]  Yes     Priority: High     Echo (2/23): LVEF EF 60%.  LVH with grade 2 DD.  No significant valvular disease  [facility] admission with FC IV heart failure symptoms, pulmonary edema on chest x-ray, and elevated proBNP, 9/30/2023  Echo (9/30/2023): LVEF 25%.  Moderate MR.  RVSP <35 mmHg      Coronary artery disease involving native coronary artery of native heart with angina pectoris [I25.119]  Yes     Priority: Medium     CABG 1980  Echo (2/23): LVEF EF 60%.  LVH with grade 2 DD.  No significant valvular disease  Pharmacologic nuclear stress (2/10/2023): Inferior infarct.  No ischemia.  LVEF 55%      Hyperlipidemia LDL goal <70 [E78.5]  Yes     Priority: Medium    Paroxysmal atrial fibrillation [I48.0]  Yes     Priority: Medium     [id number] 4 (age >75, CAD, HTN)  4% A-fib burden on recent loop recorder interrogation      Anemia, chronic disease [D63.8]  Yes     Priority: Medium    LBBB (left bundle branch block) [I44.7]  Yes    Stage 3b chronic kidney disease [N18.32]  Yes    Weight loss [R63.4]  Yes    History of head and neck cancer [Z85.89]  Not Applicable       Subjective     Patient is a 89-year-old gentleman with a history of coronary artery disease status post CABG approximately 20 years ago, chronic kidney disease.  Over the last month, he has been experiencing increased shortness of breath with ambulation and orthopnea at night.  Last evening, the patient came so scared about his breathing that he presented to the emergency room.  He does not take diuretics at home.  In the ER, proBNP elevated and chest x-ray with evidence of pulmonary vascular congestion and pleural effusions.    Cardiac risk factors: Advanced age,    Past medical and surgical history, social and family history reviewed in EMR.    REVIEW OF SYSTEMS:   H&P ROS reviewed and pertinent CV ROS as noted in HPI.    Objective      Vital Sign Min/Max for last 24 hours  Temp  Min: 97.2 °F (36.2 °C)  Max: 97.9 °F (36.6 °C)   BP  Min: 98/65  Max: 132/89   Pulse  Min: 95  Max: 110   Resp  Min: 18  Max: 20   SpO2  Min: 90 %  Max: 97 %   No data recorded      Intake/Output Summary (Last 24 hours) at 9/30/2023 1614  Last data filed at 9/30/2023 1300  Gross per 24 hour   Intake 720 ml   Output 600 ml   Net 120 ml           Constitutional:       Appearance: Healthy appearance.   Eyes:      General: No scleral icterus.  Neck:      Thyroid: No thyroid mass.      Vascular: No carotid bruit or JVD. JVD normal.   Pulmonary:      Effort: Pulmonary effort is normal.      Breath sounds: Normal breath sounds.   Cardiovascular:      Normal rate. Regular rhythm.      Murmurs: There is no murmur.      No gallop.    Edema:     Peripheral edema absent.   Skin:     General: Skin is warm. There is no cyanosis.   Neurological:      General: No focal deficit present.      Mental Status: Alert.   Psychiatric:         Attention and Perception: Attention normal.        EKG (9/29/2023): Sinus at 109 bpm.  LBBB.    Echo (9/30/2023): LVEF 25% with dilated left ventricle.  Moderate mitral regurgitation (functional MR).  Normal RVSP    Chest x-ray (9/29/2023): Increased pulmonary vasculature and bilateral pleural effusions        Lab Review:   Labs reviewed in the electronic medical record.  Pertinent findings include:    Lab Results   Component Value Date    GLUCOSE 96 09/30/2023    BUN 27 (H) 09/30/2023    CREATININE 1.51 (H) 09/30/2023    EGFR 42.8 (L) 09/30/2023    BCR 17.9 09/30/2023    K 4.6 09/30/2023    CO2 26.0 09/30/2023    CALCIUM 8.5 09/30/2023    ALBUMIN 3.9 09/29/2023    BILITOT 1.1 09/29/2023    AST 19 09/29/2023    ALT 17 09/29/2023     Lab Results   Component Value Date    WBC 4.71 09/30/2023    HGB 9.8 (L) 09/30/2023    HCT 30.1 (L) 09/30/2023    .2 (H) 09/30/2023     09/30/2023     Lab Results   Component Value Date    CHOL 132 09/30/2023     TRIG 51 09/30/2023    HDL 38 (L) 09/30/2023    LDL 83 09/30/2023     Lab Results   Component Value Date    TROPONINT 45 (H) 09/30/2023    TROPONINT 45 (H) 09/30/2023    TROPONINT 40 (H) 09/29/2023         Lab 09/29/23  1257   PROBNP 5,453.0*         Assessment     Acute HFrEF with LVEF 25%  Newly reduced ejection fraction compared to February 2023  Functional class IV symptoms  Elevated proBNP and pulmonary edema on chest x-ray  Start IV diuretics for HFpEF  Monitor renal function closely  Continue metoprolol succinate 25 mg daily  Consider SGL 2 inhibitor if eGFR remains >30    Coronary artery disease  Flat troponin elevation not consistent with ACS  Ischemic evaluation in February consistent with inferior infarct  Invasive ischemic evaluation be considered once euvolemic.  High risk for PCI given age, CKD, reduced LVEF, anemia, and ancient bypass grafts.  High risk of JARAD    Paroxysmal atrial fibrillation  Presently in sinus  Continue amiodarone  Continue apixaban 2.5 mg twice daily    Hyperlipidemia with goal LDL <70  Statin intolerant  Resume ezetimibe at discharge        Plan   Furosemide 40 mg IV twice daily  Monitor renal function closely  Will consider eventual invasive ischemic evaluation once euvolemic      Electronically signed by Wallace Espinoza IV, MD at 09/30/23 0173

## 2023-10-04 NOTE — PLAN OF CARE
Problem: Adult Inpatient Plan of Care  Goal: Plan of Care Review  Outcome: Ongoing, Progressing  Goal: Patient-Specific Goal (Individualized)  Outcome: Ongoing, Progressing  Goal: Absence of Hospital-Acquired Illness or Injury  Outcome: Ongoing, Progressing  Intervention: Identify and Manage Fall Risk  Recent Flowsheet Documentation  Taken 10/4/2023 1800 by Liliana Hollis RN  Safety Promotion/Fall Prevention: safety round/check completed  Taken 10/4/2023 1600 by Liliana Hollis RN  Safety Promotion/Fall Prevention: safety round/check completed  Taken 10/4/2023 1400 by Liliana Hollis RN  Safety Promotion/Fall Prevention: safety round/check completed  Taken 10/4/2023 1200 by Liliana Hollis RN  Safety Promotion/Fall Prevention: safety round/check completed  Taken 10/4/2023 1000 by Liliana Hollis RN  Safety Promotion/Fall Prevention: safety round/check completed  Taken 10/4/2023 0800 by Liliana Hollis RN  Safety Promotion/Fall Prevention: safety round/check completed  Intervention: Prevent Skin Injury  Recent Flowsheet Documentation  Taken 10/4/2023 1800 by Liliana Hollis RN  Body Position: position changed independently  Taken 10/4/2023 1600 by Liliana Hollis RN  Body Position: position changed independently  Taken 10/4/2023 1400 by Liliana Hollis RN  Body Position: position changed independently  Taken 10/4/2023 1200 by Liliana Hollis RN  Body Position: position changed independently  Taken 10/4/2023 1000 by Liliana Hollis RN  Body Position: position changed independently  Taken 10/4/2023 0800 by Liliana Hollis RN  Body Position: position changed independently  Intervention: Prevent and Manage VTE (Venous Thromboembolism) Risk  Recent Flowsheet Documentation  Taken 10/4/2023 1800 by Liliana Hollis RN  Activity Management: activity encouraged  Taken 10/4/2023 1600 by Liliana Hollis RN  Activity Management: activity encouraged  Taken 10/4/2023 1400 by Liliana Hollis RN  Activity Management: activity  encouraged  Taken 10/4/2023 1200 by Liliana Hollis RN  Activity Management: up in chair  Taken 10/4/2023 1000 by Liliana Hollis RN  Activity Management: activity encouraged  Taken 10/4/2023 0800 by Liliana Hollis RN  Activity Management: activity encouraged  Intervention: Prevent Infection  Recent Flowsheet Documentation  Taken 10/4/2023 1800 by Liliana Hollis RN  Infection Prevention: hand hygiene promoted  Taken 10/4/2023 1600 by Liliana Hollis RN  Infection Prevention: hand hygiene promoted  Taken 10/4/2023 1400 by Liliana Hollis RN  Infection Prevention: hand hygiene promoted  Taken 10/4/2023 1200 by Liliana Hollis RN  Infection Prevention: hand hygiene promoted  Taken 10/4/2023 1000 by Liliana Hollis RN  Infection Prevention: hand hygiene promoted  Taken 10/4/2023 0800 by Liliana Hollis RN  Infection Prevention: hand hygiene promoted  Goal: Optimal Comfort and Wellbeing  Outcome: Ongoing, Progressing  Goal: Readiness for Transition of Care  Outcome: Ongoing, Progressing     Problem: Osteoarthritis Comorbidity  Goal: Maintenance of Osteoarthritis Symptom Control  Outcome: Ongoing, Progressing  Intervention: Maintain Osteoarthritis Symptom Control  Recent Flowsheet Documentation  Taken 10/4/2023 1800 by Liliana Hollis RN  Activity Management: activity encouraged  Medication Review/Management: medications reviewed  Taken 10/4/2023 1600 by Liliana Hollis RN  Activity Management: activity encouraged  Medication Review/Management: medications reviewed  Taken 10/4/2023 1400 by Liliana Hollis RN  Activity Management: activity encouraged  Medication Review/Management: medications reviewed  Taken 10/4/2023 1200 by Liliana Hollis RN  Activity Management: up in chair  Medication Review/Management: medications reviewed  Taken 10/4/2023 1000 by Liliana Hollis RN  Activity Management: activity encouraged  Medication Review/Management: medications reviewed  Taken 10/4/2023 0800 by Liliana Hollis RN  Activity  Management: activity encouraged  Medication Review/Management: medications reviewed     Problem: Pain Acute  Goal: Acceptable Pain Control and Functional Ability  Outcome: Ongoing, Progressing  Intervention: Prevent or Manage Pain  Recent Flowsheet Documentation  Taken 10/4/2023 1800 by Liliana Hollis RN  Medication Review/Management: medications reviewed  Taken 10/4/2023 1600 by Liliana Hollis RN  Medication Review/Management: medications reviewed  Taken 10/4/2023 1400 by Liliana Hollis RN  Medication Review/Management: medications reviewed  Taken 10/4/2023 1200 by Liliana Hollis RN  Medication Review/Management: medications reviewed  Taken 10/4/2023 1000 by Liliana Hollis RN  Medication Review/Management: medications reviewed  Taken 10/4/2023 0800 by Liliana Hollis RN  Medication Review/Management: medications reviewed     Problem: Fall Injury Risk  Goal: Absence of Fall and Fall-Related Injury  Outcome: Ongoing, Progressing  Intervention: Identify and Manage Contributors  Recent Flowsheet Documentation  Taken 10/4/2023 1800 by Liliana Hollis RN  Medication Review/Management: medications reviewed  Taken 10/4/2023 1600 by Liliana Hollis RN  Medication Review/Management: medications reviewed  Taken 10/4/2023 1400 by Liliana Hollis RN  Medication Review/Management: medications reviewed  Taken 10/4/2023 1200 by Liliana Hollis RN  Medication Review/Management: medications reviewed  Taken 10/4/2023 1000 by Liliana Hollis RN  Medication Review/Management: medications reviewed  Taken 10/4/2023 0800 by Liliana Hollis RN  Medication Review/Management: medications reviewed  Intervention: Promote Injury-Free Environment  Recent Flowsheet Documentation  Taken 10/4/2023 1800 by Liliana Hollis RN  Safety Promotion/Fall Prevention: safety round/check completed  Taken 10/4/2023 1600 by Liliana Hollis RN  Safety Promotion/Fall Prevention: safety round/check completed  Taken 10/4/2023 1400 by Liliana Hollis RN  Safety  Promotion/Fall Prevention: safety round/check completed  Taken 10/4/2023 1200 by Liliana Hollis RN  Safety Promotion/Fall Prevention: safety round/check completed  Taken 10/4/2023 1000 by Liliana Hollis RN  Safety Promotion/Fall Prevention: safety round/check completed  Taken 10/4/2023 0800 by Liliana Hollis RN  Safety Promotion/Fall Prevention: safety round/check completed     Problem: Skin Injury Risk Increased  Goal: Skin Health and Integrity  Outcome: Ongoing, Progressing  Intervention: Optimize Skin Protection  Recent Flowsheet Documentation  Taken 10/4/2023 1800 by Liliana Hollis RN  Head of Bed (HOB) Positioning: HOB elevated  Taken 10/4/2023 1600 by Liliana Hollis RN  Head of Bed (HOB) Positioning: HOB elevated  Taken 10/4/2023 1400 by Liliana Hollis RN  Head of Bed (HOB) Positioning: HOB elevated  Taken 10/4/2023 1000 by Liliana Hollis RN  Head of Bed (HOB) Positioning: HOB elevated  Taken 10/4/2023 0800 by Liliana Hollis RN  Head of Bed (HOB) Positioning: HOB elevated     Problem: Dysrhythmia  Goal: Normalized Cardiac Rhythm  Outcome: Ongoing, Progressing   Goal Outcome Evaluation:

## 2023-10-04 NOTE — PROGRESS NOTES
Cardiology Progress Note      Reason for visit:    Acute on chronic systolic heart failure  Coronary artery disease  Paroxysmal atrial fibrillaton    IDENTIFICATION: 89-year-old gentleman who resides in [facility] Hospital Problems    Diagnosis  POA    **Acute HFrEF (heart failure with reduced ejection fraction) [I50.21]  Yes     Echo (2/23): LVEF EF 60%.  LVH with grade 2 DD.  No significant valvular disease  [facility] admission with FC IV heart failure symptoms, pulmonary edema on chest x-ray, and elevated proBNP, 9/30/2023  Echo (9/30/2023): LVEF 25%.  Moderate MR.  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  LVH with grade 2 DD.  No significant valvular disease  Pharmacologic nuclear stress (2/10/2023): Inferior infarct.  No ischemia.  LVEF 55%      Paroxysmal atrial fibrillation [I48.0]  Yes     Priority: High     [id number] 4 (age >75, CAD, HTN)  4% A-fib burden on recent loop recorder interrogation      Stage 3b chronic kidney disease [N18.32]  Yes     Priority: High    Anemia, chronic disease [D63.8]  Yes     Priority: Medium    Hyperlipidemia LDL goal <70 [E78.5]  Yes     Priority: Low    Weight loss [R63.4]  Yes     Priority: Low    CHF (congestive heart failure) [I50.9]  Yes    LBBB (left bundle branch block) [I44.7]  Yes            No events noted overnight.  He is sitting up in the bed sleeping but arouses easily.  His creatinine has returned to his baseline at 1.6.  His blood pressures are stable but low.  He is asymptomatic with them and tells me he has been able to get up without issues.  He feels like he is back to his baseline.  He would like to be discharged home.           Vital Sign Min/Max for last 24 hours  Temp  Min: 97.4 °F (36.3 °C)  Max: 98.6 °F (37 °C)   BP  Min: 100/62  Max: 113/82   Pulse  Min: 103  Max: 107   Resp  Min:  18  Max: 22   SpO2  Min: 89 %  Max: 97 %   Flow (L/min)  Min: 2  Max: 2      Intake/Output Summary (Last 24 hours) at 10/4/2023 0834  Last data filed at 10/3/2023 1350  Gross per 24 hour   Intake 200 ml   Output --   Net 200 ml           Physical Exam  Constitutional:       General: He is awake.   Neck:      Vascular: No carotid bruit or JVD.   Cardiovascular:      Rate and Rhythm: Regular rhythm. Tachycardia present.      Comments: Left bundle branch block  Pulmonary:      Effort: Pulmonary effort is normal.      Breath sounds: Normal breath sounds.   Musculoskeletal:      Right lower leg: No edema.      Left lower leg: No edema.   Skin:     General: Skin is warm and dry.   Neurological:      Mental Status: He is alert.   Psychiatric:         Behavior: Behavior is cooperative.       Tele: Normal sinus rhythm     Results Review (reviewed the patient's recent labs in the electronic medical record):      EKG (10/2/2023): Sinus tachycardia at 1 106 bpm with left bundle branch block     CT of the chest (9/29/2023): New moderate size bilateral pleural effusions.     ECHO (9/30/2023): LVEF 25%.  Moderate MR.  Small less than 1 cm pericardial effusion.  Bilateral pleural effusions    Results from last 7 days   Lab Units 10/04/23  0540 10/03/23  0400 10/02/23  0448 10/01/23  0401 09/30/23  1831 09/30/23  0535 09/29/23  1257   SODIUM mmol/L 137 137 137 138  --  138 138   POTASSIUM mmol/L 5.2 4.6 4.5 4.5  --  4.6 5.1   CHLORIDE mmol/L 103 103 101 104  --  105 103   BUN mg/dL 41* 40* 39* 32*  --  27* 22   CREATININE mg/dL 1.60* 1.93* 2.07* 1.75*  --  1.51* 1.64*   MAGNESIUM mg/dL 2.2  --  2.1 2.0   < >  --   --     < > = values in this interval not displayed.     Results from last 7 days   Lab Units 09/30/23  0724 09/30/23  0535 09/29/23  1257   HSTROP T ng/L 45* 45* 40*     Results from last 7 days   Lab Units 10/04/23  0540 09/30/23  0535 09/29/23  1257   WBC 10*3/mm3 7.33 4.71 7.96   HEMOGLOBIN g/dL 11.1* 9.8* 11.3*  "  HEMATOCRIT % 35.4* 30.1* 35.2*   PLATELETS 10*3/mm3 227 215 258       Lab Results   Component Value Date    HGBA1C 6.0 04/07/2015       Lab Results   Component Value Date    CHOL 132 09/30/2023    TRIG 51 09/30/2023    HDL 38 (L) 09/30/2023    LDL 83 09/30/2023                Acute HFrEF   Functional class IV symptoms with elevated proBNP, pulmonary edema on admission  Symptomatic improvement with IV furosemide  No ARB or MRA due to CKD  IV diuretics on hold due to elevated creatinine but now back to baseline at 1.6  Metoprolol succinate increased to 50 mg daily 10/3 due to elevated heart rate        Coronary artery disease  Flat troponin elevation not consistent with ACS  Ischemic evaluation in February consistent with inferior infarct  Invasive ischemic evaluation be considered once euvolemic.  Patient is high risk for coronary angiography and PCI given age, CKD, reduced LVEF, anemia, and ancient bypass grafts and unlikeliness of PCI \"targets\".  High risk of JARAD  Patient would like to defer ischemic evaluation for now, particularly since not having chest pain  Aspirin 81 mg daily     Paroxysmal atrial fibrillation  Maintaining sinus on amiodarone 100 mg daily  Continue apixaban 2.5 mg twice daily for stroke prophylaxis  Has Loop recorder implant     Hyperlipidemia with goal LDL <70  Statin intolerant  Resume Zetia at discharge     Left bundle branch block  Chronic     Chronic kidney disease stage IIIb  Diuretics discontinued due to bump in creatinine of 2.07  Current creatinine now back to baseline at 1.6        Hypotension  Became hypotensive on evening of 10/1/2023  Blood pressure improved after holding diuretics  Current /69             No ACE/ARB/Entresto/spironolactone due to CKD and hypotension  Defer ischemic evaluation and treat medically.   Continue amiodarone and Eliquis for paroxysmal atrial fibrillation.  Continue metoprolol.  Higher dose given this morning.  Monitor blood pressure to see if " will tolerate.  Hold diuretics due to hypotension    Electronically signed by CYNDEE Castaneda, 10/04/23, 8:34 AM EDT.

## 2023-10-04 NOTE — PLAN OF CARE
Problem: Adult Inpatient Plan of Care  Goal: Plan of Care Review  Outcome: Ongoing, Progressing  Goal: Patient-Specific Goal (Individualized)  Outcome: Ongoing, Progressing  Goal: Absence of Hospital-Acquired Illness or Injury  Outcome: Ongoing, Progressing  Intervention: Identify and Manage Fall Risk  Recent Flowsheet Documentation  Taken 10/4/2023 0600 by Taz Corona RN  Safety Promotion/Fall Prevention:   assistive device/personal items within reach   clutter free environment maintained   safety round/check completed  Taken 10/4/2023 0400 by Taz Corona RN  Safety Promotion/Fall Prevention:   assistive device/personal items within reach   clutter free environment maintained   safety round/check completed  Taken 10/4/2023 0200 by Taz Corona RN  Safety Promotion/Fall Prevention:   assistive device/personal items within reach   clutter free environment maintained   safety round/check completed  Taken 10/4/2023 0000 by Taz Corona RN  Safety Promotion/Fall Prevention:   clutter free environment maintained   assistive device/personal items within reach   safety round/check completed  Taken 10/3/2023 2200 by Taz Corona RN  Safety Promotion/Fall Prevention:   clutter free environment maintained   assistive device/personal items within reach   safety round/check completed  Taken 10/3/2023 2000 by Taz Corona RN  Safety Promotion/Fall Prevention:   clutter free environment maintained   assistive device/personal items within reach   safety round/check completed  Intervention: Prevent Skin Injury  Recent Flowsheet Documentation  Taken 10/4/2023 0600 by Taz Corona RN  Body Position: position changed independently  Skin Protection: adhesive use limited  Taken 10/4/2023 0400 by Taz Corona RN  Body Position: position changed independently  Skin Protection: adhesive use limited  Taken 10/4/2023 0200 by Taz Corona RN  Body Position: position changed independently  Skin  Protection: adhesive use limited  Taken 10/4/2023 0000 by Taz Corona RN  Body Position: position changed independently  Skin Protection: adhesive use limited  Taken 10/3/2023 2200 by Taz Corona RN  Body Position: position changed independently  Skin Protection: adhesive use limited  Taken 10/3/2023 2000 by Taz Corona RN  Body Position: position changed independently  Skin Protection: adhesive use limited  Intervention: Prevent and Manage VTE (Venous Thromboembolism) Risk  Recent Flowsheet Documentation  Taken 10/4/2023 0600 by Taz Corona RN  Activity Management: activity encouraged  Taken 10/4/2023 0400 by Taz Corona RN  Activity Management: activity encouraged  Taken 10/4/2023 0200 by Taz Corona RN  Activity Management: activity encouraged  Taken 10/4/2023 0000 by Taz Corona RN  Activity Management: activity encouraged  Taken 10/3/2023 2200 by Taz Corona RN  Activity Management: activity encouraged  Taken 10/3/2023 2000 by Taz Corona RN  Activity Management: activity encouraged  Range of Motion: active ROM (range of motion) encouraged  Intervention: Prevent Infection  Recent Flowsheet Documentation  Taken 10/4/2023 0600 by Taz Corona RN  Infection Prevention:   environmental surveillance performed   hand hygiene promoted   rest/sleep promoted  Taken 10/4/2023 0400 by Taz Corona RN  Infection Prevention:   environmental surveillance performed   hand hygiene promoted   rest/sleep promoted  Taken 10/4/2023 0200 by Taz Corona RN  Infection Prevention:   environmental surveillance performed   hand hygiene promoted   rest/sleep promoted  Taken 10/4/2023 0000 by Taz Corona RN  Infection Prevention:   environmental surveillance performed   hand hygiene promoted   rest/sleep promoted  Taken 10/3/2023 2200 by Taz Corona RN  Infection Prevention:   environmental surveillance performed   hand hygiene promoted   rest/sleep promoted  Taken  10/3/2023 2000 by Taz Corona RN  Infection Prevention:   environmental surveillance performed   hand hygiene promoted   rest/sleep promoted  Goal: Optimal Comfort and Wellbeing  Outcome: Ongoing, Progressing  Intervention: Provide Person-Centered Care  Recent Flowsheet Documentation  Taken 10/3/2023 2000 by Taz Corona RN  Trust Relationship/Rapport: care explained  Goal: Readiness for Transition of Care  Outcome: Ongoing, Progressing     Problem: Osteoarthritis Comorbidity  Goal: Maintenance of Osteoarthritis Symptom Control  Outcome: Ongoing, Progressing  Intervention: Maintain Osteoarthritis Symptom Control  Recent Flowsheet Documentation  Taken 10/4/2023 0600 by Taz Corona RN  Activity Management: activity encouraged  Taken 10/4/2023 0400 by Taz Corona RN  Activity Management: activity encouraged  Taken 10/4/2023 0200 by Taz Corona RN  Activity Management: activity encouraged  Taken 10/4/2023 0000 by Taz Corona RN  Activity Management: activity encouraged  Taken 10/3/2023 2200 by Taz Corona RN  Activity Management: activity encouraged  Taken 10/3/2023 2000 by Taz Corona RN  Activity Management: activity encouraged     Problem: Pain Acute  Goal: Acceptable Pain Control and Functional Ability  Outcome: Ongoing, Progressing  Intervention: Prevent or Manage Pain  Recent Flowsheet Documentation  Taken 10/4/2023 0200 by Taz Corona RN  Sleep/Rest Enhancement: awakenings minimized  Taken 10/4/2023 0000 by Taz Corona RN  Sleep/Rest Enhancement: relaxation techniques promoted  Taken 10/3/2023 2200 by Taz Corona RN  Sleep/Rest Enhancement: relaxation techniques promoted  Taken 10/3/2023 2000 by Taz Corona RN  Sensory Stimulation Regulation:   television on   care clustered  Bowel Elimination Promotion: adequate fluid intake promoted  Sleep/Rest Enhancement: relaxation techniques promoted  Intervention: Optimize Psychosocial Wellbeing  Recent Flowsheet  Documentation  Taken 10/3/2023 2000 by Taz Corona RN  Supportive Measures: active listening utilized  Diversional Activities: television     Problem: Fall Injury Risk  Goal: Absence of Fall and Fall-Related Injury  Outcome: Ongoing, Progressing  Intervention: Identify and Manage Contributors  Recent Flowsheet Documentation  Taken 10/4/2023 0600 by Tza Corona RN  Self-Care Promotion: independence encouraged  Taken 10/4/2023 0400 by Taz Corona RN  Self-Care Promotion: independence encouraged  Taken 10/4/2023 0300 by Taz Corona RN  Self-Care Promotion: independence encouraged  Taken 10/4/2023 0200 by Taz Corona RN  Self-Care Promotion: independence encouraged  Taken 10/4/2023 0000 by Taz Corona RN  Self-Care Promotion: independence encouraged  Taken 10/3/2023 2200 by Taz Corona RN  Self-Care Promotion: independence encouraged  Intervention: Promote Injury-Free Environment  Recent Flowsheet Documentation  Taken 10/4/2023 0600 by Taz Corona RN  Safety Promotion/Fall Prevention:   assistive device/personal items within reach   clutter free environment maintained   safety round/check completed  Taken 10/4/2023 0400 by Taz Corona RN  Safety Promotion/Fall Prevention:   assistive device/personal items within reach   clutter free environment maintained   safety round/check completed  Taken 10/4/2023 0200 by Taz Corona RN  Safety Promotion/Fall Prevention:   assistive device/personal items within reach   clutter free environment maintained   safety round/check completed  Taken 10/4/2023 0000 by Taz Corona RN  Safety Promotion/Fall Prevention:   clutter free environment maintained   assistive device/personal items within reach   safety round/check completed  Taken 10/3/2023 2200 by Taz Corona RN  Safety Promotion/Fall Prevention:   clutter free environment maintained   assistive device/personal items within reach   safety round/check completed  Taken  10/3/2023 2000 by Taz Corona RN  Safety Promotion/Fall Prevention:   clutter free environment maintained   assistive device/personal items within reach   safety round/check completed     Problem: Skin Injury Risk Increased  Goal: Skin Health and Integrity  Outcome: Ongoing, Progressing  Intervention: Optimize Skin Protection  Recent Flowsheet Documentation  Taken 10/4/2023 0600 by Taz Corona RN  Pressure Reduction Techniques: frequent weight shift encouraged  Head of Bed (HOB) Positioning: [facility] [place] elevated  Pressure Reduction Devices: pressure-redistributing mattress utilized  Skin Protection: adhesive use limited  Taken 10/4/2023 0400 by Taz Corona RN  Pressure Reduction Techniques: frequent weight shift encouraged  Head of Bed (HOB) Positioning: HOB elevated  Pressure Reduction Devices: pressure-redistributing mattress utilized  Skin Protection: adhesive use limited  Taken 10/4/2023 0200 by Taz Corona RN  Pressure Reduction Techniques: weight shift assistance provided  Head of Bed (HOB) Positioning: [facility] [place] elevated  Pressure Reduction Devices: pressure-redistributing mattress utilized  Skin Protection: adhesive use limited  Taken 10/4/2023 0000 by Taz Corona RN  Pressure Reduction Techniques: weight shift assistance provided  Head of Bed (HOB) Positioning: [facility] [place] elevated  Pressure Reduction Devices: pressure-redistributing mattress utilized  Skin Protection: adhesive use limited  Taken 10/3/2023 2200 by Taz Corona RN  Pressure Reduction Techniques: weight shift assistance provided  Head of Bed (HOB) Positioning: [facility] [place] elevated  Pressure Reduction Devices: pressure-redistributing mattress utilized  Skin Protection: adhesive use limited  Taken 10/3/2023 2000 by Taz Corona RN  Pressure Reduction Techniques: weight shift assistance provided  Head of Bed (HOB) Positioning: HOB elevated  Pressure Reduction Devices: pressure-redistributing mattress utilized  Skin Protection: adhesive use  limited     Problem: Dysrhythmia  Goal: Normalized Cardiac Rhythm  Outcome: Ongoing, Progressing   Goal Outcome Evaluation:

## 2023-10-05 ENCOUNTER — HOME HEALTH ADMISSION (OUTPATIENT)
Dept: HOME HEALTH SERVICES | Facility: HOME HEALTHCARE | Age: 88
End: 2023-10-05
Payer: COMMERCIAL

## 2023-10-05 ENCOUNTER — READMISSION MANAGEMENT (OUTPATIENT)
Dept: CALL CENTER | Facility: HOSPITAL | Age: 88
End: 2023-10-05
Payer: MEDICARE

## 2023-10-05 ENCOUNTER — DOCUMENTATION (OUTPATIENT)
Dept: HOME HEALTH SERVICES | Facility: HOME HEALTHCARE | Age: 88
End: 2023-10-05
Payer: MEDICARE

## 2023-10-05 VITALS
SYSTOLIC BLOOD PRESSURE: 90 MMHG | OXYGEN SATURATION: 91 % | HEART RATE: 84 BPM | RESPIRATION RATE: 18 BRPM | TEMPERATURE: 97.9 F | HEIGHT: 72 IN | BODY MASS INDEX: 24.26 KG/M2 | WEIGHT: 179.1 LBS | DIASTOLIC BLOOD PRESSURE: 68 MMHG

## 2023-10-05 PROBLEM — I50.23 ACUTE ON CHRONIC HFREF (HEART FAILURE WITH REDUCED EJECTION FRACTION): Status: ACTIVE | Noted: 2023-10-05

## 2023-10-05 LAB
ANION GAP SERPL CALCULATED.3IONS-SCNC: 10 MMOL/L (ref 5–15)
BUN SERPL-MCNC: 45 MG/DL (ref 8–23)
BUN/CREAT SERPL: 26.8 (ref 7–25)
CALCIUM SPEC-SCNC: 8.7 MG/DL (ref 8.2–9.6)
CHLORIDE SERPL-SCNC: 104 MMOL/L (ref 98–107)
CO2 SERPL-SCNC: 25 MMOL/L (ref 22–29)
CREAT SERPL-MCNC: 1.68 MG/DL (ref 0.76–1.27)
EGFRCR SERPLBLD CKD-EPI 2021: 37.7 ML/MIN/1.73
GLUCOSE SERPL-MCNC: 107 MG/DL (ref 65–99)
POTASSIUM SERPL-SCNC: 5 MMOL/L (ref 3.5–5.2)
SODIUM SERPL-SCNC: 139 MMOL/L (ref 136–145)

## 2023-10-05 PROCEDURE — 80048 BASIC METABOLIC PNL TOTAL CA: CPT | Performed by: INTERNAL MEDICINE

## 2023-10-05 PROCEDURE — 97535 SELF CARE MNGMENT TRAINING: CPT

## 2023-10-05 PROCEDURE — 97116 GAIT TRAINING THERAPY: CPT

## 2023-10-05 PROCEDURE — 97110 THERAPEUTIC EXERCISES: CPT

## 2023-10-05 PROCEDURE — 99232 SBSQ HOSP IP/OBS MODERATE 35: CPT | Performed by: INTERNAL MEDICINE

## 2023-10-05 PROCEDURE — 97166 OT EVAL MOD COMPLEX 45 MIN: CPT

## 2023-10-05 PROCEDURE — 92526 ORAL FUNCTION THERAPY: CPT

## 2023-10-05 RX ORDER — TORSEMIDE 20 MG/1
20 TABLET ORAL DAILY
Qty: 30 TABLET | Refills: 0 | Status: SHIPPED | OUTPATIENT
Start: 2023-10-06

## 2023-10-05 RX ORDER — TORSEMIDE 20 MG/1
20 TABLET ORAL DAILY
Status: DISCONTINUED | OUTPATIENT
Start: 2023-10-05 | End: 2023-10-05 | Stop reason: HOSPADM

## 2023-10-05 RX ORDER — METOPROLOL SUCCINATE 50 MG/1
50 TABLET, EXTENDED RELEASE ORAL DAILY
Qty: 30 TABLET | Refills: 0 | Status: SHIPPED | OUTPATIENT
Start: 2023-10-05

## 2023-10-05 RX ADMIN — PANTOPRAZOLE SODIUM 40 MG: 40 TABLET, DELAYED RELEASE ORAL at 05:53

## 2023-10-05 RX ADMIN — AMIODARONE HYDROCHLORIDE 100 MG: 200 TABLET ORAL at 07:48

## 2023-10-05 RX ADMIN — METOPROLOL SUCCINATE 50 MG: 50 TABLET, EXTENDED RELEASE ORAL at 07:47

## 2023-10-05 RX ADMIN — SENNOSIDES AND DOCUSATE SODIUM 2 TABLET: 50; 8.6 TABLET ORAL at 07:47

## 2023-10-05 RX ADMIN — TORSEMIDE 20 MG: 20 TABLET ORAL at 09:36

## 2023-10-05 RX ADMIN — APIXABAN 2.5 MG: 2.5 TABLET, FILM COATED ORAL at 07:48

## 2023-10-05 RX ADMIN — MULTIPLE VITAMINS W/ MINERALS TAB 1 TABLET: TAB at 07:47

## 2023-10-05 RX ADMIN — ASPIRIN 81 MG: 81 TABLET, COATED ORAL at 07:47

## 2023-10-05 RX ADMIN — GABAPENTIN 300 MG: 300 CAPSULE ORAL at 07:47

## 2023-10-05 NOTE — THERAPY TREATMENT NOTE
Patient Name: Mukund Pool  : 1930    MRN: [id number]                              Today's Date: 10/5/2023       Admit Date: 2023    Visit Dx:     ICD-10-CM ICD-9-CM   1. Pneumonia of both lower lobes due to infectious organism  J18.9 486   2. Acute pulmonary edema  J81.0 518.4   3. Renal insufficiency  N28.9 593.9   4. History of atrial fibrillation  Z86.79 V12.59   5. Syncope, unspecified syncope type  R55 780.2   6. Oropharyngeal dysphagia  R13.12 787.22   7. Impaired functional mobility, balance, gait, and endurance  Z74.09 V49.89   8. Acute HFrEF (heart failure with reduced ejection fraction)  I50.21 428.21     Patient Active Problem List   Diagnosis    Malignant neoplasm of right vocal cord    History of head and neck cancer    Paroxysmal atrial fibrillation    Stage 3b chronic kidney disease    Anemia, chronic disease    Weight loss    Coronary artery disease involving native coronary artery of native heart with angina pectoris    Hyperlipidemia LDL goal <70    LBBB (left bundle branch block)    Acute HFrEF (heart failure with reduced ejection fraction)    CHF (congestive heart failure)     Past Medical History:   Diagnosis Date    Arthritis     Chronic kidney disease     kidney stones    GERD (gastroesophageal reflux disease)     History of radiation therapy 2021    laryngeal mass    Prostate cancer     Vocal cord cancer      Past Surgical History:   Procedure Laterality Date    CARDIAC ELECTROPHYSIOLOGY PROCEDURE  2023    cardiac loop recorder    CHOLECYSTECTOMY      CORONARY ARTERY BYPASS GRAFT      PROSTATE SURGERY      VOCAL CORD MASS EXCISION  2021      General Information       Row Name 10/05/23 1125          Physical Therapy Time and Intention    Document Type therapy note (daily note)  -AS     Mode of Treatment physical therapy  -AS       Row Name 10/05/23 1125          General Information    Patient Profile Reviewed yes  -AS     Existing Precautions/Restrictions  fall  -AS     Barriers to Rehab none identified  -AS       Row Name 10/05/23 1125          Cognition    Orientation Status (Cognition) oriented x 4  -AS       Row Name 10/05/23 1125          Safety Issues, Functional Mobility    Safety Issues Affecting Function (Mobility) safety precaution awareness  -AS     Impairments Affecting Function (Mobility) endurance/activity tolerance;strength  -AS     Comment, Safety Issues/Impairments (Mobility) alert and following commands  -AS               User Key  (r) = Recorded By, (t) = Taken By, (c) = Cosigned By      Initials Name Provider Type    AS Brandee Mendez PTA Physical Therapist Assistant                   Mobility       Row Name 10/05/23 1125          Bed Mobility    Supine-Sit [place] (Bed Mobility) modified independence  -AS     Assistive Device (Bed Mobility) head of bed elevated  -AS     Comment, (Bed Mobility) no assist needed to complete supine>sit  -AS       [facility] Name 10/05/23 1125          Transfers    Comment, (Transfers) cues for hand placement  -AS       Row Name 10/05/23 1125          Bed-Chair Transfer    Bed-Chair [place] (Transfers) verbal cues;contact guard;1 person assist  -AS     Assistive Device (Bed-Chair Transfers) walker, front-wheeled  -AS       [facility] Name 10/05/23 1125          Sit-Stand Transfer    Sit-Stand [place] (Transfers) standby assist  -AS     Assistive Device (Sit-Stand Transfers) walker, front-wheeled  -AS       [facility] Name 10/05/23 1125          Gait/Stairs (Locomotion)    [place] Level (Gait) verbal cues;contact guard;1 person assist  -AS     Assistive Device (Gait) walker, front-wheeled  -AS     Distance in Feet (Gait) 45 + 45  -AS     Deviations/Abnormal Patterns (Gait) bilateral deviations;base of support, narrow;gait speed decreased  -AS     Bilateral Gait Deviations forward flexed posture;heel strike decreased  -AS     Comment, (Gait/Stairs) patient ambulated 45' + 45' with CGA x1 and rolling walker for  support, verbal cues for improved posture. SaO2 >94% on RA with activity.  -AS               User Key  (r) = Recorded By, (t) = Taken By, (c) = Cosigned By      Initials Name Provider Type    AS Brandee Mendez PTA Physical Therapist Assistant                   Obj/Interventions       Row Name 10/05/23 [facility]          Motor Skills    Therapeutic Exercise shoulder;knee;ankle  -AS       Row Name 10/05/23 [facility]          Shoulder (Therapeutic Exercise)    Shoulder (Therapeutic Exercise) AROM (active range of motion)  -AS     Shoulder AROM (Therapeutic Exercise) bilateral;flexion;extension;aBduction;aDduction;sitting;10 repetitions  bicep curls  -AS       Row Name 10/05/23 [facility]          Knee (Therapeutic Exercise)    Knee (Therapeutic Exercise) strengthening exercise  -AS     Knee Strengthening (Therapeutic Exercise) bilateral;marching while seated;LAQ (long arc quad);sitting;10 repetitions  -AS       [facility] Name 10/05/23 1130          Ankle (Therapeutic Exercise)    Ankle (Therapeutic Exercise) AROM (active range of motion)  -AS     Ankle AROM (Therapeutic Exercise) bilateral;dorsiflexion;plantarflexion;sitting;10 repetitions  -AS       Row Name 10/05/23 [facility]          Balance    Dynamic Standing Balance verbal cues;contact guard;1-person assist  -AS     Position/Device Used, Standing Balance walker, front-wheeled  -AS     Comment, Balance no LOB  -AS               User Key  (r) = Recorded By, (t) = Taken By, (c) = Cosigned By      Initials Name Provider Type    AS Brandee Mendez PTA Physical Therapist Assistant                   Goals/Plan    No documentation.                  Clinical Impression       Row Name 10/05/23 1130          Pain    Pretreatment Pain Rating 0/10 - no pain  -AS     Posttreatment Pain Rating 0/10 - no pain  -AS       Row Name 10/05/23 [facility]          Plan of Care Review    Plan of Care Reviewed With patient  -AS     Progress improving  -AS     Outcome Evaluation patient ambulated 45' + 45'  with CGA x1 and rolling walker for support, verbal cues for improved posture. SaO2 >94% on RA with activity. Patient appears to be at baseline functional level and would benefit from HHPT at D/C.  -AS       Row Name 10/05/23 1130          Positioning and Restraints    Pre-Treatment Position in bed  -AS     Post Treatment Position chair  -AS     In Chair reclined;call light within reach;encouraged to call for assist;exit alarm on;waffle cushion;legs elevated  -AS               User Key  (r) = Recorded By, (t) = Taken By, (c) = Cosigned By      Initials Name Provider Type    AS Brandee Mendez PTA Physical Therapist Assistant                   Outcome Measures       Row Name 10/05/23 1132 10/05/23 0821       How much help from another person do you currently need...    Turning from your back to your side while in flat bed without using bedrails? 4  -AS 3  -KR    Moving from lying on back to sitting on the side of a flat bed without bedrails? 4  -AS 3  -KR    Moving to and from a bed to a chair (including a wheelchair)? 3  -AS 3  -KR    Standing up from a chair using your arms (e.g., wheelchair, bedside chair)? 3  -AS 3  -KR    Climbing 3-5 steps with a railing? 3  -AS 2  -KR    To walk in hospital room? 3  -AS 3  -KR    AM-PAC 6 Clicks Score (PT) 20  -AS 17  -KR    Highest level of mobility 6 --> Walked 10 steps or more  -AS 5 --> Static standing  -KR      Row Name 10/05/23 1132 10/05/23 0804       Functional Assessment    Outcome Measure Options AM-PAC 6 Clicks Basic Mobility (PT)  -AS AM-PAC 6 Clicks Daily Activity (OT)  -TA              User Key  (r) = Recorded By, (t) = Taken By, (c) = Cosigned By      Initials Name Provider Type    AS Brandee Mendez PTA Physical Therapist Assistant    Pastor Greene OT Occupational Therapist    Liliana Arguelles RN Registered Nurse                                 Physical Therapy Education       Title: PT OT SLP Therapies (In Progress)       Topic: Physical  Therapy (In Progress)       Point: Mobility training (In Progress)       Learning Progress Summary             Patient Acceptance, E, NR by AS at 10/5/2023 1133    Acceptance, E, NR by KR at 10/5/2023 0949    Acceptance, E, NR by KR at 10/4/2023 0945    Acceptance, E, NR by KR at 10/3/2023 0916    Acceptance, E, VU by SD at 10/1/2023 1151   Family Acceptance, E, NR by KR at 10/4/2023 0945                         Point: Home exercise program (In Progress)       Learning Progress Summary             Patient Acceptance, E, NR by AS at 10/5/2023 1133    Acceptance, E, NR by KR at 10/5/2023 0949    Acceptance, E, NR by KR at 10/4/2023 0945    Acceptance, E, NR by KR at 10/3/2023 0916    Acceptance, E, VU by SD at 10/1/2023 1151   Family Acceptance, E, NR by KR at 10/4/2023 0945                         Point: Body mechanics (In Progress)       Learning Progress Summary             Patient Acceptance, E, NR by AS at 10/5/2023 1133    Acceptance, E, NR by KR at 10/5/2023 0949    Acceptance, E, NR by KR at 10/4/2023 0945    Acceptance, E, NR by KR at 10/3/2023 0916    Acceptance, E, VU by SD at 10/1/2023 1151   Family Acceptance, E, NR by KR at 10/4/2023 0945                         Point: Precautions (In Progress)       Learning Progress Summary             Patient Acceptance, E, NR by AS at 10/5/2023 1133    Acceptance, E, NR by KR at 10/5/2023 0949    Acceptance, E, NR by KR at 10/4/2023 0945    Acceptance, E, NR by KR at 10/3/2023 0916    Acceptance, E, VU by SD at 10/1/2023 1151   Family Acceptance, E, NR by KR at 10/4/2023 0945                                         User Key       Initials Effective Dates Name Provider Type Discipline    SD 03/13/23 -  Wendi Onofre, PT Physical Therapist PT    AS 04/28/23 -  Brandee Mendez PTA Physical Therapist Assistant PT    KR 01/16/23 -  Liliana Hollis, RN Registered Nurse Nurse                  PT Recommendation and Plan     Plan of Care Reviewed With:  patient  Progress: improving  Outcome Evaluation: patient ambulated 45' + 45' with CGA x1 and rolling walker for support, verbal cues for improved posture. SaO2 >94% on RA with activity. Patient appears to be at baseline functional level and would benefit from HHPT at D/C.     Time Calculation:         PT Charges       Row Name 10/05/23 1133             Time Calculation    Start Time 1112  -AS      PT Received On 10/05/23  -AS      PT Goal Re-Cert Due Date 10/11/23  -AS         Timed Charges    85278 - PT Therapeutic Exercise Minutes 10  -AS      52205 - Gait Training Minutes  13  -AS         Total Minutes    Timed Charges Total Minutes 23  -AS       Total Minutes 23  -AS                User Key  (r) = Recorded By, (t) = Taken By, (c) = Cosigned By      Initials Name Provider Type    AS Brandee Mendez PTA Physical Therapist Assistant                  Therapy Charges for Today       Code Description Service Date Service Provider Modifiers Qty    61994429266 HC PT THER PROC EA 15 MIN 10/5/2023 Brandee Mendez, PHILLIP GP 1    18271051896 HC GAIT TRAINING EA 15 MIN 10/5/2023 Brandee Mendez PTA GP 1            PT G-Codes  Outcome Measure Options: AM-PAC 6 Clicks Basic Mobility (PT)  AM-PAC 6 Clicks Score (PT): 20  AM-PAC 6 Clicks Score (OT): 19  PT Discharge Summary  Anticipated Discharge Disposition (PT): home with assist, home with home health    Brandee Mendez PTA  10/5/2023

## 2023-10-05 NOTE — DISCHARGE SUMMARY
[facility] Medicine Services  DISCHARGE SUMMARY    Patient Name: Mukund Pool  : 1930  MRN: [id number]    Date of Admission: 2023 12:46 PM  Date of Discharge:  10/05/23  Primary Care Physician: Slick Hubabrd MD    Consults       Date and Time Order Name Status Description    2023 10:39 AM Inpatient Cardiology Consult Completed             Hospital Course     Presenting Problem: dyspnea     Active Hospital Problems    Diagnosis  POA    **Acute HFrEF (heart failure with reduced ejection fraction) [I50.21]  Yes    Acute on chronic HFrEF (heart failure with reduced ejection fraction) [I50.23]  Yes    CHF (congestive heart failure) [I50.9]  Yes    Coronary artery disease involving native coronary artery of native heart with angina pectoris [I25.119]  Yes    Hyperlipidemia LDL goal <70 [E78.5]  Yes    LBBB (left bundle branch block) [I44.7]  Yes    Paroxysmal atrial fibrillation [I48.0]  Yes    Stage 3b chronic kidney disease [N18.32]  Yes    Anemia, chronic disease [D63.8]  Yes    Weight loss [R63.4]  Yes      Resolved Hospital Problems    Diagnosis Date Resolved POA    Chest pain [R07.9] 2023 Yes    Bilateral pulmonary infiltrates [R91.8] 2023 Yes    Hypoxia [R09.02] 2023 Yes          Hospital Course:  89-year-old man with history of coronary artery disease, head neck cancer , coronary disease with prior CABG, paroxysmal A-fib on amiodarone who presents with chest pain, shortness of breath, hypoxia and 40 pound weight loss. Required 2L on presentation. CT of the chest without contrast showed new moderate-sized bilateral pleural effusions with likely compressive atelectasis. Troponins elevated but no significant delta. BNP elevated 5400, EKG with left bundle branch block. Echo showed newly reduced EF of 25%.     Acute hypoxia, resolved  Heart failure with reduced ejection fraction, new diagnosis (EF 25%)  Chest pain with history of  coronary disease, CABG  - Cards consulted -- appreciate recs   - Required 2L on presentation and then weaned to room air  -Patient was continued on aspirin and metoprolol XL was increased to 50 mg daily.  Initially diuretics were given and held due to CHRISTIANO.  Torsemide 20 mg daily was restarted per cardiology.  Should have close follow-up with heart valve clinic and referral was placed in the system.   -Holding Entresto spironolactone, ACE inhibitor in the setting of CHRISTIANO and hypotension      CHRISTIANO on CKD  - Creatinine trended up to 2.07 related to diuresis.  Improved back to baseline on discharge  -Repeat BMP in 1 week with PCP or clinic     40 pound weight loss  Previous abnormal pancreas on outside scan  -Ca 19-9 normal  -CT of the abdomen and pelvis with low-density masses within the pancreas which may represent a cyst, pseudocyst or IPMN the largest lesion within the head of the pancreas is unchanged, new 2.1 cm cyst within the mid body of the pancreas.  - Patient reports this has been followed for this and they have decided not to undergo treatment given his age.   - Outpatient management      Paroxysmal A-fib  -Continue amiodarone, metoprolol and eliquis    Discharge Follow Up Recommendations for outpatient labs/diagnostics:  PCP Dr. Hubbard 1 week  [facility] 1 week  Cardiology Dr. Arceo 4-6 weeks     Day of Discharge     HPI:   No acute events.  States that he feels good.  Denies shortness of breath.-Up ambulating today.  Reviewed plans for discharge and he would like to go home with home health.    Review of Systems  Gen- No fevers, chills  CV- No chest pain, palpitations  Resp- No cough, dyspnea  GI- No N/V/D, abd pain      Vital Signs:   Temp:  [96.8 °F (36 °C)-98.3 °F (36.8 °C)] 97.9 °F (36.6 °C)  Heart Rate:  [] 84  Resp:  [16-18] 18  BP: ()/(55-72) 90/68  Flow (L/min):  [2] 2      Physical Exam:  Constitutional: No acute distress, awake, alert; elderly  HENT: NCAT, mucous  membranes moist  Respiratory: Clear to auscultation bilaterally, respiratory effort normal   Cardiovascular: RRR, II/VI murmur   Gastrointestinal: Positive bowel sounds, soft, nontender, nondistended  Musculoskeletal: No bilateral ankle edema  Psychiatric: Appropriate affect, cooperative  Neurologic: Oriented x 3, strength symmetric in all extremities, Cranial Nerves grossly intact to confrontation, speech clear  Skin: No rashes      Pertinent  and/or Most Recent Results     LAB RESULTS:      Lab 10/04/23  0540 09/30/23  0535 09/29/23  1425 09/29/23  1257   WBC 7.33 4.71  --  7.96   HEMOGLOBIN 11.1* 9.8*  --  11.3*   HEMATOCRIT 35.4* 30.1*  --  35.2*   PLATELETS 227 215  --  258   NEUTROS ABS  --  2.53  --  5.75   IMMATURE GRANS (ABS)  --  0.01  --  0.04   LYMPHS ABS  --  1.52  --  1.39   MONOS ABS  --  0.55  --  0.68   EOS ABS  --  0.06  --  0.03   .6* 104.2*  --  106.3*   CRP  --   --   --  0.54*   PROCALCITONIN  --   --   --  0.05   LACTATE  --   --  1.8  --    LDH  --   --   --  178   D DIMER QUANT  --   --   --  0.60         Lab 10/05/23  0404 10/04/23  0540 10/03/23  0400 10/02/23  0448 10/01/23  0401 09/30/23  1831 09/30/23  0535 09/29/23  1257   SODIUM 139 137 137 137 138  --    < > 138   POTASSIUM 5.0 5.2 4.6 4.5 4.5  --    < > 5.1   CHLORIDE 104 103 103 101 104  --    < > 103   CO2 25.0 24.0 27.0 26.0 26.0  --    < > 25.0   ANION GAP 10.0 10.0 7.0 10.0 8.0  --    < > 10.0   BUN 45* 41* 40* 39* 32*  --    < > 22   CREATININE 1.68* 1.60* 1.93* 2.07* 1.75*  --    < > 1.64*   EGFR 37.7* 39.9* 31.9* 29.3* 35.9*  --    < > 38.8*   GLUCOSE 107* 93 100* 104* 98  --    < > 133*   CALCIUM 8.7 8.5 8.3 8.2 8.2  --    < > 8.9   MAGNESIUM  --  2.2  --  2.1 2.0 2.1  --   --    TSH  --   --   --   --   --   --   --  3.140    < > = values in this interval not displayed.         Lab 09/29/23  1257   TOTAL PROTEIN 7.3   ALBUMIN 3.9   GLOBULIN 3.4   ALT (SGPT) 17   AST (SGOT) 19   BILIRUBIN 1.1   ALK PHOS 103          Lab 09/30/23  0724 09/30/23  0535 09/29/23  1257   PROBNP  --   --  5,453.0*   HSTROP T 45* 45* 40*         Lab 09/30/23  0535   CHOLESTEROL 132   LDL CHOL 83   HDL CHOL 38*   TRIGLYCERIDES 51         Lab 09/29/23  1257   FERRITIN 129.80   FOLATE 9.59   VITAMIN B 12 1,278*         Brief Urine Lab Results       None          Microbiology Results (last 10 days)       Procedure Component Value - Date/Time    Blood Culture - Blood, Arm, Left [[id number]]  (Normal) Collected: 09/29/23 1445    Lab Status: Final result Specimen: Blood from Arm, Left Updated: 10/04/23 1515     Blood Culture No growth at 5 days    Blood Culture - Blood, Arm, Right [[id number]]  (Normal) Collected: 09/29/23 1425    Lab Status: Final result Specimen: Blood from Arm, Right Updated: 10/04/23 1445     Blood Culture No growth at 5 days    COVID PRE-OP / PRE-PROCEDURE SCREENING ORDER (NO ISOLATION) - Swab, Nasopharynx [[id number]]  (Normal) Collected: 09/29/23 1259    Lab Status: Final result Specimen: Swab from Nasopharynx Updated: 09/29/23 1337    Narrative:      The following orders were created for panel order COVID PRE-OP / PRE-PROCEDURE SCREENING ORDER (NO ISOLATION) - Swab, Nasopharynx.  Procedure                               Abnormality         Status                     ---------                               -----------         ------                     COVID-19 and FLU A/B PCR...[[id number]]  Normal              Final result                 Please view results for these tests on the individual orders.    COVID-19 and FLU A/B PCR - Swab, Nasopharynx [[id number]]  (Normal) Collected: 09/29/23 1259    Lab Status: Final result Specimen: Swab from Nasopharynx Updated: 09/29/23 1337     COVID19 Not Detected     Influenza A PCR Not Detected     Influenza B PCR Not Detected    Narrative:      Fact sheet for providers: https://www.fda.gov/media/002741/download    Fact sheet for patients: https://www.fda.gov/media/362884/download    Test performed  by PCR.            Adult Transthoracic Echo Complete W/ Cont if Necessary Per Protocol    Result Date: 9/30/2023    The left ventricle is mildly dilated and globally hypokinetic.  Left ventricular systolic function is moderately decreased. Estimated left ventricular EF = 25%   Aortic valve is calcified with no significant stenosis or regurgitation.   Moderate mitral valve regurgitation is present.   Estimated right ventricular systolic pressure from tricuspid regurgitation is normal (<35 mmHg).   There is a small (<1cm) pericardial effusion adjacent to the left ventricle.   Bilateral pleural effusions present     CT Abdomen Pelvis Without Contrast    Result Date: 9/29/2023  CT ABDOMEN PELVIS WO CONTRAST Date of Exam: 9/29/2023 6:32 PM EDT Indication: Weight loss, unintended prior cystic mass in pancreas. Comparison: PET/CT 5/27/2021 Technique: Axial CT images were obtained of the abdomen and pelvis without the administration of contrast. Reconstructed coronal and sagittal images were also obtained. Automated exposure control and iterative construction methods were used. Findings: There are new moderate-sized margins. There is dependent atelectasis within the bilateral lower lobes. 1 cm 8.8 cm The liver appears within normal limits. Patient is status post cholecystectomy. No evidence biliary tract obstruction. Again seen within the head of the pancreas is a cystic appearing low-density mass measuring 4.7 x 2.5 cm in size. This is unchanged in size from prior exam. Within the mid body of the pancreas there is an additional well-circumscribed low density mass which appears new. This measures 2.1 x 1.9 cm in size. Pancreatic parenchyma is overall atrophic. No evidence of pancreatic duct obstruction. Spleen appears normal. Bilateral adrenal glands are within normal limits. There is a cyst at the upper pole of the left kidney which is unchanged. There are nonobstructing stones in the lower pole the right kidney. No left  renal stones. No evidence of ureteral stone or hydronephrosis. There is no abdominal or retroperitoneal adenopathy. The upper GI tract is within normal limits. Pelvis: Ureters within normal limits. Patient appears to be status post prostatectomy. There are scattered colonic diverticula. No evidence diverticulitis. No free. No free intraperitoneal fluid. Patient is status post total left hip arthroplasty. There is diffuse osteopenia of the bony structures. No lytic or sclerotic bony lesions are identified.     Impression: 1. There are low-density masses within the pancreas which may represent a pancreatic cyst, pseudocyst, or IPMN. The largest lesion within the head of the pancreas is unchanged. There is a new 2.1 cm cyst within the mid body of the pancreas. 2. Moderate bilateral pleural effusions which are new. 3. Nonobstructing right renal stones. 4. Colonic diverticulosis but no evidence of diverticulitis. Electronically Signed: Jamarcus Loco MD  9/29/2023 7:44 PM EDT  Workstation ID: [id number]    CT Chest Without Contrast Diagnostic    Result Date: 9/29/2023  CT CHEST WO CONTRAST DIAGNOSTIC Date of Exam: 9/29/2023 6:32 PM EDT Indication: Pneumonia, complication suspected, xray done. Comparison: PET/CT 8/25/2021 Technique: Axial CT images were obtained of the chest without contrast administration.  Reconstructed coronal and sagittal images were also obtained. Automated exposure control and iterative construction methods were used. Findings: No mediastinal, hilar, or axillary adenopathy. There is a large modification. Patient is status post median sternotomy for coronary artery bypass grafting. There are new moderate-sized bilateral pleural effusions. There is bilateral lower lobe likely due to compressive atelectasis. No suspicious pulmonary nodule. Bilateral adrenal glands are within normal limits. There is a cyst of the upper pole which is unchanged. There is diffuse osteopenia. No acute fracture identified. No  lytic or sclerotic bony lesions. There is confluent ossification of the anterior and posterior longitudinal ligaments compatible changes of ankylosing spondylitis.     Impression: 1. New moderate-sized bilateral pleural effusions. 2. New bibasilar airspace disease likely due to compressive atelectasis. Electronically Signed: Jamarcus Loco MD  9/29/2023 7:47 PM EDT  Workstation ID: [id number]    FL Video Swallow With Speech Single Contrast    Result Date: 9/30/2023  FL VIDEO SWALLOW W SPEECH SINGLE-CONTRAST Date of Exam: 9/30/2023 10:56 AM EDT Indication: r/o dysphagia. Comparison: None available. TECHNIQUE: 54 seconds of fluoroscopic time was used for this exam. 6 associated fluoroscopic loops were saved. The patient was evaluated in the seated lateral position while taking a variety of consistencies of barium by mouth under the direction of speech pathology.  COMPARISON: NONE FINDINGS: 54 seconds of fluoroscopy provided for a modified barium swallow. Please see speech therapy report for full details and recommendations.     Fluoroscopy provided for a modified barium swallow. Please see speech therapy report for full details and recommendations. Electronically Signed: John Guido MD  9/30/2023 11:58 AM EDT  Workstation ID: [id number]    XR Chest 1 View    Result Date: 9/29/2023  XR CHEST 1 VW Date of Exam: 9/29/2023 1:01 PM EDT Indication: SOA triage protocol Comparison: PET/CT 8/25/2021  Findings: Prior sternotomy and CABG. Patient rotated side to left. Cardiac loop recorder noted. Central pulmonary vascular congestion with interstitial thickening most compatible with pulmonary edema. Bibasilar airspace opacities with small bilateral pleural effusions. No pneumothorax.     Impression: 1. Pulmonary edema. 2. Bibasilar airspace disease may relate to atelectasis and/or pneumonia with small bilateral pleural effusions. Electronically Signed: Soto Matos MD  9/29/2023 1:31 PM EDT  Workstation ID: [id number]              Results for orders placed during the hospital encounter of 09/29/23    Adult Transthoracic Echo Complete W/ Cont if Necessary Per Protocol    Interpretation Summary    The left ventricle is mildly dilated and globally hypokinetic.  Left ventricular systolic function is moderately decreased. Estimated left ventricular EF = 25%    Aortic valve is calcified with no significant stenosis or regurgitation.    Moderate mitral valve regurgitation is present.    Estimated right ventricular systolic pressure from tricuspid regurgitation is normal (<35 mmHg).    There is a small (<1cm) pericardial effusion adjacent to the left ventricle.    Bilateral pleural effusions present      Plan for Follow-up of Pending Labs/Results:     Discharge Details        Discharge Medications        New Medications        Instructions Start Date   torsemide 20 MG tablet  Commonly known as: DEMADEX   20 mg, Oral, Daily   Start Date: October 6, 2023            Changes to Medications        Instructions Start Date   metoprolol succinate XL 50 MG 24 hr tablet  Commonly known as: TOPROL-XL  What changed:   medication strength  how much to take   50 mg, Oral, Daily             Continue These Medications        Instructions Start Date   amiodarone 100 MG tablet  Commonly known as: PACERONE   100 mg, Oral, Daily      apixaban 2.5 MG tablet tablet  Commonly known as: ELIQUIS   2.5 mg, Oral, 2 Times Daily      aspirin 81 MG EC tablet   81 mg, Oral, Daily      ezetimibe 10 MG tablet  Commonly known as: ZETIA   10 mg, Oral, Daily      gabapentin 300 MG capsule  Commonly known as: NEURONTIN   Take 1 capsule by mouth 3 (Three) Times a Day.      multivitamin with minerals tablet tablet   1 tablet, Oral, Daily      nitroglycerin 0.4 MG SL tablet  Commonly known as: NITROSTAT   No dose, route, or frequency recorded.      omeprazole 20 MG capsule  Commonly known as: priLOSEC   20 mg, Oral, Daily               Allergies   Allergen Reactions    Statins  Myalgia         [place] [facility] [place]:  Hospital:  Diet Order   Procedures    Diet: Regular/House Diet; Texture: Regular Texture (IDDSI 7); Fluid Consistency: Thin (IDDSI 0)       Diet Instructions       Diet: Cardiac Diets; Healthy Heart (2-3 Na+); Thin (IDDSI 0)      Discharge Diet: Cardiac Diets    Cardiac Diet: Healthy Heart (2-3 Na+)    Fluid Consistency: Thin (IDDSI 0)             Activity:  Activity Instructions       Activity as Tolerated              Restrictions or Other Recommendations:         CODE STATUS:    Code Status and Medical Interventions:   Ordered at: 09/29/23 1454     Code Status (Patient has no pulse and is not breathing):    CPR (Attempt to Resuscitate)     Medical Interventions (Patient has pulse or is breathing):    Full       No future appointments.    Additional Instructions for the Follow-ups that You Need to Schedule       Ambulatory Referral to Home Health   As directed      Face to Face Visit Date: 10/3/2023   Follow-up provider for Plan of Care?: I treated the patient in an acute care facility and will not continue treatment after discharge.   Follow-up provider: SHAYLA BARTLETT [[id number]]   Reason/Clinical Findings: Presenting with weakness, decreased activity doni, impaired balance, and difficulty walking and is below baseline level of function for mobility   Describe mobility limitations that make leaving home difficult: Impaired Functional Mobility, Gait, Balance, and Endurance   Nursing/Therapeutic Services Requested: Physical Therapy Occupational Therapy   PT orders: Therapeutic exercise Gait Training Transfer training Strengthening Home safety assessment   Weight Bearing Status: As Tolerated   Occupational orders: Activities of daily living Home safety assessment Energy conservation Strengthening   Frequency: 1 Week 1        Ambulatory Referral to Home Health   As directed      Face to Face Visit Date: 10/5/2023   Follow-up provider for  Plan of Care?: I treated the patient in an acute care facility and will not continue treatment after discharge.   Follow-up provider: SHAYLA HUBBARD [[id number]]   Reason/Clinical Findings: sp hospital stay   Describe mobility limitations that make leaving home difficult: weakness   Nursing/Therapeutic Services Requested: Skilled Nursing Physical Therapy Occupational Therapy   Skilled nursing orders: Medication education Cardiopulmonary assessments   PT orders: Therapeutic exercise Gait Training Transfer training Strengthening   Weight Bearing Status: As Tolerated   Occupational orders: Activities of daily living Home safety assessment Strengthening Cognition Fine motor   Frequency: 1 Week 1        Discharge Follow-up with PCP   As directed       Currently Documented PCP:    Shayla Hubbard MD    PCP Phone Number:    [phone]     Follow Up Details: PCP Dr. Hubbard 1 week        Discharge Follow-up with Specified Provider: Cardiology Dr. Espinoza 4 weeks   As directed      To: Cardiology Dr. Espinoza 4 weeks        Discharge Follow-up with Specified Provider: [facility] 1 week   As directed      To: [facility] 1 week                      Sunita Laguerre DO  10/05/23      Time Spent on Discharge:  I spent  35  minutes on this discharge activity which included: face-to-face encounter with the patient, reviewing the data in the system, coordination of the care with the nursing staff as well as consultants, documentation, and entering orders.

## 2023-10-05 NOTE — PLAN OF CARE
Problem: Adult Inpatient Plan of Care  Goal: Plan of Care Review  Outcome: Adequate for Care Transition  Goal: Patient-Specific Goal (Individualized)  Outcome: Adequate for Care Transition  Goal: Absence of Hospital-Acquired Illness or Injury  Outcome: Adequate for Care Transition  Intervention: Identify and Manage Fall Risk  Recent Flowsheet Documentation  Taken 10/5/2023 1200 by Liliana Hollis RN  Safety Promotion/Fall Prevention: safety round/check completed  Taken 10/5/2023 1000 by Liliana Hollis RN  Safety Promotion/Fall Prevention: safety round/check completed  Taken 10/5/2023 0821 by Liliana Hollis RN  Safety Promotion/Fall Prevention: safety round/check completed  Intervention: Prevent Skin Injury  Recent Flowsheet Documentation  Taken 10/5/2023 1200 by Liliana Hollis RN  Body Position: position changed independently  Taken 10/5/2023 1000 by Liliana Hollis RN  Body Position: position changed independently  Taken 10/5/2023 0821 by Liliana Hollis RN  Body Position: position changed independently  Intervention: Prevent and Manage VTE (Venous Thromboembolism) Risk  Recent Flowsheet Documentation  Taken 10/5/2023 1200 by Liliana Hollis RN  Activity Management: activity encouraged  Taken 10/5/2023 1000 by Liliana Hollis RN  Activity Management: back to bed  Taken 10/5/2023 0821 by Liliana Hollis RN  Activity Management: activity encouraged  Range of Motion: active ROM (range of motion) encouraged  Intervention: Prevent Infection  Recent Flowsheet Documentation  Taken 10/5/2023 1200 by Liliana Hollis RN  Infection Prevention: hand hygiene promoted  Taken 10/5/2023 1000 by Liliana Hollis RN  Infection Prevention: hand hygiene promoted  Taken 10/5/2023 0821 by Liliana Hollis RN  Infection Prevention: hand hygiene promoted  Goal: Optimal Comfort and Wellbeing  Outcome: Adequate for Care Transition  Goal: Readiness for Transition of Care  Outcome: Adequate for Care Transition     Problem: Osteoarthritis  Comorbidity  Goal: Maintenance of Osteoarthritis Symptom Control  Outcome: Adequate for Care Transition  Intervention: Maintain Osteoarthritis Symptom Control  Recent Flowsheet Documentation  Taken 10/5/2023 1200 by Liliana Hollis RN  Activity Management: activity encouraged  Medication Review/Management: medications reviewed  Taken 10/5/2023 1000 by Liliana Hollis RN  Activity Management: back to bed  Medication Review/Management: medications reviewed  Taken 10/5/2023 0821 by Liliana Hollis RN  Activity Management: activity encouraged  Assistive Device Utilized: walker  Medication Review/Management: medications reviewed     Problem: Pain Acute  Goal: Acceptable Pain Control and Functional Ability  Outcome: Adequate for Care Transition  Intervention: Prevent or Manage Pain  Recent Flowsheet Documentation  Taken 10/5/2023 1200 by Liliana Hollis RN  Medication Review/Management: medications reviewed  Taken 10/5/2023 1000 by Liliana Hollis RN  Medication Review/Management: medications reviewed  Taken 10/5/2023 0821 by Liliana Hollis RN  Medication Review/Management: medications reviewed     Problem: Fall Injury Risk  Goal: Absence of Fall and Fall-Related Injury  Outcome: Adequate for Care Transition  Intervention: Identify and Manage Contributors  Recent Flowsheet Documentation  Taken 10/5/2023 1200 by Liliana Hollis RN  Medication Review/Management: medications reviewed  Taken 10/5/2023 1000 by Liliana Hollis RN  Medication Review/Management: medications reviewed  Taken 10/5/2023 0821 by Liliana Hollis RN  Medication Review/Management: medications reviewed  Intervention: Promote Injury-Free Environment  Recent Flowsheet Documentation  Taken 10/5/2023 1200 by Liliana Hollis RN  Safety Promotion/Fall Prevention: safety round/check completed  Taken 10/5/2023 1000 by Liliana Hollis RN  Safety Promotion/Fall Prevention: safety round/check completed  Taken 10/5/2023 0821 by Liliana Hollis RN  Safety  Promotion/Fall Prevention: safety round/check completed     Problem: Skin Injury Risk Increased  Goal: Skin Health and Integrity  Outcome: Adequate for Care Transition  Intervention: Optimize Skin Protection  Recent Flowsheet Documentation  Taken 10/5/2023 1200 by Liliana Hollis RN  Head of Bed (HOB) Positioning: HOB elevated  Taken 10/5/2023 1000 by Liliana Hollis RN  Head of Bed (HOB) Positioning: HOB elevated  Taken 10/5/2023 0821 by Liliana Hollis RN  Head of Bed ([facility]) Positioning: HOB elevated     Problem: Dysrhythmia  Goal: Normalized Cardiac Rhythm  Outcome: Adequate for Care Transition   Goal Outcome Evaluation:

## 2023-10-05 NOTE — PLAN OF CARE
Goal Outcome Evaluation:              Outcome Evaluation: SLP following for pharyngeal strengthening/increase swallowing function/safety.

## 2023-10-05 NOTE — THERAPY TREATMENT NOTE
Acute Care - Speech Language Pathology   Swallow Treatment Note [facility]     Patient Name: Mukund Pool  : 1930  MRN: [id number]  Today's Date: 10/5/2023               Admit Date: 2023    Visit Dx:     ICD-10-CM ICD-9-CM   1. Pneumonia of both lower lobes due to infectious organism  J18.9 486   2. Acute pulmonary edema  J81.0 518.4   3. Renal insufficiency  N28.9 593.9   4. History of atrial fibrillation  Z86.79 V12.59   5. Syncope, unspecified syncope type  R55 780.2   6. Oropharyngeal dysphagia  R13.12 787.22   7. Impaired functional mobility, balance, gait, and endurance  Z74.09 V49.89   8. Acute HFrEF (heart failure with reduced ejection fraction)  I50.21 428.21   9. Coronary artery disease involving native coronary artery of native heart with angina pectoris  I25.119 414.01     413.9   10. Anemia, chronic disease  D63.8 285.29   11. Hypoxia  R09.02 799.02   12. Paroxysmal atrial fibrillation  I48.0 427.31   13. Bilateral pulmonary infiltrates  R91.8 793.19   14. Chest pain, unspecified type  R07.9 786.50     Patient Active Problem List   Diagnosis    Malignant neoplasm of right vocal cord    History of head and neck cancer    Paroxysmal atrial fibrillation    Stage 3b chronic kidney disease    Anemia, chronic disease    Weight loss    Coronary artery disease involving native coronary artery of native heart with angina pectoris    Hyperlipidemia LDL goal <70    LBBB (left bundle branch block)    Acute HFrEF (heart failure with reduced ejection fraction)    CHF (congestive heart failure)     Past Medical History:   Diagnosis Date    Arthritis     Chronic kidney disease     kidney stones    GERD (gastroesophageal reflux disease)     History of radiation therapy 2021    laryngeal mass    Prostate cancer     Vocal cord cancer      Past Surgical History:   Procedure Laterality Date    CARDIAC ELECTROPHYSIOLOGY PROCEDURE  2023    cardiac loop recorder    CHOLECYSTECTOMY      CORONARY  ARTERY BYPASS GRAFT      PROSTATE SURGERY      VOCAL CORD MASS EXCISION  03/22/2021       SLP Recommendation and Plan     SLP Diet Recommendation: regular textures, thin liquids (10/05/23 1030)  Recommended Precautions and Strategies: upright posture during/after eating, general aspiration precautions (10/05/23 1030)  SLP Rec. for Method of Medication Administration: meds whole, with thin liquids, as tolerated (10/05/23 1030)     Monitor for Signs of Aspiration: notify SLP if any concerns (10/05/23 1030)  Recommended Diagnostics: other (see comments) (No repeat study recommended) (10/05/23 1030)     Anticipated Discharge Disposition (SLP): home with home health, home with OP services (10/05/23 1030)     Therapy Frequency (Swallow): 3 days per week (10/05/23 1030)  Predicted Duration Therapy Intervention (Days): until discharge (10/05/23 1030)  Oral Care Recommendations: Oral Care BID/PRN, Toothbrush (10/05/23 1030)        Daily Summary of Progress (SLP): progress toward functional goals as expected (10/05/23 1030)               Treatment Assessment (SLP): no clinical signs of, pharyngeal dysphagia (10/05/23 1030)  Treatment Assessment Comments (SLP): Reviewed swallowing exercises. Discussed long term effects of Rx treatment on swallowing.  Dependent for exercises and did not recall any exercises. (10/05/23 1030)  Plan for Continued Treatment (SLP): continue treatment per plan of care (10/05/23 1030)       Oral Care Recommendations: Oral Care BID/PRN, Toothbrush (10/05/23 1030)           SWALLOW EVALUATION (last 72 hours)       SLP Adult Swallow Evaluation       Row Name 10/05/23 1030 10/03/23 1550                Rehab Evaluation    Document Type therapy note (daily note)  -ML therapy note (daily note)  -MP       Subjective Information no complaints  -ML no complaints  -MP       Patient Observations alert  -ML alert;cooperative  -MP       Patient/Family/Caregiver Comments/Observations No family present  -ML No family  present  -MP       Patient Effort excellent  -ML good  -MP       Symptoms Noted During/After Treatment none  -ML --          General Information    Patient Profile Reviewed yes  -ML --       Pertinent History Of Current Problem See previously documented ST hx.  -ML --          Pain    Additional Documentation Pain Scale: Numbers Pre/Post-Treatment (Group)  -ML Pain Scale: FACES Pre/Post-Treatment (Group)  -MP          Pain Scale: Numbers Pre/Post-Treatment    Pretreatment Pain Rating 0/10 - no pain  -ML --       Posttreatment Pain Rating 0/10 - no pain  -ML --          Pain Scale: FACES Pre/Post-Treatment    Pain: FACES Scale, Pretreatment -- 0-->no hurt  -MP       Posttreatment Pain Rating -- 0-->no hurt  -MP          SLP Treatment Clinical Impressions    Treatment Assessment (SLP) no clinical signs of;pharyngeal dysphagia  -ML toleration of diet  -MP       Treatment Assessment Comments (SLP) Reviewed swallowing exercises. Discussed long term effects of Rx treatment on swallowing.  Dependent for exercises and did not recall any exercises.  -ML Reviewed MBS results & recs w/ pt, as well as information re: late effects of radiation & importance of pharyngeal strengthening exercises. Reviewed & completed dysphagia exercises w/ pt, left handout & encouraged daily independent completion.  -MP       Daily Summary of Progress (SLP) progress toward functional goals as expected  -ML progress toward functional goals is good  -MP       Plan for Continued Treatment (SLP) continue treatment per plan of care  -ML continue treatment per plan of care  -MP       Care Plan Review care plan/treatment goals reviewed  -ML care plan/treatment goals reviewed;patient/other agree to care plan  -MP          Recommendations    Therapy Frequency (Swallow) 3 days per week  -ML PRN  -MP       Predicted Duration Therapy Intervention (Days) until discharge  -ML until discharge  -MP       SLP Diet Recommendation regular textures;thin liquids  -ML  regular textures;thin liquids  -MP       Recommended Diagnostics other (see comments)  No repeat study recommended  -ML --       Recommended Precautions and Strategies upright posture during/after eating;general aspiration precautions  -ML upright posture during/after eating;general aspiration precautions  -MP       Oral Care Recommendations Oral Care BID/PRN;Toothbrush  -ML Oral Care BID/PRN;Toothbrush  -MP       SLP Rec. for Method of Medication Administration meds whole;with thin liquids;as tolerated  -ML meds whole;with thin liquids;as tolerated  -MP       Monitor for Signs of Aspiration notify SLP if any concerns  -ML notify SLP if any concerns  -MP       Anticipated Discharge Disposition (SLP) home with home health;home with OP services  -ML home with OP services  -MP                 User Key  (r) = Recorded By, (t) = Taken By, (c) = Cosigned By      Initials Name Effective Dates    ML Grace Rogers, CCC-SLP 06/16/21 -     MP Christian Gilliland MS CCC-SLP 12/28/21 -                     EDUCATION  The patient has been educated in the following areas:   Dysphagia (Swallowing Impairment).        SLP GOALS       Row Name 10/05/23 1030 10/03/23 1550          (LTG) Patient will demonstrate functional swallow for    Diet Texture (Demonstrate functional swallow) regular textures  -ML regular textures  -MP     Liquid viscosity (Demonstrate functional swallow) thin liquids  -ML thin liquids  -MP     [place] (Demonstrate functional swallow) independently (over 90% accuracy)  -ML independently (over 90% accuracy)  -MP     Time Frame (Demonstrate functional swallow) by discharge  -ML by discharge  -MP     Progress/Outcomes (Demonstrate functional swallow) goal ongoing  -ML continuing progress toward goal  -MP     Comment (Demonstrate functional swallow) No difficulty with PO trials today.  No c/o dysphagia.  -ML --        (STG) Pharyngeal Strengthening Exercise Goal 1 (SLP)    Activity (Pharyngeal  Strengthening Goal 1, SLP) increase timing;increase closure at entrance to airway/closure of airway at glottis;increase tongue base retraction  -ML increase timing;increase closure at entrance to airway/closure of airway at glottis;increase tongue base retraction  -MP     Increase Timing prepping - 3 second prep or suck swallow or 3-step swallow  -ML prepping - 3 second prep or suck swallow or 3-step swallow  -MP     Increase Closure at Entrance to Airway/Closure of Airway at Glottis supraglottic swallow  -ML supraglottic swallow  -MP     Increase Tongue Base Retraction hard effortful swallow  -ML hard effortful swallow  -MP     [place]/Accuracy (Pharyngeal Strengthening Goal 1, SLP) independently (over 90% accuracy)  -ML independently (over 90% accuracy)  -MP     Time Frame (Pharyngeal Strengthening Goal 1, SLP) short term goal (STG)  -ML short term goal (STG)  -MP     Progress/Outcomes (Pharyngeal Strengthening Goal 1, SLP) continuing progress toward goal  -ML continuing progress toward goal  -MP     Comment (Pharyngeal Strengthening Goal 1, SLP) Completes exercises with direction.  Timing exercises x 30; Effortful swallow with increased effort x 25; and supraglottic, however unable to maintain breathhold or achieve a timely second swallow.  -ML Provided handout, reviewed & completed w/ pt, and left handout for independent completion  -MP               User Key  (r) = Recorded By, (t) = Taken By, (c) = Cosigned By      Initials Name Provider Type    Grace Moore CCC-SLP Speech and Language Pathologist    Christian Colon MS CCC-SLP Speech and Language Pathologist                       Time Calculation:    Time Calculation- SLP       Row Name 10/05/23 1256             Time Calculation- SLP    SLP Start Time 1030  -ML      SLP Received On 10/05/23  -ML                User Key  (r) = Recorded By, (t) = Taken By, (c) = Cosigned By      Initials Name Provider Type    Grace Moore,  CCC-SLP Speech and Language Pathologist                    Therapy Charges for Today       Code Description Service Date Service Provider Modifiers Qty    [id number] HC ST TREATMENT SWALLOW 3 10/5/2023 Grace Rogers, CONNOR-SLP GN 1                 Grace Rogers CCC-NATACHA  10/5/2023

## 2023-10-05 NOTE — PROGRESS NOTES
Cardiology Progress Note      Reason for visit:    Acute on chronic systolic heart failure  Coronary artery disease  Paroxysmal atrial fibrillaton    IDENTIFICATION: 89-year-old gentleman who resides in [facility] Problems    Diagnosis  POA    **Acute HFrEF (heart failure with reduced ejection fraction) [I50.21]  Yes     Priority: High     Echo (2/23): LVEF EF 60%.  LVH with grade 2 DD.  No significant valvular disease  [facility] admission with FC IV heart failure symptoms, pulmonary edema on chest x-ray, and elevated proBNP, 9/30/2023  Echo (9/30/2023): LVEF 25%.  Moderate MR.  RVSP <35 mmHg      Coronary artery disease involving native coronary artery of native heart with angina pectoris [I25.119]  Yes     Priority: Medium     CABG 1980  Echo (2/23): LVEF EF 60%.  LVH with grade 2 DD.  No significant valvular disease  Pharmacologic nuclear stress (2/10/2023): Inferior infarct.  No ischemia.  LVEF 55%      Hyperlipidemia LDL goal <70 [E78.5]  Yes     Priority: Medium    Paroxysmal atrial fibrillation [I48.0]  Yes     Priority: Medium     [id number] 4 (age >75, CAD, HTN)  4% A-fib burden on recent loop recorder interrogation      Anemia, chronic disease [D63.8]  Yes     Priority: Medium    CHF (congestive heart failure) [I50.9]  Yes    LBBB (left bundle branch block) [I44.7]  Yes    Stage 3b chronic kidney disease [N18.32]  Yes    Weight loss [R63.4]  Yes            Patient awake and in good spirits  Desires to go back to [facility]  No chest pain and breathing improved         Vital Sign Min/Max for last 24 hours  Temp  Min: 96.8 °F (36 °C)  Max: 98.5 °F (36.9 °C)   BP  Min: 95/66  Max: 102/70   Pulse  Min: 100  Max: 104   Resp  Min: 16  Max: 18   SpO2  Min: 91 %  Max: 98 %   Flow (L/min)  Min: 2  Max: 2      Intake/Output Summary (Last 24 hours) at 10/5/2023 0854  Last data filed at 10/5/2023 0821  Gross per 24 hour   Intake 480 ml   Output --   Net 480 ml             Physical  Exam  Constitutional:       General: He is awake.   Neck:      Vascular: No carotid bruit or JVD.   Cardiovascular:      Rate and Rhythm: Regular rhythm. Tachycardia present.      Comments: Left bundle branch block  Pulmonary:      Effort: Pulmonary effort is normal.      Breath sounds: Normal breath sounds.   Musculoskeletal:      Right lower leg: No edema.      Left lower leg: No edema.   Skin:     General: Skin is warm and dry.   Neurological:      Mental Status: He is alert.   Psychiatric:         Behavior: Behavior is cooperative.       Tele: Normal sinus rhythm     Results Review (reviewed the patient's recent labs in the electronic medical record):      EKG (10/2/2023): Sinus tachycardia at 1 106 bpm with left bundle branch block     CT of the chest (9/29/2023): New moderate size bilateral pleural effusions.     ECHO (9/30/2023): LVEF 25%.  Moderate MR.  Small less than 1 cm pericardial effusion.  Bilateral pleural effusions    Results from last 7 days   Lab Units 10/05/23  0404 10/04/23  0540 10/03/23  0400 10/02/23  0448 10/01/23  0401 09/30/23  1831 09/30/23  0535 09/29/23  1257   SODIUM mmol/L 139 137 137 137 138  --  138 138   POTASSIUM mmol/L 5.0 5.2 4.6 4.5 4.5  --  4.6 5.1   CHLORIDE mmol/L 104 103 103 101 104  --  105 103   BUN mg/dL 45* 41* 40* 39* 32*  --  27* 22   CREATININE mg/dL 1.68* 1.60* 1.93* 2.07* 1.75*  --  1.51* 1.64*   MAGNESIUM mg/dL  --  2.2  --  2.1 2.0   < >  --   --     < > = values in this interval not displayed.       Results from last 7 days   Lab Units 09/30/23  0724 09/30/23  0535 09/29/23  1257   HSTROP T ng/L 45* 45* 40*       Results from last 7 days   Lab Units 10/04/23  0540 09/30/23  0535 09/29/23  1257   WBC 10*3/mm3 7.33 4.71 7.96   HEMOGLOBIN g/dL 11.1* 9.8* 11.3*   HEMATOCRIT % 35.4* 30.1* 35.2*   PLATELETS 10*3/mm3 227 215 258         Lab Results   Component Value Date    HGBA1C 6.0 04/07/2015       Lab Results   Component Value Date    CHOL 132 09/30/2023    TRIG 51  "09/30/2023    HDL 38 (L) 09/30/2023    LDL 83 09/30/2023                Acute HFrEF V EF 25%  Functional class IV symptoms with elevated proBNP, pulmonary edema on admission  Improved with diuresis  On metoprolol succinate 25 mg daily  No ACE/ARB/MRA due to borderline BP and CKD        Coronary artery disease  Flat troponin elevation not consistent with ACS  Ischemic evaluation in February consistent with inferior infarct  Invasive ischemic evaluation be considered once euvolemic.  Patient is high risk for coronary angiography and PCI given age, CKD, reduced LVEF, anemia, and ancient bypass grafts and unlikeliness of PCI \"targets\".  High risk of JARAD  Continue medical therapy with aspirin, beta-blocker, statin     Paroxysmal atrial fibrillation  Maintaining sinus on amiodarone 100 mg daily  Continue apixaban 2.5 mg twice daily for stroke prophylaxis  Loop recorder interrogation shows no atrial fibrillation recently     Hyperlipidemia with goal LDL <70  Statin intolerant  Resume Zetia at discharge     Left bundle branch block  Chronic     Chronic kidney disease stage IIIb  Diuretics discontinued due to bump in creatinine of 2.07                PT OT evaluation  Hopefully back to [facility] today or tomorrow  No ACE/ARB/MRA due to CKD and marginal BP  Start torsemide 20 mg daily    Electronically signed by Wallace Espinoza IV, MD, 10/05/23, 8:57 AM EDT.    " Destruction After The Procedure: No

## 2023-10-05 NOTE — PROGRESS NOTES
Case Management Discharge Note    Final Note: [facility] will accept Mr. Pool for home health for skilled nursing, PT and OT he will return to [facility] on the 3rd floor in an independent apartment. Anika the director of nursing at [facility] said he can have skilled nursing and home health.           Selected Continued Care - Admitted Since 9/29/2023       Destination    No services have been selected for the patient.                Durable Medical Equipment    No services have been selected for the patient.                Dialysis/Infusion    No services have been selected for the patient.                Home Medical Care       Service Provider Selected Services Address Phone Fax Patient Preferred    [facility] [address] [address] [phone] --              Therapy    No services have been selected for the patient.                Community Resources    No services have been selected for the patient.                Community & DME    No services have been selected for the patient.                         Final Discharge Disposition Code: 06 - home with home health care

## 2023-10-05 NOTE — THERAPY EVALUATION
Patient Name: Mukund Pool  : 1930    MRN: [id number]                              Today's Date: 10/5/2023       Admit Date: 2023    Visit Dx:     ICD-10-CM ICD-9-CM   1. Pneumonia of both lower lobes due to infectious organism  J18.9 486   2. Acute pulmonary edema  J81.0 518.4   3. Renal insufficiency  N28.9 593.9   4. History of atrial fibrillation  Z86.79 V12.59   5. Syncope, unspecified syncope type  R55 780.2   6. Oropharyngeal dysphagia  R13.12 787.22   7. Impaired functional mobility, balance, gait, and endurance  Z74.09 V49.89   8. Acute HFrEF (heart failure with reduced ejection fraction)  I50.21 428.21     Patient Active Problem List   Diagnosis    Malignant neoplasm of right vocal cord    History of head and neck cancer    Paroxysmal atrial fibrillation    Stage 3b chronic kidney disease    Anemia, chronic disease    Weight loss    Coronary artery disease involving native coronary artery of native heart with angina pectoris    Hyperlipidemia LDL goal <70    LBBB (left bundle branch block)    Acute HFrEF (heart failure with reduced ejection fraction)    CHF (congestive heart failure)     Past Medical History:   Diagnosis Date    Arthritis     Chronic kidney disease     kidney stones    GERD (gastroesophageal reflux disease)     History of radiation therapy 2021    laryngeal mass    Prostate cancer     Vocal cord cancer      Past Surgical History:   Procedure Laterality Date    CARDIAC ELECTROPHYSIOLOGY PROCEDURE  2023    cardiac loop recorder    CHOLECYSTECTOMY      CORONARY ARTERY BYPASS GRAFT      PROSTATE SURGERY      VOCAL CORD MASS EXCISION  2021      General Information       Row Name 10/05/23 0804          OT Time and Intention    Document Type evaluation  -TA     Mode of Treatment occupational therapy  -TA       Row Name 10/05/23 0804          General Information    Patient Profile Reviewed yes  -TA     Prior Level of Function independent:;all household  mobility;gait;transfer;bed mobility;ADL's  walks to dining room for meals  -TA     Existing Precautions/Restrictions fall  -TA     Barriers to Rehab none identified  -TA       Row Name 10/05/23 0804          Occupational Profile    Reason for Services/Referral (Occupational Profile) fxl decline from PLOF  -TA     Patient Goals (Occupational Profile) Return to [facility]       Row Name 10/05/23 0804          Living Environment    People in Home facility resident  [facility]  -TA       Row Name 10/05/23 0804          Home Main Entrance    Number of Stairs, Main Entrance none  -TA       Row Name 10/05/23 0804          Stairs Within Home, Primary    Number of Stairs, Within Home, Primary none  -TA       Row Name 10/05/23 0804          Cognition    Orientation Status (Cognition) oriented x 4  -TA       Row Name 10/05/23 0804          Safety Issues, Functional Mobility    Safety Issues Affecting Function (Mobility) safety precaution awareness  -TA     Impairments Affecting Function (Mobility) endurance/activity tolerance;strength  -TA               User Key  (r) = Recorded By, (t) = Taken By, (c) = Cosigned By      Initials Name Provider Type    TA Pastor Ventura, OT Occupational Therapist                     Mobility/ADL's       Row Name 10/05/23 0804          Bed Mobility    Bed Mobility supine-sit  -TA     Supine-Sit [place] (Bed Mobility) modified independence  -TA     Assistive Device (Bed Mobility) head of bed elevated  -TA       Row Name 10/05/23 0804          Transfers    Transfers sit-stand transfer  -TA       Row Name 10/05/23 0804          Sit-Stand Transfer    Sit-Stand [place] (Transfers) supervision  -TA     Assistive Device (Sit-Stand Transfers) walker, front-wheeled  -TA       Row Name 10/05/23 0804          Functional Mobility    Functional Mobility- Ind. Level supervision required  -TA     Functional Mobility- Device walker, front-wheeled  -TA     Functional Mobility-Distance  (Feet) 190  -TA     Functional Mobility- Comment Pt required 1 VC for adaptive breathing, no unsteadiness or LOB  -TA       Row Name 10/05/23 0804          Activities of Daily Living    BADL Assessment/Intervention upper body dressing;grooming  -TA       Row Name 10/05/23 08          Upper Body Dressing Assessment/Training    [place] Level (Upper Body Dressing) don;pajama/robe;set up;standby assist  -TA     Position (Upper Body Dressing) edge of bed sitting  -TA       Row Name 10/05/23 08          Grooming Assessment/Training    [place] Level (Grooming) hair care, combing/brushing;oral care regimen;wash face, hands;set up;supervision  -TA     Position (Grooming) sink side;unsupported standing;supported standing  -TA               User Key  (r) = Recorded By, (t) = Taken By, (c) = Cosigned By      Initials Name Provider Type    TA Pastor Ventura, OT Occupational Therapist                   Obj/Interventions       Row Name 10/05/23 08          Sensory Assessment (Somatosensory)    Sensory Assessment (Somatosensory) bilateral UE;sensation intact  -TA       Row Name 10/05/23 04          Vision Assessment/Intervention    Visual Impairment/Limitations WFL;corrective lenses full-time  -TA       Row Name 10/05/23 0804          Range of Motion Comprehensive    General Range of Motion bilateral upper extremity ROM WFL  -TA       Row Name 10/05/23 08          Strength Comprehensive (MMT)    General Manual Muscle Testing (MMT) Assessment upper extremity strength deficits identified  -TA     Comment, General Manual Muscle Testing (MMT) Assessment BUE 4+/5  -TA       Row Name 10/05/23 0804          Balance    Balance Assessment sitting dynamic balance;standing dynamic balance  -TA     Dynamic Sitting Balance independent  -TA     Position, Sitting Balance sitting edge of bed;sitting in chair  -TA     Dynamic Standing Balance supervision  -TA     Position/Device Used, Standing Balance walker, rolling  -TA      Balance Interventions sit to stand;weight shifting activity;occupation based/functional task;UE activity with balance activity  -TA               User Key  (r) = Recorded By, (t) = Taken By, (c) = Cosigned By      Initials Name Provider Type    Pastor Greene OT Occupational Therapist                   Goals/Plan       Row Name 10/05/23 0804          Transfer Goal 1 (OT)    Activity/Assistive Device (Transfer Goal 1, OT) toilet;bed-to-chair/chair-to-bed;sit-to-stand/stand-to-sit;walker, rolling  -TA     [place] Level/Cues Needed (Transfer Goal 1, OT) modified independence  -TA     Time Frame (Transfer Goal 1, OT) by discharge  -TA     Progress/Outcome (Transfer Goal 1, OT) goal ongoing  -TA       Row Name 10/05/23 0804          Dressing Goal 1 (OT)    Activity/Device (Dressing Goal 1, OT) lower body dressing  -TA     [place]/Cues Needed (Dressing Goal 1, OT) modified independence  -TA     Time Frame (Dressing Goal 1, OT) by discharge  -TA     Progress/Outcome (Dressing Goal 1, OT) goal ongoing  -TA       Row Name 10/05/23 0804          Toileting Goal 1 (OT)    Activity/Device (Toileting Goal 1, OT) toileting skills, all  -TA     [place] Level/Cues Needed (Toileting Goal 1, OT) modified independence  -TA     Time Frame (Toileting Goal 1, OT) by discharge  -TA     Progress/Outcome (Toileting Goal 1, OT) goal ongoing  -TA       Row Name 10/05/23 0804          Therapy Assessment/Plan (OT)    Planned Therapy Interventions (OT) activity tolerance training;BADL retraining;functional balance retraining;patient/caregiver education/training;ROM/therapeutic exercise;strengthening exercise;transfer/mobility retraining;occupation/activity based interventions  -TA               User Key  (r) = Recorded By, (t) = Taken By, (c) = Cosigned By      Initials Name Provider Type    Pastor Greene OT Occupational Therapist                   Clinical Impression       Row Name 10/05/23 0804          Pain  Assessment    Pretreatment Pain Rating 0/10 - no pain  -TA     Posttreatment Pain Rating 0/10 - no pain  -TA     Pre/Posttreatment Pain Comment Pt denied pain, tolerated  -TA     Pain Intervention(s) Ambulation/increased activity;Repositioned  -TA       Row Name 10/05/23 0804          Plan of Care Review    Plan of Care Reviewed With patient  -TA     Outcome Evaluation Pt presents with fxl decline from PLOF, deficits in ADL performance, fxl mobility, occupational endurance. Pt with 10 point drop in BP upon standing but asymptomatic. Pt completed bed mobility with Mod I, STS with supervision, UBD set up, ambulated to BR sink side grooming with set up/Sup, ambulated 190' in hallway. Pt will benefit from skilled OT services to address deficits, facilitate increased fxl I. Pt appears close to baseline fxl level and would benefit from HH services upon discharge.  -TA       Row Name 10/05/23 0804          Therapy Assessment/Plan (OT)    Patient/Family Therapy Goal Statement (OT) Return home  -TA     Rehab Potential (OT) good, to achieve stated therapy goals  -TA     Criteria for Skilled Therapeutic Interventions Met (OT) yes;skilled treatment is necessary  -TA     Therapy Frequency (OT) daily  -TA     Predicted Duration of Therapy Intervention (OT) 3 days  -TA       Row Name 10/05/23 0804          Therapy Plan Review/Discharge Plan (OT)    Anticipated Discharge Disposition (OT) home with home health  -TA       Row Name 10/05/23 0804          Vital Signs    Pre Systolic BP Rehab 106  seated  -TA     Pre Treatment Diastolic BP 71  -TA     Intra Systolic BP Rehab 97  standing  -TA     Intra Treatment Diastolic BP 68  -TA     Post Systolic BP Rehab 97  in chair at end of eval/tx  -TA     Post Treatment Diastolic BP 70  -TA     Pre SpO2 (%) 97  -TA     O2 Delivery Pre Treatment supplemental O2  -TA     Intra SpO2 (%) 95  -TA     O2 Delivery Intra Treatment room air  -TA     Post SpO2 (%) 95  -TA     O2 Delivery Post Treatment  room air  -TA     Pre Patient Position Supine  -TA     Intra Patient Position Standing  -TA     Post Patient Position Sitting  -TA       Row Name 10/05/23 0804          Positioning and Restraints    Pre-Treatment Position in bed  -TA     Post Treatment Position chair  -TA     In Chair notified nsg;reclined;call light within reach;encouraged to call for assist;exit alarm on;waffle cushion;legs elevated  -TA               User Key  (r) = Recorded By, (t) = Taken By, (c) = Cosigned By      Initials Name Provider Type    Pastor Greene OT Occupational Therapist                   Outcome Measures       Row Name 10/05/23 0804          How much help from another is currently needed...    Putting on and taking off regular lower body clothing? 3  -TA     Bathing (including washing, rinsing, and drying) 3  -TA     Toileting (which includes using toilet bed pan or urinal) 3  -TA     Putting on and taking off regular upper body clothing 3  -TA     Taking care of personal grooming (such as brushing teeth) 3  -TA     Eating meals 4  -TA     AM-PAC 6 Clicks Score (OT) 19  -TA       Row Name 10/05/23 0804          Functional Assessment    Outcome Measure Options AM-PAC 6 Clicks Daily Activity (OT)  -TA               User Key  (r) = Recorded By, (t) = Taken By, (c) = Cosigned By      Initials Name Provider Type    Pastor Greene OT Occupational Therapist                    Occupational Therapy Education       Title: PT OT SLP Therapies (In Progress)       Topic: Occupational Therapy (Done)       Point: ADL training (Done)       Description:   Instruct learner(s) on proper safety adaptation and remediation techniques during self care or transfers.   Instruct in proper use of assistive devices.                  Learning Progress Summary             Patient Acceptance, E, VU by RAMONE at 10/5/2023 0911                         Point: Precautions (Done)       Description:   Instruct learner(s) on prescribed precautions  during self-care and functional transfers.                  Learning Progress Summary             Patient Acceptance, E, VU by TA at 10/5/2023 0911                                         User Key       Initials Effective Dates Name Provider Type Discipline    TA 06/16/21 -  Pastor Ventura, OT Occupational Therapist OT                  OT Recommendation and Plan  Planned Therapy Interventions (OT): activity tolerance training, BADL retraining, functional balance retraining, patient/caregiver education/training, ROM/therapeutic exercise, strengthening exercise, transfer/mobility retraining, occupation/activity based interventions  Therapy Frequency (OT): daily  Plan of Care Review  Plan of Care Reviewed With: patient  Outcome Evaluation: Pt presents with fxl decline from PLOF, deficits in ADL performance, fxl mobility, occupational endurance. Pt with 10 point drop in BP upon standing but asymptomatic. Pt completed bed mobility with Mod I, STS with supervision, UBD set up, ambulated to BR sink side grooming with set up/Sup, ambulated 190' in hallway. Pt will benefit from skilled OT services to address deficits, facilitate increased fxl I. Pt appears close to baseline fxl level and would benefit from HH services upon discharge.     Time Calculation:   Evaluation Complexity (OT)  Review Occupational Profile/Medical/Therapy History Complexity: expanded/moderate complexity  Assessment, Occupational Performance/Identification of Deficit Complexity: 3-5 performance deficits  Clinical Decision Making Complexity (OT): detailed assessment/moderate complexity  Overall Complexity of Evaluation (OT): moderate complexity     Time Calculation- OT       Row Name 10/05/23 0804             Time Calculation- OT    OT Start Time 0804  ttc 10 minutes  -TA      Total Timed Code Minutes- OT 10 minute(s)  -TA         Timed Charges    70569 - OT Therapeutic Activity Minutes 2  -TA      42436 - OT Self Care/Mgmt Minutes 8  -TA          Untimed Charges    OT Eval/Re-eval Minutes 50  -TA         Total Minutes    Timed Charges Total Minutes 10  -TA      Untimed Charges Total Minutes 50  -TA       Total Minutes 60  -TA                User Key  (r) = Recorded By, (t) = Taken By, (c) = Cosigned By      Initials Name Provider Type    TA Pastor Ventura OT Occupational Therapist                  Therapy Charges for Today       Code Description Service Date Service Provider Modifiers Qty    39391630485 HC OT SELF CARE/MGMT/TRAIN EA 15 MIN 10/5/2023 Pastor Ventura, OT GO 1    [id number]  OT EVAL MOD COMPLEXITY 4 10/5/2023 Pastor Ventura OT GO 1                 Pastor Ventura OT  10/5/2023

## 2023-10-05 NOTE — PLAN OF CARE
Goal Outcome Evaluation:  Plan of Care Reviewed With: patient        Progress: improving  Outcome Evaluation: patient ambulated 45' + 45' with CGA x1 and rolling walker for support, verbal cues for improved posture. SaO2 >94% on RA with activity. Patient appears to be at baseline functional level and would benefit from HHPT at D/C.      Anticipated Discharge Disposition (PT): home with assist, home with home health

## 2023-10-05 NOTE — PLAN OF CARE
Problem: Adult Inpatient Plan of Care  Goal: Plan of Care Review  Recent Flowsheet Documentation  Taken 10/5/2023 0804 by Pastor Ventura OT  Plan of Care Reviewed With: patient  Outcome Evaluation: Pt presents with fxl decline from PLOF, deficits in ADL performance, fxl mobility, occupational endurance. Pt with 10 point drop in BP upon standing but asymptomatic. Pt completed bed mobility with Mod I, STS with supervision, UBD set up, ambulated to BR sink side grooming with set up/Sup, ambulated 190' in hallway. Pt will benefit from skilled OT services to address deficits, facilitate increased fxl I. Pt appears close to baseline fxl level and would benefit from HH services upon discharge.   Goal Outcome Evaluation:  Plan of Care Reviewed With: patient           Outcome Evaluation: Pt presents with fxl decline from PLOF, deficits in ADL performance, fxl mobility, occupational endurance. Pt with 10 point drop in BP upon standing but asymptomatic. Pt completed bed mobility with Mod I, STS with supervision, UBD set up, ambulated to BR sink side grooming with set up/Sup, ambulated 190' in hallway. Pt will benefit from skilled OT services to address deficits, facilitate increased fxl I. Pt appears close to baseline fxl level and would benefit from HH services upon discharge.      Anticipated Discharge Disposition (OT): home with home health

## 2023-10-05 NOTE — PROGRESS NOTES
Met with patient he is agreeable to [facility] services. Verified PCP. Radha ESTEVES, [facility]-Liaison

## 2023-10-05 NOTE — PLAN OF CARE
Problem: Adult Inpatient Plan of Care  Goal: Plan of Care Review  Outcome: Ongoing, Progressing  Goal: Patient-Specific Goal (Individualized)  Outcome: Ongoing, Progressing  Goal: Absence of Hospital-Acquired Illness or Injury  Outcome: Ongoing, Progressing  Intervention: Identify and Manage Fall Risk  Recent Flowsheet Documentation  Taken 10/5/2023 0400 by Taz Corona RN  Safety Promotion/Fall Prevention:   clutter free environment maintained   assistive device/personal items within reach   safety round/check completed  Taken 10/5/2023 0200 by Taz Corona RN  Safety Promotion/Fall Prevention:   assistive device/personal items within reach   clutter free environment maintained   safety round/check completed  Taken 10/5/2023 0000 by Taz Corona RN  Safety Promotion/Fall Prevention:   assistive device/personal items within reach   clutter free environment maintained   safety round/check completed  Taken 10/4/2023 2200 by Taz Corona RN  Safety Promotion/Fall Prevention:   assistive device/personal items within reach   clutter free environment maintained   safety round/check completed  Taken 10/4/2023 2000 by Taz Corona RN  Safety Promotion/Fall Prevention:   assistive device/personal items within reach   clutter free environment maintained   safety round/check completed  Intervention: Prevent Skin Injury  Recent Flowsheet Documentation  Taken 10/5/2023 0400 by Taz Corona RN  Body Position: position changed independently  Skin Protection: adhesive use limited  Taken 10/5/2023 0200 by Taz Corona RN  Body Position: position changed independently  Skin Protection: adhesive use limited  Taken 10/5/2023 0000 by Taz Corona RN  Body Position: position changed independently  Skin Protection: adhesive use limited  Taken 10/4/2023 2200 by Taz Corona RN  Body Position: position changed independently  Skin Protection: adhesive use limited  Taken 10/4/2023 2000 by Taz Corona  RN  Body Position: position changed independently  Skin Protection: adhesive use limited  Intervention: Prevent and Manage VTE (Venous Thromboembolism) Risk  Recent Flowsheet Documentation  Taken 10/5/2023 0400 by Taz Corona RN  Activity Management: activity encouraged  Taken 10/5/2023 0200 by Taz Corona RN  Activity Management: activity encouraged  Taken 10/5/2023 0000 by Taz Corona RN  Activity Management: activity encouraged  Taken 10/4/2023 2200 by Taz Corona RN  Activity Management: activity encouraged  Taken 10/4/2023 2000 by Taz Corona RN  Activity Management: activity encouraged  Range of Motion: active ROM (range of motion) encouraged  Intervention: Prevent Infection  Recent Flowsheet Documentation  Taken 10/5/2023 0400 by Taz Corona RN  Infection Prevention:   environmental surveillance performed   hand hygiene promoted   rest/sleep promoted  Taken 10/5/2023 0200 by Taz Corona RN  Infection Prevention:   environmental surveillance performed   hand hygiene promoted   rest/sleep promoted  Taken 10/5/2023 0000 by Taz Corona RN  Infection Prevention:   environmental surveillance performed   hand hygiene promoted   rest/sleep promoted  Taken 10/4/2023 2200 by Taz Corona RN  Infection Prevention:   environmental surveillance performed   hand hygiene promoted   rest/sleep promoted  Taken 10/4/2023 2000 by Taz Corona RN  Infection Prevention:   environmental surveillance performed   hand hygiene promoted   rest/sleep promoted  Goal: Optimal Comfort and Wellbeing  Outcome: Ongoing, Progressing  Intervention: Provide Person-Centered Care  Recent Flowsheet Documentation  Taken 10/4/2023 2000 by Taz Corona RN  Trust Relationship/Rapport: care explained  Goal: Readiness for Transition of Care  Outcome: Ongoing, Progressing     Problem: Osteoarthritis Comorbidity  Goal: Maintenance of Osteoarthritis Symptom Control  Outcome: Ongoing,  Progressing  Intervention: Maintain Osteoarthritis Symptom Control  Recent Flowsheet Documentation  Taken 10/5/2023 0400 by Taz Corona RN  Activity Management: activity encouraged  Taken 10/5/2023 0200 by Taz Corona RN  Activity Management: activity encouraged  Taken 10/5/2023 0000 by Taz Corona RN  Activity Management: activity encouraged  Taken 10/4/2023 2200 by Taz Corona RN  Activity Management: activity encouraged  Taken 10/4/2023 2000 by Taz Corona RN  Activity Management: activity encouraged     Problem: Pain Acute  Goal: Acceptable Pain Control and Functional Ability  Outcome: Ongoing, Progressing  Intervention: Prevent or Manage Pain  Recent Flowsheet Documentation  Taken 10/5/2023 0000 by Taz Corona RN  Sleep/Rest Enhancement: awakenings minimized  Taken 10/4/2023 2200 by Taz Corona RN  Sleep/Rest Enhancement: awakenings minimized  Taken 10/4/2023 2000 by Taz Corona RN  Sensory Stimulation Regulation: television on  Bowel Elimination Promotion: adequate fluid intake promoted  Sleep/Rest Enhancement: awakenings minimized  Intervention: Optimize Psychosocial Wellbeing  Recent Flowsheet Documentation  Taken 10/4/2023 2000 by Taz Corona RN  Supportive Measures: active listening utilized     Problem: Fall Injury Risk  Goal: Absence of Fall and Fall-Related Injury  Outcome: Ongoing, Progressing  Intervention: Identify and Manage Contributors  Recent Flowsheet Documentation  Taken 10/5/2023 0400 by Taz Corona RN  Self-Care Promotion: independence encouraged  Taken 10/5/2023 0200 by Taz Corona RN  Self-Care Promotion: independence encouraged  Taken 10/5/2023 0000 by Taz Corona RN  Self-Care Promotion: independence encouraged  Taken 10/4/2023 2200 by Taz Corona RN  Self-Care Promotion: independence encouraged  Taken 10/4/2023 2000 by Taz Corona RN  Self-Care Promotion: independence encouraged  Intervention: Promote Injury-Free  Environment  Recent Flowsheet Documentation  Taken 10/5/2023 0400 by Taz Corona RN  Safety Promotion/Fall Prevention:   clutter free environment maintained   assistive device/personal items within reach   safety round/check completed  Taken 10/5/2023 0200 by Taz Corona RN  Safety Promotion/Fall Prevention:   assistive device/personal items within reach   clutter free environment maintained   safety round/check completed  Taken 10/5/2023 0000 by Taz Corona RN  Safety Promotion/Fall Prevention:   assistive device/personal items within reach   clutter free environment maintained   safety round/check completed  Taken 10/4/2023 2200 by Taz Corona RN  Safety Promotion/Fall Prevention:   assistive device/personal items within reach   clutter free environment maintained   safety round/check completed  Taken 10/4/2023 2000 by Taz Corona RN  Safety Promotion/Fall Prevention:   assistive device/personal items within reach   clutter free environment maintained   safety round/check completed     Problem: Skin Injury Risk Increased  Goal: Skin Health and Integrity  Outcome: Ongoing, Progressing  Intervention: Optimize Skin Protection  Recent Flowsheet Documentation  Taken 10/5/2023 0400 by Taz Corona RN  Pressure Reduction Techniques: frequent weight shift encouraged  Head of Bed (HOB) Positioning: HOB elevated  Pressure Reduction Devices: pressure-redistributing mattress utilized  Skin Protection: adhesive use limited  Taken 10/5/2023 0200 by Taz Corona RN  Pressure Reduction Techniques: frequent weight shift encouraged  Head of Bed (HOB) Positioning: HOB elevated  Pressure Reduction Devices: pressure-redistributing mattress utilized  Skin Protection: adhesive use limited  Taken 10/5/2023 0000 by Taz Corona RN  Pressure Reduction Techniques: frequent weight shift encouraged  Head of Bed (HOB) Positioning: HOB elevated  Pressure Reduction Devices: pressure-redistributing mattress  utilized  Skin Protection: adhesive use limited  Taken 10/4/2023 2200 by Taz Corona, RN  Pressure Reduction Techniques: frequent weight shift encouraged  Head of Bed (HOB) Positioning: [facility] elevated  Pressure Reduction Devices: pressure-redistributing mattress utilized  Skin Protection: adhesive use limited  Taken 10/4/2023 2000 by Taz Corona, RN  Pressure Reduction Techniques: frequent weight shift encouraged  Head of Bed (HOB) Positioning: [facility] elevated  Pressure Reduction Devices: pressure-redistributing mattress utilized  Skin Protection: adhesive use limited     Problem: Dysrhythmia  Goal: Normalized Cardiac Rhythm  Outcome: Ongoing, Progressing   Goal Outcome Evaluation:      No acute changes overnight. Patient is resting in bed with call light within reach. No complaints of pain.

## 2023-10-06 NOTE — OUTREACH NOTE
Prep Survey      Flowsheet Row Responses   [place] facility patient discharged from? [place]   Is LACE score < 7 ? No   Eligibility Readm Mgmt   Discharge diagnosis Acute HFrEF (heart failure with reduced ejection fraction)   Does the patient have one of the following disease processes/diagnoses(primary or secondary)? CHF   Does the patient have Home health ordered? Yes   What is the Home health agency?  [facility]   Is there a DME ordered? No   Medication alerts for this patient START taking:  torsemide (DEMADEX)   Prep survey completed? Yes            Dawn ROSAS - Registered Nurse

## 2023-10-07 ENCOUNTER — HOME CARE VISIT (OUTPATIENT)
Dept: HOME HEALTH SERVICES | Facility: HOME HEALTHCARE | Age: 88
End: 2023-10-07
Payer: COMMERCIAL

## 2023-10-07 VITALS
RESPIRATION RATE: 18 BRPM | DIASTOLIC BLOOD PRESSURE: 72 MMHG | TEMPERATURE: 97.9 F | SYSTOLIC BLOOD PRESSURE: 104 MMHG | OXYGEN SATURATION: 99 % | HEART RATE: 105 BPM

## 2023-10-07 PROCEDURE — G0299 HHS/HOSPICE OF RN EA 15 MIN: HCPCS

## 2023-10-07 NOTE — HOME HEALTH
SOC Note:    Home Health ordered for: disciplines SN PT OT    Reason for Hosp/Primary Dx/Co-morbidities: CHF    Focus of Care: CHF disease process education and cardiopulm assessment, wound care left gluteal stage 1 pressure injury, medication education    Patient's goal(s): to get stronger    Current Functional status/mobility/DME: Ambulates with walker    HB status/Living Arrangements: lives at [facility] with wife and daughter, is homebound, does not drive    Skin Integrity/wound status: laureen score 17, has non-healing stage 1 pressure injury on left gluteal    Code Status: CPR    Fall Risk/Safety concerns: [employer] 10 = 6    Medication issues/Concerns: none    Additional Problems/Concerns: none    SDOH Barriers (i.e. caregiver concerns, social isolation, transportation, food insecurity, environment, income etc.)/Need for MSW: denies needs at this time    Plan for next visit: CP assessment, disease process education, medication education, wound care and assessment

## 2023-10-07 NOTE — CASE COMMUNICATION
Route to Dr. Slick Hubbard    Mukund Pool was admitted to home care on 10/7/23. Below is his SOC note. He has a stage 1 pressure injury on his left gluteal area that was documented at his most recent hospital admission. No wound care orders. Recommending zguard and foam bordered dressing to be changed daily.     SOC Note:    Home Health ordered for: disciplines SN PT OT    Reason for Hosp/Primary Dx/Co-morbidities: CHF    Focus of  Care: CHF disease process education and cardiopulm assessment, wound care left gluteal stage 1 pressure injury, medication education    Patient's goal(s): to get stronger    Current Functional status/mobility/DME: Ambulates with walker    HB status/Living Arrangements: lives at [facility] with wife and daughter, is homebound, does not drive    Skin Integrity/wound status: laureen score 17, has non-healing stage 1 pressure injury on l eft gluteal    Code Status: CPR    Fall Risk/Safety concerns: M[employer] 10 = 6    Medication issues/Concerns: none    Additional Problems/Concerns: none    SDOH Barriers (i.e. caregiver concerns, social isolation, transportation, food insecurity, environment, income etc.)/Need for MSW: denies needs at this time    Plan for next visit: CP assessment, disease process education, medication education, wound care and assessment

## 2023-10-09 ENCOUNTER — HOME CARE VISIT (OUTPATIENT)
Dept: HOME HEALTH SERVICES | Facility: HOME HEALTHCARE | Age: 88
End: 2023-10-09
Payer: MEDICARE

## 2023-10-09 VITALS
HEART RATE: 101 BPM | TEMPERATURE: 97.1 F | RESPIRATION RATE: 16 BRPM | DIASTOLIC BLOOD PRESSURE: 62 MMHG | OXYGEN SATURATION: 90 % | SYSTOLIC BLOOD PRESSURE: 100 MMHG

## 2023-10-09 PROCEDURE — G0299 HHS/HOSPICE OF RN EA 15 MIN: HCPCS

## 2023-10-09 NOTE — HOME HEALTH
Routine Visit Note:    Skill/education provided CHF education. Weight managment and medication regume    Patient/caregiver response needs further education     Plan for next visit CHF managmenent     Other pertinent info:

## 2023-10-10 ENCOUNTER — READMISSION MANAGEMENT (OUTPATIENT)
Dept: CALL CENTER | Facility: HOSPITAL | Age: 88
End: 2023-10-10
Payer: MEDICARE

## 2023-10-10 LAB
QT INTERVAL: 418 MS
QTC INTERVAL: 555 MS

## 2023-10-10 NOTE — OUTREACH NOTE
CHF Week 1 Survey      Flowsheet Row Responses   [facility] [place] patient discharged from? [place]   Does the patient have one of the following disease processes/diagnoses(primary or secondary)? CHF   CHF Week 1 attempt successful? Yes   Call start time 1132   Call end time 1135   Discharge diagnosis Acute HFrEF (heart failure with reduced ejection fraction)   Person spoke with today (if not patient) and relationship Patient   Meds reviewed with patient/caregiver? Yes   Does the patient have all medications ordered at discharge? Yes   Is the patient taking all medications as directed (includes completed medication regime)? Yes   Does the patient have a primary care provider?  Yes   Does the patient have an appointment with their PCP within 7 days of discharge? No   Comments regarding PCP Schedule an appointment with Slick Hubbard MD PCP   Nursing Interventions Educated patient on importance of making appointment, Advised patient to make appointment   Has the patient kept scheduled appointments due by today? N/A   Comments Cardiology appt with Kathryn Morel APRN 10/16/23  830am   What is the Home health agency?  [facility]   Has home health visited the patient within 72 hours of discharge? Yes   Pulse Ox monitoring None   Psychosocial issues? No   Did the patient receive a copy of their discharge instructions? Yes   Nursing interventions Reviewed instructions with patient   What is the patient's perception of their health status since discharge? Improving   Nursing interventions Nurse provided patient education   Is the patient able to teach back signs and symptoms of worsening condition? (i.e. weight gain, shortness of air, etc.) Yes   If the patient is a current smoker, are they able to teach back resources for cessation? Not a smoker   Is the patient/caregiver able to teach back the hierarchy of who to call/visit for symptoms/problems? PCP, Specialist, Home health nurse, Urgent Care, ED, 911  Yes   Is the patient able to teach back Heart Failure Zones? No  [Unable to review zones with patient today,  difficulty to engage in conversation.]   CHF Nursing Interventions --  [Advised to contact cardiology with increased shortness of breath, increased swelling/ bloating.]    CHF Week 1 call completed? Yes   Is the patient interested in additional calls from an ambulatory ? No   Would this patient benefit from a Referral to [facility] Social Work? No   Wrap up additional comments Denies any questions or needs today.   Call end time [id number]            TESS DANG - Registered Nurse

## 2023-10-11 ENCOUNTER — HOME CARE VISIT (OUTPATIENT)
Dept: HOME HEALTH SERVICES | Facility: HOME HEALTHCARE | Age: 88
End: 2023-10-11
Payer: COMMERCIAL

## 2023-10-11 VITALS — RESPIRATION RATE: 18 BRPM | SYSTOLIC BLOOD PRESSURE: 92 MMHG | TEMPERATURE: 97.3 F | DIASTOLIC BLOOD PRESSURE: 54 MMHG

## 2023-10-11 PROCEDURE — G0151 HHCP-SERV OF PT,EA 15 MIN: HCPCS

## 2023-10-11 NOTE — Clinical Note
Please forward to Dr. Slick Hubbard:    Abel CHRIS PT isreal completed 10/11/23 with PT to address deficits in strength, balance, functional mobility, and activity tolerance with frequency 1w1, 2w4.

## 2023-10-12 ENCOUNTER — HOME CARE VISIT (OUTPATIENT)
Dept: HOME HEALTH SERVICES | Facility: HOME HEALTHCARE | Age: 88
End: 2023-10-12
Payer: MEDICARE

## 2023-10-12 ENCOUNTER — HOME CARE VISIT (OUTPATIENT)
Dept: HOME HEALTH SERVICES | Facility: HOME HEALTHCARE | Age: 88
End: 2023-10-12
Payer: COMMERCIAL

## 2023-10-12 VITALS
RESPIRATION RATE: 16 BRPM | TEMPERATURE: 98.1 F | DIASTOLIC BLOOD PRESSURE: 60 MMHG | SYSTOLIC BLOOD PRESSURE: 121 MMHG | HEART RATE: 104 BPM | OXYGEN SATURATION: 95 %

## 2023-10-12 PROCEDURE — G0152 HHCP-SERV OF OT,EA 15 MIN: HCPCS

## 2023-10-12 NOTE — HOME HEALTH
"PT Eval Note:  89 y/o male referred to HH PT after recent 7 day hospitalization related to acute systolic heart failure; patient lives in 3rd floor apartment with spouse in ILF at [facility]; PLOF is IND with all ADLs, IADLs, and functional mobility, no AD for mobility, still driving/shopping;  PMHx significant for CHF with reduced ejection fraction, left bundle brance block, a-fib, hx of cancer, recent 40 pount weight loss    Skill/education provided: PT eval completed with implementation of initial HEP with focus on BLE strengthening and progressive mobility program    Patient/caregiver response: Patient agreeable and motivated to perform PT POC    Plan for next visit: Progress BLE strengthening and cardiovascular endurance training    Other pertinent info: Per patient report he has a tendency to overwork and \"do too much\" so vital signs and response to activity will need to be closely monitored given his advanced age and hx of multiple cardiac diagnoses; patient is a high risk for rehospitalization"

## 2023-10-12 NOTE — Clinical Note
Forward to Dr. Hubbard    Patient seen today for an OT evaluation since recent hospitalization. Pt presents with decreased strength, balance, and endurance affecting his independence and safety with ADL tasks, and mobility in the home. Pt needs cues for EC/WS, and the need to not overdo it. OT to see an additional 1w3 in order to assist with safe return to PLOF.

## 2023-10-12 NOTE — CASE COMMUNICATION
Please route to Dr. Slick Hubbard    Mukund Pool was referred to [facility] for admission and the patient was found to have a stage 1 pressure injury on his left gluteal area which was also documented in the LDA at [facility]. Our coding specialists state the etiology of this wound has not been documented by an MD and cannot code this wound without that documentation. Do you know the etiology of this wound?     Thank you,  DARRYN Matamoros

## 2023-10-13 NOTE — HOME HEALTH
Patient is a 89 year old male with history of coronary artery disease, head neck cancer 2021, coronary disease with prior CABG, paroxysmal A-fib on amiodarone who presents with chest pain, shortness of breath, hypoxia and 40 pound weight loss. Required 2L on presentation. CT of the chest without contrast showed new moderate-sized bilateral pleural effusions with likely compressive atelectasis. Troponins elevated but no significant delta. BNP elevated 5400, EKG with left bundle branch block. Echo showed newly reduced EF of 25%. Patient hospitalized at [facility] 9/29/23-10/5/23, and then was discharged back home to [facility] where he resides with his elderly spouse. Patient seen today for an OT evaluation and presents with decreased strength, balance, and endurance affecting his independence and safety with ADL skills, and functional mobility/transfers. Patient needs frequent cues and education on need for EC/WS, pacing self, and not overdoing it with all functional tasks. OT to see an additional 1w3 in order to assist pt in return to PLOF which included independence with all ADL tasks, functional mobility/transfers, and driving a vehicle.

## 2023-10-16 ENCOUNTER — HOME CARE VISIT (OUTPATIENT)
Dept: HOME HEALTH SERVICES | Facility: HOME HEALTHCARE | Age: 88
End: 2023-10-16
Payer: MEDICARE

## 2023-10-16 VITALS
RESPIRATION RATE: 16 BRPM | DIASTOLIC BLOOD PRESSURE: 54 MMHG | TEMPERATURE: 97.3 F | SYSTOLIC BLOOD PRESSURE: 98 MMHG | HEART RATE: 110 BPM | OXYGEN SATURATION: 91 %

## 2023-10-16 PROCEDURE — G0299 HHS/HOSPICE OF RN EA 15 MIN: HCPCS

## 2023-10-16 NOTE — HOME HEALTH
Routine Visit Note:    Skill/education provided SN educated on meds and disease process. VS assessment was done. PO2 is running a bit lower 89-91 Wound on coccyx is now a scab     Patient/caregiver response verbilized understanding      Plan for next visit SN visit for education     Other pertinent info Last Monday I checked his oxygen several times. I even had him ambulate to bathroom for weight. He dropped to 98  but rebounded quickly. This week he is sleepier than usual. Oxygen 89-90. And with deep breaths up to 91. Pt told me he at one point has had to use. C pap. He is reporting not sleeping well. He no longer has a PCP. Dr Espinoza is to see him but not until nov 11 th. Spoke with Nurse in office. Awaiting what plan may be.

## 2023-10-18 ENCOUNTER — HOSPITAL ENCOUNTER (OUTPATIENT)
Facility: HOSPITAL | Age: 88
Setting detail: OBSERVATION
Discharge: HOME | End: 2023-10-20
Attending: EMERGENCY MEDICINE | Admitting: FAMILY MEDICINE
Payer: MEDICARE

## 2023-10-18 ENCOUNTER — READMISSION MANAGEMENT (OUTPATIENT)
Dept: CALL CENTER | Facility: HOSPITAL | Age: 88
End: 2023-10-18
Payer: MEDICARE

## 2023-10-18 ENCOUNTER — APPOINTMENT (OUTPATIENT)
Dept: GENERAL RADIOLOGY | Facility: HOSPITAL | Age: 88
End: 2023-10-18
Payer: MEDICARE

## 2023-10-18 ENCOUNTER — HOME CARE VISIT (OUTPATIENT)
Dept: HOME HEALTH SERVICES | Facility: HOME HEALTHCARE | Age: 88
End: 2023-10-18
Payer: COMMERCIAL

## 2023-10-18 VITALS
OXYGEN SATURATION: 96 % | DIASTOLIC BLOOD PRESSURE: 56 MMHG | HEART RATE: 40 BPM | TEMPERATURE: 96.9 F | RESPIRATION RATE: 18 BRPM | SYSTOLIC BLOOD PRESSURE: 118 MMHG

## 2023-10-18 DIAGNOSIS — N18.9 CHRONIC KIDNEY DISEASE, UNSPECIFIED CKD STAGE: ICD-10-CM

## 2023-10-18 DIAGNOSIS — R42 DIZZINESS: ICD-10-CM

## 2023-10-18 DIAGNOSIS — R00.1 SYMPTOMATIC BRADYCARDIA: Primary | ICD-10-CM

## 2023-10-18 DIAGNOSIS — I44.7 LEFT BUNDLE BRANCH BLOCK: ICD-10-CM

## 2023-10-18 PROBLEM — J90 PLEURAL EFFUSION, BILATERAL: Status: ACTIVE | Noted: 2023-10-18

## 2023-10-18 PROBLEM — N17.9 ACUTE KIDNEY INJURY SUPERIMPOSED ON CHRONIC KIDNEY DISEASE: Status: ACTIVE | Noted: 2023-10-18

## 2023-10-18 PROBLEM — K21.9 GERD WITHOUT ESOPHAGITIS: Status: ACTIVE | Noted: 2023-10-18

## 2023-10-18 PROBLEM — R79.89 ELEVATED TROPONIN: Status: ACTIVE | Noted: 2023-10-18

## 2023-10-18 LAB
ALBUMIN SERPL-MCNC: 3.6 G/DL (ref 3.5–5.2)
ALBUMIN/GLOB SERPL: 1.1 G/DL
ALP SERPL-CCNC: 90 U/L (ref 39–117)
ALT SERPL W P-5'-P-CCNC: 32 U/L (ref 1–41)
ANION GAP SERPL CALCULATED.3IONS-SCNC: 10 MMOL/L (ref 5–15)
AST SERPL-CCNC: 25 U/L (ref 1–40)
BASOPHILS # BLD AUTO: 0.07 10*3/MM3 (ref 0–0.2)
BASOPHILS NFR BLD AUTO: 1.2 % (ref 0–1.5)
BILIRUB SERPL-MCNC: 0.8 MG/DL (ref 0–1.2)
BILIRUB UR QL STRIP: NEGATIVE
BUN BLDA-MCNC: 46 MG/DL (ref 8–26)
BUN SERPL-MCNC: 47 MG/DL (ref 8–23)
BUN/CREAT SERPL: 19.8 (ref 7–25)
CA-I BLDA-SCNC: 1.17 MMOL/L (ref 1.2–1.32)
CALCIUM BLD QL: 1.17 MG/DL
CALCIUM SPEC-SCNC: 8.8 MG/DL (ref 8.2–9.6)
CHLORIDE BLDA-SCNC: 101 MMOL/L (ref 98–109)
CHLORIDE BLDA-SCNC: 101 MMOL/L (ref 98–109)
CHLORIDE SERPL-SCNC: 103 MMOL/L (ref 98–107)
CLARITY UR: CLEAR
CO2 BLDA-SCNC: 26 MMOL/L (ref 24–29)
CO2 SERPL-SCNC: 30 MMOL/L (ref 22–29)
COLOR UR: YELLOW
CREAT BLDA-MCNC: 2.8 MG/DL
CREAT BLDA-MCNC: 2.8 MG/DL (ref 0.6–1.3)
CREAT SERPL-MCNC: 2.37 MG/DL (ref 0.76–1.27)
DEPRECATED RDW RBC AUTO: 53.4 FL (ref 37–54)
EGFRCR SERPLBLD CKD-EPI 2021: 20.4 ML/MIN/1.73
EGFRCR SERPLBLD CKD-EPI 2021: 24.9 ML/MIN/1.73
EOSINOPHIL # BLD AUTO: 0.14 10*3/MM3 (ref 0–0.4)
EOSINOPHIL NFR BLD AUTO: 2.4 % (ref 0.3–6.2)
ERYTHROCYTE [DISTWIDTH] IN BLOOD BY AUTOMATED COUNT: 13.4 % (ref 12.3–15.4)
GEN 5 2HR TROPONIN T REFLEX: 47 NG/L
GLOBULIN UR ELPH-MCNC: 3.3 GM/DL
GLUCOSE BLDC GLUCOMTR-MCNC: 113 MG/DL (ref 70–130)
GLUCOSE BLDC GLUCOMTR-MCNC: 113 MG/DL (ref 70–130)
GLUCOSE BLDC GLUCOMTR-MCNC: 131 MG/DL (ref 70–130)
GLUCOSE SERPL-MCNC: 119 MG/DL (ref 65–99)
GLUCOSE UR STRIP-MCNC: NEGATIVE MG/DL
HCT VFR BLD AUTO: 33.6 % (ref 37.5–51)
HCT VFR BLDA CALC: 37 % (ref 38–51)
HCT VFR BLDA CALC: 37 % (ref 38–51)
HGB BLD-MCNC: 10.7 G/DL (ref 13–17.7)
HGB BLDA-MCNC: 12.6 G/DL (ref 12–17)
HGB BLDA-MCNC: 12.6 G/DL (ref 12–17)
HGB UR QL STRIP.AUTO: NEGATIVE
HOLD SPECIMEN: NORMAL
IMM GRANULOCYTES # BLD AUTO: 0.03 10*3/MM3 (ref 0–0.05)
IMM GRANULOCYTES NFR BLD AUTO: 0.5 % (ref 0–0.5)
KETONES UR QL STRIP: NEGATIVE
LEUKOCYTE ESTERASE UR QL STRIP.AUTO: NEGATIVE
LYMPHOCYTES # BLD AUTO: 1.94 10*3/MM3 (ref 0.7–3.1)
LYMPHOCYTES NFR BLD AUTO: 33 % (ref 19.6–45.3)
MAGNESIUM SERPL-MCNC: 2.1 MG/DL (ref 1.7–2.3)
MCH RBC QN AUTO: 34.5 PG (ref 26.6–33)
MCHC RBC AUTO-ENTMCNC: 31.8 G/DL (ref 31.5–35.7)
MCV RBC AUTO: 108.4 FL (ref 79–97)
MONOCYTES # BLD AUTO: 0.92 10*3/MM3 (ref 0.1–0.9)
MONOCYTES NFR BLD AUTO: 15.6 % (ref 5–12)
NEUTROPHILS NFR BLD AUTO: 2.78 10*3/MM3 (ref 1.7–7)
NEUTROPHILS NFR BLD AUTO: 47.3 % (ref 42.7–76)
NITRITE UR QL STRIP: NEGATIVE
NRBC BLD AUTO-RTO: 0 /100 WBC (ref 0–0.2)
NT-PROBNP SERPL-MCNC: 4050 PG/ML (ref 0–1800)
OSMOLALITY UR: 356 MOSM/KG (ref 300–1100)
PH UR STRIP.AUTO: 5.5 [PH] (ref 5–8)
PLATELET # BLD AUTO: 167 10*3/MM3 (ref 140–450)
PMV BLD AUTO: 10.1 FL (ref 6–12)
POTASSIUM BLDA-SCNC: 4.3 MMOL/L (ref 3.5–4.9)
POTASSIUM BLDA-SCNC: 4.3 MMOL/L (ref 3.5–4.9)
POTASSIUM SERPL-SCNC: 4.5 MMOL/L (ref 3.5–5.2)
PROT SERPL-MCNC: 6.9 G/DL (ref 6–8.5)
PROT UR QL STRIP: NEGATIVE
RBC # BLD AUTO: 3.1 10*6/MM3 (ref 4.14–5.8)
SODIUM BLD-SCNC: 141 MMOL/L (ref 138–146)
SODIUM BLD-SCNC: 141 MMOL/L (ref 138–146)
SODIUM SERPL-SCNC: 143 MMOL/L (ref 136–145)
SODIUM UR-SCNC: 113 MMOL/L
SP GR UR STRIP: 1.01 (ref 1–1.03)
T4 FREE SERPL-MCNC: 1.41 NG/DL (ref 0.93–1.7)
TROPONIN T DELTA: -6 NG/L
TROPONIN T SERPL HS-MCNC: 53 NG/L
TSH SERPL DL<=0.05 MIU/L-ACNC: 2.01 UIU/ML (ref 0.27–4.2)
UROBILINOGEN UR QL STRIP: NORMAL
WBC NRBC COR # BLD: 5.88 10*3/MM3 (ref 3.4–10.8)
WHOLE BLOOD HOLD COAG: NORMAL
WHOLE BLOOD HOLD SPECIMEN: NORMAL

## 2023-10-18 PROCEDURE — 84484 ASSAY OF TROPONIN QUANT: CPT

## 2023-10-18 PROCEDURE — G0378 HOSPITAL OBSERVATION PER HR: HCPCS

## 2023-10-18 PROCEDURE — 71045 X-RAY EXAM CHEST 1 VIEW: CPT

## 2023-10-18 PROCEDURE — 93005 ELECTROCARDIOGRAM TRACING: CPT | Performed by: EMERGENCY MEDICINE

## 2023-10-18 PROCEDURE — 84300 ASSAY OF URINE SODIUM: CPT | Performed by: NURSE PRACTITIONER

## 2023-10-18 PROCEDURE — 80053 COMPREHEN METABOLIC PANEL: CPT

## 2023-10-18 PROCEDURE — 85025 COMPLETE CBC W/AUTO DIFF WBC: CPT

## 2023-10-18 PROCEDURE — 25010000002 FUROSEMIDE PER 20 MG: Performed by: NURSE PRACTITIONER

## 2023-10-18 PROCEDURE — 99284 EMERGENCY DEPT VISIT MOD MDM: CPT

## 2023-10-18 PROCEDURE — 84443 ASSAY THYROID STIM HORMONE: CPT

## 2023-10-18 PROCEDURE — 82570 ASSAY OF URINE CREATININE: CPT | Performed by: NURSE PRACTITIONER

## 2023-10-18 PROCEDURE — 96374 THER/PROPH/DIAG INJ IV PUSH: CPT

## 2023-10-18 PROCEDURE — 83935 ASSAY OF URINE OSMOLALITY: CPT | Performed by: NURSE PRACTITIONER

## 2023-10-18 PROCEDURE — 93010 ELECTROCARDIOGRAM REPORT: CPT | Performed by: INTERNAL MEDICINE

## 2023-10-18 PROCEDURE — 82948 REAGENT STRIP/BLOOD GLUCOSE: CPT

## 2023-10-18 PROCEDURE — 83880 ASSAY OF NATRIURETIC PEPTIDE: CPT

## 2023-10-18 PROCEDURE — 84439 ASSAY OF FREE THYROXINE: CPT | Performed by: EMERGENCY MEDICINE

## 2023-10-18 PROCEDURE — 80047 BASIC METABLC PNL IONIZED CA: CPT

## 2023-10-18 PROCEDURE — 93005 ELECTROCARDIOGRAM TRACING: CPT | Performed by: FAMILY MEDICINE

## 2023-10-18 PROCEDURE — 81003 URINALYSIS AUTO W/O SCOPE: CPT | Performed by: NURSE PRACTITIONER

## 2023-10-18 PROCEDURE — 93005 ELECTROCARDIOGRAM TRACING: CPT

## 2023-10-18 PROCEDURE — 83735 ASSAY OF MAGNESIUM: CPT

## 2023-10-18 PROCEDURE — 85014 HEMATOCRIT: CPT

## 2023-10-18 PROCEDURE — G0151 HHCP-SERV OF PT,EA 15 MIN: HCPCS

## 2023-10-18 RX ORDER — BISACODYL 10 MG
10 SUPPOSITORY, RECTAL RECTAL DAILY PRN
Status: DISCONTINUED | OUTPATIENT
Start: 2023-10-18 | End: 2023-10-20 | Stop reason: HOSPADM

## 2023-10-18 RX ORDER — SODIUM CHLORIDE 0.9 % (FLUSH) 0.9 %
10 SYRINGE (ML) INJECTION EVERY 12 HOURS SCHEDULED
Status: DISCONTINUED | OUTPATIENT
Start: 2023-10-18 | End: 2023-10-20 | Stop reason: HOSPADM

## 2023-10-18 RX ORDER — BISACODYL 5 MG/1
5 TABLET, DELAYED RELEASE ORAL DAILY PRN
Status: DISCONTINUED | OUTPATIENT
Start: 2023-10-18 | End: 2023-10-20 | Stop reason: HOSPADM

## 2023-10-18 RX ORDER — ASPIRIN 81 MG/1
81 TABLET ORAL DAILY
Status: DISCONTINUED | OUTPATIENT
Start: 2023-10-19 | End: 2023-10-20 | Stop reason: HOSPADM

## 2023-10-18 RX ORDER — AMOXICILLIN 250 MG
2 CAPSULE ORAL 2 TIMES DAILY
Status: DISCONTINUED | OUTPATIENT
Start: 2023-10-18 | End: 2023-10-20 | Stop reason: HOSPADM

## 2023-10-18 RX ORDER — SODIUM CHLORIDE 0.9 % (FLUSH) 0.9 %
10 SYRINGE (ML) INJECTION AS NEEDED
Status: DISCONTINUED | OUTPATIENT
Start: 2023-10-18 | End: 2023-10-20 | Stop reason: HOSPADM

## 2023-10-18 RX ORDER — PANTOPRAZOLE SODIUM 40 MG/1
40 TABLET, DELAYED RELEASE ORAL
Status: DISCONTINUED | OUTPATIENT
Start: 2023-10-19 | End: 2023-10-20 | Stop reason: HOSPADM

## 2023-10-18 RX ORDER — FUROSEMIDE 10 MG/ML
60 INJECTION INTRAMUSCULAR; INTRAVENOUS ONCE
Status: COMPLETED | OUTPATIENT
Start: 2023-10-18 | End: 2023-10-18

## 2023-10-18 RX ORDER — AMIODARONE HYDROCHLORIDE 200 MG/1
100 TABLET ORAL DAILY
Status: DISCONTINUED | OUTPATIENT
Start: 2023-10-19 | End: 2023-10-20

## 2023-10-18 RX ORDER — SODIUM CHLORIDE 9 MG/ML
40 INJECTION, SOLUTION INTRAVENOUS AS NEEDED
Status: DISCONTINUED | OUTPATIENT
Start: 2023-10-18 | End: 2023-10-20 | Stop reason: HOSPADM

## 2023-10-18 RX ORDER — POLYETHYLENE GLYCOL 3350 17 G/17G
17 POWDER, FOR SOLUTION ORAL DAILY PRN
Status: DISCONTINUED | OUTPATIENT
Start: 2023-10-18 | End: 2023-10-20 | Stop reason: HOSPADM

## 2023-10-18 RX ADMIN — SENNOSIDES AND DOCUSATE SODIUM 2 TABLET: 8.6; 5 TABLET ORAL at 20:51

## 2023-10-18 RX ADMIN — APIXABAN 2.5 MG: 2.5 TABLET, FILM COATED ORAL at 20:52

## 2023-10-18 RX ADMIN — FUROSEMIDE 60 MG: 10 INJECTION, SOLUTION INTRAMUSCULAR; INTRAVENOUS at 20:51

## 2023-10-18 NOTE — H&P
"    [facility] Medicine Services  HISTORY AND PHYSICAL    Patient Name: Mukund Pool  : 1930  MRN: [id number]  Primary Care Physician: Slick Hubbard MD  Date of admission: 10/18/2023    Subjective   Subjective     Chief Complaint:  Bradycardia     HPI:  Mukund Pool is a 89 y.o. male w/ a hx of CAD (s/p CABG), HTN, HLD, PAF (Eliquis, amiodarone), CHF (EF 25%), CKD Stage III, hx of head/neck cancer (), GERD who presented to the ED w/ c/o bradycardia.   Pt admitted to [facility] -10/5/2023.  Patient initially presented with complaints of chest pain, shortness of breath, hypoxia and a 40 pound weight loss.  Patient required 2 L of supplemental oxygen on presentation.  CT of the chest without contrast revealed a new moderate-sized bilateral pleural effusion with likely compressive atelectasis.  Echocardiogram obtained revealing an EF of 25%.  Cardiology was consulted and recommended continuation of aspirin and metoprolol XL.  Toprol was increased to 50 mg daily.  Initially diuretics were given and then held due to CHRISTIANO.  Torsemide 20 mg daily was restarted per cardiology recommendations.  Routine Entresto, spironolactone, ACE inhibitor were held in the setting of CHRISTIANO and hypotension.  Creatinine improved prior to discharge.  In regards to patient's 40 pound weight loss, a CT of the abdomen and pelvis showed \"low-density masses within the pancreas which may represent a cyst pseudocyst or IPMN the largest lesion within the head of the pancreas is unchanged, new 2.1 cm cyst within the mid body of the pancreas.\"  Patient reported that he had been followed for this and had decided not to undergo treatment given his age.  Patient was encouraged to follow-up with his routine outpatient oncologist.  Ultimately discharged back home with instructions to follow-up with the heart valve clinic in 1 week.  Patient resides at [facility].  Today " patient's vitals were checked by staff and it was noted that patient had a heart rate in the 30s and 40s.  EMS was called at that time.  Patient complains of 1 to 2 days of intermittent lightheadedness and dizziness w/ standing, exertional dyspnea and decreased urine output. Pt continues to c/o decreased oral intake 2/2 decreased appetite.   Pt denies fever, chest pain, cough, N/V/D, abdominal pain, dysuria, worsening pedal edema, syncope.   Pt evaluated in the ED. EKG c/w sinus bradycardia. CXR cW bilateral pleural effusions. Creatinine elevated at 2.37. Pt admitted to the hospital medicine service for further evaluation.     Review of Systems   Constitutional:  Positive for appetite change and fatigue. Negative for chills, diaphoresis and fever.        Decreased oral intake 2/2 decreased appetite.    HENT: Negative.  Negative for congestion, postnasal drip, rhinorrhea, sinus pressure, sinus pain, sneezing and trouble swallowing.    Eyes: Negative.  Negative for visual disturbance.   Respiratory:  Positive for shortness of breath. Negative for cough, chest tightness and wheezing.         Exertional dyspnea.    Cardiovascular:  Negative for chest pain, palpitations and leg swelling.        Bradycardia.    Gastrointestinal: Negative.  Negative for abdominal distention, abdominal pain, blood in stool, constipation, diarrhea, nausea and vomiting.   Endocrine: Negative.    Genitourinary:  Positive for decreased urine volume. Negative for difficulty urinating, dysuria, flank pain, frequency and hematuria.   Musculoskeletal: Negative.  Negative for arthralgias, back pain, myalgias, neck pain and neck stiffness.   Skin: Negative.  Negative for wound.   Allergic/Immunologic: Negative.  Negative for immunocompromised state.   Neurological:  Positive for dizziness and light-headedness. Negative for tremors, seizures, syncope, facial asymmetry, speech difficulty, weakness, numbness and headaches.   Hematological: Negative.   Does not bruise/bleed easily.   Psychiatric/Behavioral: Negative.  Negative for confusion.    All other systems reviewed and are negative.     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       Past Surgical History:   Procedure Laterality Date    CARDIAC ELECTROPHYSIOLOGY PROCEDURE  04/2023    cardiac loop recorder    CHOLECYSTECTOMY      CORONARY ARTERY BYPASS GRAFT      PROSTATE SURGERY      VOCAL CORD MASS EXCISION  03/22/2021     Family History:  family history includes Breast cancer in his mother; Cancer in his mother; Heart attack in his father; Ovarian cancer in his sister.     Social History:  reports that he has never smoked. He has never used smokeless tobacco. He reports that he does not drink alcohol and does not use drugs.  Social History     Social History Narrative    Not on file     Medications:  amiodarone, apixaban, aspirin, ezetimibe, gabapentin, metoprolol succinate XL, multivitamin with minerals, nitroglycerin, omeprazole, and torsemide    Allergies   Allergen Reactions    Statins Myalgia     Objective   Objective     Vital Signs:   Temp:  [96.9 °F (36.1 °C)-98 °F (36.7 °C)] 98 °F (36.7  °C)  Heart Rate:  [40-44] 44  Resp:  [16-18] 18  BP: (118-134)/(50-73) 134/62    Physical Exam     Constitutional: Awake, alert; non-toxic appearing   Eyes: PERRLA, sclerae anicteric, no conjunctival injection  HENT: NCAT, mucous membranes moist  Neck: Supple, no thyromegaly, no lymphadenopathy, trachea midline  Respiratory: Rhonchi bilateral lower lobes;no wheeze; nonlabored respirations   Cardiovascular: Regular rhythm, bradycardic; no murmurs, rubs, or gallops, trace edema BLE   Gastrointestinal: Positive bowel sounds, soft, nontender, nondistended  Musculoskeletal: Normal ROM bilaterally   Psychiatric: Appropriate affect, cooperative  Neurologic: Oriented x 3, strength symmetric in all extremities, Cranial Nerves grossly intact to confrontation, speech clear  Skin: No rashes, lesions or wounds     Result Review:  I have personally reviewed the results from the time of this admission to 10/18/2023 20:23 EDT and agree with these findings:  [x]  Laboratory list / accordion  []  Microbiology  [x]  Radiology  [x]  EKG/Telemetry   []  Cardiology/Vascular   []  Pathology  [x]  Old records    LAB RESULTS:      Lab 10/18/23  1816 10/18/23  1814 10/18/23  1809   WBC  --   --  5.88   HEMOGLOBIN  --   --  10.7*   HEMOGLOBIN, POC 12.6 12.6  --    HEMATOCRIT  --   --  33.6*   HEMATOCRIT POC 37* 37*  --    PLATELETS  --   --  167   NEUTROS ABS  --   --  2.78   IMMATURE GRANS (ABS)  --   --  0.03   LYMPHS ABS  --   --  1.94   MONOS ABS  --   --  0.92*   EOS ABS  --   --  0.14   MCV  --   --  108.4*         Lab 10/18/23  1816 10/18/23  1814 10/18/23  1809   SODIUM  --   --  143   POTASSIUM  --   --  4.5   CHLORIDE  --   --  103   CO2  --   --  30.0*   ANION GAP  --   --  10.0   BUN  --   --  47*   CREATININE 2.80 2.80* 2.37*   EGFR  --  20.4* 24.9*   GLUCOSE  --   --  119*   CALCIUM  --   --  8.8   MAGNESIUM  --   --  2.1   TSH  --   --  2.010         Lab 10/18/23  1809   TOTAL PROTEIN 6.9   ALBUMIN 3.6   GLOBULIN 3.3   ALT  (SGPT) 32   AST (SGOT) 25   BILIRUBIN 0.8   ALK PHOS 90         Lab 10/18/23  1809   PROBNP 4,050.0*   HSTROP T 53*                 Brief Urine Lab Results       None          Microbiology Results (last 10 days)       ** No results found for the last 240 hours. **          XR Chest 1 View    Result Date: 10/18/2023  XR CHEST 1 VW Date of Exam: 10/18/2023 6:19 PM EDT Indication: Dysrhythmia triage protocol Comparison: 9/29/2023 Findings: Sternotomy. Implantable cardiac device. Cardiomediastinal silhouette is unchanged. Pulmonary vascular congestion. Small bilateral pleural effusions with bibasilar airspace disease, greater on the left. No pneumothorax. Bones are unchanged.     Impression: Impression: Small bilateral pleural effusions with bibasilar airspace disease greater on the left, favored represent atelectasis, less likely infection. Electronically Signed: Ignacio Burleson MD  10/18/2023 6:50 PM EDT  Workstation ID: [id number]     Results for orders placed during the hospital encounter of 09/29/23    Adult Transthoracic Echo Complete W/ Cont if Necessary Per Protocol    Interpretation Summary    The left ventricle is mildly dilated and globally hypokinetic.  Left ventricular systolic function is moderately decreased. Estimated left ventricular EF = 25%    Aortic valve is calcified with no significant stenosis or regurgitation.    Moderate mitral valve regurgitation is present.    Estimated right ventricular systolic pressure from tricuspid regurgitation is normal (<35 mmHg).    There is a small (<1cm) pericardial effusion adjacent to the left ventricle.    Bilateral pleural effusions present    Assessment & Plan   Assessment & Plan       Symptomatic bradycardia    History of head and neck cancer    Paroxysmal atrial fibrillation    Stage 3b chronic kidney disease    Anemia, chronic disease    Coronary artery disease involving native coronary artery of native heart with angina pectoris    Hyperlipidemia LDL goal <70    " LBBB (left bundle branch block)    CHF (congestive heart failure)    Acute kidney injury superimposed on chronic kidney disease    Elevated troponin    GERD without esophagitis    Pleural effusion, bilateral    Mukund Pool is a 89 y.o. male w/ a hx of CAD (s/p CABG), HTN, HLD, PAF (Eliquis, amiodarone), CHF (EF 25%), CKD Stage III, hx of head/neck cancer (2021), GERD who presented to the ED w/ c/o bradycardia.     **Symptomatic bradycardia   **Bilateral, small pleural effusions   **Elevated troponin   **CAD (hx of CABG)  **CHF (EF 25%)  **PAF   **HTN, HLD  -recent admit for hypoxia 2/2 CHF and chest pain (d/c home 10/5); evaluated by Cardiology, Toprol was increased to 50mg daily (diuretics given then held 2/2 CHRISTIANO and hypotension, torsemide restarted prior to d/c) (Entresto, aldactone and ACE all held pending f/u appointments- 2/2 CHRISTIANO and hypotension)  -heart rate 30's-40's PTA  -EKG c/w sinus bradycardia; repeat EKG in am (known LBBB)  -troponin 53; repeat pending; proBNP ~4,000  -CXR c/w small bilateral effusions   -pt currently on room air; sats los 90's   -hold Toprol, amiodarone continued w/ hold parameter  -Torsemide held for now; Lasix 60mg IV now and reassess need for further diuretics pending output and am lab results   -ASA and Eliquis continued   -zetia held   -daily weights; strict I/Os   -orthostatic Bps   -Cardiology consult in am   -pt,ot and case mgt consult     **CHRISTIANO on CKD Stage III  -previous CR 1.68 on 10/5 (as high as 2.07 during recent admit)   -current CR 2.37 w/ eGFR 24  -UA and urine studies pending   -Lasix IV now; holding oral torsemide and re-evaluated labs, etc in am   -consider Nephrology consult pending CR trend     **GERD  -continue PPI    **Hx of head/neck cancer (2023)  **Recent weight loss, decreased appetite   -per recent d/c summary- pt w/ 40 lbs weight loss and previous abnormal CT scan   -per d/c summary: \"CT of the abdomen and pelvis with low-density masses within the " "pancreas which may represent a cyst, pseudocyst or IPMN the largest lesion within the head of the pancreas is unchanged, 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   -pt continues to c/o decreased appetite; nutrition consult in am     DVT prophylaxis:  Eliquis     CODE STATUS:    Level Of Support Discussed With: Patient  Code Status (Patient has no pulse and is not breathing): CPR (Attempt to Resuscitate)  Medical Interventions (Patient has pulse or is breathing): Full Support    Expected Discharge  Expected Discharge Date: 10/20/2023; Expected Discharge Time:     Signature: Electronically signed by CYNDEE Rios, 10/18/23, 8:23 PM EDT.    Time spent: 55 minutes               "

## 2023-10-18 NOTE — OUTREACH NOTE
CHF Week 2 Survey      Flowsheet Row Responses   [facility] facility patient discharged from? [place]   Does the patient have one of the following disease processes/diagnoses(primary or secondary)? CHF   Week 2 attempt successful? No   Unsuccessful attempts Attempt 1            Brigida VARGHESE - Registered Nurse

## 2023-10-18 NOTE — Clinical Note
Level of Care: Telemetry [5]   Diagnosis: Symptomatic bradycardia [[id number]]   Admitting Physician: MINDY HERBERT [[id number]]   Bed Request Comments: cdu

## 2023-10-19 ENCOUNTER — DOCUMENTATION (OUTPATIENT)
Dept: CARDIOLOGY | Facility: CLINIC | Age: 88
End: 2023-10-19
Payer: MEDICARE

## 2023-10-19 ENCOUNTER — HOME CARE VISIT (OUTPATIENT)
Dept: HOME HEALTH SERVICES | Facility: HOME HEALTHCARE | Age: 88
End: 2023-10-19
Payer: COMMERCIAL

## 2023-10-19 PROBLEM — I50.9 CHF (CONGESTIVE HEART FAILURE): Status: RESOLVED | Noted: 2023-10-04 | Resolved: 2023-10-19

## 2023-10-19 PROBLEM — I50.23 ACUTE ON CHRONIC HFREF (HEART FAILURE WITH REDUCED EJECTION FRACTION): Status: RESOLVED | Noted: 2023-10-05 | Resolved: 2023-10-19

## 2023-10-19 PROBLEM — N18.9 ACUTE KIDNEY INJURY SUPERIMPOSED ON CHRONIC KIDNEY DISEASE: Status: RESOLVED | Noted: 2023-10-18 | Resolved: 2023-10-19

## 2023-10-19 PROBLEM — I50.22 CHRONIC HFREF (HEART FAILURE WITH REDUCED EJECTION FRACTION): Status: ACTIVE | Noted: 2023-09-30

## 2023-10-19 PROBLEM — R63.4 WEIGHT LOSS: Status: RESOLVED | Noted: 2023-09-29 | Resolved: 2023-10-19

## 2023-10-19 PROBLEM — N17.9 ACUTE KIDNEY INJURY SUPERIMPOSED ON CHRONIC KIDNEY DISEASE: Status: RESOLVED | Noted: 2023-10-18 | Resolved: 2023-10-19

## 2023-10-19 LAB
ANION GAP SERPL CALCULATED.3IONS-SCNC: 10 MMOL/L (ref 5–15)
BASOPHILS # BLD AUTO: 0.07 10*3/MM3 (ref 0–0.2)
BASOPHILS NFR BLD AUTO: 1.1 % (ref 0–1.5)
BUN SERPL-MCNC: 44 MG/DL (ref 8–23)
BUN/CREAT SERPL: 19.6 (ref 7–25)
CALCIUM SPEC-SCNC: 9 MG/DL (ref 8.2–9.6)
CHLORIDE SERPL-SCNC: 103 MMOL/L (ref 98–107)
CO2 SERPL-SCNC: 28 MMOL/L (ref 22–29)
CREAT SERPL-MCNC: 2.25 MG/DL (ref 0.76–1.27)
CREAT UR-MCNC: 27.7 MG/DL
DEPRECATED RDW RBC AUTO: 50.4 FL (ref 37–54)
EGFRCR SERPLBLD CKD-EPI 2021: 26.5 ML/MIN/1.73
EOSINOPHIL # BLD AUTO: 0.16 10*3/MM3 (ref 0–0.4)
EOSINOPHIL NFR BLD AUTO: 2.6 % (ref 0.3–6.2)
ERYTHROCYTE [DISTWIDTH] IN BLOOD BY AUTOMATED COUNT: 13 % (ref 12.3–15.4)
GLUCOSE SERPL-MCNC: 101 MG/DL (ref 65–99)
HCT VFR BLD AUTO: 32.9 % (ref 37.5–51)
HGB BLD-MCNC: 10.8 G/DL (ref 13–17.7)
IMM GRANULOCYTES # BLD AUTO: 0.02 10*3/MM3 (ref 0–0.05)
IMM GRANULOCYTES NFR BLD AUTO: 0.3 % (ref 0–0.5)
LYMPHOCYTES # BLD AUTO: 2.28 10*3/MM3 (ref 0.7–3.1)
LYMPHOCYTES NFR BLD AUTO: 37.4 % (ref 19.6–45.3)
MCH RBC QN AUTO: 34.2 PG (ref 26.6–33)
MCHC RBC AUTO-ENTMCNC: 32.8 G/DL (ref 31.5–35.7)
MCV RBC AUTO: 104.1 FL (ref 79–97)
MONOCYTES # BLD AUTO: 0.86 10*3/MM3 (ref 0.1–0.9)
MONOCYTES NFR BLD AUTO: 14.1 % (ref 5–12)
NEUTROPHILS NFR BLD AUTO: 2.71 10*3/MM3 (ref 1.7–7)
NEUTROPHILS NFR BLD AUTO: 44.5 % (ref 42.7–76)
NRBC BLD AUTO-RTO: 0 /100 WBC (ref 0–0.2)
PLATELET # BLD AUTO: 167 10*3/MM3 (ref 140–450)
PMV BLD AUTO: 10.6 FL (ref 6–12)
POTASSIUM SERPL-SCNC: 3.9 MMOL/L (ref 3.5–5.2)
QT INTERVAL: 554 MS
QT INTERVAL: 574 MS
QTC INTERVAL: 473 MS
QTC INTERVAL: 473 MS
RBC # BLD AUTO: 3.16 10*6/MM3 (ref 4.14–5.8)
SODIUM SERPL-SCNC: 141 MMOL/L (ref 136–145)
WBC NRBC COR # BLD: 6.1 10*3/MM3 (ref 3.4–10.8)

## 2023-10-19 PROCEDURE — G0378 HOSPITAL OBSERVATION PER HR: HCPCS

## 2023-10-19 PROCEDURE — 99214 OFFICE O/P EST MOD 30 MIN: CPT | Performed by: INTERNAL MEDICINE

## 2023-10-19 PROCEDURE — 97162 PT EVAL MOD COMPLEX 30 MIN: CPT

## 2023-10-19 PROCEDURE — 85025 COMPLETE CBC W/AUTO DIFF WBC: CPT | Performed by: NURSE PRACTITIONER

## 2023-10-19 PROCEDURE — 80048 BASIC METABOLIC PNL TOTAL CA: CPT | Performed by: NURSE PRACTITIONER

## 2023-10-19 PROCEDURE — 93005 ELECTROCARDIOGRAM TRACING: CPT | Performed by: NURSE PRACTITIONER

## 2023-10-19 PROCEDURE — 99214 OFFICE O/P EST MOD 30 MIN: CPT | Performed by: STUDENT IN AN ORGANIZED HEALTH CARE EDUCATION/TRAINING PROGRAM

## 2023-10-19 PROCEDURE — 97166 OT EVAL MOD COMPLEX 45 MIN: CPT

## 2023-10-19 PROCEDURE — 93010 ELECTROCARDIOGRAM REPORT: CPT | Performed by: STUDENT IN AN ORGANIZED HEALTH CARE EDUCATION/TRAINING PROGRAM

## 2023-10-19 RX ORDER — ACETAMINOPHEN 325 MG/1
650 TABLET ORAL EVERY 6 HOURS PRN
Status: DISCONTINUED | OUTPATIENT
Start: 2023-10-19 | End: 2023-10-20 | Stop reason: HOSPADM

## 2023-10-19 RX ORDER — CHOLECALCIFEROL (VITAMIN D3) 125 MCG
5 CAPSULE ORAL NIGHTLY PRN
Status: DISCONTINUED | OUTPATIENT
Start: 2023-10-19 | End: 2023-10-20 | Stop reason: HOSPADM

## 2023-10-19 RX ORDER — TORSEMIDE 20 MG/1
20 TABLET ORAL DAILY
Status: DISCONTINUED | OUTPATIENT
Start: 2023-10-19 | End: 2023-10-20 | Stop reason: HOSPADM

## 2023-10-19 RX ADMIN — SENNOSIDES AND DOCUSATE SODIUM 2 TABLET: 8.6; 5 TABLET ORAL at 08:15

## 2023-10-19 RX ADMIN — APIXABAN 2.5 MG: 2.5 TABLET, FILM COATED ORAL at 21:36

## 2023-10-19 RX ADMIN — BISACODYL 5 MG: 5 TABLET, COATED ORAL at 18:11

## 2023-10-19 RX ADMIN — APIXABAN 2.5 MG: 2.5 TABLET, FILM COATED ORAL at 08:15

## 2023-10-19 RX ADMIN — SENNOSIDES AND DOCUSATE SODIUM 2 TABLET: 8.6; 5 TABLET ORAL at 21:36

## 2023-10-19 RX ADMIN — ACETAMINOPHEN 650 MG: 325 TABLET ORAL at 10:04

## 2023-10-19 RX ADMIN — AMIODARONE HYDROCHLORIDE 100 MG: 200 TABLET ORAL at 08:15

## 2023-10-19 RX ADMIN — Medication 10 ML: at 21:36

## 2023-10-19 RX ADMIN — ACETAMINOPHEN 650 MG: 325 TABLET ORAL at 01:37

## 2023-10-19 RX ADMIN — ACETAMINOPHEN 650 MG: 325 TABLET ORAL at 21:36

## 2023-10-19 RX ADMIN — ASPIRIN 81 MG: 81 TABLET, COATED ORAL at 08:15

## 2023-10-19 RX ADMIN — TORSEMIDE 20 MG: 20 TABLET ORAL at 12:37

## 2023-10-19 RX ADMIN — PANTOPRAZOLE SODIUM 40 MG: 40 TABLET, DELAYED RELEASE ORAL at 06:51

## 2023-10-19 RX ADMIN — Medication 5 MG: at 21:36

## 2023-10-19 RX ADMIN — Medication 10 ML: at 08:16

## 2023-10-19 NOTE — THERAPY EVALUATION
Patient Name: Mukund Pool  : [id number]    MRN: [id number]                              Today's Date: 10/19/2023       Admit Date: 10/18/2023    Visit Dx:     ICD-10-CM ICD-9-CM   1. Symptomatic bradycardia  R00.1 427.89   2. Dizziness  R42 780.4   3. Left bundle branch block  I44.7 426.3   4. 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)    Symptomatic bradycardia    Elevated troponin level not due to acute coronary syndrome    GERD without esophagitis    Pleural effusion, bilateral     Past Medical History:   Diagnosis Date    Arthritis     Chronic kidney disease     kidney stones    GERD (gastroesophageal reflux disease)     History of radiation therapy 2021    laryngeal mass    Prostate cancer     Vocal cord cancer      Past Surgical History:   Procedure Laterality Date    CARDIAC ELECTROPHYSIOLOGY PROCEDURE  2023    cardiac loop recorder    CHOLECYSTECTOMY      CORONARY ARTERY BYPASS GRAFT      PROSTATE SURGERY      VOCAL CORD MASS EXCISION  2021      General Information       Row Name 10/19/23 1029          Physical Therapy Time and Intention    Document Type evaluation  -AB     Mode of Treatment physical therapy  -AB       Row Name 10/19/23 1029          General Information    Patient Profile Reviewed yes  -AB     Prior Level of Function independent:;gait;transfer;bed mobility;ADL's  Pt reports owning three wheeled walker and RW but does not use as often as he should. Endorses at least one recent fall. Pt current with PT 3x/wk  -AB      Existing Precautions/Restrictions fall;other (see comments)  monitor HR and BP.  -AB     Barriers to Rehab medically complex  -AB       Row Name 10/19/23 1029          Living Environment    People in Home facility resident  Kadeem MENARD  -AB       Row Name 10/19/23 1029          Home Main Entrance    Number of Stairs, Main Entrance none  -AB       Row Name 10/19/23 1029          Stairs Within Home, Primary    Number of Stairs, Within Home, Primary none  -AB       Row Name 10/19/23 1029          Cognition    Orientation Status (Cognition) oriented to;person;place;situation;disoriented to;time  Pt able to state year but wrong month  -AB       Row Name 10/19/23 1029          Safety Issues, Functional Mobility    Safety Issues Affecting Function (Mobility) awareness of need for assistance;insight into deficits/self-awareness;safety precaution awareness;safety precautions follow-through/compliance;sequencing abilities  -AB     Impairments Affecting Function (Mobility) balance;endurance/activity tolerance;pain;strength;shortness of breath  -AB               User Key  (r) = Recorded By, (t) = Taken By, (c) = Cosigned By      Initials Name Provider Type    AB Brandee Corado, PT Physical Therapist                   Mobility       Row Name 10/19/23 1034          Bed Mobility    Comment, (Bed Mobility) Pt received from bathroom with OT.  -AB       Row Name 10/19/23 1034          Transfers    Comment, (Transfers) Cues for hand placement, sequencing, and PLB.  -AB       Row Name 10/19/23 1034          Sit-Stand Transfer    Sit-Stand [place] (Transfers) contact guard;1 person assist;verbal cues  -AB     Assistive Device (Sit-Stand Transfers) walker, front-wheeled  -AB       Row Name 10/19/23 1034          Gait/Stairs (Locomotion)    [place] Level (Gait) contact guard;1 person assist;verbal cues  -AB     Assistive Device (Gait) walker, front-wheeled  -AB     Distance in Feet (Gait) 80  -AB     Deviations/Abnormal  Patterns (Gait) base of support, narrow;bilateral deviations;jess decreased;gait speed decreased;stride length decreased  -AB     Bilateral Gait Deviations forward flexed posture;heel strike decreased  -AB     [place] Level (Stairs) not tested  -AB     Comment, (Gait/Stairs) Pt ambulated in hallway with step through gait pattern and slowed jess. Cues for upright posture, walker positioning, and increased step length. No overt LOB. Pt reported mild dizziness during gait training, BP at 122/55. HR in 40's throughout. Further activity limited by fatigue.  -AB               User Key  (r) = Recorded By, (t) = Taken By, (c) = Cosigned By      Initials Name Provider Type    AB Brandee Corado, PT Physical Therapist                   Obj/Interventions       Row Name 10/19/23 1038          Range of Motion Comprehensive    General Range of Motion bilateral lower extremity ROM WFL  -AB       Row Name 10/19/23 1038          Strength Comprehensive (MMT)    General Manual Muscle Testing (MMT) Assessment lower extremity strength deficits identified  -AB     Comment, General Manual Muscle Testing (MMT) Assessment BLE grossly 4/5  -AB       Row Name 10/19/23 1038          Balance    Balance Assessment sitting static balance;sitting dynamic balance;standing static balance;standing dynamic balance  -AB     Static Sitting Balance contact guard  -AB     Dynamic Sitting Balance contact guard  -AB     Position, Sitting Balance unsupported;sitting edge of bed  -AB     Static Standing Balance contact guard  -AB     Dynamic Standing Balance contact guard;1-person assist;verbal cues  -AB     Position/Device Used, Standing Balance supported;walker, front-wheeled  -AB     Balance Interventions sitting;standing;sit to stand;supported;static;dynamic;occupation based/functional task  -AB       Row Name 10/19/23 1038          Sensory Assessment (Somatosensory)    Sensory Assessment (Somatosensory) LE sensation intact  -AB                User Key  (r) = Recorded By, (t) = Taken By, (c) = Cosigned By      Initials Name Provider Type    AB Brandee Corado, PT Physical Therapist                   Goals/Plan       Row Name 10/19/23 1047          Bed Mobility Goal 1 (PT)    Activity/Assistive Device (Bed Mobility Goal 1, PT) supine to sit;sit to supine  -AB     [place] Level/Cues Needed (Bed Mobility Goal 1, PT) independent  -AB     Time Frame (Bed Mobility Goal 1, PT) long term goal (LTG);10 days  -AB       Row Name 10/19/23 1047          Transfer Goal 1 (PT)    Activity/Assistive Device (Transfer Goal 1, PT) sit-to-stand/stand-to-sit;bed-to-chair/chair-to-bed;walker, rolling  -AB     [place] Level/Cues Needed (Transfer Goal 1, PT) modified independence  -AB     Time Frame (Transfer Goal 1, PT) long term goal (LTG);10 days  -AB       Row Name 10/19/23 1047          Gait Training Goal 1 (PT)    Activity/Assistive Device (Gait Training Goal 1, PT) gait (walking locomotion);assistive device use;walker, rolling  -AB     [place] Level (Gait Training Goal 1, PT) supervision required  -AB     Distance (Gait Training Goal 1, PT) 150  -AB     Time Frame (Gait Training Goal 1, PT) long term goal (LTG);10 days  -AB       Row Name 10/19/23 1047          Therapy Assessment/Plan (PT)    Planned Therapy Interventions (PT) balance training;bed mobility training;gait training;home exercise program;patient/family education;postural re-education;transfer training;stretching;strengthening;stair training;ROM (range of motion)  -AB               User Key  (r) = Recorded By, (t) = Taken By, (c) = Cosigned By      Initials Name Provider Type    AB Brandee Corado, PT Physical Therapist                   Clinical Impression       Row Name 10/19/23 1040          Pain    Additional Documentation Pain Scale: FACES Pre/Post-Treatment (Group)  -AB       Row Name 10/19/23 1040          Pain Scale: FACES Pre/Post-Treatment    Pain: FACES Scale, Pretreatment 2-->hurts  little bit  -AB     Posttreatment Pain Rating 2-->hurts little bit  -AB     Pain Location generalized  -AB     Pain Location - head;neck  -AB     Pre/Posttreatment Pain Comment Pt reports head/neck pain. Hospitalist is aware.  -AB       Row Name 10/19/23 1040          Plan of Care Review    Plan of Care Reviewed With patient;family  -AB     Progress no change  -AB     Outcome Evaluation PT initial eval completed. Pt presents below his functional baseline with generalized weakness, impaired balance, and decreased endurance limiting his independence with mobility. Pt ambulated 80' with CGA and RW. No overt LOB but reported mild dizzines, /55. Further IPPT is warrented. PT rec d/c to SNF when medically appropriate.  -AB       Row Name 10/19/23 1040          Therapy Assessment/Plan (PT)    Rehab Potential (PT) good, to achieve stated therapy goals  -AB     Criteria for Skilled Interventions Met (PT) yes;meets criteria;skilled treatment is necessary  -AB     Therapy Frequency (PT) daily  -AB       Row Name 10/19/23 1040          Vital Signs    Pre Systolic BP Rehab 110  -AB     Pre Treatment Diastolic BP 52  -AB     Post Systolic BP Rehab 122  -AB     Post Treatment Diastolic BP 55  -AB     Pretreatment Heart Rate (beats/min) 41  -AB     Posttreatment Heart Rate (beats/min) 42  -AB     Pre SpO2 (%) 91  -AB     O2 Delivery Pre Treatment room air  -AB     O2 Delivery Intra Treatment room air  -AB     Post SpO2 (%) 92  -AB     O2 Delivery Post Treatment room air  -AB     Pre Patient Position Standing  -AB     Intra Patient Position Standing  -AB     Post Patient Position Sitting  -AB       Row Name 10/19/23 1040          Positioning and Restraints    Pre-Treatment Position bathroom  with OT  -AB     Post Treatment Position chair  -AB     In Chair notified nsg;sitting;reclined;call light within reach;encouraged to call for assist;exit alarm on;legs elevated;with family/caregiver;waffle cushion  -AB               User  Key  (r) = Recorded By, (t) = Taken By, (c) = Cosigned By      Initials Name Provider Type    Brandee Kelly, PT Physical Therapist                   Outcome Measures       Row Name 10/19/23 1048 10/19/23 0800       How much help from another person do you currently need...    Turning from your back to your side while in flat bed without using bedrails? 3  -AB 4  -EB    Moving from lying on back to sitting on the side of a flat bed without bedrails? 3  -AB 4  -EB    Moving to and from a bed to a chair (including a wheelchair)? 3  -AB 3  -EB    Standing up from a chair using your arms (e.g., wheelchair, bedside chair)? 3  -AB 3  -EB    Climbing 3-5 steps with a railing? 2  -AB 2  -EB    To walk in hospital room? 3  -AB 3  -EB    AM-PAC 6 Clicks Score (PT) 17  -AB 19  -EB    Highest level of mobility 5 --> Static standing  -AB 6 --> Walked 10 steps or more  -      Row Name 10/19/23 1048          Functional Assessment    Outcome Measure Options AM-PAC 6 Clicks Basic Mobility (PT)  -AB               User Key  (r) = Recorded By, (t) = Taken By, (c) = Cosigned By      Initials Name Provider Type    Grisel Payne RN Registered Nurse    Brandee Kelly, PT Physical Therapist                                 Physical Therapy Education       Title: PT OT SLP Therapies (In Progress)       Topic: Physical Therapy (In Progress)       Point: Mobility training (Done)       Learning Progress Summary             Patient Acceptance, E,D, VU,NR by AB at 10/19/2023 1048                         Point: Home exercise program (Not Started)       Learner Progress:  Not documented in this visit.              Point: Body mechanics (Done)       Learning Progress Summary             Patient Acceptance, E,D, VU,NR by AB at 10/19/2023 1048                         Point: Precautions (Done)       Learning Progress Summary             Patient Acceptance, E,D, VU,NR by AB at 10/19/2023 1048                                          User Key       Initials Effective Dates Name Provider Type Discipline    AB 09/22/22 -  Brandee Corado, PT Physical Therapist PT                  PT Recommendation and Plan  Planned Therapy Interventions (PT): balance training, bed mobility training, gait training, home exercise program, patient/family education, postural re-education, transfer training, stretching, strengthening, stair training, ROM (range of motion)  Plan of Care Reviewed With: patient, family  Progress: no change  Outcome Evaluation: PT initial eval completed. Pt presents below his functional baseline with generalized weakness, impaired balance, and decreased endurance limiting his independence with mobility. Pt ambulated 80' with CGA and RW. No overt LOB but reported mild dizzines, /55. Further IPPT is warrented. PT rec d/c to SNF when medically appropriate.     Time Calculation:   PT Evaluation Complexity  History, PT Evaluation Complexity: 1-2 personal factors and/or comorbidities  Examination of Body Systems (PT Eval Complexity): total of 3 or more elements  Clinical Presentation (PT Evaluation Complexity): evolving  Clinical Decision Making (PT Evaluation Complexity): moderate complexity  Overall Complexity (PT Evaluation Complexity): moderate complexity     PT Charges       Row Name 10/19/23 1049             Time Calculation    Start Time 0900  -AB      PT Received On 10/19/23  -AB      PT Goal Re-Cert Due Date 10/29/23  -AB         Untimed Charges    PT Eval/Re-eval Minutes 50  -AB         Total Minutes    Untimed Charges Total Minutes 50  -AB       Total Minutes 50  -AB                User Key  (r) = Recorded By, (t) = Taken By, (c) = Cosigned By      Initials Name Provider Type    AB Brandee Corado, PT Physical Therapist                  Therapy Charges for Today       Code Description Service Date Service Provider Modifiers Qty    [id number]  PT EVAL MOD COMPLEXITY 4 10/19/2023 Brandee Corado, PT GP 1            PT  G-Codes  Outcome Measure Options: AM-PAC 6 Clicks Basic Mobility (PT)  AM-PAC 6 Clicks Score (PT): 17  PT Discharge Summary  Anticipated Discharge Disposition (PT): skilled nursing facility    Brandee Corado, PT  10/19/2023

## 2023-10-19 NOTE — PLAN OF CARE
Goal Outcome Evaluation:            VSS, RA, SB. Patient seen by Cards today and torsemide given per order. Ambulating with assist. Will continue current plan of care

## 2023-10-19 NOTE — PROGRESS NOTES
"Pt has Medtronic Linq II loop recorder & is a patient of [facility] cardiology. Bedside interrogation of loop recorder performed by [employer] rep. Device interrogation scanned into pt's EMR under the Media tab, labeled \"[employer] Device Check\".  "

## 2023-10-19 NOTE — THERAPY EVALUATION
Patient Name: Mukund Pool  : 1930    MRN: [id number]                              Today's Date: 10/19/2023       Admit Date: 10/18/2023    Visit Dx:     ICD-10-CM ICD-9-CM   1. Symptomatic bradycardia  R00.1 427.89   2. Dizziness  R42 780.4   3. Left bundle branch block  I44.7 426.3   4. 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)    Symptomatic bradycardia    Elevated troponin level not due to acute coronary syndrome    GERD without esophagitis    Pleural effusion, bilateral     Past Medical History:   Diagnosis Date    Arthritis     Chronic kidney disease     kidney stones    GERD (gastroesophageal reflux disease)     History of radiation therapy 2021    laryngeal mass    Prostate cancer     Vocal cord cancer      Past Surgical History:   Procedure Laterality Date    CARDIAC ELECTROPHYSIOLOGY PROCEDURE  2023    cardiac loop recorder    CHOLECYSTECTOMY      CORONARY ARTERY BYPASS GRAFT      PROSTATE SURGERY      VOCAL CORD MASS EXCISION  2021      General Information       Row Name 10/19/23 1122          OT Time and Intention    Document Type evaluation  -AN     Mode of Treatment occupational therapy  -AN       Row Name 10/19/23 1122          General Information    Patient Profile Reviewed yes  -AN     Prior Level of Function independent:;all household mobility;community mobility;gait;ADL's  Pt reports owning three wheeled walker and RW but does not use as often as he should. Endorses at least one recent fall. Pt current with PT 3x/wk   -AN     Existing Precautions/Restrictions fall;other (see comments)  monitor HR and BP throughout  -AN     Barriers to Rehab medically complex  -AN       Row Name 10/19/23 1122          Occupational Profile    Environmental Supports and Barriers (Occupational Profile) owns shower chair but does not currently use it  -AN       Row Name 10/19/23 1122          Living Environment    People in Home facility resident;[facility] (see comments)  [facility]  -[facility]       Row Name 10/19/23 1122          Home Main Entrance    Number of Stairs, Main Entrance none  -AN       Row Name 10/19/23 1122          Stairs Within Home, Primary    Number of Stairs, Within Home, Primary none  -AN       Row Name 10/19/23 1122          Cognition    Orientation Status (Cognition) oriented to;person;place;situation;disoriented to;time  -AN       Row Name 10/19/23 1122          Safety Issues, Functional Mobility    Safety Issues Affecting Function (Mobility) safety precaution awareness;safety precautions follow-through/compliance;judgment;awareness of need for assistance  -AN     Impairments Affecting Function (Mobility) balance;endurance/activity tolerance;pain;strength;shortness of breath  -AN               User Key  (r) = Recorded By, (t) = Taken By, (c) = Cosigned By      Initials Name Provider Type    AN Katheryn Carreno OT Occupational Therapist                     Mobility/ADL's       Row Name 10/19/23 1124          Bed Mobility    Bed Mobility supine-sit  -AN     Supine-Sit [place] (Bed Mobility) minimum assist (75% patient effort);1 person assist;verbal cues  -AN     Bed Mobility, Safety Issues decreased use of arms for pushing/pulling;decreased use of legs for bridging/pushing;impaired trunk control for bed mobility  -AN     Assistive Device (Bed Mobility) head of bed elevated  -AN       Row Name 10/19/23 1124          Transfers    Transfers sit-stand transfer;stand-sit transfer  -AN     Comment, (Transfers) cues for hand  placement, transfer technique, and safety with RW throughout  -AN       Row Name 10/19/23 1124          Sit-Stand Transfer    Sit-Stand [place] (Transfers) contact guard;verbal cues  -AN     Assistive Device (Sit-Stand Transfers) walker, front-wheeled  -AN       Row Name 10/19/23 1124          Stand-Sit Transfer    Stand-Sit [place] (Transfers) verbal cues;contact guard  -AN     Assistive Device (Stand-Sit Transfers) walker, front-wheeled  -AN       Row Name 10/19/23 1124          Functional Mobility    Functional Mobility- Ind. Level contact guard assist  -AN     Functional Mobility- Device walker, front-wheeled  -AN     Functional Mobility-Distance (Feet) --  <household distance  -AN     Functional Mobility- Safety Issues sequencing ability decreased;step length decreased  -AN     Functional Mobility- Comment Pt ambulated short distance in order to perform toileting tasks using the RW with CGA for balance.  -AN       Row Name 10/19/23 1124          Activities of Daily Living    BADL Assessment/Intervention upper body dressing;lower body dressing;toileting  -AN       Row Name 10/19/23 [facility]          Upper Body Dressing Assessment/Training    [place] Level (Upper Body Dressing) don;pajama/robe;minimum assist (75% patient effort)  -AN     Position (Upper Body Dressing) edge of bed sitting  -AN       Row Name 10/19/23 1124          Lower Body Dressing Assessment/Training    [place] Level (Lower Body Dressing) don;socks;minimum assist (75% patient effort)  -AN     Position (Lower Body Dressing) edge of bed sitting  -AN       Row Name 10/19/23 1124          Toileting Assessment/Training    [place] Level (Toileting) adjust/manage clothing;contact guard assist  -AN     Assistive Devices (Toileting) commode  -AN     Position (Toileting) supported standing  -AN     Comment, (Toileting) Pt educated on RW positioning in order to perform standing toileting tasks.  -AN               User Key  (r) =  Recorded By, (t) = Taken By, (c) = Cosigned By      Initials Name Provider Type    Katheryn Wyman OT Occupational Therapist                   Obj/Interventions       Row Name 10/19/23 1127          Sensory Assessment (Somatosensory)    Sensory Assessment (Somatosensory) UE sensation intact  -AN       Row Name 10/19/23 1127          Vision Assessment/Intervention    Visual Impairment/Limitations WFL;corrective lenses full-time  -AN       Row Name 10/19/23 1127          Range of Motion Comprehensive    General Range of Motion bilateral upper extremity ROM WFL  -AN       Row Name 10/19/23 1127          Strength Comprehensive (MMT)    General Manual Muscle Testing (MMT) Assessment upper extremity strength deficits identified  -AN     Comment, General Manual Muscle Testing (MMT) Assessment BUE grossly 4/5  -AN       Row Name 10/19/23 1127          Balance    Balance Assessment sitting static balance;sitting dynamic balance;sit to stand dynamic balance;standing dynamic balance;standing static balance  -AN     Static Sitting Balance standby assist  -AN     Dynamic Sitting Balance contact guard  -AN     Position, Sitting Balance sitting edge of bed  -AN     Sit to Stand Dynamic Balance verbal cues;contact guard  -AN     Static Standing Balance contact guard;verbal cues  -AN     Dynamic Standing Balance contact guard;verbal cues  -AN     Position/Device Used, Standing Balance supported;walker, front-wheeled  -AN     Balance Interventions standing;sit to stand;supported;static;dynamic;minimal challenge;occupation based/functional task  -AN               User Key  (r) = Recorded By, (t) = Taken By, (c) = Cosigned By      Initials Name Provider Type    Katheryn Wyman OT Occupational Therapist                   Goals/Plan       Row Name 10/19/23 1130          Bed Mobility Goal 1 (OT)    Activity/Assistive Device (Bed Mobility Goal 1, OT) sit to supine/supine to sit  -AN     [place] Level/Cues Needed (Bed  Mobility Goal 1, OT) supervision required  -AN     Time Frame (Bed Mobility Goal 1, OT) long term goal (LTG);10 days  -AN       Row Name 10/19/23 1130          Transfer Goal 1 (OT)    Activity/Assistive Device (Transfer Goal 1, OT) sit-to-stand/stand-to-sit;toilet;walker, rolling  -AN     [place] Level/Cues Needed (Transfer Goal 1, OT) contact guard required  -AN     Time Frame (Transfer Goal 1, OT) long term goal (LTG);10 days  -AN       Row Name 10/19/23 1130          Toileting Goal 1 (OT)    Activity/Device (Toileting Goal 1, OT) adjust/manage clothing;perform perineal hygiene;commode  -AN     [place] Level/Cues Needed (Toileting Goal 1, OT) contact guard required  -AN     Time Frame (Toileting Goal 1, OT) long term goal (LTG);10 days  -AN       Row Name 10/19/23 1130          Grooming Goal 1 (OT)    Activity/Device (Grooming Goal 1, OT) oral care;wash face, hands  -AN     [place] (Grooming Goal 1, OT) contact guard required  -AN     Time Frame (Grooming Goal 1, OT) long term goal (LTG);10 days  -AN     Strategies/Barriers (Grooming Goal 1, OT) sink side  -AN       Row Name 10/19/23 1130          Therapy Assessment/Plan (OT)    Planned Therapy Interventions (OT) activity tolerance training;adaptive equipment training;BADL retraining;functional balance retraining;transfer/mobility retraining;strengthening exercise;occupation/activity based interventions;patient/caregiver education/training  -AN               User Key  (r) = Recorded By, (t) = Taken By, (c) = Cosigned By      Initials Name Provider Type    AN Katheryn Carreno OT Occupational Therapist                   Clinical Impression       Row Name 10/19/23 1128          Pain Scale: FACES Pre/Post-Treatment    Pain: FACES Scale, Pretreatment 2-->hurts little bit  -AN     Posttreatment Pain Rating 2-->hurts little bit  -AN     Pain Location - neck  -AN     Pre/Posttreatment Pain Comment tolerated  -AN       Row Name 10/19/23 1128          Plan  of Care Review    Plan of Care Reviewed With patient;family  -AN     Progress no change  -AN     Outcome Evaluation Pt presents below his functional baseline with deficits including generalized weakness, impaired balance, decreased functional endurance, and impaired ADLs warranting skilled OT services. Pt required CGA using the RW for functional mobility in prep for toileting tasks and frequent cues for RW safety throughout. Rec SNF at [facility] for best functional outcomes.  -AN       Row Name 10/19/23 1128          Therapy Assessment/Plan (OT)    Patient/Family Therapy Goal Statement (OT) Return to PLOF  -AN     Rehab Potential (OT) good, to achieve stated therapy goals  -AN     Criteria for Skilled Therapeutic Interventions Met (OT) yes;skilled treatment is necessary  -AN     Therapy Frequency (OT) daily  -AN       Row Name 10/19/23 1128          Therapy Plan Review/Discharge Plan (OT)    Anticipated Discharge Disposition (OT) skilled nursing facility  -AN       Row Name 10/19/23 1128          Vital Signs    Pre Systolic BP Rehab 104  -AN     Pre Treatment Diastolic BP 55  supine  -AN     Intra Systolic BP Rehab 105  -AN     Intra Treatment Diastolic BP 52  sitting  -AN     Post Systolic BP Rehab 110  -AN     Post Treatment Diastolic BP 52  standing  -AN     O2 Delivery Pre Treatment room air  -AN     O2 Delivery Intra Treatment room air  -AN     O2 Delivery Post Treatment room air  -AN     Pre Patient Position Supine  -AN     Intra Patient Position Standing  -AN     Post Patient Position Sitting  -AN       Row Name 10/19/23 1128          Positioning and Restraints    Pre-Treatment Position in bed  -AN     Post Treatment Position bathroom  -AN     Bathroom with PT;notified nsg  -AN               User Key  (r) = Recorded By, (t) = Taken By, (c) = Cosigned By      Initials Name Provider Type    Katheryn Wyman OT Occupational Therapist                   Outcome Measures       Row Name 10/19/23 1131          How much  help from another is currently needed...    Putting on and taking off regular lower body clothing? 3  -AN     Bathing (including washing, rinsing, and drying) 2  -AN     Toileting (which includes using toilet bed pan or urinal) 3  -AN     Putting on and taking off regular upper body clothing 3  -AN     Taking care of personal grooming (such as brushing teeth) 3  -AN     Eating meals 3  -AN     AM-PAC 6 Clicks Score (OT) 17  -AN       Row Name 10/19/23 1048 10/19/23 0800       How much help from another person do you currently need...    Turning from your back to your side while in flat bed without using bedrails? 3  -AB 4  -EB    Moving from lying on back to sitting on the side of a flat bed without bedrails? 3  -AB 4  -EB    Moving to and from a bed to a chair (including a wheelchair)? 3  -AB 3  -EB    Standing up from a chair using your arms (e.g., wheelchair, bedside chair)? 3  -AB 3  -EB    Climbing 3-5 steps with a railing? 2  -AB 2  -EB    To walk in hospital room? 3  -AB 3  -EB    AM-PAC 6 Clicks Score (PT) 17  -AB 19  -EB    Highest level of mobility 5 --> Static standing  -AB 6 --> Walked 10 steps or more  -EB      Row Name 10/19/23 1131 10/19/23 1048       Functional Assessment    Outcome Measure Options AM-PAC 6 Clicks Daily Activity (OT)  -AN AM-PAC 6 Clicks Basic Mobility (PT)  -AB              User Key  (r) = Recorded By, (t) = Taken By, (c) = Cosigned By      Initials Name Provider Type    Grisel Payne, RN Registered Nurse    Katheryn Wyman OT Occupational Therapist    Brandee Kelly, PT Physical Therapist                    Occupational Therapy Education       Title: PT OT SLP Therapies (In Progress)       Topic: Occupational Therapy (In Progress)       Point: ADL training (Done)       Description:   Instruct learner(s) on proper safety adaptation and remediation techniques during self care or transfers.   Instruct in proper use of assistive devices.                  Learning  Progress Summary             Patient Acceptance, E, VU by AN at 10/19/2023 1132                         Point: Home exercise program (Not Started)       Description:   Instruct learner(s) on appropriate technique for monitoring, assisting and/or progressing therapeutic exercises/activities.                  Learner Progress:  Not documented in this visit.              Point: Precautions (Done)       Description:   Instruct learner(s) on prescribed precautions during self-care and functional transfers.                  Learning Progress Summary             Patient Acceptance, E, VU by AN at 10/19/2023 1132                         Point: Body mechanics (Done)       Description:   Instruct learner(s) on proper positioning and spine alignment during self-care, functional mobility activities and/or exercises.                  Learning Progress Summary             Patient Acceptance, E, VU by AN at 10/19/2023 1132                                         User Key       Initials Effective Dates Name Provider Type Discipline    AN 09/21/21 -  Katheryn Carreno OT Occupational Therapist OT                  OT Recommendation and Plan  Planned Therapy Interventions (OT): activity tolerance training, adaptive equipment training, BADL retraining, functional balance retraining, transfer/mobility retraining, strengthening exercise, occupation/activity based interventions, patient/caregiver education/training  Therapy Frequency (OT): daily  Plan of Care Review  Plan of Care Reviewed With: patient, family  Progress: no change  Outcome Evaluation: Pt presents below his functional baseline with deficits including generalized weakness, impaired balance, decreased functional endurance, and impaired ADLs warranting skilled OT services. Pt required CGA using the RW for functional mobility in prep for toileting tasks and frequent cues for RW safety throughout. [facility] SN[facility] for best functional outcomes.     Time Calculation:   Evaluation  Complexity (OT)  Review Occupational Profile/Medical/Therapy History Complexity: expanded/moderate complexity  Assessment, Occupational Performance/Identification of Deficit Complexity: 3-5 performance deficits  Clinical Decision Making Complexity (OT): detailed assessment/moderate complexity  Overall Complexity of Evaluation (OT): moderate complexity     Time Calculation- OT       Row Name 10/19/23 1133             Time Calculation- OT    OT Start Time 0845  -AN      OT Received On 10/19/23  -AN      OT Goal Re-Cert Due Date 10/29/23  -AN         Untimed Charges    OT Eval/Re-eval Minutes 48  -AN         Total Minutes    Untimed Charges Total Minutes 48  -AN       Total Minutes 48  -AN                User Key  (r) = Recorded By, (t) = Taken By, (c) = Cosigned By      Initials Name Provider Type    AN Katheryn Carreno OT Occupational Therapist                  Therapy Charges for Today       Code Description Service Date Service Provider Modifiers Qty    [id number] HC OT EVAL MOD COMPLEXITY 4 10/19/2023 Katheryn Carreno OT GO 1                 Katheryn Carreno OT  10/19/2023

## 2023-10-19 NOTE — ED PROVIDER NOTES
Subjective   History of Present Illness  89-year-old male presents via EMS to be evaluated for dizziness, hypotension, and bradycardia.  The patient was complaining of dizziness at his nursing home facility today to his staff.  He was noted to be both hypotensive and bradycardic when his vital signs were checked.  He was also found to be orthostatic.  As a result of his abnormal vital signs, EMS was called and he was brought to our facility to be evaluated.  The patient denies any prior history of bradycardia.  He is unsure as to what might have triggered his episode today.  He denies any recent changes to his diet or medications.  He denies any personal history of recurrent syncopal episodes.      Review of Systems   Neurological:  Positive for dizziness.   All other systems reviewed and are negative.      Past Medical History:   Diagnosis Date    Arthritis     Chronic kidney disease     kidney stones    GERD (gastroesophageal reflux disease)     History of radiation therapy 05/24/2021    laryngeal mass    Prostate cancer     Vocal cord cancer        No Known Allergies    Past Surgical History:   Procedure Laterality Date    CARDIAC ELECTROPHYSIOLOGY PROCEDURE  04/2023    cardiac loop recorder    CHOLECYSTECTOMY      CORONARY ARTERY BYPASS GRAFT      PROSTATE SURGERY      VOCAL CORD MASS EXCISION  03/22/2021       Family History   Problem Relation Age of Onset    Cancer Mother     Breast cancer Mother     Heart attack Father     Ovarian cancer Sister        Social History     Socioeconomic History    Marital status:    Tobacco Use    Smoking status: Never    Smokeless tobacco: Never   Vaping Use    Vaping Use: Never used   Substance and Sexual Activity    Alcohol use: No    Drug use: No    Sexual activity: Defer           Objective   Physical Exam  Vitals and nursing note reviewed.   Constitutional:       General: He is not in acute distress.     Appearance: He is well-developed. He is not diaphoretic.       Comments: Nontoxic-appearing elderly male   HENT:      Head: Normocephalic and atraumatic.   Neck:      Vascular: No JVD.   Cardiovascular:      Rate and Rhythm: Regular rhythm. Bradycardia present.      Heart sounds: Normal heart sounds. No murmur heard.     No friction rub. No gallop.   Pulmonary:      Effort: Pulmonary effort is normal. No respiratory distress.      Breath sounds: Normal breath sounds. No wheezing or rales.   Abdominal:      General: Bowel sounds are normal. There is no distension.      Palpations: Abdomen is soft. There is no mass.      Tenderness: There is no abdominal tenderness. There is no guarding.   Musculoskeletal:         General: Normal range of motion.      Cervical back: Normal range of motion.   Skin:     General: Skin is warm and dry.      Coloration: Skin is not pale.      Findings: No erythema or rash.   Neurological:      Mental Status: He is alert and oriented to person, place, and time.   Psychiatric:         Thought Content: Thought content normal.         Judgment: Judgment normal.         Procedures           ED Course  ED Course as of 10/18/23 2345   Wed Oct 18, 2023   1907 89-year-old male presents via EMS to be evaluated for dizziness, hypotension, and bradycardia.  The patient was complaining of dizziness at his nursing home facility today to staff.  He was noted to be both hypotensive and bradycardic when his vital signs were checked.  He was also noted to be orthostatic.  As a result of his abnormal vital signs, EMS was called and he was brought to our facility to be evaluated.  On arrival, the patient is nontoxic-appearing.  EKG revealed a marked sinus bradycardia with a heart rate of 43 with a left bundle branch block that is unchanged when compared to priors and negative by Sgarbossa criteria. [DD]   1908 Chem-8 obtained revealed a normal potassium. [DD]   1923 Additional labs remarkable for mild acute on chronic renal insufficiency, mildly elevated troponin, and  elevated BNP.  The patient is now normotensive.  I reviewed the patient's case with Dr. Singer of cardiology who reviewed the patient's EKG as well.  He agreed that since the patient is quite symptomatic, he felt that it was best to admit the patient to the hospital for close observation.  He recommended holding his beta-blocker.  I am in agreement.  I discussed the patient's case with our hospitalist, Dr. Farris, and the patient will be admitted under his care for further evaluation and treatment.  The patient is hemodynamically stable at this time and is aware/agreeable with the plan. [DD]   1924 I personally and independently viewed the patient's x-ray images myself, and I am in agreement with the radiologist's reading for final interpretation--particularly regarding pleural effusions.  Doubt pneumonia based on overall clinical picture. [DD]      ED Course User Index  [DD] Waylon Butcher MD                                      Recent Results (from the past 24 hour(s))   ECG 12 Lead ED Triage Standing Order; Dysrhythmia    Collection Time: 10/18/23  5:59 PM   Result Value Ref Range    QT Interval 560 ms    QTC Interval 473 ms   Comprehensive Metabolic Panel    Collection Time: 10/18/23  6:09 PM    Specimen: Blood   Result Value Ref Range    Glucose 119 (H) 65 - 99 mg/dL    BUN 47 (H) 8 - 23 mg/dL    Creatinine 2.37 (H) 0.76 - 1.27 mg/dL    Sodium 143 136 - 145 mmol/L    Potassium 4.5 3.5 - 5.2 mmol/L    Chloride 103 98 - 107 mmol/L    CO2 30.0 (H) 22.0 - 29.0 mmol/L    Calcium 8.8 8.2 - 9.6 mg/dL    Total Protein 6.9 6.0 - 8.5 g/dL    Albumin 3.6 3.5 - 5.2 g/dL    ALT (SGPT) 32 1 - 41 U/L    AST (SGOT) 25 1 - 40 U/L    Alkaline Phosphatase 90 39 - 117 U/L    Total Bilirubin 0.8 0.0 - 1.2 mg/dL    Globulin 3.3 gm/dL    A/G Ratio 1.1 g/dL    BUN/Creatinine Ratio 19.8 7.0 - 25.0    Anion Gap 10.0 5.0 - 15.0 mmol/L    eGFR 24.9 (L) >60.0 mL/min/1.73   Magnesium    Collection Time: 10/18/23  6:09 PM    Specimen:  Blood   Result Value Ref Range    Magnesium 2.1 1.7 - 2.3 mg/dL   Single High Sensitivity Troponin T    Collection Time: 10/18/23  6:09 PM    Specimen: Blood   Result Value Ref Range    HS Troponin T 53 (C) <15 ng/L   TSH    Collection Time: 10/18/23  6:09 PM    Specimen: Blood   Result Value Ref Range    TSH 2.010 0.270 - 4.200 uIU/mL   BNP    Collection Time: 10/18/23  6:09 PM    Specimen: Blood   Result Value Ref Range    proBNP 4,050.0 (H) 0.0 - 1,800.0 pg/mL   Green Top (Gel)    Collection Time: 10/18/23  6:09 PM   Result Value Ref Range    Extra Tube Hold for add-ons.    Lavender Top    Collection Time: 10/18/23  6:09 PM   Result Value Ref Range    Extra Tube hold for add-on    Gold Top - SST    Collection Time: 10/18/23  6:09 PM   Result Value Ref Range    Extra Tube Hold for add-ons.    Gray Top    Collection Time: 10/18/23  6:09 PM   Result Value Ref Range    Extra Tube Hold for add-ons.    Light Blue Top    Collection Time: 10/18/23  6:09 PM   Result Value Ref Range    Extra Tube Hold for add-ons.    CBC Auto Differential    Collection Time: 10/18/23  6:09 PM    Specimen: Blood   Result Value Ref Range    WBC 5.88 3.40 - 10.80 10*3/mm3    RBC 3.10 (L) 4.14 - 5.80 10*6/mm3    Hemoglobin 10.7 (L) 13.0 - 17.7 g/dL    Hematocrit 33.6 (L) 37.5 - 51.0 %    .4 (H) 79.0 - 97.0 fL    MCH 34.5 (H) 26.6 - 33.0 pg    MCHC 31.8 31.5 - 35.7 g/dL    RDW 13.4 12.3 - 15.4 %    RDW-SD 53.4 37.0 - 54.0 fl    MPV 10.1 6.0 - 12.0 fL    Platelets 167 140 - 450 10*3/mm3    Neutrophil % 47.3 42.7 - 76.0 %    Lymphocyte % 33.0 19.6 - 45.3 %    Monocyte % 15.6 (H) 5.0 - 12.0 %    Eosinophil % 2.4 0.3 - 6.2 %    Basophil % 1.2 0.0 - 1.5 %    Immature Grans % 0.5 0.0 - 0.5 %    Neutrophils, Absolute 2.78 1.70 - 7.00 10*3/mm3    Lymphocytes, Absolute 1.94 0.70 - 3.10 10*3/mm3    Monocytes, Absolute 0.92 (H) 0.10 - 0.90 10*3/mm3    Eosinophils, Absolute 0.14 0.00 - 0.40 10*3/mm3    Basophils, Absolute 0.07 0.00 - 0.20 10*3/mm3     Immature Grans, Absolute 0.03 0.00 - 0.05 10*3/mm3    nRBC 0.0 0.0 - 0.2 /100 WBC   T4, Free    Collection Time: 10/18/23  6:09 PM    Specimen: Blood   Result Value Ref Range    Free T4 1.41 0.93 - 1.70 ng/dL   POC CHEM 8    Collection Time: 10/18/23  6:14 PM    Specimen: Blood   Result Value Ref Range    Glucose 113 70 - 130 mg/dL    BUN 46 (H) 8 - 26 mg/dL    Creatinine 2.80 (H) 0.60 - 1.30 mg/dL    Sodium 141 138 - 146 mmol/L    POC Potassium 4.3 3.5 - 4.9 mmol/L    Chloride 101 98 - 109 mmol/L    Total CO2 26 24 - 29 mmol/L    Hemoglobin 12.6 12.0 - 17.0 g/dL    Hematocrit 37 (L) 38 - 51 %    Ionized Calcium 1.17 (L) 1.20 - 1.32 mmol/L    eGFR 20.4 (L) >60.0 mL/min/1.73   POC Chem 8    Collection Time: 10/18/23  6:16 PM    Specimen: Blood   Result Value Ref Range    Sodium 141 138 - 146 mmol/L    POC Potassium 4.3 3.5 - 4.9 mmol/L    Chloride 101 98 - 109 mmol/L    Calcium, Arterial 1.17 mg/dL    Glucose 113 70 - 130 mg/dL    Creatinine 2.80 mg/dL    Hemoglobin 12.6 12.0 - 17.0 g/dL    Hematocrit 37 (A) 38 - 51 %   ECG 12 Lead Rhythm Change    Collection Time: 10/18/23  6:36 PM   Result Value Ref Range    QT Interval 564 ms    QTC Interval 476 ms   POC Glucose Once    Collection Time: 10/18/23  8:18 PM    Specimen: Blood   Result Value Ref Range    Glucose 131 (H) 70 - 130 mg/dL   High Sensitivity Troponin T 2Hr    Collection Time: 10/18/23  8:22 PM    Specimen: Blood   Result Value Ref Range    HS Troponin T 47 (H) <15 ng/L    Troponin T Delta -6 (L) >=-4 - <+4 ng/L   ECG 12 Lead Bradycardia    Collection Time: 10/18/23  8:24 PM   Result Value Ref Range    QT Interval 554 ms    QTC Interval 473 ms   Urinalysis With Culture If Indicated - Urine, Clean Catch    Collection Time: 10/18/23  9:49 PM    Specimen: Urine, Clean Catch   Result Value Ref Range    Color, UA Yellow Yellow, Straw    Appearance, UA Clear Clear    pH, UA 5.5 5.0 - 8.0    Specific Gravity, UA 1.009 1.001 - 1.030    Glucose, UA Negative  Negative    Ketones, UA Negative Negative    Bilirubin, UA Negative Negative    Blood, UA Negative Negative    Protein, UA Negative Negative    Leuk Esterase, UA Negative Negative    Nitrite, UA Negative Negative    Urobilinogen, UA 0.2 E.U./dL 0.2 - 1.0 E.U./dL   Osmolality, Urine - Urine, Clean Catch    Collection Time: 10/18/23  9:49 PM    Specimen: Urine, Clean Catch   Result Value Ref Range    Osmolality, Urine 356 300 - 1,100 mOsm/kg   Sodium, Urine, Random - Urine, Clean Catch    Collection Time: 10/18/23  9:49 PM    Specimen: Urine, Clean Catch   Result Value Ref Range    Sodium, Urine 113 mmol/L     Note: In addition to lab results from this visit, the labs listed above may include labs taken at another facility or during a different encounter within the last 24 hours. Please correlate lab times with ED admission and discharge times for further clarification of the services performed during this visit.    XR Chest 1 View   Final Result   Impression:   Small bilateral pleural effusions with bibasilar airspace disease greater on the left, favored represent atelectasis, less likely infection.      Electronically Signed: Ignacio Burleson MD     10/18/2023 6:50 PM EDT     Workstation ID: [id number]        Vitals:    10/18/23 2031 10/18/23 2032 10/18/23 2100 10/18/23 2300   BP: 131/58 120/51     BP Location: Left arm Left arm     Patient Position: Sitting Standing     Pulse: (!) 43 (!) 43 (!) 43 (!) 39   Resp:       Temp:       TempSrc:       SpO2: 94% 94% 96% 96%   Weight:       Height:         Medications   sodium chloride 0.9 % flush 10 mL (has no administration in time range)   aspirin EC tablet 81 mg (has no administration in time range)   amiodarone (PACERONE) tablet 100 mg (has no administration in time range)   apixaban (ELIQUIS) tablet 2.5 mg (2.5 mg Oral Given 10/18/23 2052)   pantoprazole (PROTONIX) EC tablet 40 mg (has no administration in time range)   sodium chloride 0.9 % flush 10 mL ( Intravenous  Canceled Entry 10/18/23 2200)   sodium chloride 0.9 % flush 10 mL (has no administration in time range)   sodium chloride 0.9 % infusion 40 mL (has no administration in time range)   sennosides-docusate (PERICOLACE) 8.6-50 MG per tablet 2 tablet (2 tablets Oral Given 10/18/23 2051)     And   polyethylene glycol (MIRALAX) packet 17 g (has no administration in time range)     And   bisacodyl (DULCOLAX) EC tablet 5 mg (has no administration in time range)     And   bisacodyl (DULCOLAX) suppository 10 mg (has no administration in time range)   furosemide (LASIX) injection 60 mg (60 mg Intravenous Given 10/18/23 2051)     ECG/EMG Results (last 24 hours)       Procedure Component Value Units Date/Time    ECG 12 Lead ED Triage Standing Order; Dysrhythmia [[id number]] Collected: 10/18/23 1759     Updated: 10/18/23 1759     QT Interval 560 ms      QTC Interval 473 ms     Narrative:      Test Reason : ED Triage Standing Order~  Blood Pressure :   */*   mmHG  Vent. Rate :  43 BPM     Atrial Rate :  43 BPM     P-R Int : 202 ms          QRS Dur : 184 ms      QT Int : 560 ms       P-R-T Axes :  89  24 166 degrees     QTc Int : 473 ms    Marked sinus bradycardia  Left bundle branch block  Abnormal ECG  When compared with ECG of 02-OCT-2023 08:53,  Sinus rhythm has replaced Wide QRS rhythm  Vent. rate has decreased BY  63 BPM    Referred By: ED MD           Confirmed By:     ECG 12 Lead Rhythm Change [[id number]] Collected: 10/18/23 1836     Updated: 10/18/23 1832     QT Interval 564 ms      QTC Interval 476 ms     Narrative:      Test Reason : Rhythm Change  Blood Pressure :   */*   mmHG  Vent. Rate :  43 BPM     Atrial Rate :  43 BPM     P-R Int : 196 ms          QRS Dur : 190 ms      QT Int : 564 ms       P-R-T Axes :  47  71 251 degrees     QTc Int : 476 ms    Marked sinus bradycardia  Left bundle branch block  Abnormal ECG  When compared with ECG of 18-OCT-2023 17:59, (Unconfirmed)  T wave inversion now evident in Inferior  leads    Referred By: EDMD           Confirmed By:     ECG 12 Lead Bradycardia [[id number]] Collected: 10/18/23 2024     Updated: 10/18/23 2025     QT Interval 554 ms      QTC Interval 473 ms     Narrative:      Test Reason : Bradycardia  Blood Pressure :   */*   mmHG  Vent. Rate :  44 BPM     Atrial Rate :  44 BPM     P-R Int : 206 ms          QRS Dur : 178 ms      QT Int : 554 ms       P-R-T Axes :  46  13 182 degrees     QTc Int : 473 ms    Marked sinus bradycardia  Left bundle branch block  Abnormal ECG  When compared with ECG of 18-OCT-2023 18:36, (Unconfirmed)  Questionable change in QRS axis  T wave inversion no longer evident in Inferior leads    Referred By:            Confirmed By:           ECG 12 Lead Bradycardia   Preliminary Result   Test Reason : Bradycardia   Blood Pressure :   */*   mmHG   Vent. Rate :  44 BPM     Atrial Rate :  44 BPM      P-R Int : 206 ms          QRS Dur : 178 ms       QT Int : 554 ms       P-R-T Axes :  46  13 182 degrees      QTc Int : 473 ms      Marked sinus bradycardia   Left bundle branch block   Abnormal ECG   When compared with ECG of 18-OCT-2023 18:36, (Unconfirmed)   Questionable change in QRS axis   T wave inversion no longer evident in Inferior leads      Referred By:            Confirmed By:       ECG 12 Lead Rhythm Change   Preliminary Result   Test Reason : Rhythm Change   Blood Pressure :   */*   mmHG   Vent. Rate :  43 BPM     Atrial Rate :  43 BPM      P-R Int : 196 ms          QRS Dur : 190 ms       QT Int : 564 ms       P-R-T Axes :  47  71 251 degrees      QTc Int : 476 ms      Marked sinus bradycardia   Left bundle branch block   Abnormal ECG   When compared with ECG of 18-OCT-2023 17:59, (Unconfirmed)   T wave inversion now evident in Inferior leads      Referred By: EDMD           Confirmed By:       ECG 12 Lead ED Triage Standing Order; Dysrhythmia   Preliminary Result   Test Reason : ED Triage Standing Order~   Blood Pressure :   */*   mmHG   Vent. Rate :   43 BPM     Atrial Rate :  43 BPM      P-R Int : 202 ms          QRS Dur : 184 ms       QT Int : 560 ms       P-R-T Axes :  89  24 166 degrees      QTc Int : 473 ms      Marked sinus bradycardia   Left bundle branch block   Abnormal ECG   When compared with ECG of 02-OCT-2023 08:53,   Sinus rhythm has replaced Wide QRS rhythm   Vent. rate has decreased BY  63 BPM      Referred By: ED MD           Confirmed By:                  Medical Decision Making  Amount and/or Complexity of Data Reviewed  Labs: ordered.  ECG/medicine tests: ordered.    Risk  Decision regarding hospitalization.        Final diagnoses:   Symptomatic bradycardia   Dizziness   Left bundle branch block   Chronic kidney disease, unspecified CKD stage       ED Disposition  ED Disposition       ED Disposition   Decision to Admit    Condition   --    Comment   Level of Care: Telemetry [5]   Diagnosis: Symptomatic bradycardia [206736]   Admitting Physician: MINDY HERBERT [[id number]]   Bed Request Comments: cdu                 No follow-up provider specified.       Medication List      No changes were made to your prescriptions during this visit.            Waylon Butcher MD  10/18/23 2934

## 2023-10-19 NOTE — PROGRESS NOTES
[facility] Cardiology  Progress       Reason for visit:    Bradycardia      Active Hospital Problems    Diagnosis  POA    **Symptomatic bradycardia [R00.1]  Yes    Acute kidney injury superimposed on chronic kidney disease [N17.9, N18.9]  Yes    Elevated troponin [R79.89]  Yes    GERD without esophagitis [K21.9]  Yes    Pleural effusion, bilateral [J90]  Yes    CHF (congestive heart failure) [I50.9]  Yes    Coronary artery disease involving native coronary artery of native heart with angina pectoris [I25.119]  Yes     CABG 1980  Echo (2/23): LVEF EF 60%.  LVH with grade 2 DD.  No significant valvular disease  Pharmacologic nuclear stress (2/10/2023): Inferior infarct.  No ischemia.  LVEF 55%      Hyperlipidemia LDL goal <70 [E78.5]  Yes    LBBB (left bundle branch block) [I44.7]  Yes    Stage 3b chronic kidney disease [N18.32]  Yes    Anemia, chronic disease [D63.8]  Yes    Paroxysmal atrial fibrillation [I48.0]  Yes     [id number] 4 (age >75, CAD, HTN)  4% A-fib burden on recent loop recorder interrogation      History of head and neck cancer [Z85.89]  Not Applicable            Wonderful 89-year-old gentleman who resides at [facility].  He was recently admitted with new onset systolic heart failure with ejection fraction of 25%.  He was diuresed at that time and started on metoprolol succinate in addition to amiodarone which she takes for atrial fibrillation.    Yesterday the patient was feeling lightheaded.  Nursing staff at [facility] detected marked bradycardic pulse and insisted he come back to the emergency room.    The patient is presently sitting in chair with no complaints.  He denies chest pain symptoms.  Metoprolol has been discontinued, but his heart rate presently remains in the 40s.         Vital Sign Min/Max for last 24 hours  Temp  Min: 96.9 °F (36.1 °C)  Max: 98 °F (36.7 °C)   BP  Min: 108/53  Max: 134/66   Pulse  Min: 38  Max: 44   Resp  Min: 16  Max: 18    SpO2  Min: 92 %  Max: 98 %   Flow (L/min)  Min: 2  Max: 2      Intake/Output Summary (Last 24 hours) at 10/19/2023 0839  Last data filed at 10/19/2023 0600  Gross per 24 hour   Intake 300 ml   Output 1750 ml   Net -1450 ml           Physical Exam  Constitutional:       Appearance: Normal appearance.   HENT:      Head: Normocephalic.   Cardiovascular:      Rate and Rhythm: Regular rhythm. Bradycardia present.   Neurological:      Mental Status: He is alert.         Tele: Sinus bradycardia 40 bpm    Results Review (reviewed the patient's recent labs in the electronic medical record):     EKG (10/18/2023 at 1759): Sinus bradycardia at 43 bpm.  Left bundle branch block.    CXR (10/18/2023): Mild pulmonary vascular congestion with small bilateral effusions.    ECHO (9/30/2023): LVEF 25%.  Moderate MR.    Results from last 7 days   Lab Units 10/19/23  0608 10/18/23  1816 10/18/23  1814 10/18/23  1809   SODIUM mmol/L 141  --   --  143   POTASSIUM mmol/L 3.9  --   --  4.5   CHLORIDE mmol/L 103  --   --  103   BUN mg/dL 44*  --   --  47*   CREATININE mg/dL 2.25* 2.80 2.80* 2.37*   MAGNESIUM mg/dL  --   --   --  2.1     Results from last 7 days   Lab Units 10/18/23  2022 10/18/23  1809   HSTROP T ng/L 47* 53*     Results from last 7 days   Lab Units 10/19/23  0608 10/18/23  1816 10/18/23  1814 10/18/23  1809   WBC 10*3/mm3 6.10  --   --  5.88   HEMOGLOBIN g/dL 10.8*  --   --  10.7*   HEMOGLOBIN, POC g/dL  --  12.6 12.6  --    HEMATOCRIT % 32.9*  --   --  33.6*   HEMATOCRIT POC %  --  37* 37*  --    PLATELETS 10*3/mm3 167  --   --  167       Lab Results   Component Value Date    HGBA1C 6.0 04/07/2015       Lab Results   Component Value Date    CHOL 132 09/30/2023    TRIG 51 09/30/2023    HDL 38 (L) 09/30/2023    LDL 83 09/30/2023              Iatrogenic symptomatic bradycardia  Recently started on metoprolol succinate for HFrEF  Presently marked sinus bradycardia at 40 bpm  Discontinue metoprolol  Continue monitoring on  telemetry  Hopefully home tomorrow if heart rate improves.  Otherwise we will need to discuss PPM    Chronic HFrEF with LVEF 25%  Appears euvolemic  Resume torsemide     Coronary artery disease  No angina  We will likely defer ischemic evaluation due to age and lack of angina    Chronic troponin elevation secondary to CHF     Paroxysmal atrial fibrillation  Maintaining sinus  Continue amiodarone  Continue apixaban     Hyperlipidemia with goal LDL <70  Statin intolerant                  Discontinue metoprolol  Resume torsemide  Continue to monitor on telemetry  If heart rate improves, may be discharged home tomorrow    Electronically signed by Wallace Espinoza IV, MD, 10/19/23, 10:23 AM EDT.

## 2023-10-19 NOTE — OUTREACH NOTE
CHF Week 2 Survey      Flowsheet Row Responses   [facility] facility patient discharged from? [facility]   Does the patient have one of the following disease processes/diagnoses(primary or secondary)? CHF   Week 2 attempt successful? No   Unsuccessful attempts Attempt 2   Revoke Readmitted            CAROLYNN SANDOVAL - Registered Nurse

## 2023-10-19 NOTE — CONSULTS
Malnutrition Severity Assessment    Patient Name:  Mukund Pool  YOB: 1930  MRN: [id number]  Admit Date:  10/18/2023    Patient meets criteria for : Moderate (non-severe) Malnutrition    Comments:  Pt meets criteria for chronic, moderate malnutrition with the indicators of moderate muscle wasting and subcutaneous fat loss. Of note, pt with hx of head/neck cancer however weight had remained stable for ~1yrs - of late has had insidious weight loss. Suspect inadequate protein-energy intake contributing to wasting. May also have age related sarcopenia.     Malnutrition Severity Assessment  Malnutrition Type: Chronic Disease - Related Malnutrition  Malnutrition Type (last 8 hours)       Malnutrition Severity Assessment       Row Name 10/19/23 1052       Malnutrition Severity Assessment    Malnutrition Type Chronic Disease - Related Malnutrition      Row Name 10/19/23 1052       Unintentional Weight Loss     Unintentional Weight Loss Findings --  non-significant loss of 7.4% x6 months      Row Name 10/19/23 1052       Muscle Loss    Loss of Muscle Mass Findings Moderate    Episcopalian Region Moderate - slight depression    Clavicle Bone Region Moderate - some protrusion in females, visible in males    Acromion Bone Region Moderate - acromion may slightly protrude    Scapular Bone Region Moderate - mild depression, bones may show slightly    Dorsal Hand Region Moderate - slight depression    Patellar Region Moderate - patella more prominent, less muscle definition around patella    Anterior Thigh Region Moderate - mild depression on inner thigh    Posterior Calf Region Moderate - some roundness, slight firmness      Row Name 10/19/23 1052       Fat Loss    Subcutaneous Fat Loss Findings Moderate    Orbital Region  Moderate -  somewhat hollowness, slightly dark circles    Upper Arm Region Moderate - some fat tissue, not ample      Row Name 10/19/23 1052       Criteria Met (Must meet criteria for severity  in at least 2 of these categories: M Wasting, Fat Loss, Fluid, Secondary Signs, Wt. Status, Intake)    Patient meets criteria for  Moderate (non-severe) Malnutrition                    Electronically signed by:  Grisel Dumont MS,RD,LD  10/19/23 11:10 EDT

## 2023-10-19 NOTE — PLAN OF CARE
Goal Outcome Evaluation:  Plan of Care Reviewed With: patient, family        Progress: no change  Outcome Evaluation: Pt presents below his functional baseline with deficits including generalized weakness, impaired balance, decreased functional endurance, and impaired ADLs warranting skilled OT services. Pt required CGA using the RW for functional mobility in prep for toileting tasks and frequent cues for RW safety throughout. Rec SNF at dc for best functional outcomes.      Anticipated Discharge Disposition (OT): skilled nursing facility

## 2023-10-19 NOTE — PROGRESS NOTES
[facility] Medicine Services  PROGRESS NOTE    Patient Name: Mukund Pool  : 1930  MRN: [id number]    Date of Admission: 10/18/2023  Primary Care Physician: Slick Hubbard MD    Subjective   Subjective     CC:  Shortness of breath and weakness    HPI:  Patient is a 89-year-old who complains of shortness of breath and weakness and was found to have significant bradycardia with a heart rate in the 30s.  His Toprol was held and heart rate still remains in the 40s.  He gets winded easily.  Even at rest he feels weak and tired.  He also complains of a headache this morning.  Cardiology consult is in progress.      Objective   Objective     Vital Signs:   Temp:  [96.9 °F (36.1 °C)-98 °F (36.7 °C)] 97.8 °F (36.6 °C)  Heart Rate:  [38-44] 40  Resp:  [16-18] 18  BP: (108-134)/(50-73) 133/63  Flow (L/min):  [2] 2     Physical Exam:  Constitutional: No acute distress, awake, alert  HENT: NCAT, mucous membranes moist  Respiratory: Clear to auscultation bilaterally, respiratory effort normal   Cardiovascular: Heart rate 42, positive murmur  Gastrointestinal: Positive bowel sounds, soft, nontender, nondistended  Musculoskeletal: No bilateral ankle edema  Psychiatric: Appropriate affect, cooperative  Neurologic: Oriented x 3, generally weak, Cranial Nerves grossly intact to confrontation, speech clear  Skin: No rashes      Results Reviewed:  LAB RESULTS:      Lab 10/19/23  0608 10/18/23  1816 10/18/23  1814 10/18/23  1809   WBC 6.10  --   --  5.88   HEMOGLOBIN 10.8*  --   --  10.7*   HEMOGLOBIN, POC  --  12.6 12.6  --    HEMATOCRIT 32.9*  --   --  33.6*   HEMATOCRIT POC  --  37* 37*  --    PLATELETS 167  --   --  167   NEUTROS ABS 2.71  --   --  2.78   IMMATURE GRANS (ABS) 0.02  --   --  0.03   LYMPHS ABS 2.28  --   --  1.94   MONOS ABS 0.86  --   --  0.92*   EOS ABS 0.16  --   --  0.14   .1*  --   --  108.4*         Lab 10/19/23  0608 10/18/23  1816 10/18/23  1816  10/18/23  1809   SODIUM 141  --   --  143   POTASSIUM 3.9  --   --  4.5   CHLORIDE 103  --   --  103   CO2 28.0  --   --  30.0*   ANION GAP 10.0  --   --  10.0   BUN 44*  --   --  47*   CREATININE 2.25* 2.80 2.80* 2.37*   EGFR 26.5*  --  20.4* 24.9*   GLUCOSE 101*  --   --  119*   CALCIUM 9.0  --   --  8.8   MAGNESIUM  --   --   --  2.1   TSH  --   --   --  2.010         Lab 10/18/23  1809   TOTAL PROTEIN 6.9   ALBUMIN 3.6   GLOBULIN 3.3   ALT (SGPT) 32   AST (SGOT) 25   BILIRUBIN 0.8   ALK PHOS 90         Lab 10/18/23  2022 10/18/23  1809   PROBNP  --  4,050.0*   HSTROP T 47* 53*                 Brief Urine Lab Results  (Last result in the past 365 days)        Color   Clarity   Blood   Leuk Est   Nitrite   Protein   CREAT   Urine HCG        10/18/23 2149             27.7         10/18/23 2149 Yellow   Clear   Negative   Negative   Negative   Negative                   Microbiology Results Abnormal       None            XR Chest 1 View    Result Date: 10/18/2023  XR CHEST 1 VW Date of Exam: 10/18/2023 6:19 PM EDT Indication: Dysrhythmia triage protocol Comparison: 9/29/2023 Findings: Sternotomy. Implantable cardiac device. Cardiomediastinal silhouette is unchanged. Pulmonary vascular congestion. Small bilateral pleural effusions with bibasilar airspace disease, greater on the left. No pneumothorax. Bones are unchanged.     Impression: Impression: Small bilateral pleural effusions with bibasilar airspace disease greater on the left, favored represent atelectasis, less likely infection. Electronically Signed: Ignacio Burleson MD  10/18/2023 6:50 PM EDT  Workstation ID: [id number]     Results for orders placed during the hospital encounter of 09/29/23    Adult Transthoracic Echo Complete W/ Cont if Necessary Per Protocol    Interpretation Summary    The left ventricle is mildly dilated and globally hypokinetic.  Left ventricular systolic function is moderately decreased. Estimated left ventricular EF = 25%    Aortic valve  is calcified with no significant stenosis or regurgitation.    Moderate mitral valve regurgitation is present.    Estimated right ventricular systolic pressure from tricuspid regurgitation is normal (<35 mmHg).    There is a small (<1cm) pericardial effusion adjacent to the left ventricle.    Bilateral pleural effusions present      Current medications:  Scheduled Meds:amiodarone, 100 mg, Oral, Daily  apixaban, 2.5 mg, Oral, BID  aspirin, 81 mg, Oral, Daily  pantoprazole, 40 mg, Oral, Q AM  senna-docusate sodium, 2 tablet, Oral, BID  sodium chloride, 10 mL, Intravenous, Q12H      Continuous Infusions:   PRN Meds:.  acetaminophen    senna-docusate sodium **AND** polyethylene glycol **AND** bisacodyl **AND** bisacodyl    sodium chloride    sodium chloride    sodium chloride    Assessment & Plan   Assessment & Plan     Active Hospital Problems    Diagnosis  POA    **Symptomatic bradycardia [R00.1]  Yes    Acute kidney injury superimposed on chronic kidney disease [N17.9, N18.9]  Yes    Elevated troponin [R79.89]  Yes    GERD without esophagitis [K21.9]  Yes    Pleural effusion, bilateral [J90]  Yes    CHF (congestive heart failure) [I50.9]  Yes    Coronary artery disease involving native coronary artery of native heart with angina pectoris [I25.119]  Yes    Hyperlipidemia LDL goal <70 [E78.5]  Yes    LBBB (left bundle branch block) [I44.7]  Yes    Stage 3b chronic kidney disease [N18.32]  Yes    Anemia, chronic disease [D63.8]  Yes    Paroxysmal atrial fibrillation [I48.0]  Yes    History of head and neck cancer [Z85.89]  Not Applicable      Resolved Hospital Problems   No resolved problems to display.        Brief Hospital Course to date:  Mukund Pool is a 89 y.o. male w/ a hx of CAD (s/p CABG), HTN, HLD, PAF (Eliquis, amiodarone), CHF (EF 25%), CKD Stage III, hx of head/neck cancer (2021), GERD who presented to the ED w/ c/o symptomatic bradycardia.      **Symptomatic bradycardia, not easily  "reversed  **Bilateral, small pleural effusions   **Elevated troponin   **CAD (hx of CABG)  **CHF (EF 25%)  **PAF   **HTN, HLD  -recent admit for hypoxia 2/2 CHF and chest pain (d/c home 10/5); evaluated by Cardiology, Toprol was increased to 50mg daily (diuretics given then held 2/2 CHRISTIANO and hypotension, torsemide restarted prior to d/c) (Entresto, aldactone and ACE all held pending f/u appointments- 2/2 CHRISTIANO and hypotension)  -heart rate 30's-40's PTA  -EKG c/w sinus bradycardia; repeat EKG same (known LBBB)  -troponin 53; repeat 47; proBNP ~4,000  -CXR c/w small bilateral effusions   -pt currently on room air; sats los 90's   -hold Toprol, amiodarone continued w/ hold parameter  -Torsemide held for now; Lasix 60mg IV now and reassess need for further diuretics pending cardiology reccs  -ASA and Eliquis continued   -zetia held   -daily weights; strict I/Os   -orthostatic Bps   -Cardiology consult in progress - Dr Espinoza to see  -pt,ot and case mgt consult      **CHRISTIANO on CKD Stage III  -previous CR 1.68 on 10/5 (as high as 2.07 during recent admit)   -current CR 2.37 w/ eGFR 24  -UA and urine studies pending   -Lasix IV now; holding oral torsemide  -consider Nephrology consult pending CR trend      **GERD  -continue PPI     **Hx of head/neck cancer (2023)  **Recent weight loss, decreased appetite   -per recent d/c summary- pt w/ 40 lbs weight loss and previous abnormal CT scan   -per d/c summary: \"CT of the abdomen and pelvis with low-density masses within the pancreas which may represent a cyst, pseudocyst or IPMN the largest lesion within the head of the pancreas is unchanged, 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   -pt continues to c/o decreased appetite; nutrition consult    Consider palliative care consult        Expected Discharge Location and Transportation: Home  Expected Discharge "   Expected Discharge Date: 10/20/2023; Expected Discharge Time:      DVT prophylaxis:  Medical DVT prophylaxis orders are present.     AM-PAC 6 Clicks Score (PT): 19 (10/19/23 0800)    CODE STATUS:   Code Status and Medical Interventions:   Ordered at: 10/18/23 2011     Level Of Support Discussed With:    Patient     Code Status (Patient has no pulse and is not breathing):    CPR (Attempt to Resuscitate)     Medical Interventions (Patient has pulse or is breathing):    Full Support       Dayami Garcia MD  10/19/23

## 2023-10-19 NOTE — HOME HEALTH
Called Dr. Hubbard's office and spoke with Basia about pt slow HR at 40 BPM and only able to increase to 44 BPM with activity.

## 2023-10-19 NOTE — CONSULTS
"                  Clinical Nutrition   Nutrition Assessment  Reason for Visit: MST score 2+, Consult, Reduced oral intake, \"Unsure\" unintentional weight loss, Chronic Poor Intake    Patient Name: Mukund Pool  YOB: 1930  MRN: [id number]  Date of Encounter: 10/19/23 10:52 EDT  Admission date: 10/18/2023    Comments:    Pt meets criteria for chronic, moderate malnutrition with the indicators of moderate muscle wasting and subcutaneous fat loss. Of note, pt with hx of head/neck cancer however weight had remained stable for ~1yrs - of late has had insidious weight loss. Suspect inadequate protein-energy intake contributing to wasting. May also have age related sarcopenia.     Provide Boost Plus 2x daily - will also liberalize diet to promote increased food options.     Nutrition Assessment   Admission Diagnosis:  Symptomatic bradycardia [R00.1]  Symptomatic bradycardia [R00.1]  Symptomatic bradycardia [R00.1]    Problem List:    Symptomatic bradycardia    Paroxysmal atrial fibrillation    Stage 3b chronic kidney disease    Coronary artery disease involving native coronary artery of native heart with angina pectoris    Hyperlipidemia LDL goal <70    LBBB (left bundle branch block)    Chronic HFrEF (heart failure with reduced ejection fraction)    Elevated troponin level not due to acute coronary syndrome    Pleural effusion, bilateral  Moderate Malnutrition    PMH:   He  has a past medical history of Arthritis, Chronic kidney disease, GERD (gastroesophageal reflux disease), History of radiation therapy (05/24/2021), Prostate cancer, and Vocal cord cancer.    PSH:  He  has a past surgical history that includes Prostate surgery; Coronary artery bypass graft; Cholecystectomy; VOCAL CORD MASS EXCISION (03/22/2021); and Cardiac electrophysiology procedure (04/2023).    Applicable Nutrition Concerns:   Skin:  Oral:  GI:    Applicable Interval History:       Reported/Observed/Food/Nutrition Related " "History:     Pt laying in bed at time of visit - able to provide hx. Resides at [facility], reports eating 2 meals daily at facility. Pt with known hx of head/neck cancer in 2021 - primary weight loss of 40# during initial stages of treatment but does reports insidious weight loss of late. Unable to quantify due to note weighing frequently. Denies difficulty chewing/swallowing. Agreeable to [facility]. [facility]    Anthropometrics     Flowsheet Rows      Flowsheet Row First Filed Value   Admission Height 182.9 cm (72\") Documented at 10/18/2023 1818   Admission Weight 81.6 kg (180 lb) Documented at 10/18/2023 1818            Height: Height: 182.9 cm (72\")  Last Filed Weight: Weight: 79.5 kg (175 lb 4.3 oz) (10/19/23 0500)  Method: Weight Method: Bed scale  BMI: BMI (Calculated): 23.8  BMI classification: Normal: 18.5-24.9kg/m2  IBW:   171lbs    UBW:     Weight      Weight (kg) Weight (lbs) Weight Method   4/19/2023 85.73 kg  189 lb     9/29/2023 81.647 kg  180 lb  Stated    9/30/2023 81.6 kg  179 lb 14.3 oz     10/2/2023 81.421 kg  179 lb 8 oz  Bed scale    10/3/2023 81.239 kg  179 lb 1.6 oz  Bed scale    10/18/2023 79.5 kg  175 lb 4.3 oz  Bed scale     81.647 kg  180 lb  Stated    10/19/2023 79.5 kg  175 lb 4.3 oz       Weight change: weight loss of 14 lbs (7.4%) over the past 6 month(s)    Intentional?  No    Nutrition Focused Physical Exam     Date:  10/19       Patient meets criteria for malnutrition diagnosis, see MSA note.     Current Nutrition Prescription   PO: Diet: Regular/House Diet, Cardiac Diets; Healthy Heart (2-3 Na+); Texture: Regular Texture (IDDSI 7); Fluid Consistency: Thin (IDDSI 0)  Oral Nutrition Supplement: N/A  Intake: Insufficient data    Nutrition Diagnosis   Date:  10/19            Updated:    Problem Malnutrition  chronic, moderate   Etiology Advanced age   Signs/Symptoms moderate muscle wasting and subcutaneous fat loss   Status: New    Goal:   General: Nutrition to support treatment  PO: " Tolerate PO  EN/PN: N/A    Nutrition Intervention      Follow treatment progress, Care plan reviewed, Menu adjusted, Encourage intake, Supplement provided    Provide Boost Plus 2x daily - will also liberalize diet to promote increased food options.     Monitoring/Evaluation:   Per protocol, I&O, PO intake, Pertinent labs, Symptoms, POC/GOC      Grisel Dumont MS,RD,LD  Time Spent: 25min

## 2023-10-19 NOTE — PLAN OF CARE
Goal Outcome Evaluation:              Outcome Evaluation: Pt sinus mason with LBBB on several EKGs. Stable BPs on RA. IV lasix given. PRN given for headache overnight. ECHO and cardiology consult ordered. No further concerns.

## 2023-10-19 NOTE — PLAN OF CARE
Goal Outcome Evaluation:  Plan of Care Reviewed With: patient, family        Progress: no change  Outcome Evaluation: PT initial eval completed. Pt presents below his functional baseline with generalized weakness, impaired balance, and decreased endurance limiting his independence with mobility. Pt ambulated 80' with CGA and RW. No overt LOB but reported mild dizzines, /55. Further IPPT is warrented. PT rec d/c to SNF when medically appropriate.      Anticipated Discharge Disposition (PT): skilled nursing facility

## 2023-10-20 ENCOUNTER — READMISSION MANAGEMENT (OUTPATIENT)
Dept: CALL CENTER | Facility: HOSPITAL | Age: 88
End: 2023-10-20
Payer: MEDICARE

## 2023-10-20 ENCOUNTER — HOME CARE VISIT (OUTPATIENT)
Dept: HOME HEALTH SERVICES | Facility: HOME HEALTHCARE | Age: 88
End: 2023-10-20
Payer: COMMERCIAL

## 2023-10-20 VITALS
HEART RATE: 42 BPM | HEIGHT: 72 IN | DIASTOLIC BLOOD PRESSURE: 63 MMHG | WEIGHT: 175.27 LBS | OXYGEN SATURATION: 93 % | SYSTOLIC BLOOD PRESSURE: 113 MMHG | BODY MASS INDEX: 23.74 KG/M2 | RESPIRATION RATE: 18 BRPM | TEMPERATURE: 97.5 F

## 2023-10-20 PROCEDURE — 99214 OFFICE O/P EST MOD 30 MIN: CPT | Performed by: NURSE PRACTITIONER

## 2023-10-20 PROCEDURE — G0378 HOSPITAL OBSERVATION PER HR: HCPCS

## 2023-10-20 RX ADMIN — SENNOSIDES AND DOCUSATE SODIUM 2 TABLET: 8.6; 5 TABLET ORAL at 08:48

## 2023-10-20 RX ADMIN — ASPIRIN 81 MG: 81 TABLET, COATED ORAL at 08:48

## 2023-10-20 RX ADMIN — APIXABAN 2.5 MG: 2.5 TABLET, FILM COATED ORAL at 08:48

## 2023-10-20 RX ADMIN — Medication 10 ML: at 08:50

## 2023-10-20 RX ADMIN — PANTOPRAZOLE SODIUM 40 MG: 40 TABLET, DELAYED RELEASE ORAL at 06:24

## 2023-10-20 RX ADMIN — TORSEMIDE 20 MG: 20 TABLET ORAL at 08:49

## 2023-10-20 RX ADMIN — ACETAMINOPHEN 650 MG: 325 TABLET ORAL at 03:49

## 2023-10-20 RX ADMIN — ACETAMINOPHEN 650 MG: 325 TABLET ORAL at 11:26

## 2023-10-20 NOTE — CONSULTS
Cardiac Electrophysiology In Patient Consultation        [facility] at [facility]     Consultation      PATIENT NAME:  Mukund Pool    :  1930 AGE: 89 y.o.     Date of Admission:  10/18/2023  Date of Consultation:  10/20/2023        Subjective      REASON FOR CONSULT: Bradycardia, left bundle branch block    CHIEF COMPLAINT:  Chief Complaint   Patient presents with    Slow Heart Rate       HISTORY OF PRESENT ILLNESS: Patient is a very pleasant 89-year-old gentleman who follows with Dr. Espinoza.  He has a history of coronary disease, persistent atrial fibrillation on amiodarone, and recently diagnosed new systolic heart failure with ejection fraction of 25.  He presents the hospital after feeling lightheaded via the nursing staff at [facility].  He was found to be significantly bradycardic with heart rates in the 40s and was transferred here.    Metoprolol is been stopped, but he continues to be bradycardic in the 40s.  He says he is feeling much better.  At the current time he does not relate significant symptoms related to bradycardia.  Denies any dizziness, syncope, or presyncope.    PAST MEDICAL HISTORY  Past Medical History:   Diagnosis Date    Arthritis     Chronic kidney disease     kidney stones    GERD (gastroesophageal reflux disease)     History of radiation therapy 2021    laryngeal mass    Prostate cancer     Vocal cord cancer        SURGICAL HISTORY   has a past surgical history that includes Prostate surgery; Coronary artery bypass graft; Cholecystectomy; VOCAL CORD MASS EXCISION (2021); and Cardiac electrophysiology procedure (2023).     SOCIAL HISTORY  Social History     Socioeconomic History    Marital status:    Tobacco Use    Smoking status: Never    Smokeless tobacco: Never   Vaping Use    Vaping Use: Never used   Substance and Sexual Activity    Alcohol use: No    Drug use: No    Sexual activity: Defer       FAMILY  "HISTORY  family history includes Breast cancer in his mother; Cancer in his mother; Heart attack in his father; Ovarian cancer in his sister.     MEDICATIONS  Prior to Admission medications    Medication Sig Start Date End Date Taking? Authorizing Provider   amiodarone (PACERONE) 100 MG tablet Take 1 tablet by mouth Daily. Indications: Atrial Fibrillation   Yes Gilmer Ortez MD   apixaban (ELIQUIS) 2.5 MG tablet tablet Take 1 tablet by mouth 2 (Two) Times a Day. Indications: Atrial Fibrillation   Yes Gilmer Ortez MD   aspirin 81 MG EC tablet Take 1 tablet by mouth Daily.   Yes Gilmer Ortez MD   ezetimibe (ZETIA) 10 MG tablet Take 1 tablet by mouth Daily. Indications: High Amount of Fats in the Blood 11/28/22  Yes Gilmer Ortez MD   gabapentin (NEURONTIN) 300 MG capsule Take 1 capsule by mouth 3 (Three) Times a Day. aking only 2 times a day   Indications: Neuropathic Pain 1/7/21  Yes Gilmer Ortez MD   multivitamin with minerals tablet tablet Take 1 tablet by mouth Daily.   Yes Gilmer Ortez MD   omeprazole (priLOSEC) 20 MG capsule Take 1 capsule by mouth Daily. Indications: Stomach Ulcer 3/30/21  Yes Gilmer Ortez MD   torsemide (DEMADEX) 20 MG tablet Take 1 tablet by mouth Daily. 10/6/23  Yes Sunita Laguerre DO   nitroglycerin (NITROSTAT) 0.4 MG SL tablet Place 0.4 mg under the tongue Every 5 (Five) Minutes As Needed for Chest Pain. Indications: Acute Angina Pectoris 3/16/21   Gilmer Ortez MD       ALLERGIES  No Known Allergies    REVIE  Objective     VITAL SIGNS: /69 (BP Location: Left arm, Patient Position: Lying)   Pulse (!) 42   Temp 97.4 °F (36.3 °C) (Oral)   Resp 18   Ht 182.9 cm (72\")   Wt 79.5 kg (175 lb 4.3 oz)   SpO2 95%   BMI 23.77 kg/m²      ADMIT WEIGHT:  81.6 kg (180 lb)  BMI: Body mass index is 23.77 kg/m².    Admission Weight: 81.6 kg (180 lb)     PHYSICAL EXAM  General appearance: Awake, alert, cooperative  Head: " "Normocephalic, without obvious abnormality, atraumatic  Eyes: Conjunctivae/corneas clear, EOMs intact  Neck: no adenopathy, no carotid bruit, no JVD, and thyroid: not enlarged  Lungs: clear to auscultation bilaterally and no rhonchi or crackles\", ' symmetric  Heart: Bradycardic rate and rhythm, S1, S2 normal, no murmur, click, rub or gallop  Abdomen: Soft, nontender, bowel sounds normal,  no organomegaly  Extremities: extremities normal, atraumatic, no cyanosis or edema  Skin: Skin color, turgor normal, no rashes or lesions  Neurologic: Grossly normal     CBC:   Results from last 7 days   Lab Units 10/19/23  0608 10/18/23  1816 10/18/23  1814 10/18/23  1809   WBC 10*3/mm3 6.10  --   --  5.88   HEMOGLOBIN g/dL 10.8*  --   --  10.7*   HEMOGLOBIN, POC g/dL  --  12.6 12.6  --    HEMATOCRIT % 32.9*  --   --  33.6*   HEMATOCRIT POC %  --  37* 37*  --    MCV fL 104.1*  --   --  108.4*   PLATELETS 10*3/mm3 167  --   --  167         BMP:  Results from last 7 days   Lab Units 10/19/23  0608 10/18/23  1816 10/18/23  1814 10/18/23  1809   POTASSIUM mmol/L 3.9  --   --  4.5   CHLORIDE mmol/L 103  --   --  103   CO2 mmol/L 28.0  --   --  30.0*   BUN mg/dL 44*  --   --  47*   CREATININE mg/dL 2.25* 2.80 2.80* 2.37*   GLUCOSE mg/dL 101*  --   --  119*   CALCIUM mg/dL 9.0  --   --  8.8   MAGNESIUM mg/dL  --   --   --  2.1     PT/INR:     APTT:     MAG:   Results from last 7 days   Lab Units 10/18/23  1809   MAGNESIUM mg/dL 2.1     D Dimer:     Troponin I     ProBNP     Lipid Panel:  No results found for: \"CHOL\", \"TRIG\", \"HDL\"    CURRENT MEDICATIONS:    Current Facility-Administered Medications:     acetaminophen (TYLENOL) tablet 650 mg, 650 mg, Oral, Q6H PRN, Natalia Vega APRN, 650 mg at 10/20/23 0349    amiodarone (PACERONE) tablet 100 mg, 100 mg, Oral, Daily, Natalia Vega APRN, 100 mg at 10/19/23 0815    apixaban (ELIQUIS) tablet 2.5 mg, 2.5 mg, Oral, BID, Natalia Vega APRN, 2.5 mg at 10/19/23 2136    aspirin EC tablet 81 " mg, 81 mg, Oral, Daily, Natalia Vega APRN, 81 mg at 10/19/23 0815    sennosides-docusate (PERICOLACE) 8.6-50 MG per tablet 2 tablet, 2 tablet, Oral, BID, 2 tablet at 10/19/23 2136 **AND** polyethylene glycol (MIRALAX) packet 17 g, 17 g, Oral, Daily PRN **AND** bisacodyl (DULCOLAX) EC tablet 5 mg, 5 mg, Oral, Daily PRN, 5 mg at 10/19/23 1811 **AND** bisacodyl (DULCOLAX) suppository 10 mg, 10 mg, Rectal, Daily PRN, Natalia Vega APRN    melatonin tablet 5 mg, 5 mg, Oral, Nightly PRN, Natalia Vega APRN, 5 mg at 10/19/23 2136    pantoprazole (PROTONIX) EC tablet 40 mg, 40 mg, Oral, Q AM, Natalia Vega APRN, 40 mg at 10/20/23 0624    sodium chloride 0.9 % flush 10 mL, 10 mL, Intravenous, PRN, Emergency, Triage Protocol, MD    sodium chloride 0.9 % flush 10 mL, 10 mL, Intravenous, Q12H, Natalia Vega APRN, 10 mL at 10/19/23 2136    sodium chloride 0.9 % flush 10 mL, 10 mL, Intravenous, PRN, Natalia Vega APRN    sodium chloride 0.9 % infusion 40 mL, 40 mL, Intravenous, PRN, Natalia Vega APRN    torsemide (DEMADEX) tablet 20 mg, 20 mg, Oral, Daily, Wallace Espinoza IV, MD, 20 mg at 10/19/23 1237  CONTINUOUS INFUSIONS:         Assessment & Plan        Diagnosis Plan   1. Symptomatic bradycardia        2. Dizziness        3. Left bundle branch block        4. Chronic kidney disease, unspecified CKD stage          Body mass index is 23.77 kg/m².    Patient has a history of chronic left bundle branch block, atrial fibrillation, and now new decreased ejection fraction.  He has been on amiodarone for his atrial fibrillation but had significant bradycardia with this.  Also was on metoprolol which now been discontinued.  He remains bradycardic.  I personally reviewed his EKGs since admission which showed significant sinus bradycardia with wide left bundle branch block.  We discussed the pathophysiology of tachybradycardia syndrome in relation to atrial fibrillation as well as left lateral branch  block.    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  Given his left bundle branch block, it would probably be reasonable to proceed with left bundle branch area pacing and attempt to help this.  See if this improves his cardiomyopathy.  Given his age, not sure that I would proceed with a left ventricular lead.  We did discuss the pros and cons of pacemaker implantation given his age.  He is at higher risk of adverse outcomes with this.  The main goal for this would be improvement in symptoms.  After this discussion, he elected to see how he feels for now and consider pacemaker implantation as an outpatient.  We will plan for outpatient follow-up.

## 2023-10-20 NOTE — PROGRESS NOTES
Cardiology Progress Note      Reason for visit:    Bradycardia    IDENTIFICATION: 89-year-old gentleman who resides in [place], [place]    Active Hospital Problems    Diagnosis  POA    **Symptomatic bradycardia [R00.1]  Yes    Coronary artery disease involving native coronary artery of native heart with angina pectoris [I25.119]  Yes     Priority: High     CABG 1980  Echo (2/23): LVEF EF 60%.  LVH with grade 2 DD.  No significant valvular disease  Pharmacologic nuclear stress (2/10/2023): Inferior infarct.  No ischemia.  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  LVH with grade 2 DD.  No significant valvular disease  [facility] admission with FC IV heart failure symptoms, pulmonary edema on chest x-ray, and elevated proBNP, 9/30/2023  Echo (9/30/2023): LVEF 25%.  Moderate MR.  RVSP <35 mmHg              Patient is sitting up in the bed without complaints.  He is breathing easy on room air.  EP saw patient yesterday and discussed the need for a possible pacemaker that could be done as outpatient.  The patient would really like to go home today.  He was able to ambulate in the [place] and although he is unsteady on his feet which he says is a normal gait for him he did not have any dizziness or lightheadedness.  His heart rate at baseline was 40 bpm but did increase to 50 bpm when walking.  He denies any chest pain or shortness of breath.           Vital Sign Min/Max for last 24 hours  Temp  Min:  97.5 °F (36.4 °C)  Max: 98.1 °F (36.7 °C)   BP  Min: 106/41  Max: 141/62   Pulse  Min: 37  Max: 42   Resp  Min: 18  Max: 18   SpO2  Min: 92 %  Max: 98 %   No data recorded      Intake/Output Summary (Last 24 hours) at 10/20/2023 0721  Last data filed at 10/19/2023 2314  Gross per 24 hour   Intake --   Output 500 ml   Net -500 ml           Physical Exam  Constitutional:       General: He is awake.   Cardiovascular:      Rate and Rhythm: Regular rhythm. Bradycardia present.   Pulmonary:      Effort: Pulmonary effort is normal.      Breath sounds: Normal breath sounds.   Musculoskeletal:      Right lower leg: No edema.      Left lower leg: No edema.   Skin:     General: Skin is warm and dry.   Neurological:      Mental Status: He is alert and oriented to person, place, and time.   Psychiatric:         Behavior: Behavior is cooperative.         Tele: Sinus bradycardia 40 bpm     Results Review (reviewed the patient's recent labs in the electronic medical record):      EKG (10/18/2023 at 1759): Sinus bradycardia at 43 bpm.  Left bundle branch block.     CXR (10/18/2023): Mild pulmonary vascular congestion with small bilateral effusions.     ECHO (9/30/2023): LVEF 25%.  Moderate MR.    Results from last 7 days   Lab Units 10/19/23  0608 10/18/23  1816 10/18/23  1814 10/18/23  1809   SODIUM mmol/L 141  --   --  143   POTASSIUM mmol/L 3.9  --   --  4.5   CHLORIDE mmol/L 103  --   --  103   BUN mg/dL 44*  --   --  47*   CREATININE mg/dL 2.25* 2.80 2.80* 2.37*   MAGNESIUM mg/dL  --   --   --  2.1     Results from last 7 days   Lab Units 10/18/23  2022 10/18/23  1809   HSTROP T ng/L 47* 53*     Results from last 7 days   Lab Units 10/19/23  0608 10/18/23  1816 10/18/23  1814 10/18/23  1809   WBC 10*3/mm3 6.10  --   --  5.88   HEMOGLOBIN g/dL 10.8*  --   --  10.7*   HEMOGLOBIN, POC g/dL  --  12.6 12.6  --    HEMATOCRIT % 32.9*  --   --  33.6*   HEMATOCRIT POC %  --  37* 37*  --    PLATELETS 10*3/mm3 167  --   --  167       Lab  Results   Component Value Date    HGBA1C 6.0 04/07/2015       Lab Results   Component Value Date    CHOL 132 09/30/2023    TRIG 51 09/30/2023    HDL 38 (L) 09/30/2023    LDL 83 09/30/2023              Symptomatic bradycardia  Recently started on metoprolol succinate for HFrEF now with marked sinus bradycardia 40 bpm  Remains bradycardic despite discontinuation of metoprolol      Chronic HFrEF with LVEF 25%  Appears euvolemic  No ACE/ARB/Entresto/spironolactone due to chronic kidney disease  Demadex 20 mg daily     Coronary artery disease  No angina  Defer ischemic evaluation due to age and lack of angina     Chronic troponin elevation secondary to CHF      Left bundle branch block  Chronic     Paroxysmal atrial fibrillation  Maintaining sinus  Amiodarone 100 mg daily  Eliquis 2.5 mg twice daily       Hyperlipidemia with goal LDL <70  Statin intolerant             Discontinue amiodarone  Discussed patient with Dr. Espinoza and Dr. Pickett and given heart rate did elevate and patient was able to ambulate without symptoms will allow discharge home today.  Patient will follow-up with Dr. Pickett in the office to discuss need for potential pacemaker/CRT.  Patient has appointment for Tuesday at 1:30 PM at [place] office  He will follow-up with me at already scheduled appointment in November  Defer statin therapy due to intolerance  No beta-blocker due to bradycardia  No ACE/ARB/Entresto/spironolactone due to chronic kidney disease    Electronically signed by CYNDEE Castaneda, 10/20/23, 7:21 AM EDT.

## 2023-10-20 NOTE — PROGRESS NOTES
[facility] Medicine Services  PROGRESS NOTE    Patient Name: Mukund Pool  : 1930  MRN: [id number]    Date of Admission: 10/18/2023  Primary Care Physician: Slick Hubbard MD    Subjective   Subjective     CC:  Shortness of breath and weakness    HPI:  10/19 - Patient is a 89-year-old who complains of shortness of breath and weakness and was found to have significant bradycardia with a heart rate in the 30s.  His Toprol was held and heart rate still remains in the 40s.  He gets winded easily.  Even at rest he feels weak and tired.  He also complains of a headache this morning.  Cardiology consult is in progress.    10/20 - Pt with persistent symptomatic bradycardia with shortness of breath and fatigue/weakness and HR is 30-40s. Cardiology has asked EP to evaluate for pacemaker. Will see as outpt per Dr Pickett on Tuesday next week      Objective   Objective     Vital Signs:   Temp:  [97.4 °F (36.3 °C)-98.1 °F (36.7 °C)] 97.4 °F (36.3 °C)  Heart Rate:  [37-42] 42  Resp:  [18] 18  BP: (106-145)/(41-69) 145/69     Physical Exam:  Constitutional: No acute distress, awake, alert  HENT: NCAT, mucous membranes moist  Respiratory: Clear to auscultation bilaterally, respiratory effort normal   Cardiovascular: Heart rate 39, positive murmur  Gastrointestinal: Positive bowel sounds, soft, nontender, nondistended  Musculoskeletal: No bilateral ankle edema  Psychiatric: Appropriate affect, cooperative  Neurologic: Oriented x 3, generally weak, Cranial Nerves grossly intact to confrontation, speech clear  Skin: No rashes      Results Reviewed:  LAB RESULTS:      Lab 10/19/23  0608 10/18/23  1816 10/18/23  1814 10/18/23  1809   WBC 6.10  --   --  5.88   HEMOGLOBIN 10.8*  --   --  10.7*   HEMOGLOBIN, POC  --  12.6 12.6  --    HEMATOCRIT 32.9*  --   --  33.6*   HEMATOCRIT POC  --  37* 37*  --    PLATELETS 167  --   --  167   NEUTROS ABS 2.71  --   --  2.78   IMMATURE GRANS (ABS)  0.02  --   --  0.03   LYMPHS ABS 2.28  --   --  1.94   MONOS ABS 0.86  --   --  0.92*   EOS ABS 0.16  --   --  0.14   .1*  --   --  108.4*         Lab 10/19/23  0608 10/18/23  1816 10/18/23  1814 10/18/23  1809   SODIUM 141  --   --  143   POTASSIUM 3.9  --   --  4.5   CHLORIDE 103  --   --  103   CO2 28.0  --   --  30.0*   ANION GAP 10.0  --   --  10.0   BUN 44*  --   --  47*   CREATININE 2.25* 2.80 2.80* 2.37*   EGFR 26.5*  --  20.4* 24.9*   GLUCOSE 101*  --   --  119*   CALCIUM 9.0  --   --  8.8   MAGNESIUM  --   --   --  2.1   TSH  --   --   --  2.010         Lab 10/18/23  1809   TOTAL PROTEIN 6.9   ALBUMIN 3.6   GLOBULIN 3.3   ALT (SGPT) 32   AST (SGOT) 25   BILIRUBIN 0.8   ALK PHOS 90         Lab 10/18/23  2022 10/18/23  1809   PROBNP  --  4,050.0*   HSTROP T 47* 53*                 Brief Urine Lab Results  (Last result in the past 365 days)        Color   Clarity   Blood   Leuk Est   Nitrite   Protein   CREAT   Urine HCG        10/18/23 2149             27.7         10/18/23 2149 Yellow   Clear   Negative   Negative   Negative   Negative                   Microbiology Results Abnormal       None            XR Chest 1 View    Result Date: 10/18/2023  XR CHEST 1 VW Date of Exam: 10/18/2023 6:19 PM EDT Indication: Dysrhythmia triage protocol Comparison: 9/29/2023 Findings: Sternotomy. Implantable cardiac device. Cardiomediastinal silhouette is unchanged. Pulmonary vascular congestion. Small bilateral pleural effusions with bibasilar airspace disease, greater on the left. No pneumothorax. Bones are unchanged.     Impression: Impression: Small bilateral pleural effusions with bibasilar airspace disease greater on the left, favored represent atelectasis, less likely infection. Electronically Signed: Ignacio Burleson MD  10/18/2023 6:50 PM EDT  Workstation ID: [id number]     Results for orders placed during the hospital encounter of 09/29/23    Adult Transthoracic Echo Complete W/ Cont if Necessary Per  Protocol    Interpretation Summary    The left ventricle is mildly dilated and globally hypokinetic.  Left ventricular systolic function is moderately decreased. Estimated left ventricular EF = 25%    Aortic valve is calcified with no significant stenosis or regurgitation.    Moderate mitral valve regurgitation is present.    Estimated right ventricular systolic pressure from tricuspid regurgitation is normal (<35 mmHg).    There is a small (<1cm) pericardial effusion adjacent to the left ventricle.    Bilateral pleural effusions present      Current medications:  Scheduled Meds:amiodarone, 100 mg, Oral, Daily  apixaban, 2.5 mg, Oral, BID  aspirin, 81 mg, Oral, Daily  pantoprazole, 40 mg, Oral, Q AM  senna-docusate sodium, 2 tablet, Oral, BID  sodium chloride, 10 mL, Intravenous, Q12H  torsemide, 20 mg, Oral, Daily      Continuous Infusions:   PRN Meds:.  acetaminophen    senna-docusate sodium **AND** polyethylene glycol **AND** bisacodyl **AND** bisacodyl    melatonin    sodium chloride    sodium chloride    sodium chloride    Assessment & Plan   Assessment & Plan     Active Hospital Problems    Diagnosis  POA    **Symptomatic bradycardia [R00.1]  Yes    Elevated troponin level not due to acute coronary syndrome [R79.89]  Yes    Pleural effusion, bilateral [J90]  Yes    Coronary artery disease involving native coronary artery of native heart with angina pectoris [I25.119]  Yes    Hyperlipidemia LDL goal <70 [E78.5]  Yes    LBBB (left bundle branch block) [I44.7]  Yes    Chronic HFrEF (heart failure with reduced ejection fraction) [I50.22]  Yes    Stage 3b chronic kidney disease [N18.32]  Yes    Paroxysmal atrial fibrillation [I48.0]  Yes      Resolved Hospital Problems    Diagnosis Date Resolved POA    Acute kidney injury superimposed on chronic kidney disease [N17.9, N18.9] 10/19/2023 Yes    CHF (congestive heart failure) [I50.9] 10/19/2023 Yes        Brief Hospital Course to date:  Mukund Pool is a 89  "y.o. male w/ a hx of CAD (s/p CABG), HTN, HLD, PAF (Eliquis, amiodarone), CHF (EF 25%), CKD Stage III, hx of head/neck cancer (2021), GERD who presented to the ED w/ c/o symptomatic bradycardia.      **Symptomatic bradycardia, not easily reversed  **Bilateral, small pleural effusions   **Elevated troponin   **CAD (hx of CABG)  **CHF (EF 25%)  **PAF   **HTN, HLD  -recent admit for hypoxia 2/2 CHF and chest pain (d/c home 10/5); evaluated by Cardiology, Toprol was increased to 50mg daily (diuretics given then held 2/2 CHRISTIANO and hypotension, torsemide restarted prior to d/c) (Entresto, aldactone and ACE all held pending f/u appointments- 2/2 CHRISTIANO and hypotension)  -heart rate 30's-40's PTA and persistently 30-40s on tele since admitted  -EKG c/w sinus bradycardia; repeat EKG same (known LBBB)  -troponin 53; repeat 47; proBNP ~4,000  -CXR c/w small bilateral effusions   -pt currently on room air; sats low 90's   -discontinue Toprol  -amiodarone continued w/ hold parameters  -diuretics per cardiology reccs - seen by Dr Espinoza  -Dr Espinoza has consulted Dr. Pickett (EP) for pacemaker eval  -ASA and Eliquis continued   -zetia held   -daily weights; strict I/Os   -pt,ot and case mgt consult      **CHRISTIANO on CKD Stage III  -previous CR 1.68 on 10/5 (as high as 2.07 during recent admit)   - CR 2.37 -2.25  -stable     **GERD  -continue PPI     **Hx of head/neck cancer (2023)  **Recent weight loss, decreased appetite   -per recent d/c summary- pt w/ 40 lbs weight loss and previous abnormal CT scan   -per d/c summary: \"CT of the abdomen and pelvis with low-density masses within the pancreas which may represent a cyst, pseudocyst or IPMN the largest lesion within the head of the pancreas is unchanged, 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   -pt continues to c/o decreased appetite; nutrition consult    Consider " palliative care consult if pacemaker is not an option        Expected Discharge Location and Transportation: Home  Expected Discharge   Expected Discharge Date: 10/23/2023; Expected Discharge Time:      DVT prophylaxis:  Medical DVT prophylaxis orders are present.     AM-PAC 6 Clicks Score (PT): 17 (10/19/23 2000)    CODE STATUS:   Code Status and Medical Interventions:   Ordered at: 10/18/23 2011     Level Of Support Discussed With:    Patient     Code Status (Patient has no pulse and is not breathing):    CPR (Attempt to Resuscitate)     Medical Interventions (Patient has pulse or is breathing):    Full Support       Dayami Garcia MD  10/20/23

## 2023-10-20 NOTE — DISCHARGE SUMMARY
[facility] Medicine Services  DISCHARGE SUMMARY    Patient Name: Mukund Pool  : 1930  MRN: [id number]    Date of Admission: 10/18/2023  5:58 PM  Date of Discharge:  10/20/23    Primary Care Physician: Slick Hubbard MD    Consults       Date and Time Order Name Status Description    2023 10:39 AM Inpatient Cardiology Consult Completed             Hospital Course     Presenting Problem: bradycardia    Active Hospital Problems    Diagnosis  POA    **Symptomatic bradycardia [R00.1]  Yes    Elevated troponin level not due to acute coronary syndrome [R79.89]  Yes    Pleural effusion, bilateral [J90]  Yes    Coronary artery disease involving native coronary artery of native heart with angina pectoris [I25.119]  Yes    Hyperlipidemia LDL goal <70 [E78.5]  Yes    LBBB (left bundle branch block) [I44.7]  Yes    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 male  w/ a hx of CAD (s/p CABG), HTN, HLD, PAF (Eliquis, amiodarone), CHF (EF 25%), CKD Stage III, hx of head/neck cancer (), GERD who presented to the ED w/ c/o symptomatic bradycardia.      **Symptomatic bradycardia, not easily reversed  **Bilateral, small pleural effusions   **Elevated troponin   **CAD (hx of CABG)  **CHF (EF 25%)  **PAF   **HTN, HLD  -recent admit for hypoxia 2/2 CHF and chest pain (d/c home 10/5); evaluated by Cardiology, Toprol was increased to 50mg daily (diuretics given then held 2/2 CHRISTIANO and hypotension, torsemide restarted prior to d/c) (Entresto, aldactone and ACE all held pending f/u  "appointments- 2/2 CHRISTIANO and hypotension)  -heart rate 30's-40's PTA and persistently 30-40s on tele since admitted  -EKG c/w sinus bradycardia; repeat EKG same (known LBBB)  -troponin 53; repeat 47; proBNP ~4,000  -CXR c/w small bilateral effusions   -pt currently on room air; sats low 90's   -discontinue Toprol  -amiodarone discontinued per cardiology  -diuretics held per cardiology reccs - seen by Dr Espinoza  -Dr Espinoza has consulted Dr. Pickett (EP) for pacemaker eval- will see in clinic Tuesday next week  -ASA and Eliquis continued   -zetia held   -daily weights; strict I/Os   -pt,ot and case mgt      **CHRISTIANO on CKD Stage III  -previous CR 1.68 on 10/5 (as high as 2.07 during recent admit)   - CR 2.37 -2.25  -stable     **GERD  -continue PPI     **Hx of head/neck cancer (2023)  **Recent weight loss, decreased appetite   -per recent d/c summary- pt w/ 40 lbs weight loss and previous abnormal CT scan   -per d/c summary: \"CT of the abdomen and pelvis with low-density masses within the pancreas which may represent a cyst, pseudocyst or IPMN the largest lesion within the head of the pancreas is unchanged, 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   -pt continues to c/o decreased appetite; nutrition consult     Consider palliative care consult if pacemaker is not an option      Discharge Follow Up Recommendations for outpatient labs/diagnostics:   Followup with Dr. Pickett, cardiology EP Tuesday next week    Day of Discharge     HPI:   10/19 - Patient is a 89-year-old who complains of shortness of breath and weakness and was found to have significant bradycardia with a heart rate in the 30s.  His Toprol was held and heart rate still remains in the 40s.  He gets winded easily.  Even at rest he feels weak and tired.  He also complains of a headache this morning.  Cardiology consult is in progress.   "   10/20 - Pt with persistent symptomatic bradycardia with shortness of breath and fatigue/weakness and HR is 30-40s. Cardiology has asked EP to evaluate for pacemaker. Will see as outpt per Dr Pickett on Tuesday next week    Review of Systems  Gen- No fevers, chills  CV- pos bradycardia  Resp- No cough, dyspnea  GI- No N/V/D, abd pain      Vital Signs:   Temp:  [97.4 °F (36.3 °C)-98.1 °F (36.7 °C)] 97.5 °F (36.4 °C)  Heart Rate:  [37-46] 42  Resp:  [18] 18  BP: (113-145)/(58-69) 113/63  Flow (L/min):  [0] 0      Physical Exam:  Constitutional: No acute distress, awake, alert  HENT: NCAT, mucous membranes moist  Respiratory: Clear to auscultation bilaterally, respiratory effort normal   Cardiovascular: bradycardia, pos murmur  Gastrointestinal: Positive bowel sounds, soft, nontender, nondistended  Musculoskeletal: general weakness  Psychiatric: Appropriate affect, cooperative  Neurologic: Oriented x 3, strength symmetric in all extremities, Cranial Nerves grossly intact to confrontation, speech clear  Skin: No rashes      Pertinent  and/or Most Recent Results     LAB RESULTS:      Lab 10/19/23  0608 10/18/23  1816 10/18/23  1814 10/18/23  1809   WBC 6.10  --   --  5.88   HEMOGLOBIN 10.8*  --   --  10.7*   HEMOGLOBIN, POC  --  12.6 12.6  --    HEMATOCRIT 32.9*  --   --  33.6*   HEMATOCRIT POC  --  37* 37*  --    PLATELETS 167  --   --  167   NEUTROS ABS 2.71  --   --  2.78   IMMATURE GRANS (ABS) 0.02  --   --  0.03   LYMPHS ABS 2.28  --   --  1.94   MONOS ABS 0.86  --   --  0.92*   EOS ABS 0.16  --   --  0.14   .1*  --   --  108.4*         Lab 10/19/23  0608 10/18/23  1816 10/18/23  1814 10/18/23  1809   SODIUM 141  --   --  143   POTASSIUM 3.9  --   --  4.5   CHLORIDE 103  --   --  103   CO2 28.0  --   --  30.0*   ANION GAP 10.0  --   --  10.0   BUN 44*  --   --  47*   CREATININE 2.25* 2.80 2.80* 2.37*   EGFR 26.5*  --  20.4* 24.9*   GLUCOSE 101*  --   --  119*   CALCIUM 9.0  --   --  8.8   MAGNESIUM  --   --    --  2.1   TSH  --   --   --  2.010         Lab 10/18/23  1809   TOTAL PROTEIN 6.9   ALBUMIN 3.6   GLOBULIN 3.3   ALT (SGPT) 32   AST (SGOT) 25   BILIRUBIN 0.8   ALK PHOS 90         Lab 10/18/23  2022 10/18/23  1809   PROBNP  --  4,050.0*   HSTROP T 47* 53*                 Brief Urine Lab Results  (Last result in the past 365 days)        Color   Clarity   Blood   Leuk Est   Nitrite   Protein   CREAT   Urine HCG        10/18/23 2149             27.7         10/18/23 2149 Yellow   Clear   Negative   Negative   Negative   Negative                 Microbiology Results (last 10 days)       ** No results found for the last 240 hours. **            XR Chest 1 View    Result Date: 10/18/2023  XR CHEST 1 VW Date of Exam: 10/18/2023 6:19 PM EDT Indication: Dysrhythmia triage protocol Comparison: 9/29/2023 Findings: Sternotomy. Implantable cardiac device. Cardiomediastinal silhouette is unchanged. Pulmonary vascular congestion. Small bilateral pleural effusions with bibasilar airspace disease, greater on the left. No pneumothorax. Bones are unchanged.     Impression: Small bilateral pleural effusions with bibasilar airspace disease greater on the left, favored represent atelectasis, less likely infection. Electronically Signed: Ignacio Burleson MD  10/18/2023 6:50 PM EDT  Workstation ID: [id number]             Results for orders placed during the hospital encounter of 09/29/23    Adult Transthoracic Echo Complete W/ Cont if Necessary Per Protocol    Interpretation Summary    The left ventricle is mildly dilated and globally hypokinetic.  Left ventricular systolic function is moderately decreased. Estimated left ventricular EF = 25%    Aortic valve is calcified with no significant stenosis or regurgitation.    Moderate mitral valve regurgitation is present.    Estimated right ventricular systolic pressure from tricuspid regurgitation is normal (<35 mmHg).    There is a small (<1cm) pericardial effusion adjacent to the left  ventricle.    Bilateral pleural effusions present      Plan for Follow-up of Pending Labs/Results: nothing pending    Discharge Details        Discharge Medications        Continue These Medications        Instructions Start Date   apixaban 2.5 MG tablet tablet  Commonly known as: ELIQUIS   1 tablet, Oral, 2 Times Daily      aspirin 81 MG EC tablet   81 mg, Oral, Daily      ezetimibe 10 MG tablet  Commonly known as: ZETIA   1 tablet, Oral, Daily      gabapentin 300 MG capsule  Commonly known as: NEURONTIN   1 capsule, Oral, 3 Times Daily, aking only 2 times a day       multivitamin with minerals tablet tablet   1 tablet, Oral, Daily      nitroglycerin 0.4 MG SL tablet  Commonly known as: NITROSTAT   0.4 mg, Sublingual, Every 5 Minutes PRN      omeprazole 20 MG capsule  Commonly known as: priLOSEC   1 capsule, Oral, Daily      torsemide 20 MG tablet  Commonly known as: DEMADEX   20 mg, Oral, Daily             Stop These Medications      amiodarone 100 MG tablet  Commonly known as: PACERONE              No Known Allergies      Discharge Disposition:  Home or Self Care    Diet:  Hospital:  Diet Order   Procedures    Diet: Regular/House Diet; Texture: Regular Texture (IDDSI 7); Fluid Consistency: Thin (IDDSI 0)       Diet Instructions       Diet: Regular/House Diet; Thin (IDDSI 0)      Discharge Diet: Regular/House Diet    Fluid Consistency: Thin (IDDSI 0)             Activity:  Activity Instructions       Up WIth Assist              Restrictions or Other Recommendations:  Up with assist       CODE STATUS:    Code Status and Medical Interventions:   Ordered at: 10/18/23 2011     Level Of Support Discussed With:    Patient     Code Status (Patient has no pulse and is not breathing):    CPR (Attempt to Resuscitate)     Medical Interventions (Patient has pulse or is breathing):    Full Support       Future Appointments   Date Time Provider Department Center   10/20/2023 To Be Determined Levy Ly, PT [facility] None    10/23/2023 To Be Determined Ann Marie Biggs, RN [facility] None   10/23/2023  2:00 PM Kathryn Morel APRN [facility]   10/24/2023  1:30 PM Levy Pickett MD [facility]   10/25/2023 To Be Determined Levy Ly, PT [facility] None   10/26/2023 To Be Determined Brandee Ambriz, OT [facility] None   10/27/2023 To Be Determined Levy Ly, PT [facility] None   10/30/2023 To Be Determined Ann Marie Biggs, RN [facility] None   11/1/2023 To Be Determined Levy Ly, PT [facility] None   11/2/2023 To Be Determined Brandee Ambriz, OT [facility] None   11/3/2023 To Be Determined Levy Ly, PT [facility] None   11/8/2023 To Be Determined Levy Ly, PT [facility] None   11/10/2023 To Be Determined Levy Ly, PT [facility] None   11/14/2023  2:20 PM Radha Billingsley APRN [facility]       Additional Instructions for the Follow-ups that You Need to Schedule       Discharge Follow-up with Specified Provider: Dr Piyush Suarez as scheduled; 4 Days   As directed      To: Dr Piyush Suarez as scheduled   Follow Up: 4 Days   Follow Up Details: eval for pacemaker                      Dayami Garcia MD  10/20/23      Time Spent on Discharge:  I spent  36  minutes on this discharge activity which included: face-to-face encounter with the patient, reviewing the data in the system, coordination of the care with the nursing staff as well as consultants, documentation, and entering orders.

## 2023-10-20 NOTE — OUTREACH NOTE
Prep Survey      Flowsheet Row Responses   [facility] patient discharged from? [place]   Is LACE score < 7 ? No   Eligibility Readm Mgmt   Discharge diagnosis Symptomatic bradycardia   Does the patient have one of the following disease processes/diagnoses(primary or secondary)? Other   Does the patient have Home health ordered? No   Is there a DME ordered? No   Prep survey completed? Yes            Julissa KEYS - Registered Nurse

## 2023-10-20 NOTE — CASE MANAGEMENT/SOCIAL WORK
Continued Stay Note  [facility]     Patient Name: Mukund Pool  MRN: [id number]  Today's Date: 10/20/2023    Admit Date: 10/18/2023    Plan: Return to [facility]   Discharge Plan       Row Name 10/20/23 1248       Plan    Plan Return to [facility]    Plan Comments Patient is being discharged home today - He will return to his apartment at [facility] where he lives with his spouse as well. Daughter will transport home. Discussed PT recommendations for rehab but patient is not interested and has assistance at [facility] when needed. I've attempted to [facility] 3 times to discuss patient's return and left a message but no return call has been made. He is current with Home health and has appointment for Monday -no other discharge needs identified- [phone]    Final Discharge Disposition Code 06 - home with home health care                   Discharge Codes    No documentation.                 Expected Discharge Date and Time       Expected Discharge Date Expected Discharge Time    Oct 20, 2023               Yesica Davis RN

## 2023-10-20 NOTE — PLAN OF CARE
Goal Outcome Evaluation:              Outcome Evaluation: Pt continued to be sinus mason overnight on RA. PRNs given for headache and sleep. Pt has not rested well since admission. Plan is to discharge once HR increases or possible PPM if it does not increase. No further concerns.

## 2023-10-22 ENCOUNTER — READMISSION MANAGEMENT (OUTPATIENT)
Dept: CALL CENTER | Facility: HOSPITAL | Age: 88
End: 2023-10-22
Payer: MEDICARE

## 2023-10-22 ENCOUNTER — APPOINTMENT (OUTPATIENT)
Dept: GENERAL RADIOLOGY | Facility: HOSPITAL | Age: 88
End: 2023-10-22
Payer: MEDICARE

## 2023-10-22 ENCOUNTER — HOSPITAL ENCOUNTER (INPATIENT)
Facility: HOSPITAL | Age: 88
LOS: 4 days | Discharge: HOME | End: 2023-10-26
Attending: EMERGENCY MEDICINE | Admitting: INTERNAL MEDICINE
Payer: MEDICARE

## 2023-10-22 DIAGNOSIS — R55 NEAR SYNCOPE: ICD-10-CM

## 2023-10-22 DIAGNOSIS — I95.9 HYPOTENSION, UNSPECIFIED HYPOTENSION TYPE: ICD-10-CM

## 2023-10-22 DIAGNOSIS — R42 LIGHTHEADEDNESS: ICD-10-CM

## 2023-10-22 DIAGNOSIS — R79.89 ELEVATED TROPONIN: ICD-10-CM

## 2023-10-22 DIAGNOSIS — Z86.79 HISTORY OF CONGESTIVE HEART FAILURE: ICD-10-CM

## 2023-10-22 DIAGNOSIS — I48.91 ATRIAL FIBRILLATION WITH RAPID VENTRICULAR RESPONSE: Primary | ICD-10-CM

## 2023-10-22 DIAGNOSIS — I44.7 LEFT BUNDLE BRANCH BLOCK: ICD-10-CM

## 2023-10-22 DIAGNOSIS — N18.9 CHRONIC KIDNEY DISEASE, UNSPECIFIED CKD STAGE: ICD-10-CM

## 2023-10-22 DIAGNOSIS — R06.02 SHORTNESS OF BREATH: ICD-10-CM

## 2023-10-22 LAB
ALBUMIN SERPL-MCNC: 3.7 G/DL (ref 3.5–5.2)
ALBUMIN/GLOB SERPL: 1 G/DL
ALP SERPL-CCNC: 90 U/L (ref 39–117)
ALT SERPL W P-5'-P-CCNC: 33 U/L (ref 1–41)
ANION GAP SERPL CALCULATED.3IONS-SCNC: 12 MMOL/L (ref 5–15)
AST SERPL-CCNC: 35 U/L (ref 1–40)
BASOPHILS # BLD AUTO: 0.06 10*3/MM3 (ref 0–0.2)
BASOPHILS NFR BLD AUTO: 0.9 % (ref 0–1.5)
BILIRUB SERPL-MCNC: 0.9 MG/DL (ref 0–1.2)
BUN SERPL-MCNC: 43 MG/DL (ref 8–23)
BUN/CREAT SERPL: 15.5 (ref 7–25)
CALCIUM SPEC-SCNC: 9.2 MG/DL (ref 8.2–9.6)
CHLORIDE SERPL-SCNC: 99 MMOL/L (ref 98–107)
CO2 SERPL-SCNC: 28 MMOL/L (ref 22–29)
CREAT SERPL-MCNC: 2.78 MG/DL (ref 0.76–1.27)
DEPRECATED RDW RBC AUTO: 54.6 FL (ref 37–54)
EGFRCR SERPLBLD CKD-EPI 2021: 20.6 ML/MIN/1.73
EOSINOPHIL # BLD AUTO: 0.19 10*3/MM3 (ref 0–0.4)
EOSINOPHIL NFR BLD AUTO: 2.8 % (ref 0.3–6.2)
ERYTHROCYTE [DISTWIDTH] IN BLOOD BY AUTOMATED COUNT: 13.3 % (ref 12.3–15.4)
GLOBULIN UR ELPH-MCNC: 3.7 GM/DL
GLUCOSE SERPL-MCNC: 140 MG/DL (ref 65–99)
HCT VFR BLD AUTO: 40.4 % (ref 37.5–51)
HGB BLD-MCNC: 12.7 G/DL (ref 13–17.7)
HOLD SPECIMEN: NORMAL
HOLD SPECIMEN: NORMAL
IMM GRANULOCYTES # BLD AUTO: 0.02 10*3/MM3 (ref 0–0.05)
IMM GRANULOCYTES NFR BLD AUTO: 0.3 % (ref 0–0.5)
LIPASE SERPL-CCNC: 11 U/L (ref 13–60)
LYMPHOCYTES # BLD AUTO: 2.43 10*3/MM3 (ref 0.7–3.1)
LYMPHOCYTES NFR BLD AUTO: 35.5 % (ref 19.6–45.3)
MAGNESIUM SERPL-MCNC: 2.2 MG/DL (ref 1.7–2.3)
MCH RBC QN AUTO: 34.3 PG (ref 26.6–33)
MCHC RBC AUTO-ENTMCNC: 31.4 G/DL (ref 31.5–35.7)
MCV RBC AUTO: 109.2 FL (ref 79–97)
MONOCYTES # BLD AUTO: 0.71 10*3/MM3 (ref 0.1–0.9)
MONOCYTES NFR BLD AUTO: 10.4 % (ref 5–12)
NEUTROPHILS NFR BLD AUTO: 3.44 10*3/MM3 (ref 1.7–7)
NEUTROPHILS NFR BLD AUTO: 50.1 % (ref 42.7–76)
NRBC BLD AUTO-RTO: 0 /100 WBC (ref 0–0.2)
NT-PROBNP SERPL-MCNC: 6072 PG/ML (ref 0–1800)
PLATELET # BLD AUTO: 196 10*3/MM3 (ref 140–450)
PMV BLD AUTO: 10.3 FL (ref 6–12)
POTASSIUM SERPL-SCNC: 4.2 MMOL/L (ref 3.5–5.2)
PROT SERPL-MCNC: 7.4 G/DL (ref 6–8.5)
RBC # BLD AUTO: 3.7 10*6/MM3 (ref 4.14–5.8)
SODIUM SERPL-SCNC: 139 MMOL/L (ref 136–145)
TROPONIN T SERPL HS-MCNC: 69 NG/L
WBC NRBC COR # BLD: 6.85 10*3/MM3 (ref 3.4–10.8)
WHOLE BLOOD HOLD COAG: NORMAL
WHOLE BLOOD HOLD SPECIMEN: NORMAL

## 2023-10-22 PROCEDURE — 93005 ELECTROCARDIOGRAM TRACING: CPT | Performed by: EMERGENCY MEDICINE

## 2023-10-22 PROCEDURE — 83735 ASSAY OF MAGNESIUM: CPT | Performed by: NURSE PRACTITIONER

## 2023-10-22 PROCEDURE — 93005 ELECTROCARDIOGRAM TRACING: CPT

## 2023-10-22 PROCEDURE — 25810000003 SODIUM CHLORIDE 0.9 % SOLUTION: Performed by: EMERGENCY MEDICINE

## 2023-10-22 PROCEDURE — 83690 ASSAY OF LIPASE: CPT | Performed by: EMERGENCY MEDICINE

## 2023-10-22 PROCEDURE — 71045 X-RAY EXAM CHEST 1 VIEW: CPT

## 2023-10-22 PROCEDURE — 85025 COMPLETE CBC W/AUTO DIFF WBC: CPT | Performed by: EMERGENCY MEDICINE

## 2023-10-22 PROCEDURE — 84484 ASSAY OF TROPONIN QUANT: CPT | Performed by: EMERGENCY MEDICINE

## 2023-10-22 PROCEDURE — 99285 EMERGENCY DEPT VISIT HI MDM: CPT

## 2023-10-22 PROCEDURE — 80053 COMPREHEN METABOLIC PANEL: CPT | Performed by: EMERGENCY MEDICINE

## 2023-10-22 PROCEDURE — 99291 CRITICAL CARE FIRST HOUR: CPT | Performed by: INTERNAL MEDICINE

## 2023-10-22 PROCEDURE — 36415 COLL VENOUS BLD VENIPUNCTURE: CPT

## 2023-10-22 PROCEDURE — 25010000002 AMIODARONE IN DEXTROSE 5% 360-4.14 MG/200ML-% SOLUTION: Performed by: EMERGENCY MEDICINE

## 2023-10-22 PROCEDURE — 83880 ASSAY OF NATRIURETIC PEPTIDE: CPT | Performed by: EMERGENCY MEDICINE

## 2023-10-22 RX ORDER — POLYETHYLENE GLYCOL 3350 17 G/17G
17 POWDER, FOR SOLUTION ORAL DAILY PRN
Status: DISCONTINUED | OUTPATIENT
Start: 2023-10-22 | End: 2023-10-26 | Stop reason: HOSPADM

## 2023-10-22 RX ORDER — AMOXICILLIN 250 MG
2 CAPSULE ORAL 2 TIMES DAILY
Status: DISCONTINUED | OUTPATIENT
Start: 2023-10-22 | End: 2023-10-26 | Stop reason: HOSPADM

## 2023-10-22 RX ORDER — SODIUM CHLORIDE 9 MG/ML
40 INJECTION, SOLUTION INTRAVENOUS AS NEEDED
Status: DISCONTINUED | OUTPATIENT
Start: 2023-10-22 | End: 2023-10-22

## 2023-10-22 RX ORDER — ASPIRIN 81 MG/1
324 TABLET, CHEWABLE ORAL ONCE
Status: COMPLETED | OUTPATIENT
Start: 2023-10-22 | End: 2023-10-22

## 2023-10-22 RX ORDER — PANTOPRAZOLE SODIUM 40 MG/10ML
40 INJECTION, POWDER, LYOPHILIZED, FOR SOLUTION INTRAVENOUS
Status: DISCONTINUED | OUTPATIENT
Start: 2023-10-23 | End: 2023-10-26 | Stop reason: HOSPADM

## 2023-10-22 RX ORDER — MAGNESIUM SULFATE HEPTAHYDRATE 40 MG/ML
2 INJECTION, SOLUTION INTRAVENOUS ONCE
Status: COMPLETED | OUTPATIENT
Start: 2023-10-22 | End: 2023-10-23

## 2023-10-22 RX ORDER — GABAPENTIN 300 MG/1
300 CAPSULE ORAL EVERY 12 HOURS SCHEDULED
Status: DISCONTINUED | OUTPATIENT
Start: 2023-10-23 | End: 2023-10-23

## 2023-10-22 RX ORDER — BISACODYL 10 MG
10 SUPPOSITORY, RECTAL RECTAL DAILY PRN
Status: DISCONTINUED | OUTPATIENT
Start: 2023-10-22 | End: 2023-10-26 | Stop reason: HOSPADM

## 2023-10-22 RX ORDER — SODIUM CHLORIDE 0.9 % (FLUSH) 0.9 %
10 SYRINGE (ML) INJECTION EVERY 12 HOURS SCHEDULED
Status: DISCONTINUED | OUTPATIENT
Start: 2023-10-22 | End: 2023-10-26 | Stop reason: HOSPADM

## 2023-10-22 RX ORDER — ASPIRIN 81 MG/1
81 TABLET, CHEWABLE ORAL DAILY
Status: DISCONTINUED | OUTPATIENT
Start: 2023-10-23 | End: 2023-10-26 | Stop reason: HOSPADM

## 2023-10-22 RX ORDER — SODIUM CHLORIDE 0.9 % (FLUSH) 0.9 %
10 SYRINGE (ML) INJECTION AS NEEDED
Status: DISCONTINUED | OUTPATIENT
Start: 2023-10-22 | End: 2023-10-26 | Stop reason: HOSPADM

## 2023-10-22 RX ORDER — SODIUM CHLORIDE 9 MG/ML
50 INJECTION, SOLUTION INTRAVENOUS CONTINUOUS
Status: DISCONTINUED | OUTPATIENT
Start: 2023-10-22 | End: 2023-10-23

## 2023-10-22 RX ORDER — NITROGLYCERIN 0.4 MG/1
0.4 TABLET SUBLINGUAL
Status: DISCONTINUED | OUTPATIENT
Start: 2023-10-22 | End: 2023-10-26 | Stop reason: HOSPADM

## 2023-10-22 RX ORDER — BISACODYL 5 MG/1
5 TABLET, DELAYED RELEASE ORAL DAILY PRN
Status: DISCONTINUED | OUTPATIENT
Start: 2023-10-22 | End: 2023-10-26 | Stop reason: HOSPADM

## 2023-10-22 RX ADMIN — SODIUM CHLORIDE 1000 ML: 9 INJECTION, SOLUTION INTRAVENOUS at 21:17

## 2023-10-22 RX ADMIN — Medication 10 ML: at 23:53

## 2023-10-22 RX ADMIN — AMIODARONE HYDROCHLORIDE 1 MG/MIN: 1.8 INJECTION, SOLUTION INTRAVENOUS at 21:52

## 2023-10-22 RX ADMIN — ASPIRIN 81 MG CHEWABLE TABLET 324 MG: 81 TABLET CHEWABLE at 20:57

## 2023-10-23 ENCOUNTER — HOME CARE VISIT (OUTPATIENT)
Dept: HOME HEALTH SERVICES | Facility: HOME HEALTHCARE | Age: 88
End: 2023-10-23
Payer: COMMERCIAL

## 2023-10-23 LAB
ANION GAP SERPL CALCULATED.3IONS-SCNC: 11 MMOL/L (ref 5–15)
BUN SERPL-MCNC: 41 MG/DL (ref 8–23)
BUN/CREAT SERPL: 15.2 (ref 7–25)
CALCIUM SPEC-SCNC: 9 MG/DL (ref 8.2–9.6)
CHLORIDE SERPL-SCNC: 103 MMOL/L (ref 98–107)
CO2 SERPL-SCNC: 27 MMOL/L (ref 22–29)
CREAT SERPL-MCNC: 2.7 MG/DL (ref 0.76–1.27)
DEPRECATED RDW RBC AUTO: 49.5 FL (ref 37–54)
EGFRCR SERPLBLD CKD-EPI 2021: 21.3 ML/MIN/1.73
ERYTHROCYTE [DISTWIDTH] IN BLOOD BY AUTOMATED COUNT: 12.5 % (ref 12.3–15.4)
FLUAV SUBTYP SPEC NAA+PROBE: NOT DETECTED
FLUBV RNA ISLT QL NAA+PROBE: NOT DETECTED
FOLATE SERPL-MCNC: 10.3 NG/ML (ref 4.78–24.2)
GLUCOSE SERPL-MCNC: 100 MG/DL (ref 65–99)
HCT VFR BLD AUTO: 37.8 % (ref 37.5–51)
HGB BLD-MCNC: 12 G/DL (ref 13–17.7)
MAGNESIUM SERPL-MCNC: 3.1 MG/DL (ref 1.7–2.3)
MCH RBC QN AUTO: 33.6 PG (ref 26.6–33)
MCHC RBC AUTO-ENTMCNC: 31.7 G/DL (ref 31.5–35.7)
MCV RBC AUTO: 105.9 FL (ref 79–97)
PHOSPHATE SERPL-MCNC: 3.3 MG/DL (ref 2.5–4.5)
PLATELET # BLD AUTO: 186 10*3/MM3 (ref 140–450)
PMV BLD AUTO: 10.1 FL (ref 6–12)
POTASSIUM SERPL-SCNC: 5.1 MMOL/L (ref 3.5–5.2)
QT INTERVAL: 460 MS
QT INTERVAL: 560 MS
QT INTERVAL: 564 MS
QTC INTERVAL: 473 MS
QTC INTERVAL: 476 MS
QTC INTERVAL: 562 MS
RBC # BLD AUTO: 3.57 10*6/MM3 (ref 4.14–5.8)
SARS-COV-2 RNA RESP QL NAA+PROBE: NOT DETECTED
SODIUM SERPL-SCNC: 141 MMOL/L (ref 136–145)
TROPONIN T SERPL HS-MCNC: 63 NG/L
VIT B12 BLD-MCNC: 1587 PG/ML (ref 211–946)
WBC NRBC COR # BLD: 7.42 10*3/MM3 (ref 3.4–10.8)

## 2023-10-23 PROCEDURE — 84100 ASSAY OF PHOSPHORUS: CPT | Performed by: NURSE PRACTITIONER

## 2023-10-23 PROCEDURE — 99222 1ST HOSP IP/OBS MODERATE 55: CPT | Performed by: STUDENT IN AN ORGANIZED HEALTH CARE EDUCATION/TRAINING PROGRAM

## 2023-10-23 PROCEDURE — 87636 SARSCOV2 & INF A&B AMP PRB: CPT | Performed by: EMERGENCY MEDICINE

## 2023-10-23 PROCEDURE — 82607 VITAMIN B-12: CPT | Performed by: NURSE PRACTITIONER

## 2023-10-23 PROCEDURE — 25010000002 MAGNESIUM SULFATE 2 GM/50ML SOLUTION: Performed by: NURSE PRACTITIONER

## 2023-10-23 PROCEDURE — 82746 ASSAY OF FOLIC ACID SERUM: CPT | Performed by: NURSE PRACTITIONER

## 2023-10-23 PROCEDURE — 93005 ELECTROCARDIOGRAM TRACING: CPT | Performed by: NURSE PRACTITIONER

## 2023-10-23 PROCEDURE — 93010 ELECTROCARDIOGRAM REPORT: CPT | Performed by: INTERNAL MEDICINE

## 2023-10-23 PROCEDURE — 83735 ASSAY OF MAGNESIUM: CPT | Performed by: NURSE PRACTITIONER

## 2023-10-23 PROCEDURE — 25010000002 AMIODARONE IN DEXTROSE 5% 360-4.14 MG/200ML-% SOLUTION: Performed by: EMERGENCY MEDICINE

## 2023-10-23 PROCEDURE — 80048 BASIC METABOLIC PNL TOTAL CA: CPT | Performed by: NURSE PRACTITIONER

## 2023-10-23 PROCEDURE — 25810000003 SODIUM CHLORIDE 0.9 % SOLUTION: Performed by: NURSE PRACTITIONER

## 2023-10-23 PROCEDURE — 85027 COMPLETE CBC AUTOMATED: CPT | Performed by: NURSE PRACTITIONER

## 2023-10-23 PROCEDURE — 84484 ASSAY OF TROPONIN QUANT: CPT | Performed by: NURSE PRACTITIONER

## 2023-10-23 PROCEDURE — 99233 SBSQ HOSP IP/OBS HIGH 50: CPT | Performed by: INTERNAL MEDICINE

## 2023-10-23 RX ORDER — CHOLECALCIFEROL (VITAMIN D3) 125 MCG
5 CAPSULE ORAL NIGHTLY PRN
Status: DISCONTINUED | OUTPATIENT
Start: 2023-10-23 | End: 2023-10-26 | Stop reason: HOSPADM

## 2023-10-23 RX ORDER — BUMETANIDE 0.25 MG/ML
1 INJECTION INTRAMUSCULAR; INTRAVENOUS ONCE
Status: COMPLETED | OUTPATIENT
Start: 2023-10-23 | End: 2023-10-23

## 2023-10-23 RX ORDER — AMIODARONE HYDROCHLORIDE 200 MG/1
400 TABLET ORAL EVERY 12 HOURS SCHEDULED
Status: DISCONTINUED | OUTPATIENT
Start: 2023-10-23 | End: 2023-10-26

## 2023-10-23 RX ORDER — AMIODARONE HYDROCHLORIDE 200 MG/1
200 TABLET ORAL
Status: DISCONTINUED | OUTPATIENT
Start: 2023-10-30 | End: 2023-10-26

## 2023-10-23 RX ORDER — GABAPENTIN 100 MG/1
100 CAPSULE ORAL EVERY 12 HOURS SCHEDULED
Status: DISCONTINUED | OUTPATIENT
Start: 2023-10-23 | End: 2023-10-26 | Stop reason: HOSPADM

## 2023-10-23 RX ORDER — ACETAMINOPHEN 325 MG/1
650 TABLET ORAL EVERY 6 HOURS PRN
Status: DISCONTINUED | OUTPATIENT
Start: 2023-10-23 | End: 2023-10-26 | Stop reason: HOSPADM

## 2023-10-23 RX ADMIN — SENNOSIDES AND DOCUSATE SODIUM 2 TABLET: 8.6; 5 TABLET ORAL at 00:29

## 2023-10-23 RX ADMIN — AMIODARONE HYDROCHLORIDE 400 MG: 200 TABLET ORAL at 20:30

## 2023-10-23 RX ADMIN — MAGNESIUM SULFATE HEPTAHYDRATE 2 G: 40 INJECTION, SOLUTION INTRAVENOUS at 00:07

## 2023-10-23 RX ADMIN — ACETAMINOPHEN 650 MG: 325 TABLET ORAL at 22:38

## 2023-10-23 RX ADMIN — BUMETANIDE 1 MG: 0.25 INJECTION INTRAMUSCULAR; INTRAVENOUS at 18:16

## 2023-10-23 RX ADMIN — SODIUM CHLORIDE 50 ML/HR: 9 INJECTION, SOLUTION INTRAVENOUS at 00:08

## 2023-10-23 RX ADMIN — Medication 10 ML: at 20:29

## 2023-10-23 RX ADMIN — PANTOPRAZOLE SODIUM 40 MG: 40 INJECTION, POWDER, LYOPHILIZED, FOR SOLUTION INTRAVENOUS at 05:52

## 2023-10-23 RX ADMIN — GABAPENTIN 100 MG: 100 CAPSULE ORAL at 20:28

## 2023-10-23 RX ADMIN — AMIODARONE HYDROCHLORIDE 400 MG: 200 TABLET ORAL at 13:48

## 2023-10-23 RX ADMIN — Medication 5 MG: at 22:37

## 2023-10-23 RX ADMIN — AMIODARONE HYDROCHLORIDE 1 MG/MIN: 1.8 INJECTION, SOLUTION INTRAVENOUS at 09:13

## 2023-10-23 RX ADMIN — AMIODARONE HYDROCHLORIDE 1 MG/MIN: 1.8 INJECTION, SOLUTION INTRAVENOUS at 04:01

## 2023-10-23 RX ADMIN — SENNOSIDES AND DOCUSATE SODIUM 2 TABLET: 8.6; 5 TABLET ORAL at 20:28

## 2023-10-23 NOTE — PLAN OF CARE
Goal Outcome Evaluation:      No acute events this shift. Amiodarone transitioned to PO. Rate remains A. Fib but controlled. VSS.  ml's. Bumex given x1mg. Family updated at bedside.

## 2023-10-23 NOTE — PROGRESS NOTES
Malnutrition Severity Assessment    Patient Name:  Mukund Pool  YOB: 1930  MRN: [id number]  Admit Date:  10/22/2023    Patient meets criteria for : Moderate (non-severe) Malnutrition      Malnutrition Severity Assessment  Malnutrition Type: Chronic Disease - Related Malnutrition  Malnutrition Type (last 8 hours)       Malnutrition Severity Assessment       Row Name 10/23/23 1700       Malnutrition Severity Assessment    Malnutrition Type Chronic Disease - Related Malnutrition      Row Name 10/23/23 1700       Unintentional Weight Loss     Unintentional Weight Loss Findings --  20lb wt loss over 13 months, 11% wt loss      Row Name 10/23/23 1700       Muscle Loss    Loss of Muscle Mass Findings Moderate    T[place]ist Region Moderate - slight depression    Clavicle Bone Region Moderate - some protrusion in females, visible in males    Patellar Region Moderate - patella more prominent, less muscle definition around patella    Anterior Thigh Region Moderate - mild depression on inner thigh      Row Name 10/23/23 1700       Fat Loss    Subcutaneous Fat Loss Findings Moderate  moderate buccal wasting    Orbital Region  Moderate -  somewhat hollowness, slightly dark circles    Upper Arm Region Moderate - some fat tissue, not ample      Row Name 10/23/23 1700       Fluid Accumulation (Edema)    Fluid Acumulation Findings --  noted edema BLE      Row Name 10/23/23 1700       Criteria Met (Must meet criteria for severity in at least 2 of these categories: M Wasting, Fat Loss, Fluid, Secondary Signs, Wt. Status, Intake)    Patient meets criteria for  Moderate (non-severe) Malnutrition                    Electronically signed by:  Grace Jackson RD  10/23/23 17:06 EDT

## 2023-10-23 NOTE — CONSULTS
[facility]  CARDIAC ELECTROPHYSIOLOGY CONSULTATION NOTE    Mukund Pool  : 1930  MRN:[id number]  Home Phone:[phone]    Date of Admission:10/22/2023  Date of Consultation: 10/23/23  Primary Provider:  Slick Hubbard MD    Referring Provider: Babak Douglas MD  Reason for Consultation: Afib with RVR    IDENTIFICATION: A 89 y.o. male  resident of [place], [place]    CC:   Chief Complaint   Patient presents with    Shortness of Breath       PROBLEM LIST:   Active Hospital Problems    Diagnosis  POA    **Atrial fibrillation with RVR [I48.91]  Yes    A/C renal failure [N17.9]  Yes    GERD without esophagitis [K21.9]  Yes    Coronary artery disease involving native coronary artery of native heart with angina pectoris [I25.119]  Yes     CABG   Echo (): LVEF EF 60%.  LVH with grade 2 DD.  No significant valvular disease  Pharmacologic nuclear stress (2/10/2023): Inferior infarct.  No ischemia.  LVEF 55%      Hyperlipidemia LDL goal <70 [E78.5]  Yes    Chronic HFrEF (heart failure with reduced ejection fraction) [I50.22]  Yes     Echo (): LVEF EF 60%.  LVH with grade 2 DD.  No significant valvular disease  [facility] admission with FC IV heart failure symptoms, pulmonary edema on chest x-ray, and elevated proBNP, 2023  Echo (2023): LVEF 25%.  Moderate MR.  RVSP <35 mmHg      Stage 3b chronic kidney disease [N18.32]  Yes    Anemia, chronic disease [D63.8]  Yes    History of head and neck cancer [Z85.89]  Not Applicable           ALLERGIES: No Known Allergies    HPI: Mr. Pool is a 89-year-old male with above medical history who we are seeing in consultation today for atrial fibrillation with RVR.   He had a new finding of systolic heart failure with echo 2023 showing LVEF of 25%.  He was started on GDMT including increasing metoprolol succinate to 50 mg daily at that time.  He presented to [facility] ED 10/18/2023 with symptomatic  bradycardia.  Metoprolol was discontinued and the patient's symptoms improved although heart rate remained in the 40s bpm.  PPM was not felt to be urgently indicated at that time and he was set up for follow-up within a week with Dr. Pickett's office to discuss the need for permanent pacemaker implantation for tachybradycardia syndrome outpatient.  Unfortunately, the patient developed shortness of breath, fatigue, palpitations and lightheadedness yesterday evening and presented to [facility] ED.  Patient was found to be in atrial fibrillation with RVR.  Amiodarone infusion was initiated with sustained but rate-controlled atrial fibrillation with left bundle branch block.  He states his shortness of breath has improved drastically and he is feeling overall better but has not been up out of bed.  On room air      ROS: All systems have been reviewed and are negative with the exception of those mentioned in the HPI and problem list above.    HOME MEDICINES:   Current Outpatient Medications   Medication Instructions    apixaban (ELIQUIS) 2.5 MG tablet tablet 1 tablet, Oral, 2 Times Daily    aspirin 81 mg, Oral, Daily    ezetimibe (ZETIA) 10 MG tablet 1 tablet, Oral, Daily    gabapentin (NEURONTIN) 300 MG capsule 1 capsule, Oral, 3 Times Daily, aking only 2 times a day     multivitamin with minerals tablet tablet 1 tablet, Oral, Daily    nitroglycerin (NITROSTAT) 0.4 mg, Sublingual, Every 5 Minutes PRN    omeprazole (priLOSEC) 20 MG capsule 1 capsule, Oral, Daily    torsemide (DEMADEX) 20 mg, Oral, Daily       Surgical History:   Past Surgical History:   Procedure Laterality Date    CARDIAC ELECTROPHYSIOLOGY PROCEDURE  04/2023    cardiac loop recorder    CHOLECYSTECTOMY      CORONARY ARTERY BYPASS GRAFT      PROSTATE SURGERY      VOCAL CORD MASS EXCISION  03/22/2021       Social History:   Social History     Socioeconomic History    Marital status:    Tobacco Use    Smoking status: Never    Smokeless tobacco: Never   Vaping  "Use    Vaping Use: Never used   Substance and Sexual Activity    Alcohol use: No    Drug use: No    Sexual activity: Defer       Family History:   Family History   Problem Relation Age of Onset    Cancer Mother     Breast cancer Mother     Heart attack Father     Ovarian cancer Sister        Objective     BP 93/74   Pulse 85   Temp 98.2 °F (36.8 °C) (Oral)   Resp 18   Ht 185.4 cm (73\")   Wt 75 kg (165 lb 5.5 oz)   SpO2 97%   BMI 21.81 kg/m²     Intake/Output Summary (Last 24 hours) at 10/23/2023 0851  Last data filed at 10/23/2023 0634  Gross per 24 hour   Intake 1671.3 ml   Output 400 ml   Net 1271.3 ml       PHYSICAL EXAM:   CONSTITUTIONAL: Elderly, cooperative, in no acute distress  CARDIOVASCULAR: Irregularly irregular rhythm and normal rate, holosystolic murmur  RESPIRATORY: Clear to auscultation, normal respiratory effort on room air  EXTREMITIES: No gross deformities, 1+ pretibial/pedal edema. Peripheral pulses are present and equal bilaterally  NEUROLOGICAL: No gross focal deficits  PSYCHIATRIC: Normal mood and affect. Behavior is normal     Labs/Diagnostic Data  Results from last 7 days   Lab Units 10/23/23  0452 10/22/23  2109 10/19/23  0608   SODIUM mmol/L 141 139 141   POTASSIUM mmol/L 5.1 4.2 3.9   CHLORIDE mmol/L 103 99 103   CO2 mmol/L 27.0 28.0 28.0   BUN mg/dL 41* 43* 44*   CREATININE mg/dL 2.70* 2.78* 2.25*   GLUCOSE mg/dL 100* 140* 101*   CALCIUM mg/dL 9.0 9.2 9.0     Results from last 7 days   Lab Units 10/23/23  0452 10/22/23  2109 10/18/23  2022   HSTROP T ng/L 63* 69* 47*     Results from last 7 days   Lab Units 10/23/23  0452 10/22/23  2048 10/19/23  0608   WBC 10*3/mm3 7.42 6.85 6.10   HEMOGLOBIN g/dL 12.0* 12.7* 10.8*   HEMATOCRIT % 37.8 40.4 32.9*   PLATELETS 10*3/mm3 186 196 167     Results from last 7 days   Lab Units 10/23/23  0452   MAGNESIUM mg/dL 3.1*         Results from last 7 days   Lab Units 10/18/23  1809   TSH uIU/mL 2.010   FREE T4 ng/dL 1.41         Results from last 7 " days   Lab Units 10/22/23  2109   PROBNP pg/mL 6,072.0*           EKG atrial fibrillation with LBBB    Radiology Data:   CXR 10/22/23:  Findings:  The heart is enlarged with changes of midline sternotomy. There is a loop recorder over the left chest. There is mild pulmonary vascular congestion. There are no focal consolidations to suggest pneumonia. Small bilateral pleural effusions are seen   decreased from prior study.     IMPRESSION:  Impression:  Cardiomegaly and pulmonary vascular congestion. Small bilateral pleural effusions though smaller than prior study.    Current Medications:    aspirin, 81 mg, Oral, Daily  gabapentin, 300 mg, Oral, Q12H  pantoprazole, 40 mg, Intravenous, Q AM  senna-docusate sodium, 2 tablet, Oral, BID  sodium chloride, 10 mL, Intravenous, Q12H      amiodarone, 1 mg/min, Last Rate: 1 mg/min (10/23/23 0401)  sodium chloride, 50 mL/hr, Last Rate: 50 mL/hr (10/23/23 0008)        Assessment and Plan:   Atrial Fibrillation  Eliquis on hold  Presented with RVR and borderline hypotension  Amiodarone gtt was initiated  Currently in rate controlled atrial fibrillation with chronic LBBB  Tachybradycardia syndrome  Patient with sinus bradycardia earlier this month on metoprolol succinate and amiodarone. Both discontinued at that time  Restarted on amiodarone gtt this admission with rate controlled atrial fibrillation currently  Dr. Pickett had discussion with patient today about AVN ablation + PP. The patient wants to hold off on this at current as he is feeling better. We will try to contact his wife to discuss further. Could potentially perform this admission if the patient and family prefer  For now, will switch from IV to PO amiodarone 400 mg bid x1 week followed by 200 mg po qd thereafter  Chronic HFrEF  Echo 9/30/2023: LVEF 25%, moderate MR, RVSP <35, small pericardial effusion present adjacent to LV, bilateral pleural effusions  Would consider restarting home torsemide when/if CHRISTIANO and  borderline hypotension would allow. Might need nephrology's assistance at some point. Currently compensated with nonlabored breathing on room air  Discussed with the patient's general/interventional cardiology provider - Chio COTTER - who agreed with plan and has nothing further to add at this time.      Electronically signed by Sudeep Bobo PA-C, 10/23/23, 1:19 PM EDT.

## 2023-10-23 NOTE — PLAN OF CARE
Goal Outcome Evaluation:  Plan of Care Reviewed With: patient           Outcome Evaluation: Patient admitted from ED for Afib with RVR. Amio gtt infusing. 2g Mag given. Cardiology and EP consulted.

## 2023-10-23 NOTE — OUTREACH NOTE
Medical Week 1 Survey      Flowsheet Row Responses   [facility] [place] patient discharged from? [facility]   Does the patient have one of the following disease processes/diagnoses(primary or secondary)? Other   Week 1 attempt successful? No   Unsuccessful attempts Attempt 1   Revoke Readmitted            BRAD KEYS - Registered Nurse

## 2023-10-23 NOTE — PAYOR COMM NOTE
"Mukund Meier (89 y.o. Male)       Date of Birth   04/13/1930    Social Security Number       Address   [address] [place] [address]    Home Phone   [phone]    MRN   [id number]       Pentecostal   None    Marital Status                               Admission Date   10/22/23    Admission Type   Emergency    Admitting Provider   Babak Douglas MD    Attending Provider   Babak Douglas MD    Department, Room/Bed   [facility] 2A ICU, N209/1       Discharge Date       Discharge Disposition       Discharge Destination                                 Attending Provider: Babak Douglas MD    Allergies: No Known Allergies    Isolation: None   Infection: None   Code Status: CPR    Ht: 185.4 cm (73\")   Wt: 75 kg (165 lb 5.5 oz)    Admission Cmt: None   Principal Problem: Atrial fibrillation with RVR [I48.91]                   Active Insurance as of 10/22/2023       Primary Coverage       Payor Plan Insurance Group Employer/Plan Group    AETNA MEDICARE REPLACEMENT AETNA MEDICARE REPLACEMENT [id number]       Payor Plan Address Payor Plan Phone Number Payor Plan Fax Number Effective Dates    [address] [phone]  1/1/2021 - None Entered    [place] [address]         Subscriber Name Subscriber Birth Date Member ID       MUKUND MEIER 4/13/1930 [id number]                     Emergency Contacts        (Rel.) Home Phone Work Phone Mobile Phone    RHODA MEIER (Spouse) [phone] -- --    MATTHEW BISHOP (Relative) -- -- [phone]    Stacy Reed (Relative) -- -- [phone]              [place]: [facility] [id number] Tax ID [id number]  Insurance Information                  AETNA MEDICARE REPLACEMENT/AETNA MEDICARE REPLACEMENT Phone: [phone]    Subscriber: Yrn Mukund Mcghee Subscriber#: [id number]    Group#: [id number] Precert#: [id number]             History & Physical        Babak Douglas MD at 10/22/23 2222        "     INTENSIVIST   INITIAL HOSPITAL VISIT NOTE     Hospital:  LOS: 0 days     Mr. Mukund Pool, 89 y.o. male is seen for a Chief Complaint of:  Chief Complaint   Patient presents with    Shortness of Breath     Subjective  S     Mukund Pool is a 89 y.o. male who was admitted here 9/29 - 10/5 for acute HFrEF exacerbation & 10/18 - 10/20 for symptomatic bradycardia.  Plan was for outpatient f/u w/ Dr. Pickett on 10/24 for PPM evaluation.  He was taken of his amiodarone and Toprol prior to D/C.      Since his discharge he has had N/V, fatigue, SOA, and poor PO intake.  Today he had worsening SOA, mild CP,  & near-syncope which prompted him to return to [facility] where he was found to be in AF w/ RVR and bursts of WCT.       4 minutes of critical care provided by CYNDEE Henry in addendum accordance with split/shared billing. This time excludes other billable procedures. Time does include preparation of documents, medical consultations, review of old records, and direct bedside care. Patient is at high risk for life-threatening deterioration due to arrhythmia.   Electronically signed by CYNDEE Harrison, 10/22/23, 10:22 PM EDT.     PMH: He  has a past medical history of Arthritis, Chronic kidney disease, GERD (gastroesophageal reflux disease), History of radiation therapy (05/24/2021), Prostate cancer, and Vocal cord cancer.   PSxH: He  has a past surgical history that includes Prostate surgery; Coronary artery bypass graft; Cholecystectomy; VOCAL CORD MASS EXCISION (03/22/2021); and Cardiac electrophysiology procedure (04/2023).      Medications:  No current facility-administered medications on file prior to encounter.     Current Outpatient Medications on File Prior to Encounter   Medication Sig    apixaban (ELIQUIS) 2.5 MG tablet tablet Take 1 tablet by mouth 2 (Two) Times a Day. Indications: Atrial Fibrillation    aspirin 81 MG EC tablet Take 1 tablet by mouth Daily.    ezetimibe (ZETIA) 10  MG tablet Take 1 tablet by mouth Daily. Indications: High Amount of Fats in the Blood    gabapentin (NEURONTIN) 300 MG capsule Take 1 capsule by mouth 3 (Three) Times a Day. aking only 2 times a day   Indications: Neuropathic Pain    multivitamin with minerals tablet tablet Take 1 tablet by mouth Daily.    nitroglycerin (NITROSTAT) 0.4 MG SL tablet Place 0.4 mg under the tongue Every 5 (Five) Minutes As Needed for Chest Pain. Indications: Acute Angina Pectoris    omeprazole (priLOSEC) 20 MG capsule Take 1 capsule by mouth Daily. Indications: Stomach Ulcer    torsemide (DEMADEX) 20 MG tablet Take 1 tablet by mouth Daily.     Allergies: He has No Known Allergies.   FH: His family history includes Breast cancer in his mother; Cancer in his mother; Heart attack in his father; Ovarian cancer in his sister.   SH: He  reports that he has never smoked. He has never used smokeless tobacco. He reports that he does not drink alcohol and does not use drugs.     The patient's relevant past medical, surgical and social history were reviewed and updated in Epic as appropriate.     ROS (14):   Review of Systems   Constitutional:  Positive for fatigue. Negative for chills and fever.   Respiratory:  Positive for shortness of breath. Negative for cough and wheezing.    Cardiovascular:  Positive for chest pain and leg swelling. Negative for palpitations.   Gastrointestinal:  Positive for nausea and vomiting. Negative for abdominal pain, constipation and diarrhea.   Neurological:  Positive for dizziness and light-headedness.     Objective  O   Medications (drips):  amiodarone, Last Rate: 1 mg/min (10/22/23 2152)    Physical Examination  Vitals:    10/22/23 2300   BP: 101/88   Pulse: 101   Resp:    SpO2: 100%     General: The patient appears in no acute distress. Alert, cooperative and interactive.  Chest: Clear to auscultation bilaterally, No wheezing, rhonchi, or rales. Normal work of breathing. Equal chest rise.  Cardiac: Abnormal  rhythm, tachycardic.  S1/S2 auscultated.  Extremities: No lower extremity edema. No clubbing or cyanosis.  Neuro: Motor power grossly intact bilaterally. Speech fluid and fluent. Thought process coherent.  Psych: Alert and oriented x3. Mood stable.    Results from last 7 days   Lab Units 10/22/23  2048 10/19/23  0608 10/18/23  1816 10/18/23  1814 10/18/23  1809   WBC 10*3/mm3 6.85 6.10  --   --  5.88   HEMOGLOBIN g/dL 12.7* 10.8*  --   --  10.7*   HEMOGLOBIN, POC g/dL  --   --  12.6   < >  --    MCV fL 109.2* 104.1*  --   --  108.4*   PLATELETS 10*3/mm3 196 167  --   --  167    < > = values in this interval not displayed.     Results from last 7 days   Lab Units 10/22/23  2109 10/19/23  0608 10/18/23  1816 10/18/23  1814 10/18/23  1809   SODIUM mmol/L 139 141  --   --  143   POTASSIUM mmol/L 4.2 3.9  --   --  4.5   CO2 mmol/L 28.0 28.0  --   --  30.0*   CREATININE mg/dL 2.78* 2.25* 2.80   < > 2.37*   MAGNESIUM mg/dL  --   --   --   --  2.1    < > = values in this interval not displayed.     Estimated Creatinine Clearance: 18.7 mL/min (A) (by C-G formula based on SCr of 2.78 mg/dL (H)).  Results from last 7 days   Lab Units 10/22/23  2109 10/18/23  1809   ALK PHOS U/L 90 90   BILIRUBIN mg/dL 0.9 0.8   ALT (SGPT) U/L 33 32   AST (SGOT) U/L 35 25     I reviewed the patient's new laboratory and imaging results.  I independently reviewed the patient's new images.    Assessment & Plan  A / P     Patient Yrn is a 89-year-old male with past medical history of CAD, atrial fibrillation and heart failure with reduced ejection fraction (EF 25%).  Patient has a chronic, left bundle branch block and was previously on amiodarone that was stopped during a recent admission due to significant symptomatic bradycardia; DC 10/20/2023.  Following this hospitalization plans made for permanent pacemaker implementation as an outpatient.  However, returned with nausea/vomiting, fatigue, shortness of breath and poor oral intake.  He  presented to the ER on 10/22 PM and upon arrival was found to be in atrial fibrillation with RVR.     #Atrial fibrillation with RVR  #Heart failure with reduced ejection fraction (EF 25%)  #Chronic LBBB   #Nonsustained ventricular tachycardia [reported]  #Dyspnea  -Cardiology consulted and discussed case with emergency room physician.  Placed on amiodarone drip; HR improving   -Evaluated electrolytes without significant aberrancies  -Will need EP evaluation tomorrow; Will likely need pacemaker placement     #CHRISTIANO on CKD III   -Baseline creatinine around 1.6-2.0; creatinine 2.78 on admission but with no other gross, electrolyte disturbances.  Etiology likely prerenal in the setting of poor oral intake and nausea/vomiting.  Status post 1 L of crystalloid but will hold further resuscitation given known HFrEF.  Will continue to follow urine output, creatinine and electrolytes closely while in the ICU.  Electrolyte replacement per protocol    #Nausea, vomiting  -Antiemetics if needed but need to be administered carefully given prolonged QTc    #Head and neck cancer   #[new] pancreatic lesion  -Per recent hospitalization documentation. Has declined treatment for this issue given age and comorbidities     F-NPO  A-NA  S-NA  T-SCDs  H-Head of bed greater than 30 degrees  U-NA  G-FSBS per unit protocol, correction dose insulin  S-NA  B-Last bowel movement unknown   I-PIV  D-NA    -- Wood Douglas MD  Pulmonary/Critical Care    Critical Care time spent in direct patient care: 30 minutes (excluding procedure time, if applicable) including high complexity decision making to assess, manipulate, and support vital organ system failure in this individual who has impairment of one or more vital organ systems such that there is a high probability of imminent or life threatening deterioration in the patient’s condition.     Electronically signed by Babak Douglas MD at 10/23/23 0612       Current Facility-Administered Medications    Medication Dose Route Frequency Provider Last Rate Last Admin    amiodarone 360 mg in 200 mL D5W infusion  1 mg/min Intravenous Continuous Maldonado Spencer DO 33.3 mL/hr at 10/23/23 0913 1 mg/min at 10/23/23 0913    aspirin chewable tablet 81 mg  81 mg Oral Daily Oscar Batista APRN        sennosides-docusate (PERICOLACE) 8.6-50 MG per tablet 2 tablet  2 tablet Oral BID Babak Douglas MD   2 tablet at 10/23/23 0029    And    polyethylene glycol (MIRALAX) packet 17 g  17 g Oral Daily PRN Babak Douglas MD        And    bisacodyl (DULCOLAX) EC tablet 5 mg  5 mg Oral Daily PRN Babak Douglas MD        And    bisacodyl (DULCOLAX) suppository 10 mg  10 mg Rectal Daily PRN Babak Douglas MD        gabapentin (NEURONTIN) capsule 300 mg  300 mg Oral Q12H Oscar Batista APRN        nitroglycerin (NITROSTAT) SL tablet 0.4 mg  0.4 mg Sublingual Q5 Min PRN Babak Douglas MD        pantoprazole (PROTONIX) injection 40 mg  40 mg Intravenous Q AM Oscar Batista APRN   40 mg at 10/23/23 0552    sodium chloride 0.9 % flush 10 mL  10 mL Intravenous PRN Maldonado Spencer DO        sodium chloride 0.9 % flush 10 mL  10 mL Intravenous Q12H Babak Douglas MD   10 mL at 10/22/23 2353    sodium chloride 0.9 % flush 10 mL  10 mL Intravenous PRN Babak Douglas MD         Lab Results (last 24 hours)       Procedure Component Value Units Date/Time    Folate [[id number]]  (Normal) Collected: 10/23/23 0452    Specimen: Blood Updated: 10/23/23 1022     Folate 10.30 ng/mL     Narrative:      Results may be falsely increased if patient taking Biotin.      Vitamin B12 [[id number]]  (Abnormal) Collected: 10/23/23 0452    Specimen: Blood Updated: 10/23/23 1022     Vitamin B-12 1,587 pg/mL     Narrative:      Results may be falsely increased if patient taking Biotin.      [facility] Draw [[id number]] Collected: 10/22/23 2048    Specimen: Blood Updated: 10/23/23 0837    Narrative:      The following orders were created for panel order [facility]  Draw.  Procedure                               Abnormality         Status                     ---------                               -----------         ------                     Green Top (Gel)[[id number]]                                  Final result               Lavender Top[[id number]]                                     Final result               Gold Top - SST[[id number]]                                   Final result               Gray Top[[id number]]                                                                    Light Blue Top[[id number]]                                   Final result                 Please view results for these tests on the individual orders.    Phosphorus [[id number]]  (Normal) Collected: 10/23/23 0452    Specimen: Blood Updated: 10/23/23 0630     Phosphorus 3.3 mg/dL     Magnesium [[id number]]  (Abnormal) Collected: 10/23/23 0452    Specimen: Blood Updated: 10/23/23 0630     Magnesium 3.1 mg/dL     Basic Metabolic Panel [[id number]]  (Abnormal) Collected: 10/23/23 0452    Specimen: Blood Updated: 10/23/23 0630     Glucose 100 mg/dL      BUN 41 mg/dL      Creatinine 2.70 mg/dL      Sodium 141 mmol/L      Potassium 5.1 mmol/L      Chloride 103 mmol/L      CO2 27.0 mmol/L      Calcium 9.0 mg/dL      BUN/Creatinine Ratio 15.2     Anion Gap 11.0 mmol/L      eGFR 21.3 mL/min/1.73     Narrative:      GFR Normal >60  Chronic Kidney Disease <60  Kidney Failure <15    The GFR formula is only valid for adults with stable renal function between ages 18 and 70.    High Sensitivity Troponin T [[id number]]  (Abnormal) Collected: 10/23/23 0452    Specimen: Blood Updated: 10/23/23 0610     HS Troponin T 63 ng/L     Narrative:      High Sensitive Troponin T Reference Range:  <10.0 ng/L- Negative Female for AMI  <15.0 ng/L- Negative Male for AMI  >=10 - Abnormal Female indicating possible myocardial injury.  >=15 - Abnormal Male indicating possible myocardial injury.   Clinicians would have to utilize  clinical acumen, EKG, Troponin, and serial changes to determine if it is an Acute Myocardial Infarction or myocardial injury due to an underlying chronic condition.         CBC (No Diff) [[id number]]  (Abnormal) Collected: 10/23/23 0452    Specimen: Blood Updated: 10/23/23 0536     WBC 7.42 10*3/mm3      RBC 3.57 10*6/mm3      Hemoglobin 12.0 g/dL      Hematocrit 37.8 %      .9 fL      MCH 33.6 pg      MCHC 31.7 g/dL      RDW 12.5 %      RDW-SD 49.5 fl      MPV 10.1 fL      Platelets 186 10*3/mm3     COVID PRE-OP / PRE-PROCEDURE SCREENING ORDER (NO ISOLATION) - Swab, Nasopharynx [[id number]]  (Normal) Collected: 10/23/23 0415    Specimen: Swab from Nasopharynx Updated: 10/23/23 0505    Narrative:      The following orders were created for panel order COVID PRE-OP / PRE-PROCEDURE SCREENING ORDER (NO ISOLATION) - Swab, Nasopharynx.  Procedure                               Abnormality         Status                     ---------                               -----------         ------                     COVID-19 and FLU A/B PCR...[[id number]]  Normal              Final result                 Please view results for these tests on the individual orders.    COVID-19 and FLU A/B PCR - Swab, Nasopharynx [[id number]]  (Normal) Collected: 10/23/23 0415    Specimen: Swab from Nasopharynx Updated: 10/23/23 0505     COVID19 Not Detected     Influenza A PCR Not Detected     Influenza B PCR Not Detected    Narrative:      Fact sheet for providers: https://www.fda.gov/media/165118/download    Fact sheet for patients: https://www.fda.gov/media/886436/download    Test performed by PCR.    Magnesium [[id number]]  (Normal) Collected: 10/22/23 2109    Specimen: Blood Updated: 10/22/23 2246     Magnesium 2.2 mg/dL     Green Top (Gel) [[id number]] Collected: 10/22/23 2109    Specimen: Blood Updated: 10/22/23 2215     Extra Tube Hold for add-ons.     Comment: Auto resulted.       Gold Top - SST [[id number]] Collected: 10/22/23 2104     Specimen: Blood Updated: 10/22/23 2215     Extra Tube Hold for add-ons.     Comment: Auto resulted.       Lavender Top [[id number]] Collected: 10/22/23 2048    Specimen: Blood Updated: 10/22/23 2201     Extra Tube hold for add-on     Comment: Auto resulted       Light Blue Top [[id number]] Collected: 10/22/23 2048    Specimen: Blood Updated: 10/22/23 2201     Extra Tube Hold for add-ons.     Comment: Auto resulted       Comprehensive Metabolic Panel [[id number]]  (Abnormal) Collected: 10/22/23 2109    Specimen: Blood Updated: 10/22/23 2145     Glucose 140 mg/dL      BUN 43 mg/dL      Creatinine 2.78 mg/dL      Sodium 139 mmol/L      Potassium 4.2 mmol/L      Comment: Slight hemolysis detected by analyzer. Results may be affected.        Chloride 99 mmol/L      CO2 28.0 mmol/L      Calcium 9.2 mg/dL      Total Protein 7.4 g/dL      Albumin 3.7 g/dL      ALT (SGPT) 33 U/L      AST (SGOT) 35 U/L      Alkaline Phosphatase 90 U/L      Total Bilirubin 0.9 mg/dL      Globulin 3.7 gm/dL      Comment: Calculated Result        A/G Ratio 1.0 g/dL      BUN/Creatinine Ratio 15.5     Anion Gap 12.0 mmol/L      eGFR 20.6 mL/min/1.73     Narrative:      GFR Normal >60  Chronic Kidney Disease <60  Kidney Failure <15    The GFR formula is only valid for adults with stable renal function between ages 18 and 70.    Lipase [[id number]]  (Abnormal) Collected: 10/22/23 2109    Specimen: Blood Updated: 10/22/23 2144     Lipase 11 U/L     High Sensitivity Troponin T [[id number]]  (Abnormal) Collected: 10/22/23 2109    Specimen: Blood Updated: 10/22/23 2142     HS Troponin T 69 ng/L     Narrative:      High Sensitive Troponin T Reference Range:  <10.0 ng/L- Negative Female for AMI  <15.0 ng/L- Negative Male for AMI  >=10 - Abnormal Female indicating possible myocardial injury.  >=15 - Abnormal Male indicating possible myocardial injury.   Clinicians would have to utilize clinical acumen, EKG, Troponin, and serial changes to determine if it is an  Acute Myocardial Infarction or myocardial injury due to an underlying chronic condition.         BNP [[id number]]  (Abnormal) Collected: 10/22/23 2109    Specimen: Blood Updated: 10/22/23 2142     proBNP 6,072.0 pg/mL     Narrative:      This assay is used as an aid in the diagnosis of individuals suspected of having heart failure. It can be used as an aid in the diagnosis of acute decompensated heart failure (ADHF) in patients presenting with signs and symptoms of ADHF to the emergency department (ED). In addition, NT-proBNP of <300 pg/mL indicates ADHF is not likely.    Age Range Result Interpretation  NT-proBNP Concentration (pg/mL:      <50             Positive            >450                   Gray                 300-450                    Negative             <300    50-75           Positive            >900                  Gray                300-900                  Negative            <300      >75             Positive            >1800                  Gray                300-1800                  Negative            <300    CBC & Differential [[id number]]  (Abnormal) Collected: 10/22/23 2048    Specimen: Blood Updated: 10/22/23 2053    Narrative:      The following orders were created for panel order CBC & Differential.  Procedure                               Abnormality         Status                     ---------                               -----------         ------                     CBC Auto Differential[[id number]]        Abnormal            Final result               Scan Slide[[id number]]                                                                    Please view results for these tests on the individual orders.    CBC Auto Differential [[id number]]  (Abnormal) Collected: 10/22/23 2048    Specimen: Blood Updated: 10/22/23 2053     WBC 6.85 10*3/mm3      RBC 3.70 10*6/mm3      Hemoglobin 12.7 g/dL      Hematocrit 40.4 %      .2 fL      MCH 34.3 pg      MCHC 31.4 g/dL      RDW 13.3 %       RDW-SD 54.6 fl      MPV 10.3 fL      Platelets 196 10*3/mm3      Neutrophil % 50.1 %      Lymphocyte % 35.5 %      Monocyte % 10.4 %      Eosinophil % 2.8 %      Basophil % 0.9 %      Immature Grans % 0.3 %      Neutrophils, Absolute 3.44 10*3/mm3      Lymphocytes, Absolute 2.43 10*3/mm3      Monocytes, Absolute 0.71 10*3/mm3      Eosinophils, Absolute 0.19 10*3/mm3      Basophils, Absolute 0.06 10*3/mm3      Immature Grans, Absolute 0.02 10*3/mm3      nRBC 0.0 /100 WBC           Imaging Results (Last 24 Hours)       Procedure Component Value Units Date/Time    XR Chest 1 View [[id number]] Collected: 10/22/23 2236     Updated: 10/22/23 2240    Narrative:      XR CHEST 1 VW    Date of Exam: 10/22/2023 10:05 PM EDT    Indication: Chest Pain Triage Protocol    Comparison: Chest radiograph dated October 18, 2023    Findings:  The heart is enlarged with changes of midline sternotomy. There is a loop recorder over the left chest. There is mild pulmonary vascular congestion. There are no focal consolidations to suggest pneumonia. Small bilateral pleural effusions are seen   decreased from prior study.      Impression:      Impression:  Cardiomegaly and pulmonary vascular congestion. Small bilateral pleural effusions though smaller than prior study.      Electronically Signed: Steve Awan MD    10/22/2023 10:37 PM EDT    Workstation ID: [id number]          Orders (last 24 hrs)        Start     Ordered    10/23/23 0900  aspirin chewable tablet 81 mg  Daily         10/22/23 2243    10/23/23 0900  gabapentin (NEURONTIN) capsule 300 mg  Every 12 Hours Scheduled         10/22/23 2243    10/23/23 0702  Inpatient Cardiology Consult  IN AM,   Status:  Canceled        Specialty:  Cardiology  Provider:  (Not yet assigned)    10/22/23 2224    10/23/23 0702  Inpatient Cardiac Electrophysiologist Consult  IN AM        Specialty:  Cardiac Electrophysiology  Provider:  Levy Pickett MD    10/22/23 2234    10/23/23 0702  Inpatient  Cardiology Consult  IN AM        Specialty:  Cardiology  Provider:  Levy Pickett MD    10/22/23 2234    10/23/23 0652  High Sensitivity Troponin T 2Hr  PROCEDURE ONCE,   Status:  Canceled         10/23/23 0610    10/23/23 0600  pantoprazole (PROTONIX) injection 40 mg  Every Early Morning         10/22/23 2243    10/23/23 0600  Magnesium  Morning Draw,   Status:  Canceled         10/22/23 2245    10/23/23 0600  Phosphorus  Morning Draw,   Status:  Canceled         10/22/23 2245    10/23/23 0600  Basic Metabolic Panel  Morning Draw,   Status:  Canceled         10/22/23 2245    10/23/23 0600  CBC (No Diff)  Morning Draw,   Status:  Canceled         10/22/23 2245    10/23/23 0600  Vitamin B12  Morning Draw,   Status:  Canceled         10/22/23 2246    10/23/23 0600  Folate  Morning Draw,   Status:  Canceled         10/22/23 2246    10/23/23 0600  Basic Metabolic Panel  Morning Draw         10/23/23 0431    10/23/23 0600  CBC (No Diff)  Morning Draw         10/23/23 0431    10/23/23 0600  Phosphorus  Morning Draw         10/23/23 0431    10/23/23 0600  Magnesium  Morning Draw         10/23/23 0431    10/23/23 0600  Vitamin B12  Morning Draw         10/23/23 0431    10/23/23 0600  Folate  Morning Draw         10/23/23 0431    10/23/23 0600  High Sensitivity Troponin T  Morning Draw         10/23/23 0431    10/23/23 0500  ECG 12 Lead Rhythm Change  Tomorrow AM         10/22/23 2245    10/23/23 0427  Critical Care  Once        Comments: This order was created via procedure documentation    10/23/23 0426    10/23/23 0059  Wound Ostomy Eval & Treat Pressure Injury  Once        Comments: Appears as healing old pressure injury    10/23/23 0059    10/23/23 0043  Code Status and Medical Interventions:  Continuous         10/23/23 0042    10/23/23 0001  NPO Diet NPO Type: Sips with Meds, Ice Chips  Diet Effective Midnight         10/22/23 2245    10/22/23 2309  High Sensitivity Troponin T 2Hr  PROCEDURE ONCE,   Status:  Canceled          10/22/23 2142    10/22/23 2302  sodium chloride 0.9 % infusion  Continuous         10/22/23 2246    10/22/23 2300  Vital Signs Every Hour and Per Hospital Policy Based on Patient Condition  Every Hour       10/22/23 2232    10/22/23 2300  Intake & Output  Every Hour       10/22/23 2232    10/22/23 2248  sodium chloride 0.9 % flush 10 mL  Every 12 Hours Scheduled         10/22/23 2232    10/22/23 2248  sennosides-docusate (PERICOLACE) 8.6-50 MG per tablet 2 tablet  2 Times Daily        See Hyperspace for full Linked Orders Report.    10/22/23 2232    10/22/23 2245  amiodarone 360 mg in 200 mL D5W infusion  Continuous         10/22/23 2150    10/22/23 2242  magnesium sulfate 2g/50 mL (PREMIX) infusion  Once         10/22/23 2226    10/22/23 2233  Daily Weights  Daily       10/22/23 2232    10/22/23 2233  Place Sequential Compression Device  Once         10/22/23 2232    10/22/23 2233  Maintain Sequential Compression Device  Continuous         10/22/23 2232    10/22/23 2232  Continuous Cardiac Monitoring  Continuous        Comments: Follow Standing Orders As Outlined in Process Instructions (Open Order Report to View Full Instructions)    10/22/23 2232    10/22/23 2232  Telemetry - Maintain IV Access  Continuous,   Status:  Canceled         10/22/23 2232    10/22/23 2232  Telemetry - Place Orders & Notify Provider of Results When Patient Experiences Acute Chest Pain, Dysrhythmia or Respiratory Distress  Until Discontinued         10/22/23 2232    10/22/23 2232  Continuous Pulse Oximetry  Continuous         10/22/23 2232    10/22/23 2232  Height & Weight  Once         10/22/23 2232    10/22/23 2232  Oral Care - Patient Not on NPPV & Not Intubated  Every Shift       10/22/23 2232    10/22/23 2232  Target Arousal Level RASS 0 to -1  Continuous         10/22/23 2232    10/22/23 2232  Use Mobility Guidelines for Advancement of Activity  Continuous         10/22/23 2232    10/22/23 2232  Insert Peripheral IV  Once          10/22/23 2232    10/22/23 2232  Saline Lock & Maintain IV Access  Continuous         10/22/23 2232    10/22/23 2232  Inpatient Admission  Once         10/22/23 2232    10/22/23 2231  bisacodyl (DULCOLAX) EC tablet 5 mg  Daily PRN        See Hyperspace for full Linked Orders Report.    10/22/23 2232    10/22/23 2231  bisacodyl (DULCOLAX) suppository 10 mg  Daily PRN        See Hyperspace for full Linked Orders Report.    10/22/23 2232    10/22/23 2231  nitroglycerin (NITROSTAT) SL tablet 0.4 mg  Every 5 Minutes PRN         10/22/23 2232    10/22/23 2231  sodium chloride 0.9 % flush 10 mL  As Needed         10/22/23 2232    10/22/23 2231  sodium chloride 0.9 % infusion 40 mL  As Needed,   Status:  Discontinued         10/22/23 2232    10/22/23 2231  polyethylene glycol (MIRALAX) packet 17 g  Daily PRN        See Hyperspace for full Linked Orders Report.    10/22/23 2232    10/22/23 2227  Magnesium  Once         10/22/23 2226    10/22/23 2155  XR Chest 1 View  1 Time Imaging,   Status:  Canceled         10/22/23 2154    10/22/23 2147  ECG 12 Lead Rhythm Change  Once         10/22/23 2146    10/22/23 2133  sodium chloride 0.9 % bolus 1,000 mL  Once         10/22/23 2117    10/22/23 2128  ECG 12 Lead Rhythm Change  Once         10/22/23 2127    10/22/23 2101  High Sensitivity Troponin T  Once         10/22/23 2029    10/22/23 2101  Comprehensive Metabolic Panel  Once         10/22/23 2029    10/22/23 2101  Lipase  Once         10/22/23 2029    10/22/23 2101  BNP  Once         10/22/23 2029    10/22/23 2101  Green Top (Gel)  PROCEDURE ONCE         10/22/23 2029    10/22/23 2101  Gold Top - SST  PROCEDURE ONCE         10/22/23 2029    10/22/23 2054  Scan Slide  Once,   Status:  Canceled         10/22/23 2053    10/22/23 2045  aspirin chewable tablet 324 mg  Once         10/22/23 2029    10/22/23 2034  COVID PRE-OP / PRE-PROCEDURE SCREENING ORDER (NO ISOLATION) - Swab, Nasopharynx  Once         10/22/23 2033     10/22/23 2034  COVID-19 and FLU A/B PCR - Swab, Nasopharynx  PROCEDURE ONCE         10/22/23 2033    10/22/23 2031  POCT CHEM 8  STAT         10/22/23 2030    10/22/23 2030  NPO Diet NPO Type: Strict NPO  Diet Effective Now,   Status:  Canceled         10/22/23 2029    10/22/23 2030  Undress & Gown  Once         10/22/23 2029    10/22/23 2030  Cardiac Monitoring  Continuous,   Status:  Canceled        Comments: Follow Standing Orders As Outlined in Process Instructions (Open Order Report to View Full Instructions)    10/22/23 2029    10/22/23 2030  Continuous Pulse Oximetry  Continuous,   Status:  Canceled         10/22/23 2029    10/22/23 2030  ECG 12 Lead ED Triage Standing Order; Chest Pain  Now & in 2 Hours       10/22/23 2029    10/22/23 2030  XR Chest 1 View  1 Time Imaging         10/22/23 2029    10/22/23 2030  Insert Peripheral IV  Once         10/22/23 2029    10/22/23 2030  [place] Draw  Once         10/22/23 2029    10/22/23 2030  CBC & Differential  Once         10/22/23 2029    10/22/23 2030  Lavender Top  PROCEDURE ONCE         10/22/23 2029    10/22/23 2030  Choudhary Top  PROCEDURE ONCE,   Status:  Canceled         10/22/23 2029    10/22/23 2030  Light Blue Top  PROCEDURE ONCE         10/22/23 2029    10/22/23 2030  CBC Auto Differential  PROCEDURE ONCE         10/22/23 2029    10/22/23 2029  sodium chloride 0.9 % flush 10 mL  As Needed         10/22/23 2029    Unscheduled  Oxygen Therapy- Nasal Cannula; Titrate 1-6 LPM Per SpO2; 90 - 95%  Continuous PRN       10/22/23 2029    Unscheduled  ECG 12 Lead ED Triage Standing Order; Chest Pain  As Needed      Comments: Persistent, Unrelieved or Recurrent Chest Pain    10/22/23 2029    --  SCANNED - TELEMETRY           10/22/23 0000                  Physician Progress Notes (last 24 hours)  Notes from 10/22/23 1032 through 10/23/23 1032   No notes of this type exist for this encounter.       Consult Notes (last 24 hours)  Notes from 10/22/23 1032 through  10/23/23 1032   No notes of this type exist for this encounter.

## 2023-10-23 NOTE — H&P
INTENSIVIST   INITIAL HOSPITAL VISIT NOTE     Hospital:  LOS: 0 days     Mr. Mukund Pool, 89 y.o. male is seen for a Chief Complaint of:  Chief Complaint   Patient presents with    Shortness of Breath     Subjective   S     Mukund Pool is a 89 y.o. male who was admitted here 9/29 - 10/5 for acute HFrEF exacerbation & 10/18 - 10/20 for symptomatic bradycardia.  Plan was for outpatient f/u w/ Dr. Pickett on 10/24 for PPM evaluation.  He was taken of his amiodarone and Toprol prior to D/C.      Since his discharge he has had N/V, fatigue, SOA, and poor PO intake.  Today he had worsening SOA, mild CP,  & near-syncope which prompted him to return [facility] where he was found to be in AF w/ RVR and bursts of WCT.       4 minutes of critical care provided by CYNDEE Henry in addendum accordance with split/shared billing. This time excludes other billable procedures. Time does include preparation of documents, medical consultations, review of old records, and direct bedside care. Patient is at high risk for life-threatening deterioration due to arrhythmia.   Electronically signed by CYNDEE Harrison, 10/22/23, 10:22 PM EDT.     PMH: He  has a past medical history of Arthritis, Chronic kidney disease, GERD (gastroesophageal reflux disease), History of radiation therapy (05/24/2021), Prostate cancer, and Vocal cord cancer.   PSxH: He  has a past surgical history that includes Prostate surgery; Coronary artery bypass graft; Cholecystectomy; VOCAL CORD MASS EXCISION (03/22/2021); and Cardiac electrophysiology procedure (04/2023).      Medications:  No current facility-administered medications on file prior to encounter.     Current Outpatient Medications on File Prior to Encounter   Medication Sig    apixaban (ELIQUIS) 2.5 MG tablet tablet Take 1 tablet by mouth 2 (Two) Times a Day. Indications: Atrial Fibrillation    aspirin 81 MG EC tablet Take 1 tablet by mouth Daily.    ezetimibe (ZETIA) 10 MG  tablet Take 1 tablet by mouth Daily. Indications: High Amount of Fats in the Blood    gabapentin (NEURONTIN) 300 MG capsule Take 1 capsule by mouth 3 (Three) Times a Day. aking only 2 times a day   Indications: Neuropathic Pain    multivitamin with minerals tablet tablet Take 1 tablet by mouth Daily.    nitroglycerin (NITROSTAT) 0.4 MG SL tablet Place 0.4 mg under the tongue Every 5 (Five) Minutes As Needed for Chest Pain. Indications: Acute Angina Pectoris    omeprazole (priLOSEC) 20 MG capsule Take 1 capsule by mouth Daily. Indications: Stomach Ulcer    torsemide (DEMADEX) 20 MG tablet Take 1 tablet by mouth Daily.     Allergies: He has No Known Allergies.   FH: His family history includes Breast cancer in his mother; Cancer in his mother; Heart attack in his father; Ovarian cancer in his sister.   SH: He  reports that he has never smoked. He has never used smokeless tobacco. He reports that he does not drink alcohol and does not use drugs.     The patient's relevant past medical, surgical and social history were reviewed and updated in Epic as appropriate.     ROS (14):   Review of Systems   Constitutional:  Positive for fatigue. Negative for chills and fever.   Respiratory:  Positive for shortness of breath. Negative for cough and wheezing.    Cardiovascular:  Positive for chest pain and leg swelling. Negative for palpitations.   Gastrointestinal:  Positive for nausea and vomiting. Negative for abdominal pain, constipation and diarrhea.   Neurological:  Positive for dizziness and light-headedness.     Objective   O   Medications (drips):  amiodarone, Last Rate: 1 mg/min (10/22/23 2152)    Physical Examination  Vitals:    10/22/23 2300   BP: 101/88   Pulse: 101   Resp:    SpO2: 100%     General: The patient appears in no acute distress. Alert, cooperative and interactive.  Chest: Clear to auscultation bilaterally, No wheezing, rhonchi, or rales. Normal work of breathing. Equal chest rise.  Cardiac: Abnormal  rhythm, tachycardic.  S1/S2 auscultated.  Extremities: No lower extremity edema. No clubbing or cyanosis.  Neuro: Motor power grossly intact bilaterally. Speech fluid and fluent. Thought process coherent.  Psych: Alert and oriented x3. Mood stable.    Results from last 7 days   Lab Units 10/22/23  2048 10/19/23  0608 10/18/23  1816 10/18/23  1814 10/18/23  1809   WBC 10*3/mm3 6.85 6.10  --   --  5.88   HEMOGLOBIN g/dL 12.7* 10.8*  --   --  10.7*   HEMOGLOBIN, POC g/dL  --   --  12.6   < >  --    MCV fL 109.2* 104.1*  --   --  108.4*   PLATELETS 10*3/mm3 196 167  --   --  167    < > = values in this interval not displayed.     Results from last 7 days   Lab Units 10/22/23  2109 10/19/23  0608 10/18/23  1816 10/18/23  1814 10/18/23  1809   SODIUM mmol/L 139 141  --   --  143   POTASSIUM mmol/L 4.2 3.9  --   --  4.5   CO2 mmol/L 28.0 28.0  --   --  30.0*   CREATININE mg/dL 2.78* 2.25* 2.80   < > 2.37*   MAGNESIUM mg/dL  --   --   --   --  2.1    < > = values in this interval not displayed.     Estimated Creatinine Clearance: 18.7 mL/min (A) (by C-G formula based on SCr of 2.78 mg/dL (H)).  Results from last 7 days   Lab Units 10/22/23  2109 10/18/23  1809   ALK PHOS U/L 90 90   BILIRUBIN mg/dL 0.9 0.8   ALT (SGPT) U/L 33 32   AST (SGOT) U/L 35 25     I reviewed the patient's new laboratory and imaging results.  I independently reviewed the patient's new images.    Assessment & Plan   A / P     Patient Yrn is a 89-year-old male with past medical history of CAD, atrial fibrillation and heart failure with reduced ejection fraction (EF 25%).  Patient has a chronic, left bundle branch block and was previously on amiodarone that was stopped during a recent admission due to significant symptomatic bradycardia; DC 10/20/2023.  Following this hospitalization plans made for permanent pacemaker implementation as an outpatient.  However, returned with nausea/vomiting, fatigue, shortness of breath and poor oral intake.  He  presented to the ER on 10/22 PM and upon arrival was found to be in atrial fibrillation with RVR.     #Atrial fibrillation with RVR  #Heart failure with reduced ejection fraction (EF 25%)  #Chronic LBBB   #Nonsustained ventricular tachycardia [reported]  #Dyspnea  -Cardiology consulted and discussed case with emergency room physician.  Placed on amiodarone drip; HR improving   -Evaluated electrolytes without significant aberrancies  -Will need EP evaluation tomorrow; Will likely need pacemaker placement     #CHRISTIANO on CKD III   -Baseline creatinine around 1.6-2.0; creatinine 2.78 on admission but with no other gross, electrolyte disturbances.  Etiology likely prerenal in the setting of poor oral intake and nausea/vomiting.  Status post 1 L of crystalloid but will hold further resuscitation given known HFrEF.  Will continue to follow urine output, creatinine and electrolytes closely while in the ICU.  Electrolyte replacement per protocol    #Nausea, vomiting  -Antiemetics if needed but need to be administered carefully given prolonged QTc    #Head and neck cancer   #[new] pancreatic lesion  -Per recent hospitalization documentation. Has declined treatment for this issue given age and comorbidities     F-NPO  A-NA  S-NA  T-SCDs  H-Head of bed greater than 30 degrees  U-NA  G-FSBS per unit protocol, correction dose insulin  S-NA  B-Last bowel movement unknown   I-PIV  D-NA    -- Wood Douglas MD  Pulmonary/Critical Care    Critical Care time spent in direct patient care: 30 minutes (excluding procedure time, if applicable) including high complexity decision making to assess, manipulate, and support vital organ system failure in this individual who has impairment of one or more vital organ systems such that there is a high probability of imminent or life threatening deterioration in the patient’s condition.

## 2023-10-23 NOTE — ED PROVIDER NOTES
Subjective   History of Present Illness  Patient is a pleasant 89-year-old male with history of atrial fibrillation and CHF.  Recently admitted atrial fibrillation and bradycardia.  Pacemaker was recommended to the patient for an outpatient management at that time as he was feeling better at the time of discharge 2 days ago.  This evening he was at home and became short of breath and lightheaded.  He had some mild chest pain associated with the episode.  EMS notes that he was hypotensive upon their arrival.  Patient denies similar episodes to this in the past.  Patient is found to be in atrial fibrillation with rapid ventricular response.    Upon chart review on his most recent admission his metoprolol and amiodarone were discontinued secondary to his bradycardia at that time.  This may be contributing to his current tachycardia and symptoms.  Most recent echocardiogram noted an EF of 25%.  Denies fever, abdominal pain, vomiting, diarrhea, or other acute complaints.        Review of Systems   All other systems reviewed and are negative.      Past Medical History:   Diagnosis Date    Arthritis     Chronic kidney disease     kidney stones    GERD (gastroesophageal reflux disease)     History of radiation therapy 05/24/2021    laryngeal mass    Prostate cancer     Vocal cord cancer        No Known Allergies    Past Surgical History:   Procedure Laterality Date    CARDIAC ELECTROPHYSIOLOGY PROCEDURE  04/2023    cardiac loop recorder    CHOLECYSTECTOMY      CORONARY ARTERY BYPASS GRAFT      PROSTATE SURGERY      VOCAL CORD MASS EXCISION  03/22/2021       Family History   Problem Relation Age of Onset    Cancer Mother     Breast cancer Mother     Heart attack Father     Ovarian cancer Sister        Social History     Socioeconomic History    Marital status:    Tobacco Use    Smoking status: Never    Smokeless tobacco: Never   Vaping Use    Vaping Use: Never used   Substance and Sexual Activity    Alcohol use: No     Drug use: No    Sexual activity: Defer           Objective   Physical Exam  Vitals and nursing note reviewed.   Constitutional:       General: He is in acute distress.      Appearance: He is ill-appearing.   HENT:      Head: Normocephalic and atraumatic.   Cardiovascular:      Rate and Rhythm: Tachycardia present. Rhythm irregular.   Pulmonary:      Effort: Pulmonary effort is normal.      Breath sounds: Normal breath sounds.   Chest:      Chest wall: No tenderness.   Abdominal:      General: Bowel sounds are normal.      Palpations: Abdomen is soft.   Musculoskeletal:         General: Normal range of motion.      Cervical back: Normal range of motion and neck supple.      Comments: 1+ edema bilateral lower extremities   Skin:     General: Skin is warm and dry.      Capillary Refill: Capillary refill takes less than 2 seconds.   Neurological:      General: No focal deficit present.      Mental Status: He is alert and oriented to person, place, and time.      Comments: Generalized weakness.  No focal deficit.   Psychiatric:         Behavior: Behavior normal.         Critical Care    Performed by: Maldonado Spencer DO  Authorized by: Maldonado Spencer DO    Critical care provider statement:     Critical care time (minutes):  35    Critical care time was exclusive of:  Separately billable procedures and treating other patients    Critical care was necessary to treat or prevent imminent or life-threatening deterioration of the following conditions:  Circulatory failure    Critical care was time spent personally by me on the following activities:  Ordering and performing treatments and interventions, ordering and review of laboratory studies, ordering and review of radiographic studies, pulse oximetry, re-evaluation of patient's condition, review of old charts, obtaining history from patient or surrogate, examination of patient, evaluation of patient's response to treatment, discussions with consultants and development of  treatment plan with patient or surrogate    I assumed direction of critical care for this patient from another provider in my specialty: no      Care discussed with: admitting provider               ED Course      Recent Results (from the past 24 hour(s))   ECG 12 Lead ED Triage Standing Order; Chest Pain    Collection Time: 10/22/23  8:32 PM   Result Value Ref Range    QT Interval 372 ms    QTC Interval 484 ms   Lavender Top    Collection Time: 10/22/23  8:48 PM   Result Value Ref Range    Extra Tube hold for add-on    Light Blue Top    Collection Time: 10/22/23  8:48 PM   Result Value Ref Range    Extra Tube Hold for add-ons.    CBC Auto Differential    Collection Time: 10/22/23  8:48 PM    Specimen: Blood   Result Value Ref Range    WBC 6.85 3.40 - 10.80 10*3/mm3    RBC 3.70 (L) 4.14 - 5.80 10*6/mm3    Hemoglobin 12.7 (L) 13.0 - 17.7 g/dL    Hematocrit 40.4 37.5 - 51.0 %    .2 (H) 79.0 - 97.0 fL    MCH 34.3 (H) 26.6 - 33.0 pg    MCHC 31.4 (L) 31.5 - 35.7 g/dL    RDW 13.3 12.3 - 15.4 %    RDW-SD 54.6 (H) 37.0 - 54.0 fl    MPV 10.3 6.0 - 12.0 fL    Platelets 196 140 - 450 10*3/mm3    Neutrophil % 50.1 42.7 - 76.0 %    Lymphocyte % 35.5 19.6 - 45.3 %    Monocyte % 10.4 5.0 - 12.0 %    Eosinophil % 2.8 0.3 - 6.2 %    Basophil % 0.9 0.0 - 1.5 %    Immature Grans % 0.3 0.0 - 0.5 %    Neutrophils, Absolute 3.44 1.70 - 7.00 10*3/mm3    Lymphocytes, Absolute 2.43 0.70 - 3.10 10*3/mm3    Monocytes, Absolute 0.71 0.10 - 0.90 10*3/mm3    Eosinophils, Absolute 0.19 0.00 - 0.40 10*3/mm3    Basophils, Absolute 0.06 0.00 - 0.20 10*3/mm3    Immature Grans, Absolute 0.02 0.00 - 0.05 10*3/mm3    nRBC 0.0 0.0 - 0.2 /100 WBC   High Sensitivity Troponin T    Collection Time: 10/22/23  9:09 PM    Specimen: Blood   Result Value Ref Range    HS Troponin T 69 (C) <15 ng/L   Comprehensive Metabolic Panel    Collection Time: 10/22/23  9:09 PM    Specimen: Blood   Result Value Ref Range    Glucose 140 (H) 65 - 99 mg/dL    BUN 43 (H) 8  - 23 mg/dL    Creatinine 2.78 (H) 0.76 - 1.27 mg/dL    Sodium 139 136 - 145 mmol/L    Potassium 4.2 3.5 - 5.2 mmol/L    Chloride 99 98 - 107 mmol/L    CO2 28.0 22.0 - 29.0 mmol/L    Calcium 9.2 8.2 - 9.6 mg/dL    Total Protein 7.4 6.0 - 8.5 g/dL    Albumin 3.7 3.5 - 5.2 g/dL    ALT (SGPT) 33 1 - 41 U/L    AST (SGOT) 35 1 - 40 U/L    Alkaline Phosphatase 90 39 - 117 U/L    Total Bilirubin 0.9 0.0 - 1.2 mg/dL    Globulin 3.7 gm/dL    A/G Ratio 1.0 g/dL    BUN/Creatinine Ratio 15.5 7.0 - 25.0    Anion Gap 12.0 5.0 - 15.0 mmol/L    eGFR 20.6 (L) >60.0 mL/min/1.73   Lipase    Collection Time: 10/22/23  9:09 PM    Specimen: Blood   Result Value Ref Range    Lipase 11 (L) 13 - 60 U/L   BNP    Collection Time: 10/22/23  9:09 PM    Specimen: Blood   Result Value Ref Range    proBNP 6,072.0 (H) 0.0 - 1,800.0 pg/mL   Green Top (Gel)    Collection Time: 10/22/23  9:09 PM   Result Value Ref Range    Extra Tube Hold for add-ons.    Gold Top - SST    Collection Time: 10/22/23  9:09 PM   Result Value Ref Range    Extra Tube Hold for add-ons.    Magnesium    Collection Time: 10/22/23  9:09 PM    Specimen: Blood   Result Value Ref Range    Magnesium 2.2 1.7 - 2.3 mg/dL   ECG 12 Lead Rhythm Change    Collection Time: 10/22/23  9:31 PM   Result Value Ref Range    QT Interval 374 ms    QTC Interval 491 ms   ECG 12 Lead ED Triage Standing Order; Chest Pain    Collection Time: 10/22/23  9:50 PM   Result Value Ref Range    QT Interval 434 ms    QTC Interval 587 ms     Note: In addition to lab results from this visit, the labs listed above may include labs taken at another facility or during a different encounter within the last 24 hours. Please correlate lab times with ED admission and discharge times for further clarification of the services performed during this visit.    XR Chest 1 View   Final Result   Impression:   Cardiomegaly and pulmonary vascular congestion. Small bilateral pleural effusions though smaller than prior study.          Electronically Signed: Steve Awan MD     10/22/2023 10:37 PM EDT     Workstation ID: [id number]        Vitals:    10/23/23 0245 10/23/23 0300 10/23/23 0330 10/23/23 0400   BP:  104/80 110/94 102/85   BP Location:  Right arm  Right arm   Patient Position:  Lying  Lying   Pulse: 87 75 77 82   Resp:  16  18   Temp:    98.2 °F (36.8 °C)   TempSrc:    Oral   SpO2: 100% 100% 100% 100%   Weight:       Height:         Medications   sodium chloride 0.9 % flush 10 mL (has no administration in time range)   amiodarone 360 mg in 200 mL D5W infusion (1 mg/min Intravenous New Bag 10/23/23 0401)   nitroglycerin (NITROSTAT) SL tablet 0.4 mg (has no administration in time range)   sodium chloride 0.9 % flush 10 mL (10 mL Intravenous Given 10/22/23 2353)   sodium chloride 0.9 % flush 10 mL (has no administration in time range)   sennosides-docusate (PERICOLACE) 8.6-50 MG per tablet 2 tablet (2 tablets Oral Given 10/23/23 0029)     And   polyethylene glycol (MIRALAX) packet 17 g (has no administration in time range)     And   bisacodyl (DULCOLAX) EC tablet 5 mg (has no administration in time range)     And   bisacodyl (DULCOLAX) suppository 10 mg (has no administration in time range)   aspirin chewable tablet 81 mg (has no administration in time range)   gabapentin (NEURONTIN) capsule 300 mg (has no administration in time range)   pantoprazole (PROTONIX) injection 40 mg (has no administration in time range)   sodium chloride 0.9 % infusion (50 mL/hr Intravenous New Bag 10/23/23 0008)   aspirin chewable tablet 324 mg (324 mg Oral Given 10/22/23 2057)   sodium chloride 0.9 % bolus 1,000 mL (0 mL Intravenous Stopped 10/22/23 2218)   magnesium sulfate 2g/50 mL (PREMIX) infusion (2 g Intravenous New Bag 10/23/23 0007)     ECG/EMG Results (last 24 hours)       Procedure Component Value Units Date/Time    ECG 12 Lead ED Triage Standing Order; Chest Pain [[id number]] Collected: 10/22/23 2032     Updated: 10/22/23 2031     QT Interval 372 ms       QTC Interval 484 ms     Narrative:      Test Reason : ED Triage Standing Order~  Blood Pressure :   */*   mmHG  Vent. Rate : 102 BPM     Atrial Rate :  97 BPM     P-R Int :   * ms          QRS Dur : 182 ms      QT Int : 372 ms       P-R-T Axes :   * 127 -51 degrees     QTc Int : 484 ms    Atrial fibrillation with rapid ventricular response  Right axis deviation  Left bundle branch block  Abnormal ECG  When compared with ECG of 19-OCT-2023 05:21,  Atrial fibrillation has replaced Sinus rhythm  Vent. rate has increased BY  61 BPM  QRS axis shifted right  T wave inversion less evident in Lateral leads    Referred By: EDMD           Confirmed By:     ECG 12 Lead Rhythm Change [[id number]] Collected: 10/22/23 2131     Updated: 10/22/23 2131     QT Interval 374 ms      QTC Interval 491 ms     Narrative:      Test Reason : Rhythm Change  Blood Pressure :   */*   mmHG  Vent. Rate : 104 BPM     Atrial Rate :  96 BPM     P-R Int :   * ms          QRS Dur : 184 ms      QT Int : 374 ms       P-R-T Axes :   *  32 221 degrees     QTc Int : 491 ms    Atrial fibrillation with rapid ventricular response  Left bundle branch block  Abnormal ECG  When compared with ECG of 22-OCT-2023 20:32, (Unconfirmed)  QRS axis shifted left  T wave inversion more evident in Inferior leads    Referred By: EDMD           Confirmed By:     ECG 12 Lead ED Triage Standing Order; Chest Pain [[id number]] Collected: 10/22/23 2150     Updated: 10/22/23 2151     QT Interval 434 ms      QTC Interval 587 ms     Narrative:      Test Reason : ED Triage Standing Order~  Blood Pressure :   */*   mmHG  Vent. Rate : 110 BPM     Atrial Rate : 120 BPM     P-R Int :   * ms          QRS Dur : 180 ms      QT Int : 434 ms       P-R-T Axes :   *  28 215 degrees     QTc Int : 587 ms    Atrial fibrillation with rapid ventricular response  Left bundle branch block  Abnormal ECG  When compared with ECG of 22-OCT-2023 21:31, (Unconfirmed)  QT has lengthened    Referred By:  EDMD           Confirmed By:           ECG 12 Lead ED Triage Standing Order; Chest Pain   Preliminary Result   Test Reason : ED Triage Standing Order~   Blood Pressure :   */*   mmHG   Vent. Rate : 110 BPM     Atrial Rate : 120 BPM      P-R Int :   * ms          QRS Dur : 180 ms       QT Int : 434 ms       P-R-T Axes :   *  28 215 degrees      QTc Int : 587 ms      Atrial fibrillation with rapid ventricular response   Left bundle branch block   Abnormal ECG   When compared with ECG of 22-OCT-2023 21:31, (Unconfirmed)   QT has lengthened      Referred By: EDMD           Confirmed By:       ECG 12 Lead Rhythm Change   Preliminary Result   Test Reason : Rhythm Change   Blood Pressure :   */*   mmHG   Vent. Rate : 104 BPM     Atrial Rate :  96 BPM      P-R Int :   * ms          QRS Dur : 184 ms       QT Int : 374 ms       P-R-T Axes :   *  32 221 degrees      QTc Int : 491 ms      Atrial fibrillation with rapid ventricular response   Left bundle branch block   Abnormal ECG   When compared with ECG of 22-OCT-2023 20:32, (Unconfirmed)   QRS axis shifted left   T wave inversion more evident in Inferior leads      Referred By: EDMD           Confirmed By:       ECG 12 Lead ED Triage Standing Order; Chest Pain   Preliminary Result   Test Reason : ED Triage Standing Order~   Blood Pressure :   */*   mmHG   Vent. Rate : 102 BPM     Atrial Rate :  97 BPM      P-R Int :   * ms          QRS Dur : 182 ms       QT Int : 372 ms       P-R-T Axes :   * 127 -51 degrees      QTc Int : 484 ms      Atrial fibrillation with rapid ventricular response   Right axis deviation   Left bundle branch block   Abnormal ECG   When compared with ECG of 19-OCT-2023 05:21,   Atrial fibrillation has replaced Sinus rhythm   Vent. rate has increased BY  61 BPM   QRS axis shifted right   T wave inversion less evident in Lateral leads      Referred By: EDMD           Confirmed By:       ECG 12 Lead Rhythm Change    (Results Pending)   ECG 12 Lead Rhythm  Change    (Results Pending)                                            Medical Decision Making  Case discussed with cardiology, Dr. Ward, and ICU, Dr. Douglas.  Amiodarone drip initiated.  The patient mid for further evaluation and management.    Problems Addressed:  Atrial fibrillation with rapid ventricular response: complicated acute illness or injury  Chronic kidney disease, unspecified CKD stage: complicated acute illness or injury  Elevated troponin: complicated acute illness or injury  History of congestive heart failure: complicated acute illness or injury  Hypotension, unspecified hypotension type: complicated acute illness or injury  Left bundle branch block: complicated acute illness or injury  Lightheadedness: complicated acute illness or injury  Near syncope: complicated acute illness or injury  Shortness of breath: complicated acute illness or injury    Amount and/or Complexity of Data Reviewed  Labs: ordered.  Radiology: ordered.  ECG/medicine tests: ordered.    Risk  OTC drugs.  Prescription drug management.  Decision regarding hospitalization.        Final diagnoses:   Atrial fibrillation with rapid ventricular response   Left bundle branch block   Hypotension, unspecified hypotension type   History of congestive heart failure   Chronic kidney disease, unspecified CKD stage   Elevated troponin   Lightheadedness   Near syncope   Shortness of breath       ED Disposition  ED Disposition       ED Disposition   Decision to Admit    Condition   --    Comment   Level of Care: Critical Care [6]   Diagnosis: Atrial fibrillation with RVR [213398]   Certification: I Certify That Inpatient Hospital Services Are Medically Necessary For Greater Than 2 Midnights                    Maldonado Spencer,   10/23/23 0426

## 2023-10-23 NOTE — NURSING NOTE
WOC consult for: Appears as healing old pressure injury , left gluteal.    Agree with assessment, likely healing stage 2 pressure injury vs irritant contact dermatitis due to incontinence from stool and/or urine.   Cleansed and applied Z guard-continue this q shift.    Will sign off.

## 2023-10-23 NOTE — PROGRESS NOTES
"Critical Care Note     LOS: 1 day   Patient Care Team:  Slick Hubbard MD as PCP - General (Family Medicine)  Ethan Lu MD as Referring Physician (Otolaryngology)  Chano Licona MD as Consulting Physician (Radiation Oncology)  Domingo Rodriguez MD as Consulting Physician (Interventional Cardiology)    Chief Complaint/Reason for visit:    Chief Complaint   Patient presents with    Shortness of Breath   Atrial fibrillation  Sick sinus syndrome  Cardiomyopathy/congestive heart failure  Possible nonsustained ventricular tachycardia  Acute /chronic kidney disease  Prolonged QTc    Subjective     Interval History:     Patient remains on amiodarone drip.  Room air saturation is 97%.  He is in atrial fibrillation with a rate of 84.  He is oliguric with urine output yesterday of 400 mL.  Creatinine worsened today to 2.7    Review of Systems:    All systems were reviewed and negative except as noted in subjective.    Medical history, surgical history, social history, family history reviewed    Objective     Intake/Output:    Intake/Output Summary (Last 24 hours) at 10/23/2023 1619  Last data filed at 10/23/2023 1200  Gross per 24 hour   Intake 2123.1 ml   Output 700 ml   Net 1423.1 ml       Nutrition:  Diet: Cardiac Diets, Renal Diets; Healthy Heart (2-3 Na+); Low Sodium (2-3g); Texture: Regular Texture (IDDSI 7); Fluid Consistency: Thin (IDDSI 0)    Infusions:       Mechanical Ventilator Settings:                                                Telemetry: Atrial fibrillation             Vital Signs  Blood pressure 103/74, pulse 92, temperature 98.2 °F (36.8 °C), temperature source Oral, resp. rate 18, height 185.4 cm (73\"), weight 75 kg (165 lb 5.5 oz), SpO2 98%.    Physical Exam:  General Appearance:  Elderly gentleman in no acute distress   Head:     Eyes:          Conjunctiva pink   Ears:     Throat: Oral mucosa moist   Neck: Trachea midline, no palpable thyroid   Back:      Lungs:   Respirations are " "shallow and equal    Heart:  Irregular rhythm, S1, S2 auscultated   Abdomen:   Nondistended, bowel sounds present, soft   Rectal:   Deferred   Extremities: 2+ pitting edema below the knees   Pulses:    Skin: Cool and dry   Lymph nodes:    Neurologic: Sleeping      Results Review:     I reviewed the patient's new clinical results.   Results from last 7 days   Lab Units 10/23/23  0452 10/22/23  2109 10/19/23  0608 10/18/23  1814 10/18/23  1809   SODIUM mmol/L 141 139 141  --  143   POTASSIUM mmol/L 5.1 4.2 3.9  --  4.5   CHLORIDE mmol/L 103 99 103  --  103   CO2 mmol/L 27.0 28.0 28.0  --  30.0*   BUN mg/dL 41* 43* 44*  --  47*   CREATININE mg/dL 2.70* 2.78* 2.25*   < > 2.37*   CALCIUM mg/dL 9.0 9.2 9.0  --  8.8   BILIRUBIN mg/dL  --  0.9  --   --  0.8   ALK PHOS U/L  --  90  --   --  90   ALT (SGPT) U/L  --  33  --   --  32   AST (SGOT) U/L  --  35  --   --  25   GLUCOSE mg/dL 100* 140* 101*  --  119*    < > = values in this interval not displayed.     Results from last 7 days   Lab Units 10/23/23  0452 10/22/23  2048 10/19/23  0608   WBC 10*3/mm3 7.42 6.85 6.10   HEMOGLOBIN g/dL 12.0* 12.7* 10.8*   HEMATOCRIT % 37.8 40.4 32.9*   PLATELETS 10*3/mm3 186 196 167         Lab Results   Component Value Date    BLOODCX No growth at 5 days 09/29/2023     No results found for: \"URINECX\"    I reviewed the patient's new imaging including images and reports.  Chest x-ray yesterday revealed an enlarged heart with pulmonary vascular congestion and small effusions    All medications reviewed.   amiodarone, 400 mg, Oral, Q12H   Followed by  [START ON 10/30/2023] amiodarone, 200 mg, Oral, Q24H  aspirin, 81 mg, Oral, Daily  gabapentin, 100 mg, Oral, Q12H  pantoprazole, 40 mg, Intravenous, Q AM  senna-docusate sodium, 2 tablet, Oral, BID  sodium chloride, 10 mL, Intravenous, Q12H          Assessment & Plan       Atrial fibrillation with RVR    History of head and neck cancer    Stage 3b chronic kidney disease    Anemia, chronic " disease    Coronary artery disease involving native coronary artery of native heart with angina pectoris    Hyperlipidemia LDL goal <70    Chronic HFrEF (heart failure with reduced ejection fraction)    A/C renal failure    GERD without esophagitis      89-year-old gentleman recently hospitalized with bradycardia now readmitted with atrial fibrillation.  EF has now declined to 25%, with moderate MR.  He is back on amiodarone for his atrial fibrillation.  Rate is controlled.  QT interval is prolonged.  He presented with nausea vomiting and fatigue.  Creatinine has bumped from a baseline of 1.6-2, to 2.7.  He is oliguric as well.  Cardiology PA noted that patient wanted to hold off potential procedure, AV node ablation and pacemaker as he was feeling clinically improved.    PLAN:    Cardiology evaluation for possible pacemaker  Transition to oral amiodarone  Holding Eliquis for possible procedure  Continue aspirin  Monitor urine output, potassium, BUN and creatinine  Give a dose of Bumex to see if we can improve urine output  Initiate p.o. diet    VTE Prophylaxis:SCDS    Stress Ulcer Prophylaxis: Protonix    Frances Carreno MD  10/23/23  16:19 EDT      Time: 35min

## 2023-10-23 NOTE — PROGRESS NOTES
Clinical Nutrition       Reason for Visit: MDR, Identified at risk by screening criteria, Malnutrition Severity Assessment      Patient Name: Mukund Pool  YOB: 1930  MRN: [id number]  Date of Encounter: 10/23/23 16:42 EDT  Admission date: 10/22/2023      Nutrition Assessment   Admission Diagnosis:  Atrial fibrillation with RVR [I48.91]      Problem List:    Atrial fibrillation with RVR    History of head and neck cancer    Stage 3b chronic kidney disease    Anemia, chronic disease    Coronary artery disease involving native coronary artery of native heart with angina pectoris    Hyperlipidemia LDL goal <70    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    PSH:  He  has a past surgical history that includes Prostate surgery; Coronary artery bypass graft; Cholecystectomy; VOCAL CORD MASS EXCISION (03/22/2021); and Cardiac electrophysiology procedure (04/2023).      Applicable Nutrition Concerns:   Skin:L gluteal healing stage 2 vs contact dermatitis per WOC      Reported/Observed/Food/Nutrition Related History:     Pt resting in bed on nasal cannula, lethargic, pt reports he normally has good appetite, and eats most of his meals, but has had poor appetite past month, has had gradual wt loss over this year, used to weigh 220lb's(2019) prior to head + neck cancer treatment, usually swallows ok, but does occasionally have problems with dysphagia      Labs    Labs Reviewed: Yes     Results from last 7 days   Lab Units 10/23/23  0452 10/22/23  2109 10/19/23  0608 10/18/23  1814 10/18/23  1809   GLUCOSE mg/dL 100* 140* 101*  --  119*   BUN mg/dL 41* 43* 44*  --  47*   CREATININE mg/dL 2.70*  "2.78* 2.25*   < > 2.37*   SODIUM mmol/L 141 139 141  --  143   CHLORIDE mmol/L 103 99 103  --  103   POTASSIUM mmol/L 5.1 4.2 3.9  --  4.5   PHOSPHORUS mg/dL 3.3  --   --   --   --    MAGNESIUM mg/dL 3.1* 2.2  --   --  2.1   ALT (SGPT) U/L  --  33  --   --  32    < > = values in this interval not displayed.       Results from last 7 days   Lab Units 10/22/23  2109 10/18/23  1809   ALBUMIN g/dL 3.7 3.6       Results from last 7 days   Lab Units 10/18/23  2018 10/18/23  1816 10/18/23  1814   GLUCOSE mg/dL 131* 113 113     Lab Results   Lab Value Date/Time    HGBA1C 6.0 04/07/2015 1530         Results from last 7 days   Lab Units 10/22/23  2109 10/18/23  1809   PROBNP pg/mL 6,072.0* 4,050.0*         Medications    Medications Reviewed: Yes  Pertinent: amio, protonix, bowel regimen, bumex        Intake/Ouptut 24 hrs (0701 - 0700)   I&O's Reviewed: Yes     Intake & Output (last day)         10/22 0701  10/23 0700 10/23 0701  10/24 0700    I.V. (mL/kg) 671.3 (9) 509.5 (6.8)    IV Piggyback 1000     Total Intake(mL/kg) 1671.3 (22.3) 509.5 (6.8)    Urine (mL/kg/hr) 400 300 (0.4)    Total Output 400 300    Net +1271.3 +209.5                    Anthropometrics     Flowsheet Rows      Flowsheet Row First Filed Value   Admission Height 185.4 cm (73\") Documented at 10/22/2023 2345   Admission Weight 75 kg (165 lb 5.5 oz) Documented at 10/22/2023 2345            Height: Height: 185.4 cm (73\")  Last Filed Weight: Weight: 75 kg (165 lb 5.5 oz) (10/22/23 2345)  Method: Weight Method: Bed scale  BMI: BMI (Calculated): 21.8  BMI classification: Normal: 18.5-24.9kg/m2    UBW: lately ~178lb  Weight change: 20lb wt loss over past 13 months, 11% wt loss     Weight      Weight (kg) Weight (lbs) Weight Method   4/15/2019 99.791 kg  220 lb     3/31/2021 83.099 kg  183 lb 3.2 oz     4/20/2021 83.144 kg  183 lb 4.8 oz     4/27/2021 83.779 kg  184 lb 11.2 oz     5/4/2021 83.507 kg  184 lb 1.6 oz     5/11/2021 82.736 kg  182 lb 6.4 oz   "   5/18/2021 84.278 kg  185 lb 12.8 oz     6/24/2021 84.278 kg  185 lb 12.8 oz     8/25/2021 85.639 kg  188 lb 12.8 oz     2/25/2022 86.274 kg  190 lb 3.2 oz     9/9/2022 83.915 kg  185 lb     4/19/2023 85.73 kg  189 lb     9/29/2023 81.647 kg  180 lb  Stated    9/30/2023 81.6 kg  179 lb 14.3 oz     10/2/2023 81.421 kg  179 lb 8 oz  Bed scale    10/3/2023 81.239 kg  179 lb 1.6 oz  Bed scale    10/18/2023 81.647 kg  180 lb  Stated     79.5 kg  175 lb 4.3 oz  Bed scale    10/19/2023 79.5 kg  175 lb 4.3 oz     10/22/2023 75 kg  165 lb 5.5 oz  Bed scale          Nutrition Focused Physical Exam     Date: 10-23-23    Pt meets criteria for moderate chronic malnutrition      Current Nutrition Prescription     PO: Diet: Cardiac Diets, Renal Diets; Healthy Heart (2-3 Na+); Low Sodium (2-3g); Texture: Regular Texture (IDDSI 7); Fluid Consistency: Thin (IDDSI 0)  Oral Nutrition Supplement:   Intake: Insufficient data      Nutrition Diagnosis   Date: 10-23-23 Updated:   Problem Malnutrition    Etiology Per Clinical Status: h/o Vocal Chord CA, Prostate CA, New Pancreatic Lesion   Signs/Symptoms 20lb wt loss over past 13 months, 11% wt loss, moderate muscle wasting and fat loss       Goal:   General: Nutrition support treatment  PO: Establish PO  EN/PN: N/A    Nutrition Intervention      Follow treatment progress, Care plan reviewed, Advise alternate selection, Advised available snacks, Interview for preferences, Menu provided, Menu adjusted, Supplement provided    Pt meets criteria for moderate chronic malnutrition with 20lb wt loss over the past 13 months, 11% wt loss, moderate muscle wasting and fat loss    Add Novasource Renal 2x/day    Consider SLP evaluation if any S/S of dysphagia     Monitoring/Evaluation:   Per protocol, I&O, PO intake, Supplement intake, Pertinent labs, Weight, Skin status, GI status, Symptoms, Swallow function, Hemodynamic stability      Grace Jackson RD  Time Spent: 60min

## 2023-10-24 ENCOUNTER — HOME CARE VISIT (OUTPATIENT)
Dept: HOME HEALTH SERVICES | Facility: HOME HEALTHCARE | Age: 88
End: 2023-10-24
Payer: COMMERCIAL

## 2023-10-24 LAB
ANION GAP SERPL CALCULATED.3IONS-SCNC: 8 MMOL/L (ref 5–15)
BUN SERPL-MCNC: 37 MG/DL (ref 8–23)
BUN/CREAT SERPL: 16.7 (ref 7–25)
CALCIUM SPEC-SCNC: 8.9 MG/DL (ref 8.2–9.6)
CHLORIDE SERPL-SCNC: 103 MMOL/L (ref 98–107)
CO2 SERPL-SCNC: 30 MMOL/L (ref 22–29)
CREAT SERPL-MCNC: 2.22 MG/DL (ref 0.76–1.27)
DEPRECATED RDW RBC AUTO: 48.6 FL (ref 37–54)
EGFRCR SERPLBLD CKD-EPI 2021: 27 ML/MIN/1.73
ERYTHROCYTE [DISTWIDTH] IN BLOOD BY AUTOMATED COUNT: 12.7 % (ref 12.3–15.4)
GLUCOSE SERPL-MCNC: 101 MG/DL (ref 65–99)
HCT VFR BLD AUTO: 35.6 % (ref 37.5–51)
HGB BLD-MCNC: 11.5 G/DL (ref 13–17.7)
MAGNESIUM SERPL-MCNC: 2.5 MG/DL (ref 1.7–2.3)
MCH RBC QN AUTO: 33.7 PG (ref 26.6–33)
MCHC RBC AUTO-ENTMCNC: 32.3 G/DL (ref 31.5–35.7)
MCV RBC AUTO: 104.4 FL (ref 79–97)
PHOSPHATE SERPL-MCNC: 3.2 MG/DL (ref 2.5–4.5)
PLATELET # BLD AUTO: 192 10*3/MM3 (ref 140–450)
PMV BLD AUTO: 10.1 FL (ref 6–12)
POTASSIUM SERPL-SCNC: 5.4 MMOL/L (ref 3.5–5.2)
RBC # BLD AUTO: 3.41 10*6/MM3 (ref 4.14–5.8)
SODIUM SERPL-SCNC: 141 MMOL/L (ref 136–145)
WBC NRBC COR # BLD: 6.58 10*3/MM3 (ref 3.4–10.8)

## 2023-10-24 PROCEDURE — 84100 ASSAY OF PHOSPHORUS: CPT | Performed by: NURSE PRACTITIONER

## 2023-10-24 PROCEDURE — 83735 ASSAY OF MAGNESIUM: CPT | Performed by: NURSE PRACTITIONER

## 2023-10-24 PROCEDURE — 99232 SBSQ HOSP IP/OBS MODERATE 35: CPT | Performed by: INTERNAL MEDICINE

## 2023-10-24 PROCEDURE — 85027 COMPLETE CBC AUTOMATED: CPT | Performed by: NURSE PRACTITIONER

## 2023-10-24 PROCEDURE — 80048 BASIC METABOLIC PNL TOTAL CA: CPT | Performed by: NURSE PRACTITIONER

## 2023-10-24 PROCEDURE — 99232 SBSQ HOSP IP/OBS MODERATE 35: CPT | Performed by: STUDENT IN AN ORGANIZED HEALTH CARE EDUCATION/TRAINING PROGRAM

## 2023-10-24 RX ADMIN — PANTOPRAZOLE SODIUM 40 MG: 40 INJECTION, POWDER, LYOPHILIZED, FOR SOLUTION INTRAVENOUS at 05:03

## 2023-10-24 RX ADMIN — Medication 10 ML: at 08:16

## 2023-10-24 RX ADMIN — Medication 10 ML: at 20:39

## 2023-10-24 RX ADMIN — POLYETHYLENE GLYCOL 3350 17 G: 17 POWDER, FOR SOLUTION ORAL at 10:02

## 2023-10-24 RX ADMIN — Medication 5 MG: at 20:41

## 2023-10-24 RX ADMIN — GABAPENTIN 100 MG: 100 CAPSULE ORAL at 08:13

## 2023-10-24 RX ADMIN — AMIODARONE HYDROCHLORIDE 400 MG: 200 TABLET ORAL at 08:13

## 2023-10-24 RX ADMIN — ASPIRIN 81 MG CHEWABLE TABLET 81 MG: 81 TABLET CHEWABLE at 08:13

## 2023-10-24 RX ADMIN — AMIODARONE HYDROCHLORIDE 400 MG: 200 TABLET ORAL at 20:39

## 2023-10-24 RX ADMIN — GABAPENTIN 100 MG: 100 CAPSULE ORAL at 20:39

## 2023-10-24 NOTE — PROGRESS NOTES
"  [facility]  CARDIAC ELECTROPHYSIOLOGY PROGRESS NOTE    Date of Admission: 10/22/2023  Date of Service: 10/24/23    Primary Care Physician: Slick Hubbard MD    Chief Complaint: Afib      Subjective      HPI: no changes overnight. Denies chest pain, dyspnea or palpitations      Objective   Vitals: BP 92/70   Pulse 69   Temp 97.8 °F (36.6 °C) (Oral)   Resp 18   Ht 185.4 cm (73\")   Wt 75 kg (165 lb 5.5 oz)   SpO2 96%   BMI 21.81 kg/m²     Physical Exam:   GENERAL: Alert, cooperative, in no acute distress.   HEART: Regular rate and irr irr rhythm;   LUNGS: Clear to auscultation bilaterally. No wheezing, rales or rhonchi. Nonlabored breathing  NEUROLOGIC: No gross focal abnormalities  EXTREMITIES: No obvious deformities, cyanosis, or edema noted.   PSYCH: normal mood, behavior    Results:  Results from last 7 days   Lab Units 10/24/23  0444 10/23/23  0452 10/22/23  2048   WBC 10*3/mm3 6.58 7.42 6.85   HEMOGLOBIN g/dL 11.5* 12.0* 12.7*   HEMATOCRIT % 35.6* 37.8 40.4   PLATELETS 10*3/mm3 192 186 196     Results from last 7 days   Lab Units 10/24/23  0444 10/23/23  0452 10/22/23  2109   SODIUM mmol/L 141 141 139   POTASSIUM mmol/L 5.4* 5.1 4.2   CHLORIDE mmol/L 103 103 99   CO2 mmol/L 30.0* 27.0 28.0   BUN mg/dL 37* 41* 43*   CREATININE mg/dL 2.22* 2.70* 2.78*   GLUCOSE mg/dL 101* 100* 140*      Lab Results   Component Value Date    CHOL 132 09/30/2023    TRIG 51 09/30/2023    HDL 38 (L) 09/30/2023    LDL 83 09/30/2023    AST 35 10/22/2023    ALT 33 10/22/2023             Results from last 7 days   Lab Units 10/18/23  1809   TSH uIU/mL 2.010   FREE T4 ng/dL 1.41             Results from last 7 days   Lab Units 10/23/23  0452 10/22/23  2109 10/18/23  2022   HSTROP T ng/L 63* 69* 47*     Results from last 7 days   Lab Units 10/22/23  2109   PROBNP pg/mL 6,072.0*         Intake/Output Summary (Last 24 hours) at 10/24/2023 0809  Last data filed at 10/24/2023 0000  Gross " per 24 hour   Intake 822.54 ml   Output 1875 ml   Net -1052.46 ml       I personally reviewed the patient's EKG/Telemetry data    Radiology Data: no new data    Current Medications:  amiodarone, 400 mg, Oral, Q12H   Followed by  [START ON 10/30/2023] amiodarone, 200 mg, Oral, Q24H  aspirin, 81 mg, Oral, Daily  gabapentin, 100 mg, Oral, Q12H  pantoprazole, 40 mg, Intravenous, Q AM  senna-docusate sodium, 2 tablet, Oral, BID  sodium chloride, 10 mL, Intravenous, Q12H             Assessment and Plan:   Atrial Fibrillation  Eliquis on hold  Presented with RVR and borderline hypotension  Amiodarone gtt was initiated  Currently in rate controlled atrial fibrillation with chronic LBBB  Tachybradycardia syndrome  Patient with sinus bradycardia earlier this month on metoprolol succinate and amiodarone. Both discontinued at that time  Restarted on amiodarone gtt this admission with rate controlled atrial fibrillation currently   Could potentially perform this admission if the patient and family prefer  Chronic HFrEF  Echo 9/30/2023: LVEF 25%, moderate MR, RVSP <35, small pericardial effusion present adjacent to LV, bilateral pleural effusions  Cr improved 2.7-2.2 with dose of IV Bumex yesterday afternoon  Would consider restarting home torsemide when/if CHRISTIANO and borderline hypotension would allow. Might need nephrology's assistance at some point. Currently compensated with nonlabored breathing on room air     I discussed all the above with both the patient, and his wife via the phone, as well as family member in the room today.  After this discussion we elected to proceed with plan for AV node ablation and permanent pacemaker implantation.  Please keep him n.p.o. after 8 AM tomorrow, this will likely be an afternoon add-on procedure.  Continue to hold Eliquis.

## 2023-10-24 NOTE — PROGRESS NOTES
"Critical Care Note     LOS: 2 days   Patient Care Team:  Slick Hubbard MD as PCP - General (Family Medicine)  Ethan Lu MD as Referring Physician (Otolaryngology)  Chano Licona MD as Consulting Physician (Radiation Oncology)  Domingo Rodriguez MD as Consulting Physician (Interventional Cardiology)  Levy Pickett MD as Consulting Physician (Cardiology)    Chief Complaint/Reason for visit:    Chief Complaint   Patient presents with    Shortness of Breath   Atrial fibrillation  Sick sinus syndrome  Cardiomyopathy/congestive heart failure  Possible nonsustained ventricular tachycardia  Acute /chronic kidney disease  Prolonged QTc    Subjective     Interval History:     Amiodarone was transitioned to p.o.  He remains in atrial fibrillation with a rate of 94.  Creatinine is 2.2 with adequate urine output of 1.9 L yesterday plus unmeasured.  Patient would like to proceed with AV node ablation and permanent pacemaker.    Review of Systems:    All systems were reviewed and negative except as noted in subjective.    Medical history, surgical history, social history, family history reviewed    Objective     Intake/Output:    Intake/Output Summary (Last 24 hours) at 10/24/2023 1534  Last data filed at 10/24/2023 1000  Gross per 24 hour   Intake 622.74 ml   Output 1575 ml   Net -952.26 ml       Nutrition:  Diet: Cardiac Diets, Renal Diets; Healthy Heart (2-3 Na+); Low Sodium (2-3g); Texture: Regular Texture (IDDSI 7); Fluid Consistency: Thin (IDDSI 0)    Infusions:       Mechanical Ventilator Settings:                                                Telemetry: Atrial fibrillation             Vital Signs  Blood pressure 95/59, pulse 103, temperature 97.8 °F (36.6 °C), temperature source Oral, resp. rate 18, height 185.4 cm (73\"), weight 75 kg (165 lb 5.5 oz), SpO2 98%.    Physical Exam:  General Appearance:  Elderly gentleman in no acute distress   Head:     Eyes:          Conjunctiva pink   Ears:   " "  Throat: Oral mucosa moist   Neck: Trachea midline, no palpable thyroid   Back:      Lungs:   Respirations are shallow and equal    Heart:  Irregular rhythm, S1, S2 auscultated   Abdomen:   Nondistended, bowel sounds present, soft   Rectal:   Deferred   Extremities: 2+ pitting edema below the knees   Pulses:    Skin: Cool and dry   Lymph nodes:    Neurologic: Sleeping      Results Review:     I reviewed the patient's new clinical results.   Results from last 7 days   Lab Units 10/24/23  0444 10/23/23  0452 10/22/23  2109 10/18/23  1814 10/18/23  1809   SODIUM mmol/L 141 141 139   < > 143   POTASSIUM mmol/L 5.4* 5.1 4.2   < > 4.5   CHLORIDE mmol/L 103 103 99   < > 103   CO2 mmol/L 30.0* 27.0 28.0   < > 30.0*   BUN mg/dL 37* 41* 43*   < > 47*   CREATININE mg/dL 2.22* 2.70* 2.78*   < > 2.37*   CALCIUM mg/dL 8.9 9.0 9.2   < > 8.8   BILIRUBIN mg/dL  --   --  0.9  --  0.8   ALK PHOS U/L  --   --  90  --  90   ALT (SGPT) U/L  --   --  33  --  32   AST (SGOT) U/L  --   --  35  --  25   GLUCOSE mg/dL 101* 100* 140*   < > 119*    < > = values in this interval not displayed.     Results from last 7 days   Lab Units 10/24/23  0444 10/23/23  0452 10/22/23  2048   WBC 10*3/mm3 6.58 7.42 6.85   HEMOGLOBIN g/dL 11.5* 12.0* 12.7*   HEMATOCRIT % 35.6* 37.8 40.4   PLATELETS 10*3/mm3 192 186 196         Lab Results   Component Value Date    BLOODCX No growth at 5 days 09/29/2023     No results found for: \"URINECX\"    I reviewed the patient's new imaging including images and reports.      No x-rays today    September 25, 2025, echo EF 25%, moderate MR    All medications reviewed.   amiodarone, 400 mg, Oral, Q12H   Followed by  [START ON 10/30/2023] amiodarone, 200 mg, Oral, Q24H  aspirin, 81 mg, Oral, Daily  gabapentin, 100 mg, Oral, Q12H  pantoprazole, 40 mg, Intravenous, Q AM  senna-docusate sodium, 2 tablet, Oral, BID  sodium chloride, 10 mL, Intravenous, Q12H          Assessment & Plan       Atrial fibrillation with RVR    History " of head and neck cancer    Stage 3b chronic kidney disease    Anemia, chronic disease    Coronary artery disease involving native coronary artery of native heart with angina pectoris    Hyperlipidemia LDL goal <70    Chronic HFrEF (heart failure with reduced ejection fraction)    A/C renal failure    GERD without esophagitis      89-year-old gentleman recently hospitalized with bradycardia now readmitted with atrial fibrillation.  EF has now declined to 25%, with moderate MR.  He is back on amiodarone for his atrial fibrillation.  Rate is controlled.  QT interval is prolonged.  He presented with nausea vomiting and fatigue.  Creatinine has bumped from a baseline of 1.6-2, to 2.7.  Today it is slightly better with a creatinine of 2.2.  He had a brisk response to Bumex yesterday with 1.9 L of urine output.    Cardiology notes that he has tachybradycardia syndrome and now he would like to proceed with AV node ablation and permanent pacemaker.  Plan is to proceed tomorrow.    PLAN:    Cardiology with plan for AV node ablation and permanent pacemaker  oral amiodarone  Holding Eliquis for possible procedure  Continue aspirin  Monitor urine output, potassium, BUN and creatinine  Initiate p.o. diet  Telemetry    VTE Prophylaxis:SCDS    Stress Ulcer Prophylaxis: Protonix    Frances Carreno MD  10/24/23  15:34 EDT      Time: 25min

## 2023-10-24 NOTE — PLAN OF CARE
Goal Outcome Evaluation:  Plan of Care Reviewed With: patient        Progress: no change     Patient did well through the night. Complained of 8/10 headache, tylenol given x1 dose. VSS, remains in A-fib with LBBB at a controlled rate. Will discuss pacemaker with cardiology again today vs. Med management for informed decision. Continue with current plan of care.

## 2023-10-24 NOTE — CASE MANAGEMENT/SOCIAL WORK
Discharge Planning Assessment  [facility]     Patient Name: Mukund Pool  MRN: [id number]  Today's Date: 10/24/2023    Admit Date: 10/22/2023    Plan: [facility]   Discharge Needs Assessment       Row Name 10/24/23 1057       Living Environment    People in Home spouse    Current Living Arrangements assisted living facility    Primary Care Provided by self    Provides Primary Care For no one, unable/limited ability to care for self    Family Caregiver if Needed other relative(s)    Able to Return to Prior Arrangements yes       Transition Planning    Patient/Family Anticipates Transition to home    Transportation Anticipated family or friend will provide       Discharge Needs Assessment    Readmission Within the Last 30 Days no previous admission in last 30 days    Equipment Currently Used at Home cane, straight;grab bar;bp cuff;walker, rolling    Discharge Facility/Level of Care Needs assisted living facility                   Discharge Plan       Row Name 10/24/23 1059       Plan    Plan [facility]    Patient/Family in Agreement with Plan yes    Plan Comments I met with Mr. Pool at the bedside. He lives with his wife at [facility] in [facility]. He is independent with mobility and activities of daily living. He drives short distances and is also driven by his niece when leaving the home for long distances. He is current with [facility] for skilled nursing, physical, and occupational therapies. He has a rolling walker, straight cane, bp cuff, and grab bars at home. Denies difficulty affording his medications. Back to [facility] at the time of discharge and family will transport at that time. No discharge needs identified at this time. CM will continue to follow.    Final Discharge Disposition Code 01 - home or self-care                  Continued Care and Services - Admitted Since 10/22/2023    Coordination has not been started for this encounter.        Selected Continued Care - Prior Encounters Includes continued care and service providers with selected services from prior encounters from 7/24/2023 to 10/24/2023      Discharged on 10/5/2023 Admission date: 9/29/2023 - Discharge disposition: Home-Health Care Svc      Home Medical Care       Service Provider Selected Services Address Phone Fax Patient Preferred    [facility] [address] [facility] [address] [phone] --                          Expected Discharge Date and Time       Expected Discharge Date Expected Discharge Time    Oct 27, 2023            Demographic Summary       Row Name 10/24/23 1056       General Information    General Information Comments Confirmed PCP to be Slick Hubbard and [employer] to be insurer.                   Functional Status       Row Name 10/24/23 1057       Functional Status, IADL    Medications independent    Meal Preparation independent    Housekeeping independent    Laundry independent    Shopping independent       Employment/    Employment Status retired                   Psychosocial    No documentation.                  Abuse/Neglect    No documentation.                  Legal    No documentation.                  Substance Abuse    No documentation.                  Patient Forms    No documentation.                     Toribio Caicedo RN

## 2023-10-24 NOTE — PLAN OF CARE
Goal Outcome Evaluation:      No acute events this shift. VSS.  ml's. No BM still; miralax given. Plan for AV node ablation and permanent pacemaker tomorrow afternoon; NPO at 0800. Family updated at bedside.

## 2023-10-25 ENCOUNTER — APPOINTMENT (OUTPATIENT)
Dept: GENERAL RADIOLOGY | Facility: HOSPITAL | Age: 88
End: 2023-10-25
Payer: MEDICARE

## 2023-10-25 LAB
ANION GAP SERPL CALCULATED.3IONS-SCNC: 5 MMOL/L (ref 5–15)
BUN SERPL-MCNC: 32 MG/DL (ref 8–23)
BUN/CREAT SERPL: 17.1 (ref 7–25)
CALCIUM SPEC-SCNC: 8.8 MG/DL (ref 8.2–9.6)
CHLORIDE SERPL-SCNC: 103 MMOL/L (ref 98–107)
CO2 SERPL-SCNC: 30 MMOL/L (ref 22–29)
CREAT SERPL-MCNC: 1.87 MG/DL (ref 0.76–1.27)
DEPRECATED RDW RBC AUTO: 48.5 FL (ref 37–54)
EGFRCR SERPLBLD CKD-EPI 2021: 33.1 ML/MIN/1.73
ERYTHROCYTE [DISTWIDTH] IN BLOOD BY AUTOMATED COUNT: 12.5 % (ref 12.3–15.4)
GLUCOSE SERPL-MCNC: 113 MG/DL (ref 65–99)
HCT VFR BLD AUTO: 35.4 % (ref 37.5–51)
HGB BLD-MCNC: 11.4 G/DL (ref 13–17.7)
INR PPP: 1.05 (ref 0.89–1.12)
MAGNESIUM SERPL-MCNC: 2.2 MG/DL (ref 1.7–2.3)
MCH RBC QN AUTO: 33.6 PG (ref 26.6–33)
MCHC RBC AUTO-ENTMCNC: 32.2 G/DL (ref 31.5–35.7)
MCV RBC AUTO: 104.4 FL (ref 79–97)
PLATELET # BLD AUTO: 194 10*3/MM3 (ref 140–450)
PMV BLD AUTO: 10.2 FL (ref 6–12)
POTASSIUM SERPL-SCNC: 5.1 MMOL/L (ref 3.5–5.2)
PROTHROMBIN TIME: 13.8 SECONDS (ref 12.2–14.5)
RBC # BLD AUTO: 3.39 10*6/MM3 (ref 4.14–5.8)
SODIUM SERPL-SCNC: 138 MMOL/L (ref 136–145)
WBC NRBC COR # BLD: 6.76 10*3/MM3 (ref 3.4–10.8)

## 2023-10-25 PROCEDURE — 25010000002 ONDANSETRON PER 1 MG: Performed by: STUDENT IN AN ORGANIZED HEALTH CARE EDUCATION/TRAINING PROGRAM

## 2023-10-25 PROCEDURE — C1760 CLOSURE DEV, VASC: HCPCS | Performed by: STUDENT IN AN ORGANIZED HEALTH CARE EDUCATION/TRAINING PROGRAM

## 2023-10-25 PROCEDURE — 25010000002 LIDOCAINE 1 % SOLUTION: Performed by: STUDENT IN AN ORGANIZED HEALTH CARE EDUCATION/TRAINING PROGRAM

## 2023-10-25 PROCEDURE — 25010000002 CEFAZOLIN PER 500 MG: Performed by: STUDENT IN AN ORGANIZED HEALTH CARE EDUCATION/TRAINING PROGRAM

## 2023-10-25 PROCEDURE — 83735 ASSAY OF MAGNESIUM: CPT | Performed by: INTERNAL MEDICINE

## 2023-10-25 PROCEDURE — 99153 MOD SED SAME PHYS/QHP EA: CPT | Performed by: STUDENT IN AN ORGANIZED HEALTH CARE EDUCATION/TRAINING PROGRAM

## 2023-10-25 PROCEDURE — 99152 MOD SED SAME PHYS/QHP 5/>YRS: CPT | Performed by: STUDENT IN AN ORGANIZED HEALTH CARE EDUCATION/TRAINING PROGRAM

## 2023-10-25 PROCEDURE — C1785 PMKR, DUAL, RATE-RESP: HCPCS | Performed by: STUDENT IN AN ORGANIZED HEALTH CARE EDUCATION/TRAINING PROGRAM

## 2023-10-25 PROCEDURE — 80048 BASIC METABOLIC PNL TOTAL CA: CPT | Performed by: INTERNAL MEDICINE

## 2023-10-25 PROCEDURE — C1894 INTRO/SHEATH, NON-LASER: HCPCS | Performed by: STUDENT IN AN ORGANIZED HEALTH CARE EDUCATION/TRAINING PROGRAM

## 2023-10-25 PROCEDURE — 25810000003 SODIUM CHLORIDE 0.9 % SOLUTION: Performed by: STUDENT IN AN ORGANIZED HEALTH CARE EDUCATION/TRAINING PROGRAM

## 2023-10-25 PROCEDURE — 99232 SBSQ HOSP IP/OBS MODERATE 35: CPT | Performed by: INTERNAL MEDICINE

## 2023-10-25 PROCEDURE — 93650 ICAR CATH ABLTJ AV NODE FUNC: CPT | Performed by: STUDENT IN AN ORGANIZED HEALTH CARE EDUCATION/TRAINING PROGRAM

## 2023-10-25 PROCEDURE — 25010000002 BUPIVACAINE 0.5 % SOLUTION: Performed by: STUDENT IN AN ORGANIZED HEALTH CARE EDUCATION/TRAINING PROGRAM

## 2023-10-25 PROCEDURE — 02H63JZ INSERTION OF PACEMAKER LEAD INTO RIGHT ATRIUM, PERCUTANEOUS APPROACH: ICD-10-PCS | Performed by: STUDENT IN AN ORGANIZED HEALTH CARE EDUCATION/TRAINING PROGRAM

## 2023-10-25 PROCEDURE — 25510000001 IOPAMIDOL PER 1 ML: Performed by: STUDENT IN AN ORGANIZED HEALTH CARE EDUCATION/TRAINING PROGRAM

## 2023-10-25 PROCEDURE — 85610 PROTHROMBIN TIME: CPT | Performed by: INTERNAL MEDICINE

## 2023-10-25 PROCEDURE — C1898 LEAD, PMKR, OTHER THAN TRANS: HCPCS | Performed by: STUDENT IN AN ORGANIZED HEALTH CARE EDUCATION/TRAINING PROGRAM

## 2023-10-25 PROCEDURE — C1769 GUIDE WIRE: HCPCS | Performed by: STUDENT IN AN ORGANIZED HEALTH CARE EDUCATION/TRAINING PROGRAM

## 2023-10-25 PROCEDURE — 02583ZZ DESTRUCTION OF CONDUCTION MECHANISM, PERCUTANEOUS APPROACH: ICD-10-PCS | Performed by: STUDENT IN AN ORGANIZED HEALTH CARE EDUCATION/TRAINING PROGRAM

## 2023-10-25 PROCEDURE — 25010000002 FENTANYL CITRATE (PF) 50 MCG/ML SOLUTION: Performed by: STUDENT IN AN ORGANIZED HEALTH CARE EDUCATION/TRAINING PROGRAM

## 2023-10-25 PROCEDURE — C1732 CATH, EP, DIAG/ABL, 3D/VECT: HCPCS | Performed by: STUDENT IN AN ORGANIZED HEALTH CARE EDUCATION/TRAINING PROGRAM

## 2023-10-25 PROCEDURE — 85027 COMPLETE CBC AUTOMATED: CPT | Performed by: INTERNAL MEDICINE

## 2023-10-25 PROCEDURE — C1892 INTRO/SHEATH,FIXED,PEEL-AWAY: HCPCS | Performed by: STUDENT IN AN ORGANIZED HEALTH CARE EDUCATION/TRAINING PROGRAM

## 2023-10-25 PROCEDURE — 33208 INSRT HEART PM ATRIAL & VENT: CPT | Performed by: STUDENT IN AN ORGANIZED HEALTH CARE EDUCATION/TRAINING PROGRAM

## 2023-10-25 PROCEDURE — 02HK3JZ INSERTION OF PACEMAKER LEAD INTO RIGHT VENTRICLE, PERCUTANEOUS APPROACH: ICD-10-PCS | Performed by: STUDENT IN AN ORGANIZED HEALTH CARE EDUCATION/TRAINING PROGRAM

## 2023-10-25 PROCEDURE — 0JH606Z INSERTION OF PACEMAKER, DUAL CHAMBER INTO CHEST SUBCUTANEOUS TISSUE AND FASCIA, OPEN APPROACH: ICD-10-PCS | Performed by: STUDENT IN AN ORGANIZED HEALTH CARE EDUCATION/TRAINING PROGRAM

## 2023-10-25 PROCEDURE — 71046 X-RAY EXAM CHEST 2 VIEWS: CPT

## 2023-10-25 PROCEDURE — 25010000002 MIDAZOLAM PER 1 MG: Performed by: STUDENT IN AN ORGANIZED HEALTH CARE EDUCATION/TRAINING PROGRAM

## 2023-10-25 DEVICE — LD PM TENDRIL STS 6F52CM 2088TC52: Type: IMPLANTABLE DEVICE | Status: FUNCTIONAL

## 2023-10-25 DEVICE — LD PM TENDRIL STS 6F58CM 2088TC58: Type: IMPLANTABLE DEVICE | Status: FUNCTIONAL

## 2023-10-25 DEVICE — GEN PM ASSURITY MRI DR RF PM2272: Type: IMPLANTABLE DEVICE | Status: FUNCTIONAL

## 2023-10-25 RX ORDER — SODIUM CHLORIDE 9 MG/ML
INJECTION, SOLUTION INTRAVENOUS
Status: COMPLETED | OUTPATIENT
Start: 2023-10-25 | End: 2023-10-25

## 2023-10-25 RX ORDER — BUPIVACAINE HYDROCHLORIDE 5 MG/ML
INJECTION, SOLUTION PERINEURAL
Status: DISCONTINUED | OUTPATIENT
Start: 2023-10-25 | End: 2023-10-25 | Stop reason: HOSPADM

## 2023-10-25 RX ORDER — LIDOCAINE HYDROCHLORIDE 10 MG/ML
INJECTION, SOLUTION INFILTRATION; PERINEURAL
Status: DISCONTINUED | OUTPATIENT
Start: 2023-10-25 | End: 2023-10-25 | Stop reason: HOSPADM

## 2023-10-25 RX ORDER — BUPIVACAINE HCL/0.9 % NACL/PF 0.125 %
PLASTIC BAG, INJECTION (ML) EPIDURAL
Status: DISCONTINUED | OUTPATIENT
Start: 2023-10-25 | End: 2023-10-25 | Stop reason: HOSPADM

## 2023-10-25 RX ORDER — MIDAZOLAM HYDROCHLORIDE 1 MG/ML
INJECTION INTRAMUSCULAR; INTRAVENOUS
Status: DISCONTINUED | OUTPATIENT
Start: 2023-10-25 | End: 2023-10-25 | Stop reason: HOSPADM

## 2023-10-25 RX ORDER — ONDANSETRON 2 MG/ML
INJECTION INTRAMUSCULAR; INTRAVENOUS
Status: DISCONTINUED | OUTPATIENT
Start: 2023-10-25 | End: 2023-10-25 | Stop reason: HOSPADM

## 2023-10-25 RX ORDER — FENTANYL CITRATE 50 UG/ML
INJECTION, SOLUTION INTRAMUSCULAR; INTRAVENOUS
Status: DISCONTINUED | OUTPATIENT
Start: 2023-10-25 | End: 2023-10-25 | Stop reason: HOSPADM

## 2023-10-25 RX ADMIN — SENNOSIDES AND DOCUSATE SODIUM 2 TABLET: 8.6; 5 TABLET ORAL at 07:57

## 2023-10-25 RX ADMIN — AMIODARONE HYDROCHLORIDE 400 MG: 200 TABLET ORAL at 21:19

## 2023-10-25 RX ADMIN — Medication 10 ML: at 21:19

## 2023-10-25 RX ADMIN — SODIUM CHLORIDE 2000 MG: 900 INJECTION INTRAVENOUS at 15:40

## 2023-10-25 RX ADMIN — PANTOPRAZOLE SODIUM 40 MG: 40 INJECTION, POWDER, LYOPHILIZED, FOR SOLUTION INTRAVENOUS at 05:00

## 2023-10-25 RX ADMIN — BISACODYL 10 MG: 10 SUPPOSITORY RECTAL at 10:01

## 2023-10-25 RX ADMIN — ASPIRIN 81 MG CHEWABLE TABLET 81 MG: 81 TABLET CHEWABLE at 07:58

## 2023-10-25 RX ADMIN — GABAPENTIN 100 MG: 100 CAPSULE ORAL at 21:19

## 2023-10-25 RX ADMIN — SENNOSIDES AND DOCUSATE SODIUM 2 TABLET: 8.6; 5 TABLET ORAL at 21:19

## 2023-10-25 RX ADMIN — Medication 5 MG: at 21:19

## 2023-10-25 RX ADMIN — Medication 10 ML: at 08:03

## 2023-10-25 RX ADMIN — AMIODARONE HYDROCHLORIDE 400 MG: 200 TABLET ORAL at 07:58

## 2023-10-25 RX ADMIN — GABAPENTIN 100 MG: 100 CAPSULE ORAL at 07:58

## 2023-10-25 NOTE — PLAN OF CARE
Goal Outcome Evaluation:  Plan of Care Reviewed With: patient        Progress: no change          -VSS,   -remains in A-fib, controlled rate  -plan: pacemaker and AV node ablation today

## 2023-10-25 NOTE — PROGRESS NOTES
Clinical Nutrition       Reason for Visit: Follow-up protocol      Patient Name: Mukund Pool  YOB: 1930  MRN: [id number]  Date of Encounter: 10/25/23 16:42 EDT  Admission date: 10/22/2023      Nutrition Assessment   Admission Diagnosis:  Atrial fibrillation with RVR [I48.91]      Problem List:    Atrial fibrillation with RVR    History of head and neck cancer    Stage 3b chronic kidney disease    Anemia, chronic disease    Coronary artery disease involving native coronary artery of native heart with angina pectoris    Hyperlipidemia LDL goal <70    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    PSH:  He  has a past surgical history that includes Prostate surgery; Coronary artery bypass graft; Cholecystectomy; VOCAL CORD MASS EXCISION (03/22/2021); and Cardiac electrophysiology procedure (04/2023).      Applicable Nutrition Concerns:   Skin:L gluteal healing stage 2 vs contact dermatitis per [facility], surgical site    GI: constipation , last bm 10-21    s/p AV node ablation, PPM 10-25-23    Reported/Observed/Food/Nutrition Related History:     10--25: pt resting in bed, s/p pacemaker, has not eaten yet today, is very appreciative of the care he has received in this hospital    10-23:Pt resting in bed on nasal cannula, lethargic, pt reports he normally has good appetite, and eats most of his meals, but has had poor appetite past month, has had gradual wt loss over this year, used to weigh 220lb's(2019) prior to head + neck cancer treatment, usually swallows ok, but does occasionally have problems with dysphagia      Labs    Labs Reviewed: Yes     Results from last 7 days   Lab Units  "10/25/23  0304 10/24/23  0444 10/23/23  0452 10/22/23  2109 10/18/23  1814 10/18/23  1809   GLUCOSE mg/dL 113* 101* 100* 140*   < > 119*   BUN mg/dL 32* 37* 41* 43*   < > 47*   CREATININE mg/dL 1.87* 2.22* 2.70* 2.78*   < > 2.37*   SODIUM mmol/L 138 141 141 139   < > 143   CHLORIDE mmol/L 103 103 103 99   < > 103   POTASSIUM mmol/L 5.1 5.4* 5.1 4.2   < > 4.5   PHOSPHORUS mg/dL  --  3.2 3.3  --   --   --    MAGNESIUM mg/dL 2.2 2.5* 3.1* 2.2  --  2.1   ALT (SGPT) U/L  --   --   --  33  --  32    < > = values in this interval not displayed.       Results from last 7 days   Lab Units 10/22/23  2109 10/18/23  1809   ALBUMIN g/dL 3.7 3.6       Results from last 7 days   Lab Units 10/18/23  2018 10/18/23  1816 10/18/23  1814   GLUCOSE mg/dL 131* 113 113     Lab Results   Lab Value Date/Time    HGBA1C 6.0 04/07/2015 1530         Results from last 7 days   Lab Units 10/22/23  2109 10/18/23  1809   PROBNP pg/mL 6,072.0* 4,050.0*         Medications    Medications Reviewed: Yes  Pertinent: abx, amio, protonix, bowel regimen, bumex        Intake/Ouptut 24 hrs (0701 - 0700)   I&O's Reviewed: Yes     Intake & Output (last day)         10/24 0701  10/25 0700 10/25 0701  10/26 0700    P.O. 360 360    I.V. (mL/kg) 72 (1)     Total Intake(mL/kg) 432 (5.8) 360 (4.8)    Urine (mL/kg/hr) 400 (0.2)     Total Output 400     Net +32 +360          Urine Unmeasured Occurrence  1 x              Anthropometrics     Flowsheet Rows      Flowsheet Row First Filed Value   Admission Height 185.4 cm (73\") Documented at 10/22/2023 2345   Admission Weight 75 kg (165 lb 5.5 oz) Documented at 10/22/2023 2345          Height: Height: 185.4 cm (73\")  Last Filed Weight: Weight: 75 kg (165 lb 5.5 oz) (10/22/23 2345)  Method: Weight Method: Bed scale  BMI: BMI (Calculated): 21.8  BMI classification: Normal: 18.5-24.9kg/m2    UBW: lately ~178lb  Weight change: 20lb wt loss over past 13 months, 11% wt loss     Weight      Weight (kg) Weight (lbs) Weight Method "   4/15/2019 99.791 kg  220 lb     3/31/2021 83.099 kg  183 lb 3.2 oz     4/20/2021 83.144 kg  183 lb 4.8 oz     4/27/2021 83.779 kg  184 lb 11.2 oz     5/4/2021 83.507 kg  184 lb 1.6 oz     5/11/2021 82.736 kg  182 lb 6.4 oz     5/18/2021 84.278 kg  185 lb 12.8 oz     6/24/2021 84.278 kg  185 lb 12.8 oz     8/25/2021 85.639 kg  188 lb 12.8 oz     2/25/2022 86.274 kg  190 lb 3.2 oz     9/9/2022 83.915 kg  185 lb     4/19/2023 85.73 kg  189 lb     9/29/2023 81.647 kg  180 lb  Stated    9/30/2023 81.6 kg  179 lb 14.3 oz     10/2/2023 81.421 kg  179 lb 8 oz  Bed scale    10/3/2023 81.239 kg  179 lb 1.6 oz  Bed scale    10/18/2023 81.647 kg  180 lb  Stated     79.5 kg  175 lb 4.3 oz  Bed scale    10/19/2023 79.5 kg  175 lb 4.3 oz     10/22/2023 75 kg  165 lb 5.5 oz  Bed scale          Nutrition Focused Physical Exam     Date: 10-23-23    Pt meets criteria for moderate chronic malnutrition    Pt meets criteria for moderate chronic malnutrition with 20lb wt loss over the past 13 months, 11% wt loss, moderate muscle wasting and fat loss    Current Nutrition Prescription     PO: Cardiac  Oral Nutrition Supplement: Novasource Renal 3x/day  Intake:  69% of 4 meals prior to surgery      Nutrition Diagnosis   Date: 10-23-23 Updated: 10-25  Problem Malnutrition    Etiology Per Clinical Status: h/o Vocal Chord CA, Prostate CA, New Pancreatic Lesion   Signs/Symptoms 20lb wt loss over past 13 months, 11% wt loss, moderate muscle wasting and fat loss       Goal:   General: Nutrition support treatment  PO: Advance diet when ok to eat post op  EN/PN: N/A    Nutrition Intervention      Follow treatment progress, Care plan reviewed, Encourage intake    Monitor Renal function, K+ 5.1, may need to resume renal restriction if becomes hyperkalemic    Monitoring/Evaluation:   Per protocol, I&O, PO intake, Supplement intake, Pertinent labs, Weight, Skin status, GI status, Symptoms, Swallow function, Hemodynamic stability      Grace STOKES  TARI Jackson  Time Spent: 30min

## 2023-10-25 NOTE — PROGRESS NOTES
"Critical Care Note     LOS: 3 days   Patient Care Team:  Slick Hubbard MD as PCP - General (Family Medicine)  Ethan Lu MD as Referring Physician (Otolaryngology)  Chano Licona MD as Consulting Physician (Radiation Oncology)  Domingo Rodriguez MD as Consulting Physician (Interventional Cardiology)  Levy Pickett MD as Consulting Physician (Cardiology)    Chief Complaint/Reason for visit:    Chief Complaint   Patient presents with    Shortness of Breath   Atrial fibrillation  Sick sinus syndrome  Cardiomyopathy/congestive heart failure  Possible nonsustained ventricular tachycardia  Acute /chronic kidney disease  Prolonged QTc    Subjective     Interval History:     Rested well this morning.  Walked in the ICU with therapy.  Creatinine improved at 1.87 remains in atrial fibrillation with a controlled rate    Review of Systems:    All systems were reviewed and negative except as noted in subjective.    Medical history, surgical history, social history, family history reviewed    Objective     Intake/Output:    Intake/Output Summary (Last 24 hours) at 10/25/2023 1331  Last data filed at 10/25/2023 0745  Gross per 24 hour   Intake 360 ml   Output 400 ml   Net -40 ml       Nutrition:  NPO Diet NPO Type: Strict NPO    Infusions:       Mechanical Ventilator Settings:                                                Telemetry: Atrial fibrillation             Vital Signs  Blood pressure 96/70, pulse 82, temperature 97.7 °F (36.5 °C), temperature source Oral, resp. rate 16, height 185.4 cm (73\"), weight 75 kg (165 lb 5.5 oz), SpO2 97%.    Physical Exam:  General Appearance:  Elderly gentleman in no acute distress, talking on his cell phone   Head:     Eyes:          Conjunctiva pink   Ears:     Throat: Oral mucosa moist   Neck: Trachea midline, no palpable thyroid   Back:      Lungs:   Respirations are shallow and equal    Heart:  Irregular rhythm, S1, S2 auscultated   Abdomen:   Nondistended, bowel " "sounds present, soft   Rectal:   Deferred   Extremities: 2+ pitting edema below the knees   Pulses:    Skin: Cool and dry   Lymph nodes:    Neurologic: Alert, no focal weakness      Results Review:     I reviewed the patient's new clinical results.   Results from last 7 days   Lab Units 10/25/23  0304 10/24/23  0444 10/23/23  0452 10/22/23  2109 10/18/23  1814 10/18/23  1809   SODIUM mmol/L 138 141 141 139   < > 143   POTASSIUM mmol/L 5.1 5.4* 5.1 4.2   < > 4.5   CHLORIDE mmol/L 103 103 103 99   < > 103   CO2 mmol/L 30.0* 30.0* 27.0 28.0   < > 30.0*   BUN mg/dL 32* 37* 41* 43*   < > 47*   CREATININE mg/dL 1.87* 2.22* 2.70* 2.78*   < > 2.37*   CALCIUM mg/dL 8.8 8.9 9.0 9.2   < > 8.8   BILIRUBIN mg/dL  --   --   --  0.9  --  0.8   ALK PHOS U/L  --   --   --  90  --  90   ALT (SGPT) U/L  --   --   --  33  --  32   AST (SGOT) U/L  --   --   --  35  --  25   GLUCOSE mg/dL 113* 101* 100* 140*   < > 119*    < > = values in this interval not displayed.     Results from last 7 days   Lab Units 10/25/23  0304 10/24/23  0444 10/23/23  0452   WBC 10*3/mm3 6.76 6.58 7.42   HEMOGLOBIN g/dL 11.4* 11.5* 12.0*   HEMATOCRIT % 35.4* 35.6* 37.8   PLATELETS 10*3/mm3 194 192 186         Lab Results   Component Value Date    BLOODCX No growth at 5 days 09/29/2023     No results found for: \"URINECX\"    I reviewed the patient's new imaging including images and reports.      No x-rays today, chest x-ray October 22 with pulmonary vascular congestion and small bilateral effusions    September 25, 2023, echo EF 25%, moderate MR    All medications reviewed.   amiodarone, 400 mg, Oral, Q12H   Followed by  [START ON 10/30/2023] amiodarone, 200 mg, Oral, Q24H  aspirin, 81 mg, Oral, Daily  gabapentin, 100 mg, Oral, Q12H  pantoprazole, 40 mg, Intravenous, Q AM  senna-docusate sodium, 2 tablet, Oral, BID  sodium chloride, 10 mL, Intravenous, Q12H          Assessment & Plan       Atrial fibrillation with RVR    History of head and neck cancer    Stage " 3b chronic kidney disease    Anemia, chronic disease    Coronary artery disease involving native coronary artery of native heart with angina pectoris    Hyperlipidemia LDL goal <70    Chronic HFrEF (heart failure with reduced ejection fraction)    A/C renal failure    GERD without esophagitis      89-year-old gentleman recently hospitalized with bradycardia now readmitted with atrial fibrillation.  EF has now declined to 25%, with moderate MR.  He is back on amiodarone for his atrial fibrillation.  Rate is controlled.  QT interval is prolonged.  He presented with nausea vomiting and fatigue.  Creatinine has bumped from a baseline of 1.6-2, to 2.7.  Today it is slightly better with a creatinine of 1.87.  He did not receive diuretics yesterday so urine output was marginal at 400 mL.  Cardiology notes that he has tachybradycardia syndrome and now he would like to proceed with AV node ablation and permanent pacemaker.  Plan is to proceed today    PLAN:    Cardiology with plan for AV node ablation and permanent pacemaker  oral amiodarone  Holding Eliquis for  procedure  Continue aspirin  Monitor urine output, potassium, BUN and creatinine  Initiate p.o. diet  Diuresis when able  Telemetry    VTE Prophylaxis:SCDS    Stress Ulcer Prophylaxis: Protonix    Frances Carreno MD  10/25/23  13:31 EDT      Time: 25min

## 2023-10-26 ENCOUNTER — READMISSION MANAGEMENT (OUTPATIENT)
Dept: CALL CENTER | Facility: HOSPITAL | Age: 88
End: 2023-10-26
Payer: MEDICARE

## 2023-10-26 VITALS
WEIGHT: 165.34 LBS | DIASTOLIC BLOOD PRESSURE: 63 MMHG | TEMPERATURE: 97.6 F | BODY MASS INDEX: 21.91 KG/M2 | HEART RATE: 80 BPM | RESPIRATION RATE: 16 BRPM | SYSTOLIC BLOOD PRESSURE: 114 MMHG | HEIGHT: 73 IN | OXYGEN SATURATION: 97 %

## 2023-10-26 PROBLEM — E44.0 MODERATE MALNUTRITION: Status: ACTIVE | Noted: 2023-10-26

## 2023-10-26 LAB
ANION GAP SERPL CALCULATED.3IONS-SCNC: 8 MMOL/L (ref 5–15)
BUN SERPL-MCNC: 28 MG/DL (ref 8–23)
BUN/CREAT SERPL: 16.8 (ref 7–25)
CA-I SERPL ISE-MCNC: 1.34 MMOL/L (ref 1.12–1.32)
CALCIUM SPEC-SCNC: 8.9 MG/DL (ref 8.2–9.6)
CHLORIDE SERPL-SCNC: 106 MMOL/L (ref 98–107)
CO2 SERPL-SCNC: 25 MMOL/L (ref 22–29)
CREAT SERPL-MCNC: 1.67 MG/DL (ref 0.76–1.27)
DEPRECATED RDW RBC AUTO: 49.9 FL (ref 37–54)
EGFRCR SERPLBLD CKD-EPI 2021: 37.9 ML/MIN/1.73
ERYTHROCYTE [DISTWIDTH] IN BLOOD BY AUTOMATED COUNT: 12.8 % (ref 12.3–15.4)
GLUCOSE SERPL-MCNC: 145 MG/DL (ref 65–99)
HCT VFR BLD AUTO: 35.5 % (ref 37.5–51)
HGB BLD-MCNC: 11.4 G/DL (ref 13–17.7)
MAGNESIUM SERPL-MCNC: 1.9 MG/DL (ref 1.7–2.3)
MCH RBC QN AUTO: 34.1 PG (ref 26.6–33)
MCHC RBC AUTO-ENTMCNC: 32.1 G/DL (ref 31.5–35.7)
MCV RBC AUTO: 106.3 FL (ref 79–97)
PHOSPHATE SERPL-MCNC: 3 MG/DL (ref 2.5–4.5)
PLATELET # BLD AUTO: 194 10*3/MM3 (ref 140–450)
PMV BLD AUTO: 10 FL (ref 6–12)
POTASSIUM SERPL-SCNC: 5.2 MMOL/L (ref 3.5–5.2)
RBC # BLD AUTO: 3.34 10*6/MM3 (ref 4.14–5.8)
SODIUM SERPL-SCNC: 139 MMOL/L (ref 136–145)
WBC NRBC COR # BLD: 7.68 10*3/MM3 (ref 3.4–10.8)

## 2023-10-26 PROCEDURE — 82330 ASSAY OF CALCIUM: CPT | Performed by: NURSE PRACTITIONER

## 2023-10-26 PROCEDURE — 84100 ASSAY OF PHOSPHORUS: CPT | Performed by: NURSE PRACTITIONER

## 2023-10-26 PROCEDURE — 83735 ASSAY OF MAGNESIUM: CPT | Performed by: NURSE PRACTITIONER

## 2023-10-26 PROCEDURE — 80048 BASIC METABOLIC PNL TOTAL CA: CPT | Performed by: NURSE PRACTITIONER

## 2023-10-26 PROCEDURE — 99024 POSTOP FOLLOW-UP VISIT: CPT | Performed by: PHYSICIAN ASSISTANT

## 2023-10-26 PROCEDURE — 93010 ELECTROCARDIOGRAM REPORT: CPT | Performed by: INTERNAL MEDICINE

## 2023-10-26 PROCEDURE — 85027 COMPLETE CBC AUTOMATED: CPT | Performed by: NURSE PRACTITIONER

## 2023-10-26 PROCEDURE — 93005 ELECTROCARDIOGRAM TRACING: CPT | Performed by: PHYSICIAN ASSISTANT

## 2023-10-26 PROCEDURE — 99239 HOSP IP/OBS DSCHRG MGMT >30: CPT | Performed by: NURSE PRACTITIONER

## 2023-10-26 RX ORDER — TORSEMIDE 20 MG/1
20 TABLET ORAL DAILY
Qty: 30 TABLET | Refills: 0 | Status: SHIPPED | OUTPATIENT
Start: 2023-10-27

## 2023-10-26 RX ADMIN — DICLOFENAC SODIUM 2 G: 9.3 GEL TOPICAL at 08:22

## 2023-10-26 RX ADMIN — ASPIRIN 81 MG CHEWABLE TABLET 81 MG: 81 TABLET CHEWABLE at 08:22

## 2023-10-26 RX ADMIN — ACETAMINOPHEN 650 MG: 325 TABLET ORAL at 08:22

## 2023-10-26 RX ADMIN — PANTOPRAZOLE SODIUM 40 MG: 40 INJECTION, POWDER, LYOPHILIZED, FOR SOLUTION INTRAVENOUS at 04:26

## 2023-10-26 RX ADMIN — POLYETHYLENE GLYCOL 3350 17 G: 17 POWDER, FOR SOLUTION ORAL at 08:22

## 2023-10-26 RX ADMIN — GABAPENTIN 100 MG: 100 CAPSULE ORAL at 08:22

## 2023-10-26 RX ADMIN — SENNOSIDES AND DOCUSATE SODIUM 2 TABLET: 8.6; 5 TABLET ORAL at 08:22

## 2023-10-26 RX ADMIN — Medication 10 ML: at 08:22

## 2023-10-26 NOTE — OUTREACH NOTE
Prep Survey      Flowsheet Row Responses   [facility] patient discharged from? [facility]   Is LACE score < 7 ? No   Eligibility Readm Mgmt   Discharge diagnosis Atrial fibrillation with RVR-  Pacemaker DC new   Does the patient have one of the following disease processes/diagnoses(primary or secondary)? General Surgery   Does the patient have Home health ordered? Yes   What is the Home health agency?   [facility]   Is there a DME ordered? No   General alerts for this patient [facility]   Prep survey completed? Yes            Julissa KEYS - Registered Nurse

## 2023-10-26 NOTE — PROGRESS NOTES
"  [facility]  CARDIAC ELECTROPHYSIOLOGY PROGRESS NOTE    Date of Admission: 10/22/2023  Date of Service: 10/26/23    Primary Care Physician: Slick Hubbard MD    Chief Complaint: Afib      Subjective      HPI: s/p AVN RFA and PM implant yesterday. Doing well this AM. No complaints. RA 97%.       Objective   Vitals: /66   Pulse 83   Temp 97.6 °F (36.4 °C) (Oral)   Resp 16   Ht 185.4 cm (73\")   Wt 75 kg (165 lb 5.5 oz)   SpO2 92%   BMI 21.81 kg/m²     Physical Exam:   GENERAL: Alert, cooperative, in no acute distress.   HEART: Regular rate and and rhythm.    LUNGS: Clear to auscultation bilaterally. No wheezing, rales or rhonchi. Nonlabored breathing  NEUROLOGIC: No gross focal abnormalities  EXTREMITIES: No obvious deformities, cyanosis, or edema noted.   PSYCH: normal mood, behavior    Results:  Results from last 7 days   Lab Units 10/25/23  0304 10/24/23  0444 10/23/23  0452   WBC 10*3/mm3 6.76 6.58 7.42   HEMOGLOBIN g/dL 11.4* 11.5* 12.0*   HEMATOCRIT % 35.4* 35.6* 37.8   PLATELETS 10*3/mm3 194 192 186     Results from last 7 days   Lab Units 10/25/23  0304 10/24/23  0444 10/23/23  0452   SODIUM mmol/L 138 141 141   POTASSIUM mmol/L 5.1 5.4* 5.1   CHLORIDE mmol/L 103 103 103   CO2 mmol/L 30.0* 30.0* 27.0   BUN mg/dL 32* 37* 41*   CREATININE mg/dL 1.87* 2.22* 2.70*   GLUCOSE mg/dL 113* 101* 100*      Lab Results   Component Value Date    CHOL 132 09/30/2023    TRIG 51 09/30/2023    HDL 38 (L) 09/30/2023    LDL 83 09/30/2023    AST 35 10/22/2023    ALT 33 10/22/2023                       Results from last 7 days   Lab Units 10/25/23  0304   PROTIME Seconds 13.8   INR  1.05     Results from last 7 days   Lab Units 10/23/23  0452 10/22/23  2109   HSTROP T ng/L 63* 69*     Results from last 7 days   Lab Units 10/22/23  2109   PROBNP pg/mL 6,072.0*         Intake/Output Summary (Last 24 hours) at 10/26/2023 0801  Last data filed at 10/26/2023 0600  Gross per " 24 hour   Intake --   Output 400 ml   Net -400 ml       I personally reviewed the patient's EKG/Telemetry data    Radiology Data: no new data    Current Medications:  amiodarone, 400 mg, Oral, Q12H   Followed by  [START ON 10/30/2023] amiodarone, 200 mg, Oral, Q24H  aspirin, 81 mg, Oral, Daily  gabapentin, 100 mg, Oral, Q12H  pantoprazole, 40 mg, Intravenous, Q AM  senna-docusate sodium, 2 tablet, Oral, BID  sodium chloride, 10 mL, Intravenous, Q12H             Assessment and Plan:   Atrial Fibrillation  Eliquis on hold for procedure.  Presented with RVR and borderline hypotension  Amiodarone gtt was initiated, transitioned to  mg BID  S/P AVN RFA and Pacemaker implant with LBB pacing lead 10/25/2023. Chest incision site unremarkable. CXR without PTX and acceptable lead placement.   Will d/c Amiodarone today. Restart Elqiuis 10/27/2023.   Tachybradycardia syndrome  Patient with sinus bradycardia earlier this month on metoprolol succinate and amiodarone. Both discontinued at that time  Restarted on amiodarone gtt this admission with rate controlled atrial fibrillation, now s/p AVN RFA and PM impalnt.   Chronic HFrEF  Echo 9/30/2023: LVEF 25%, moderate MR, RVSP <35, small pericardial effusion present adjacent to LV, bilateral pleural effusions  Cr improved to 1.87.   Would consider restarting home torsemide 20 mg daily when CHRISTIANO resolved. Baseline Cr appears 1.7. Today is 1.87.  Currently compensated with nonlabored breathing on room air  Restart home Torsemide 20 mg daily on 10/27/2023.     Ok home from cardiology standpoint. Restart Elqiuis and Torsemide tomorrow 10/27/202. Wound check in 7-10 days. Follow up with Dr. Pickett in 10-12 weeks with [facility] PM check.      Electronically signed by JULI Doran, 10/26/23, 7:52 AM EDT.

## 2023-10-26 NOTE — CASE MANAGEMENT/SOCIAL WORK
Case Management Discharge Note      Final Note: Mr. Pool is being discharged home today. He is current with [facility] for skilled nursing, physical, and occupational therapies. He will resume these once at home. Family will transport. No additional discharge needs identified.         Selected Continued Care - Admitted Since 10/22/2023       Destination    No services have been selected for the patient.                Durable Medical Equipment    No services have been selected for the patient.                Dialysis/Infusion    No services have been selected for the patient.                Home Medical Care Coordination complete.      Service Provider Selected Services Address Phone Fax Patient Preferred    [facility] [address] [address] [phone] --              Therapy    No services have been selected for the patient.                Community Resources    No services have been selected for the patient.                Community & DME    No services have been selected for the patient.                       Final Discharge Disposition Code: 06 - home with home health care

## 2023-10-26 NOTE — CASE MANAGEMENT/SOCIAL WORK
Continued Stay Note  [facility]     Patient Name: Mukund Pool  MRN: [id number]  Today's Date: 10/26/2023    Admit Date: 10/22/2023    Plan: [facility]   Discharge Plan       Row Name 10/26/23 1104       Plan    Plan [facility]    Patient/Family in Agreement with Plan yes    Plan Comments Mr. Pool lives at [facility] in the independent living facility. [facility] will not have to do an onsite evaluation for Mr. Pool to return. Family will transport.  will continue to follow.    Final Discharge Disposition Code 01 - home or self-care                   Discharge Codes    No documentation.                 Expected Discharge Date and Time       Expected Discharge Date Expected Discharge Time    Nov 3, 2023               Toribio Caicedo RN

## 2023-10-26 NOTE — PLAN OF CARE
Goal Outcome Evaluation:  Plan of Care Reviewed With: patient        Progress: improving              VSS. No acute changes. Currently 100% paced. Incisions CDI.

## 2023-10-26 NOTE — DISCHARGE INSTR - LAB
Wound Check follow up appointment November 2nd @ 2:30 PM.   [facility] Follow Up on December 19th at 2:15 pm.   PCP November 3rd at 4pm

## 2023-10-26 NOTE — DISCHARGE SUMMARY
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       Past Surgical History:   Procedure Laterality Date   • CARDIAC ELECTROPHYSIOLOGY PROCEDURE  04/2023    cardiac loop recorder   • CARDIAC ELECTROPHYSIOLOGY PROCEDURE N/A 10/25/2023    Procedure: Pacemaker DC new;  Surgeon: Levy Pickett MD;  Location:  [facility];  Service: Cardiology;  Laterality: N/A;   • CARDIAC ELECTROPHYSIOLOGY PROCEDURE N/A 10/25/2023    Procedure: AV node ablation;  Surgeon: Levy Pickett MD;  Location:  [facility];  Service: Cardiovascular;  Laterality: N/A;   • CHOLECYSTECTOMY     • CORONARY ARTERY BYPASS GRAFT     • PROSTATE SURGERY     • VOCAL CORD MASS EXCISION  03/22/2021       History of Present Illness    Mukund Pool is a 89 y.o. male who was admitted here 9/29 - 10/5 for acute HFrEF exacerbation & 10/18 - 10/20 for symptomatic bradycardia.  Plan was for outpatient f/u w/ Dr. Pickett on 10/24 for PPM evaluation.  He was taken of his amiodarone and Toprol prior to D/C.       Since his discharge he has had N/V, fatigue, SOA, and poor PO intake.  Today he had worsening SOA, mild CP,  & near-syncope which prompted him to return to [facility] where he was found to be in AF w/ RVR and bursts of WCT.       Hospital Course    Admitted for reasons stated above in HPI. He was initially started on Amiodarone drip but this was transitioned to PO. 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 Amiodarone was stopped. He will start his home Eliquis and torsemide tomorrow (10/27).     Hospitalization complicated by CHRISTIANO on CKD. Base line creatinine appears to be around 1.6. He was 2.78 on admission, down to 1.67 on day of admission. He will restart his home torsemide tomorrow.     Start Eliquis 10/27/23  Start Torsemide 10/27/23  Follow up wound check in 7-10 days  Follow up with Dr. Pickett in 10-12 weeks with [facility] PM check    Physical Exam:  Constitutional:  Resting in bed. Appears well-developed and well-nourished. No distress.   HEENT:  Normocephalic and atraumatic. PERRL  Neck: Normal range of motion. Neck supple. No JVD present.   Cardiovascular: Left chest pacemaker placement wound with dressing CDI. Paced and Normal rate, regular rhythm, normal heart sounds and intact distal pulses.  No gallop and no friction rub.  No murmur heard.  Pulmonary/Chest: Effort normal and breath sounds normal. No respiratory distress. No wheezes, rhonchi or rales.   Abdominal: Soft. No distension and no mass. There is no tenderness.   Musculoskeletal: Normal range of motion.   Neurological: Alert and oriented to person, place, and time.  No focal deficits  Skin: Skin is warm and dry. No rash noted.   Extremities:  No clubbing, edema or cyanosis  Psychiatric: Normal mood and affect. Behavior is normal.     Procedures Performed: Procedure(s):  Pacemaker DC new  AV node ablation     Consults:   - Cardiac EP: Dr. Pickett  - Cardiology: Sudeep BUSTOS     Pertinent Test Results:   Results from last 7 days   Lab Units 10/26/23  0835   WBC 10*3/mm3 7.68   HEMOGLOBIN g/dL 11.4*   HEMATOCRIT % 35.5*   PLATELETS 10*3/mm3 194     Results from last 7 days   Lab Units 10/26/23  0959  10/26/23  0835 10/25/23  0304 10/24/23  0444 10/23/23  0452   SODIUM mmol/L 139  --  138 141 141   POTASSIUM mmol/L 5.2  --  5.1 5.4* 5.1   CHLORIDE mmol/L 106  --  103 103 103   CO2 mmol/L 25.0  --  30.0* 30.0* 27.0   BUN mg/dL 28*  --  32* 37* 41*   CREATININE mg/dL 1.67*  --  1.87* 2.22* 2.70*   EGFR mL/min/1.73 37.9*  --  33.1* 27.0* 21.3*   GLUCOSE mg/dL 145*  --  113* 101* 100*   CALCIUM mg/dL 8.9  --  8.8 8.9 9.0   PHOSPHORUS mg/dL  --  3.0  --  3.2 3.3           Condition on Discharge:  Stable    Discharge Disposition: Home or Self CareHome    Discharge Medications:      Discharge Medications        New Medications        Instructions Start Date   Diclofenac Sodium 1 % gel gel  Commonly known as: VOLTAREN   2 g, Topical, Every 4 Hours PRN             Continue These Medications        Instructions Start Date   apixaban 2.5 MG tablet tablet  Commonly known as: ELIQUIS   2.5 mg, Oral, 2 Times Daily   Start Date: October 27, 2023     aspirin 81 MG EC tablet   81 mg, Oral, Daily      ezetimibe 10 MG tablet  Commonly known as: ZETIA   1 tablet, Oral, Daily      gabapentin 300 MG capsule  Commonly known as: NEURONTIN   1 capsule, Oral, 3 Times Daily, aking only 2 times a day       multivitamin with minerals tablet tablet   1 tablet, Oral, Daily      nitroglycerin 0.4 MG SL tablet  Commonly known as: NITROSTAT   0.4 mg, Sublingual, Every 5 Minutes PRN      omeprazole 20 MG capsule  Commonly known as: priLOSEC   1 capsule, Oral, Daily      torsemide 20 MG tablet  Commonly known as: DEMADEX   20 mg, Oral, Daily   Start Date: October 27, 2023              Discharge Diet: Diet: Cardiac Diets; Healthy Heart (2-3 Na+); Texture: Regular Texture (IDDSI 7); Fluid Consistency: Thin (IDDSI 0)    Activity at Discharge: As tolerated    Follow-up Appointments  Future Appointments   Date Time Provider Department Center   11/14/2023  2:20 PM Billingsley, Radha, APRN [facility]     Additional Instructions for the Follow-ups  that You Need to Schedule       Discharge Follow-up with PCP   As directed       Currently Documented PCP:    Slick Hubbard MD    PCP Phone Number:    [phone]     Follow Up Details: 1 week for BMP        Discharge Follow-up with Specialty: Dr. Pickett; 3 Months   As directed      Specialty: Dr. Pickett   Follow Up: 3 Months   Follow Up Details: wound check in 7-10 days, follow up wtih [facility] PM check in 3 months.        Discharge Follow-up with Specialty: EP clinic for wound check   As directed      Specialty: EP clinic for wound check                Test Results Pending at Discharge    Code Status and Medical Interventions:   Ordered at: 10/23/23 0043     Level Of Support Discussed With:    Patient     Code Status (Patient has no pulse and is not breathing):    CPR (Attempt to Resuscitate)     Medical Interventions (Patient has pulse or is breathing):    Full Support       CYNDEE Seo  10/26/23  11:31 EDT    Discharge Time Spent:     I spent  35  minutes on this discharge activity which included: face-to-face encounter with the patient, reviewing the data in the system, coordination of the care with the nursing staff as well as consultants, documentation, and entering orders.        Electronically signed by CYNDEE Seo, 10/26/23, 11:18 AM EDT.

## 2023-10-27 ENCOUNTER — HOME CARE VISIT (OUTPATIENT)
Dept: HOME HEALTH SERVICES | Facility: HOME HEALTHCARE | Age: 88
End: 2023-10-27
Payer: COMMERCIAL

## 2023-10-27 ENCOUNTER — HOME CARE VISIT (OUTPATIENT)
Dept: HOME HEALTH SERVICES | Facility: HOME HEALTHCARE | Age: 88
End: 2023-10-27
Payer: MEDICARE

## 2023-10-27 VITALS
TEMPERATURE: 98.7 F | OXYGEN SATURATION: 98 % | DIASTOLIC BLOOD PRESSURE: 68 MMHG | RESPIRATION RATE: 16 BRPM | HEART RATE: 86 BPM | SYSTOLIC BLOOD PRESSURE: 108 MMHG

## 2023-10-27 VITALS
TEMPERATURE: 96.9 F | HEART RATE: 81 BPM | DIASTOLIC BLOOD PRESSURE: 60 MMHG | RESPIRATION RATE: 18 BRPM | OXYGEN SATURATION: 93 % | SYSTOLIC BLOOD PRESSURE: 102 MMHG

## 2023-10-27 PROCEDURE — G0495 RN CARE TRAIN/EDU IN HH: HCPCS

## 2023-10-27 PROCEDURE — G0151 HHCP-SERV OF PT,EA 15 MIN: HCPCS

## 2023-10-27 NOTE — Clinical Note
Please forward to Dr. Slick Hubbard:    Abel CHRIS PT isreal completed 10/27/23 with PT to address deficits in strength, balance, functional mobility, and activity tolerance with frequency 1w1, 2w3, 1w3.

## 2023-10-27 NOTE — HOME HEALTH
Resumption of Care Note:     Reason for hospitalization/new problems: Diagnosed with tachybrady syndrome and underwent AV node ablation w/ pacemaker implantation.    [facility] [place] Findings:    New/changed medications: Diclofenac, Eliquis, Torsemide    New/changed orders: None    Plan/Focus of Care and Skilled need: care r/t pacemaker insertion, infection prevention, CHF education, medication education, pain management, fall prevention    Plan for next visit:  Will patient let you assess wound on bottom?, care r/t pacemaker insertion, infection prevention, CHF education, medication education, pain management, fall prevention

## 2023-10-29 NOTE — HOME HEALTH
89 y/o with recent hospitalization for a-fib with RVR resulting in PPM placement; patient returns to ILF apartment with spouse; PLOF is IND with all mobility and ADLs

## 2023-10-30 ENCOUNTER — HOME CARE VISIT (OUTPATIENT)
Dept: HOME HEALTH SERVICES | Facility: HOME HEALTHCARE | Age: 88
End: 2023-10-30
Payer: COMMERCIAL

## 2023-10-30 VITALS
DIASTOLIC BLOOD PRESSURE: 74 MMHG | OXYGEN SATURATION: 96 % | RESPIRATION RATE: 16 BRPM | SYSTOLIC BLOOD PRESSURE: 122 MMHG | TEMPERATURE: 97.8 F | HEART RATE: 80 BPM

## 2023-10-30 LAB
QT INTERVAL: 372 MS
QT INTERVAL: 374 MS
QT INTERVAL: 380 MS
QT INTERVAL: 434 MS
QT INTERVAL: 476 MS
QTC INTERVAL: 480 MS
QTC INTERVAL: 484 MS
QTC INTERVAL: 491 MS
QTC INTERVAL: 572 MS
QTC INTERVAL: 587 MS

## 2023-10-30 PROCEDURE — G0152 HHCP-SERV OF OT,EA 15 MIN: HCPCS

## 2023-10-30 NOTE — Clinical Note
Forward to Dr. Hubbard    Patient seen today, 10/30/23, since recent hospitalization for PPM. OT will see another 1w2 to ensure safe transition back to independent living, and return to PLOF.

## 2023-10-31 ENCOUNTER — READMISSION MANAGEMENT (OUTPATIENT)
Dept: CALL CENTER | Facility: HOSPITAL | Age: 88
End: 2023-10-31
Payer: MEDICARE

## 2023-10-31 ENCOUNTER — HOME CARE VISIT (OUTPATIENT)
Dept: HOME HEALTH SERVICES | Facility: HOME HEALTHCARE | Age: 88
End: 2023-10-31
Payer: COMMERCIAL

## 2023-10-31 ENCOUNTER — HOME CARE VISIT (OUTPATIENT)
Dept: HOME HEALTH SERVICES | Facility: HOME HEALTHCARE | Age: 88
End: 2023-10-31
Payer: MEDICARE

## 2023-10-31 VITALS
SYSTOLIC BLOOD PRESSURE: 124 MMHG | RESPIRATION RATE: 18 BRPM | DIASTOLIC BLOOD PRESSURE: 60 MMHG | OXYGEN SATURATION: 98 % | HEART RATE: 67 BPM | TEMPERATURE: 98 F

## 2023-10-31 PROCEDURE — G0151 HHCP-SERV OF PT,EA 15 MIN: HCPCS

## 2023-10-31 PROCEDURE — G0299 HHS/HOSPICE OF RN EA 15 MIN: HCPCS

## 2023-10-31 NOTE — HOME HEALTH
Patient is a 89 year old male s/p recent hospitalization 9/29 - 10/5 for acute HFrEF exacerbation & 10/18 - 10/20 for symptomatic bradycardia. Plan was for outpatient f/u w/ Dr. Pickett on 10/24 for PPM evaluation. He was taken of his amiodarone and Toprol prior to D/C, but since discharge he has had N/V, fatigue, SOA, and poor PO intake. Today he had worsening SOA, mild CP, & near-syncope which prompted him to return to [facility] where he was found to be in AF w/ RVR and bursts of WCT. Patient underwent PPM on 10/25/23, and then discharged back to independent living at [facility] on 10/26/23. Patient seen today for an OT evaluation and presents with decreased strength, balance, and endurance affecting his independence and safety with ADL's, and mobility in the home. OT extensively educated on pacemaker precautions, including use of UE's with functional tasks, and EC/WS with all BADL's. OT to see an additional 1w2 to ensure return to PLOF.

## 2023-10-31 NOTE — OUTREACH NOTE
General Surgery Week 1 Survey      Flowsheet Row Responses   [facility] patient discharged from? [place]   Does the patient have one of the following disease processes/diagnoses(primary or secondary)? General Surgery   Week 1 attempt successful? Yes   Call start time 0951   Call end time 0956   General alerts for this patient Kadeem Villagomez [facility]   Discharge diagnosis Atrial fibrillation with RVR-  Pacemaker DC new   Person spoke with today (if not patient) and relationship wife   Meds reviewed with patient/caregiver? Yes   Is the patient having any side effects they believe may be caused by any medication additions or changes? No   Does the patient have all medications related to this admission filled (includes all antibiotics, pain medications, etc.) Yes   Is the patient taking all medications as directed (includes completed medication regime)? Yes   Does the patient have a follow up appointment scheduled with their surgeon? Yes  [Patient was listed as a no show for appt with Dr Pickett when he was in the hospital . Wife reports she recieved a letter that 3 no shows could result in dismissal from practice. She is very concerned about this.]   Has the patient kept scheduled appointments due by today? N/A   Comments Cardiology appt on 11/14/2023   What is the Home health agency?   [facility]   Has home health visited the patient within 72 hours of discharge? Yes   Psychosocial issues? No   Did the patient receive a copy of their discharge instructions? Yes   What is the patient's perception of their health status since discharge? Improving   Nursing interventions Nurse provided patient education   If the patient is a current smoker, are they able to teach back resources for cessation? Not a smoker   Is the patient/caregiver able to teach back the hierarchy of who to call/visit for symptoms/problems? PCP, Specialist, Home health nurse, Urgent Care, ED, 911 Yes   Week 1 call completed? Yes   Graduated Yes    Is the patient interested in additional calls from an ambulatory ? No   Would this patient benefit from a Referral to [facility] Social Work? No   Wrap up additional comments Denies any questions or needs today.   Call end time [phone]            Jovany ROSAS - Registered Nurse

## 2023-11-01 VITALS
TEMPERATURE: 97.1 F | OXYGEN SATURATION: 94 % | SYSTOLIC BLOOD PRESSURE: 110 MMHG | DIASTOLIC BLOOD PRESSURE: 55 MMHG | HEART RATE: 87 BPM

## 2023-11-01 NOTE — HOME HEALTH
Routine Visit Note:    Skill/education provided Pt showed HUGE improvment since last visit. educated on meds. VS WNL    Patient/caregiver response verbilied understanding     Plan for next visit education om meds and health managment  Other pertinent info

## 2023-11-02 ENCOUNTER — OFFICE VISIT (OUTPATIENT)
Dept: CARDIOLOGY | Facility: CLINIC | Age: 88
End: 2023-11-02
Payer: MEDICARE

## 2023-11-02 DIAGNOSIS — I48.0 PAROXYSMAL ATRIAL FIBRILLATION: Primary | ICD-10-CM

## 2023-11-02 NOTE — PROGRESS NOTES
2023    Mukund Pool, : 1930      Fever: No    Temperature if indicated:     Wound Location: Left Infraclavicular    Dressing Removed: Removed by MA/RN      Old Dressing Appearance:  Clean, dry    Wound Appearance: Redness []                  Drainage []                  Culture obtained []        Color: Clear     Consistency:  None     Amount: none         Gloves used, wound cleansed with sterile 4x4 and peroxide [x]       MD notified []     MD orders:     Antibiotic started []      If checked, type     Other:       Appointment for follow-up scheduled for 3 months post procedure [x]    Future Appointments   Date Time Provider Department Center   2023  2:30 PM WOUND CHECK [facility]   11/3/2023 11:00 AM eLvy Ly, PT  [facility] None   2023 To Be Determined Ann Marie Biggs RN  [facility] None   2023 To Be Determined Ann Marie Biggs RN  [facility] None   2023  2:20 PM Radha Billingsley APRN [facility]   2023  2:15 PM Monico Olivera PA [facility]           Keila Bravo MA, 23      MD Signature:______________________________ Completed By/Date:

## 2023-11-03 ENCOUNTER — HOME CARE VISIT (OUTPATIENT)
Dept: HOME HEALTH SERVICES | Facility: HOME HEALTHCARE | Age: 88
End: 2023-11-03
Payer: MEDICARE

## 2023-11-03 VITALS
DIASTOLIC BLOOD PRESSURE: 82 MMHG | HEART RATE: 86 BPM | RESPIRATION RATE: 18 BRPM | OXYGEN SATURATION: 98 % | TEMPERATURE: 97.1 F | SYSTOLIC BLOOD PRESSURE: 134 MMHG

## 2023-11-03 PROCEDURE — G0151 HHCP-SERV OF PT,EA 15 MIN: HCPCS

## 2023-11-06 ENCOUNTER — HOME CARE VISIT (OUTPATIENT)
Dept: HOME HEALTH SERVICES | Facility: HOME HEALTHCARE | Age: 88
End: 2023-11-06
Payer: MEDICARE

## 2023-11-06 PROCEDURE — G0299 HHS/HOSPICE OF RN EA 15 MIN: HCPCS

## 2023-11-07 ENCOUNTER — HOME CARE VISIT (OUTPATIENT)
Dept: HOME HEALTH SERVICES | Facility: HOME HEALTHCARE | Age: 88
End: 2023-11-07
Payer: COMMERCIAL

## 2023-11-07 VITALS
HEART RATE: 84 BPM | OXYGEN SATURATION: 96 % | RESPIRATION RATE: 18 BRPM | TEMPERATURE: 97.5 F | DIASTOLIC BLOOD PRESSURE: 60 MMHG | SYSTOLIC BLOOD PRESSURE: 92 MMHG

## 2023-11-07 VITALS
TEMPERATURE: 98 F | HEART RATE: 98 BPM | SYSTOLIC BLOOD PRESSURE: 98 MMHG | OXYGEN SATURATION: 96 % | DIASTOLIC BLOOD PRESSURE: 65 MMHG

## 2023-11-07 PROCEDURE — G0151 HHCP-SERV OF PT,EA 15 MIN: HCPCS

## 2023-11-07 NOTE — HOME HEALTH
Routine Visit Note:    Skill/education provided pt was very constipated.stool softner provided     Patient/caregiver response verblilzed understanding     Plan for next visit    Other pertinent info med education

## 2023-11-08 ENCOUNTER — HOME CARE VISIT (OUTPATIENT)
Dept: HOME HEALTH SERVICES | Facility: HOME HEALTHCARE | Age: 88
End: 2023-11-08
Payer: COMMERCIAL

## 2023-11-08 VITALS
DIASTOLIC BLOOD PRESSURE: 75 MMHG | OXYGEN SATURATION: 97 % | HEART RATE: 94 BPM | SYSTOLIC BLOOD PRESSURE: 125 MMHG | RESPIRATION RATE: 18 BRPM

## 2023-11-08 PROCEDURE — G0152 HHCP-SERV OF OT,EA 15 MIN: HCPCS

## 2023-11-08 NOTE — HOME HEALTH
Routine Visit Note:    Skill/education provided: ADL retraining, establish and complete HEP, minimal functional mobility in apartment    Patient/caregiver response: Patient having some bouts of dizziness today, session limited. Encouraged pt to not overdo activity and use EC/WS and breathing techniques with exertion    Plan for next visit: OT d/c    Other pertinent info:

## 2023-11-10 ENCOUNTER — HOME CARE VISIT (OUTPATIENT)
Dept: HOME HEALTH SERVICES | Facility: HOME HEALTHCARE | Age: 88
End: 2023-11-10
Payer: COMMERCIAL

## 2023-11-10 VITALS
RESPIRATION RATE: 18 BRPM | TEMPERATURE: 97.8 F | HEART RATE: 80 BPM | OXYGEN SATURATION: 98 % | DIASTOLIC BLOOD PRESSURE: 82 MMHG | SYSTOLIC BLOOD PRESSURE: 130 MMHG

## 2023-11-10 PROBLEM — Z95.0 PRESENCE OF CARDIAC PACEMAKER: Status: ACTIVE | Noted: 2023-11-10

## 2023-11-10 PROBLEM — R79.89 ELEVATED TROPONIN LEVEL NOT DUE TO ACUTE CORONARY SYNDROME: Status: RESOLVED | Noted: 2023-10-18 | Resolved: 2023-11-10

## 2023-11-10 PROBLEM — I48.91 ATRIAL FIBRILLATION WITH RVR: Status: RESOLVED | Noted: 2023-10-22 | Resolved: 2023-11-10

## 2023-11-10 PROCEDURE — G0151 HHCP-SERV OF PT,EA 15 MIN: HCPCS

## 2023-11-10 NOTE — PROGRESS NOTES
Cardiology Outpatient Visit      Identification: Mukund Pool is a 89 y.o. male who resides in [place], [place]    Reason for visit:  Slow Heart Rate (Hospital FU ), Atrial Fibrillation, and HFrEF      Subjective      Patient is a 89-year-old gentleman who returns today for follow-up of his coronary artery disease, chronic systolic heart failure, paroxysmal atrial fibrillation and cardiac risk factors.  The patient previously followed cardiology at [facility] but was recently hospitalized at [facility].  He had CABG over 20 years ago and has chronic kidney disease.  During his first hospitalization at [facility] he presented with worsening shortness of breath and ruled in for acute CHF.  He had a loop recorder that had been implanted previously to assess for tachybradycardia syndrome.  His echocardiogram showed a reduced LVEF of 25%.  No ischemic evaluation was performed as symptoms improved with diuresis.  He then presented back to [facility] with profound bradycardia for which she was symptomatic.  His beta-blocker was discontinued but he remained bradycardic.  He was eventually discharged home with plans to return to see Dr. Pickett in the office for consideration of a cardiac resynchronization therapy pacemaker.  In the interim he returned back to [facility] with near syncope and was found to be in A-fib with RVR.  He ultimately underwent a AV chaz ablation and placement of a dual-chamber pacemaker by Dr. Pickett last month.  Since then his son can tell a huge difference.  The patient is no longer having heart failure symptoms.  He is unfortunately unable to tolerate beta-blocker, ACE/ARB/Entresto/spironolactone due to lower blood pressures.  He has been taking torsemide daily.  His blood pressure is low today at 92/60.  He denies chest pain or shortness of breath but does report dizziness and fatigue.  He lives in a independent living center with his wife.  He  "exercises 2-3 times a week using a stationary bike and doing weight lifts.  There is a gentleman that helps him with this 2 times a week.      Review of Systems   Constitutional: Negative for malaise/fatigue.   Eyes:  Negative for vision loss in left eye and vision loss in right eye.   Cardiovascular:  Negative for chest pain, dyspnea on exertion, near-syncope, orthopnea, palpitations, paroxysmal nocturnal dyspnea and syncope.   Musculoskeletal:  Negative for myalgias.   Neurological:  Negative for brief paralysis, excessive daytime sleepiness, focal weakness, numbness, paresthesias and weakness.   All other systems reviewed and are negative.      No Known Allergies      Current Outpatient Medications   Medication Instructions    apixaban (ELIQUIS) 2.5 mg, Oral, 2 Times Daily    aspirin 81 mg, Oral, Daily    Diclofenac Sodium (VOLTAREN) 2 g, Topical, Every 4 Hours PRN    ezetimibe (ZETIA) 10 MG tablet 1 tablet, Oral, Daily    gabapentin (NEURONTIN) 300 MG capsule 1 capsule, Oral, 3 Times Daily, aking only 2 times a day     multivitamin with minerals tablet tablet 1 tablet, Oral, Daily    nitroglycerin (NITROSTAT) 0.4 mg, Sublingual, Every 5 Minutes PRN    omeprazole (priLOSEC) 20 MG capsule 1 capsule, Oral, Daily    torsemide (DEMADEX) 10 mg, Oral, Daily         Objective     BP 92/70 (BP Location: Right arm, Patient Position: Standing)   Pulse 77   Ht 185.4 cm (72.99\")   Wt 77.6 kg (171 lb)   SpO2 90%   BMI 22.57 kg/m²       Constitutional:       General: Awake.      Appearance: Healthy appearance.   Pulmonary:      Effort: Pulmonary effort is normal.      Breath sounds: Normal breath sounds.   Cardiovascular:      Normal rate. Regular rhythm.      Murmurs: There is no murmur.   Pulses:     Intact distal pulses.   Edema:     Peripheral edema absent.   Skin:     General: Skin is warm and dry.   Neurological:      Mental Status: Alert and oriented to person, place and time.   Psychiatric:         Behavior: " Behavior is cooperative.         Result Review  (reviewed with patient):            Lab Results   Component Value Date    GLUCOSE 145 (H) 10/26/2023    BUN 28 (H) 10/26/2023    CREATININE 1.67 (H) 10/26/2023    EGFR 37.9 (L) 10/26/2023    BCR 16.8 10/26/2023    K 5.2 10/26/2023    CO2 25.0 10/26/2023    CALCIUM 8.9 10/26/2023    ALBUMIN 3.7 10/22/2023    BILITOT 0.9 10/22/2023    AST 35 10/22/2023    ALT 33 10/22/2023     Lab Results   Component Value Date    WBC 7.68 10/26/2023    HGB 11.4 (L) 10/26/2023    HCT 35.5 (L) 10/26/2023    .3 (H) 10/26/2023     10/26/2023     Lab Results   Component Value Date    CHOL 132 09/30/2023    TRIG 51 09/30/2023    HDL 38 (L) 09/30/2023    LDL 83 09/30/2023     Lab Results   Component Value Date    HGBA1C 6.0 04/07/2015           Assessment     Diagnoses and all orders for this visit:    1. Coronary artery disease involving native coronary artery of native heart with angina pectoris (Primary)  Overview:  CABG 1980  Echo (2/23): LVEF EF 60%.  LVH with grade 2 DD.  No significant valvular disease  Pharmacologic nuclear stress (2/10/2023): Inferior infarct.  No ischemia.  LVEF 55%   Echo (9/30/2023): LVEF 25%.  Moderate MR.  RVSP <35 mmHg    Assessment & Plan:  No signs or symptoms of angina  Continue aspirin 81 mg daily  Sublingual nitroglycerin as needed for any episodes of angina  Defer beta-blocker and ACE/ARB/Entresto due to low blood pressure      2. Chronic HFrEF (heart failure with reduced ejection fraction)  Overview:  Echo (2/23): LVEF EF 60%.  LVH with grade 2 DD.  No significant valvular disease  [facility] admission with FC IV heart failure symptoms, pulmonary edema on chest x-ray, and elevated proBNP, 9/30/2023  Echo (9/30/2023): LVEF 25%.  Moderate MR.  RVSP <35 mmHg    Assessment & Plan:  Stable NYHA class II symptoms  Decrease Demadex 10 mg daily  No ACE/ARB/Entresto/spironolactone due to low blood pressures      3. Paroxysmal atrial  fibrillation  Overview:  [id number] 4 (age >75, CAD, HTN)  4% A-fib burden on recent loop recorder interrogation  AV chaz ablation and placement of dual-chamber pacemaker for tachybradycardia syndrome/sick sinus syndrome 10/25/2023    Assessment & Plan:  Continue Eliquis 2.5 mg twice daily  No signs or symptoms of TIA or CVA      4. LBBB (left bundle branch block)  Assessment & Plan:  Patient currently asymptomatic      5. Hyperlipidemia LDL goal <70  Assessment & Plan:  Continue Zetia 10 mg daily  Defer statin therapy due to reported intolerance        6. Presence of cardiac pacemaker  Overview:  AV chaz ablation and placement of dual-chamber pacemaker by Dr. Pickett for tachybradycardia syndrome on 10/25/2023    Assessment & Plan:  Follow-up with already scheduled appointment with EP next month for device interrogation      Other orders  -     torsemide (DEMADEX) 20 MG tablet; Take 0.5 tablets by mouth Daily. Indications: Cardiac Failure  Dispense: 30 tablet; Refill: 0          Plan   No signs or symptoms of angina and heart failure symptoms stable and compensated  Decrease torsemide 10 mg daily due to lightheadedness and low blood pressures.  Patient to follow-up with EP at already scheduled appointment next month.  If patient's BP is still low at that time would recommend they discontinue torsemide altogether  Defer ACE/ARB/Entresto/spironolactone/beta-blocker due to low blood pressures  Defer statin therapy due to intolerance  Continue Eliquis for stroke prophylaxis.      Follow-up   Return in about 6 months (around 5/14/2024), or if symptoms worsen or fail to improve.        Radha Billingsley, APRN  11/14/2023

## 2023-11-13 ENCOUNTER — HOME CARE VISIT (OUTPATIENT)
Dept: HOME HEALTH SERVICES | Facility: HOME HEALTHCARE | Age: 88
End: 2023-11-13
Payer: MEDICARE

## 2023-11-13 PROCEDURE — G0299 HHS/HOSPICE OF RN EA 15 MIN: HCPCS

## 2023-11-14 ENCOUNTER — OFFICE VISIT (OUTPATIENT)
Dept: CARDIOLOGY | Facility: CLINIC | Age: 88
End: 2023-11-14
Payer: MEDICARE

## 2023-11-14 ENCOUNTER — HOME CARE VISIT (OUTPATIENT)
Dept: HOME HEALTH SERVICES | Facility: HOME HEALTHCARE | Age: 88
End: 2023-11-14
Payer: COMMERCIAL

## 2023-11-14 VITALS
RESPIRATION RATE: 18 BRPM | OXYGEN SATURATION: 96 % | DIASTOLIC BLOOD PRESSURE: 82 MMHG | SYSTOLIC BLOOD PRESSURE: 134 MMHG | HEART RATE: 80 BPM | TEMPERATURE: 97.5 F

## 2023-11-14 VITALS
TEMPERATURE: 97.8 F | OXYGEN SATURATION: 98 % | DIASTOLIC BLOOD PRESSURE: 55 MMHG | HEART RATE: 81 BPM | SYSTOLIC BLOOD PRESSURE: 90 MMHG

## 2023-11-14 VITALS
DIASTOLIC BLOOD PRESSURE: 70 MMHG | SYSTOLIC BLOOD PRESSURE: 92 MMHG | HEIGHT: 73 IN | HEART RATE: 77 BPM | WEIGHT: 171 LBS | OXYGEN SATURATION: 90 % | BODY MASS INDEX: 22.66 KG/M2

## 2023-11-14 DIAGNOSIS — I48.0 PAROXYSMAL ATRIAL FIBRILLATION: ICD-10-CM

## 2023-11-14 DIAGNOSIS — E78.5 HYPERLIPIDEMIA LDL GOAL <70: ICD-10-CM

## 2023-11-14 DIAGNOSIS — I25.119 CORONARY ARTERY DISEASE INVOLVING NATIVE CORONARY ARTERY OF NATIVE HEART WITH ANGINA PECTORIS: Primary | ICD-10-CM

## 2023-11-14 DIAGNOSIS — Z95.0 PRESENCE OF CARDIAC PACEMAKER: ICD-10-CM

## 2023-11-14 DIAGNOSIS — I50.22 CHRONIC HFREF (HEART FAILURE WITH REDUCED EJECTION FRACTION): ICD-10-CM

## 2023-11-14 DIAGNOSIS — I44.7 LBBB (LEFT BUNDLE BRANCH BLOCK): ICD-10-CM

## 2023-11-14 PROBLEM — R00.1 SYMPTOMATIC BRADYCARDIA: Status: RESOLVED | Noted: 2023-10-18 | Resolved: 2023-11-14

## 2023-11-14 PROCEDURE — G0151 HHCP-SERV OF PT,EA 15 MIN: HCPCS

## 2023-11-14 PROCEDURE — 99214 OFFICE O/P EST MOD 30 MIN: CPT | Performed by: NURSE PRACTITIONER

## 2023-11-14 RX ORDER — TORSEMIDE 20 MG/1
10 TABLET ORAL DAILY
Qty: 30 TABLET | Refills: 0 | Status: SHIPPED | OUTPATIENT
Start: 2023-11-14

## 2023-11-14 NOTE — ASSESSMENT & PLAN NOTE
No signs or symptoms of angina  Continue aspirin 81 mg daily  Sublingual nitroglycerin as needed for any episodes of angina  Defer beta-blocker and ACE/ARB/Entresto due to low blood pressure

## 2023-11-14 NOTE — ASSESSMENT & PLAN NOTE
Stable NYHA class II symptoms  Decrease Demadex 10 mg daily  No ACE/ARB/Entresto/spironolactone due to low blood pressures

## 2023-11-14 NOTE — HOME HEALTH
Routine Visit Note:    Skill/education provided Vs WNL meds are no longer question     Patient/caregiver response verbalized understanding     Plan for next visit VS and Med manage     Other pertinent info

## 2023-11-15 ENCOUNTER — HOME CARE VISIT (OUTPATIENT)
Dept: HOME HEALTH SERVICES | Facility: HOME HEALTHCARE | Age: 88
End: 2023-11-15
Payer: COMMERCIAL

## 2023-11-15 VITALS
OXYGEN SATURATION: 96 % | RESPIRATION RATE: 16 BRPM | SYSTOLIC BLOOD PRESSURE: 124 MMHG | HEART RATE: 58 BPM | DIASTOLIC BLOOD PRESSURE: 68 MMHG | TEMPERATURE: 97.8 F

## 2023-11-15 PROCEDURE — G0152 HHCP-SERV OF OT,EA 15 MIN: HCPCS

## 2023-11-16 ENCOUNTER — HOME CARE VISIT (OUTPATIENT)
Dept: HOME HEALTH SERVICES | Facility: HOME HEALTHCARE | Age: 88
End: 2023-11-16
Payer: COMMERCIAL

## 2023-11-16 VITALS
SYSTOLIC BLOOD PRESSURE: 128 MMHG | RESPIRATION RATE: 18 BRPM | DIASTOLIC BLOOD PRESSURE: 74 MMHG | HEART RATE: 97 BPM | TEMPERATURE: 97.5 F | OXYGEN SATURATION: 100 %

## 2023-11-16 PROCEDURE — G0151 HHCP-SERV OF PT,EA 15 MIN: HCPCS

## 2023-11-27 ENCOUNTER — HOME CARE VISIT (OUTPATIENT)
Dept: HOME HEALTH SERVICES | Facility: HOME HEALTHCARE | Age: 88
End: 2023-11-27
Payer: COMMERCIAL

## 2023-11-27 PROCEDURE — G0151 HHCP-SERV OF PT,EA 15 MIN: HCPCS

## 2023-11-27 NOTE — Clinical Note
Thanks!    ----- Message -----  From: Alex Toure, PT  Sent: 11/28/2023   3:53 PM EST  To: Levy Ly, PT      Naresh Pool's wife received a notice in the mail regarding 1 denied PT visit. I told her the office would have received the same information to relay to the Therapists, but I wanted to pass this along to make you aware as well. Thank you!

## 2023-11-27 NOTE — Clinical Note
Naresh Pool's wife received a notice in the mail regarding 1 denied PT visit. I told her the office would have received the same information to relay to the Therapists, but I wanted to pass this along to make you aware as well. Thank you!

## 2023-11-28 VITALS — HEART RATE: 82 BPM | OXYGEN SATURATION: 100 % | DIASTOLIC BLOOD PRESSURE: 86 MMHG | SYSTOLIC BLOOD PRESSURE: 150 MMHG

## 2023-11-28 NOTE — HOME HEALTH
Patient seen for PT home health routine visit. Reports he is doing well, had a nice thanksgiving holiday. Reports no pain in L UE s/p pacemaker.  Goals: improve strength, endurance  Plan: Continue working towards improving overall strength and balance to improve safety and decrease fall risk.

## 2023-12-04 ENCOUNTER — HOME CARE VISIT (OUTPATIENT)
Dept: HOME HEALTH SERVICES | Facility: HOME HEALTHCARE | Age: 88
End: 2023-12-04
Payer: COMMERCIAL

## 2023-12-04 VITALS
HEART RATE: 84 BPM | RESPIRATION RATE: 18 BRPM | SYSTOLIC BLOOD PRESSURE: 128 MMHG | TEMPERATURE: 96.2 F | DIASTOLIC BLOOD PRESSURE: 70 MMHG

## 2023-12-04 PROCEDURE — G0151 HHCP-SERV OF PT,EA 15 MIN: HCPCS

## 2023-12-19 ENCOUNTER — OFFICE VISIT (OUTPATIENT)
Dept: CARDIOLOGY | Facility: CLINIC | Age: 88
End: 2023-12-19
Payer: MEDICARE

## 2023-12-19 VITALS
WEIGHT: 176.8 LBS | DIASTOLIC BLOOD PRESSURE: 62 MMHG | OXYGEN SATURATION: 99 % | HEIGHT: 73 IN | SYSTOLIC BLOOD PRESSURE: 120 MMHG | BODY MASS INDEX: 23.43 KG/M2 | HEART RATE: 82 BPM

## 2023-12-19 DIAGNOSIS — I48.91 ATRIAL FIBRILLATION WITH RVR: ICD-10-CM

## 2023-12-19 DIAGNOSIS — I50.22 CHRONIC HFREF (HEART FAILURE WITH REDUCED EJECTION FRACTION): Primary | ICD-10-CM

## 2023-12-19 DIAGNOSIS — I44.7 LBBB (LEFT BUNDLE BRANCH BLOCK): ICD-10-CM

## 2023-12-19 RX ORDER — HYDROCODONE BITARTRATE AND ACETAMINOPHEN 5; 325 MG/1; MG/1
TABLET ORAL
COMMUNITY
Start: 2023-11-28

## 2023-12-19 NOTE — PROGRESS NOTES
"[facility] at [facility]   OFFICE NOTE      Mukund Pool  4/13/1930  PCP: Slick Hubbard MD    SUBJECTIVE:   Mukund Pool is a 89 y.o. male seen for a follow up visit regarding the following:     CC:Afib    HPI:   Pleasant 89-year-old presents today for follow-up regarding long-term persistent A-fib refractory to medical therapy, recent AV node ablation pacemaker, coronary disease and congestive heart failure.  Since the procedure in the hospital with AV node and pacemaker placement the patient denies any having any worsening shortness of breath chest pain or PND however he does feel \"tired\" a lot and has low energy.  His son who accompanies with him today states that he is is feeling much better.  The patient says he does have low energy when she has a small more.  He denies any dizziness syncope or worsening orthopnea PND or peripheral edema.  He has no chest pain.    Cardiac PMH: (Old records have been reviewed and summarized below)  Atrial fibrillation  Failed medical therapy including amiodarone therapy with episodes of RVR  Status post AV node ablation [employer] [facility] Pacemaker 10/25/2023  Eliquis therapy, [id number] score equal to 5  Coronary disease  Remote CABG 1980  Stress test February 10,023 inferior infarct no ischemia EF 55%.  Chronic congestive heart failure  Echocardiogram September 30, 2023 EF 25%  Chronic kidney disease stage III    Past Medical History, Past Surgical History, Family history, Social History, and Medications were all reviewed with the patient today and updated as necessary.       Current Outpatient Medications:     apixaban (ELIQUIS) 2.5 MG tablet tablet, Take 1 tablet by mouth 2 (Two) Times a Day. Indications: Atrial Fibrillation, Disp: 60 tablet, Rfl: 0    aspirin 81 MG EC tablet, Take 1 tablet by mouth Daily., Disp: , Rfl:     Diclofenac Sodium (VOLTAREN) 1 % gel gel, Apply 2 g topically to the appropriate area as directed Every 4 " "(Four) Hours As Needed (for neck pain)., Disp: 2 g, Rfl: 0    ezetimibe (ZETIA) 10 MG tablet, Take 1 tablet by mouth Daily. Indications: High Amount of Fats in the Blood, Disp: , Rfl:     gabapentin (NEURONTIN) 300 MG capsule, Take 1 capsule by mouth 3 (Three) Times a Day. aking only 2 times a day   Indications: Neuropathic Pain, Disp: , Rfl:     multivitamin with minerals tablet tablet, Take 1 tablet by mouth Daily. Indications: vitamin deficiency, Disp: , Rfl:     nitroglycerin (NITROSTAT) 0.4 MG SL tablet, Place 1 tablet under the tongue Every 5 (Five) Minutes As Needed for Chest Pain. Indications: Acute Angina Pectoris, Disp: , Rfl:     torsemide (DEMADEX) 20 MG tablet, Take 0.5 tablets by mouth Daily. Indications: Cardiac Failure, Disp: 30 tablet, Rfl: 0    HYDROcodone-acetaminophen (NORCO) 5-325 MG per tablet, , Disp: , Rfl:     omeprazole (priLOSEC) 20 MG capsule, Take 1 capsule by mouth Daily. Indications: Stomach Ulcer, Disp: , Rfl:       No Known Allergies      PHYSICAL EXAM:    /62 (BP Location: Right arm, Patient Position: Sitting, Cuff Size: Adult)   Pulse 82   Ht 185.4 cm (73\")   Wt 80.2 kg (176 lb 12.8 oz)   SpO2 99%   BMI 23.33 kg/m²        Wt Readings from Last 5 Encounters:   12/19/23 80.2 kg (176 lb 12.8 oz)   11/14/23 77.6 kg (171 lb)   10/22/23 75 kg (165 lb 5.5 oz)   10/19/23 79.5 kg (175 lb 4.3 oz)   10/03/23 81.2 kg (179 lb 1.6 oz)       BP Readings from Last 5 Encounters:   12/19/23 120/62   12/04/23 128/70   11/27/23 150/86   11/16/23 128/74   11/15/23 124/68       General appearance - Alert, well appearing, and in no distress   Mental status - Affect appropriate to mood.  Eyes - Sclerae anicteric,  ENMT - Hearing grossly normal bilaterally, Dental hygiene good.  Neck - Carotids upstroke normal bilaterally, no bruits, no JVD.  Resp - Clear to auscultation, no wheezes, rales or rhonchi, symmetric air entry.  Heart - Normal rate, regular rhythm, normal S1, S2, no murmurs, rubs, " clicks or gallops.  GI - Soft, nontender, nondistended, no masses or organomegaly.  Neurological - Grossly intact - normal speech, no focal findings  Musculoskeletal - No joint tenderness, deformity or swelling, no muscular tenderness noted.  Extremities - Peripheral pulses normal, no pedal edema, no clubbing or cyanosis.  Skin - Normal coloration and turgor.  Psych -  oriented to person, place, and time.    Medical problems and test results were reviewed with the patient today.     Device Interrogation:  Please see device interrogation form which has been signed and updated for full details.  [employer] pacemaker.  DDDR.  Rate 80.  A paced 22%.  RV paced 99%.  Underlying rhythm A-fib.  No R waves present.  RV threshold 0.75 V at 0.5 ms.  RV impedance 510 ohms.  Right atrial impedance 410 ohms.  Battery voltage is 5.5 years.  77% mode switch.  (Left bundle pacing lead)    ASSESSMENT   1. Afib: Long-term persistent, now rate controlled status post AV node ablation.  Anticoagulation Eliquis 2.5 mg twice daily    2. AVN RFA and [employer] PPM.  Pacemaker interrogation today stable.  Normal function.  This was a left bundle pacing lead placement.    3. CAD: CABG 1980, Negative MPS, Inferior Infarct, No ischemia     4.  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  Echocardiogram September 30, 2023 EF 25% moderate MR    PLAN  Normal pacemaker interrogation today.  Consider follow-up echocardiogram with Dr. Espinoza next appointment.  He has no overt signs of heart failure did have cardiomyopathy probably tachycardia induced which is now resolved with AV node ablation and pacemaker.  Continue current medical therapy for now return follow-up Dr. Pickett 6 months or sooner as needed.      12/19/2023  14:23 EST    Electronically signed by JULI Myles, 12/19/23, 2:20 PM EST.

## 2024-01-17 ENCOUNTER — TELEPHONE (OUTPATIENT)
Dept: CARDIOLOGY | Facility: CLINIC | Age: 89
End: 2024-01-17
Payer: MEDICARE

## 2024-01-17 NOTE — TELEPHONE ENCOUNTER
Name: RHODA MEIER IVNNIE    Relationship: Emergency Contact    Best Callback Number: [phone]    Incoming call to the Hub, requesting to  Reschedule their Device Check appointment on 1.19.24.     Per Hub workflow, further review is needed before the task can be completed.    Result of Call: WARM TRANSFERRED TO THE OFFICE.

## 2024-01-26 ENCOUNTER — CLINICAL SUPPORT NO REQUIREMENTS (OUTPATIENT)
Dept: CARDIOLOGY | Facility: CLINIC | Age: 89
End: 2024-01-26
Payer: MEDICARE

## 2024-01-26 DIAGNOSIS — Z95.0 PRESENCE OF CARDIAC PACEMAKER: Primary | ICD-10-CM

## 2024-01-26 NOTE — PROGRESS NOTES
Pt is s/p AVNA 10/25/2023. Pt here for in office pacemaker interrogation for rate change.   Lower rate limit decreased from 80 bpm to 70 bpm per [facility]/[employer] device rep. RV output decreased from 3.5V to 2.0V.   Report placed on Dr. Pickett's desk.

## 2024-06-16 ENCOUNTER — APPOINTMENT (OUTPATIENT)
Dept: GENERAL RADIOLOGY | Facility: HOSPITAL | Age: 89
End: 2024-06-16
Payer: MEDICARE

## 2024-06-16 ENCOUNTER — APPOINTMENT (OUTPATIENT)
Dept: CT IMAGING | Facility: HOSPITAL | Age: 89
End: 2024-06-16
Payer: MEDICARE

## 2024-06-16 ENCOUNTER — HOSPITAL ENCOUNTER (EMERGENCY)
Facility: HOSPITAL | Age: 89
Discharge: HOME | End: 2024-06-16
Attending: EMERGENCY MEDICINE | Admitting: EMERGENCY MEDICINE
Payer: MEDICARE

## 2024-06-16 VITALS
HEART RATE: 80 BPM | WEIGHT: 176.37 LBS | BODY MASS INDEX: 23.37 KG/M2 | RESPIRATION RATE: 15 BRPM | HEIGHT: 73 IN | TEMPERATURE: 98.8 F | DIASTOLIC BLOOD PRESSURE: 75 MMHG | OXYGEN SATURATION: 98 % | SYSTOLIC BLOOD PRESSURE: 139 MMHG

## 2024-06-16 DIAGNOSIS — N18.9 CHRONIC KIDNEY DISEASE, UNSPECIFIED CKD STAGE: ICD-10-CM

## 2024-06-16 DIAGNOSIS — R29.6 UNWITNESSED FALL: Primary | ICD-10-CM

## 2024-06-16 DIAGNOSIS — Z86.79 HISTORY OF CORONARY ARTERY DISEASE: ICD-10-CM

## 2024-06-16 LAB
ALBUMIN SERPL-MCNC: 3.4 G/DL (ref 3.5–5.2)
ALBUMIN/GLOB SERPL: 1 G/DL
ALP SERPL-CCNC: 89 U/L (ref 39–117)
ALT SERPL W P-5'-P-CCNC: 9 U/L (ref 1–41)
ANION GAP SERPL CALCULATED.3IONS-SCNC: 13 MMOL/L (ref 5–15)
AST SERPL-CCNC: 18 U/L (ref 1–40)
BASOPHILS # BLD MANUAL: 0 10*3/MM3 (ref 0–0.2)
BASOPHILS NFR BLD MANUAL: 0 % (ref 0–1.5)
BILIRUB SERPL-MCNC: 0.6 MG/DL (ref 0–1.2)
BILIRUB UR QL STRIP: NEGATIVE
BUN SERPL-MCNC: 25 MG/DL (ref 8–23)
BUN/CREAT SERPL: 17.7 (ref 7–25)
CALCIUM SPEC-SCNC: 8.5 MG/DL (ref 8.2–9.6)
CHLORIDE SERPL-SCNC: 107 MMOL/L (ref 98–107)
CLARITY UR: CLEAR
CO2 SERPL-SCNC: 20 MMOL/L (ref 22–29)
COLOR UR: YELLOW
CREAT SERPL-MCNC: 1.41 MG/DL (ref 0.76–1.27)
DEPRECATED RDW RBC AUTO: 50.6 FL (ref 37–54)
EGFRCR SERPLBLD CKD-EPI 2021: 46.2 ML/MIN/1.73
EOSINOPHIL # BLD MANUAL: 0.06 10*3/MM3 (ref 0–0.4)
EOSINOPHIL NFR BLD MANUAL: 1 % (ref 0.3–6.2)
ERYTHROCYTE [DISTWIDTH] IN BLOOD BY AUTOMATED COUNT: 13 % (ref 12.3–15.4)
FLUAV RNA RESP QL NAA+PROBE: NOT DETECTED
FLUBV RNA RESP QL NAA+PROBE: NOT DETECTED
GLOBULIN UR ELPH-MCNC: 3.4 GM/DL
GLUCOSE SERPL-MCNC: 112 MG/DL (ref 65–99)
GLUCOSE UR STRIP-MCNC: NEGATIVE MG/DL
HCT VFR BLD AUTO: 32.2 % (ref 37.5–51)
HGB BLD-MCNC: 10 G/DL (ref 13–17.7)
HGB UR QL STRIP.AUTO: NEGATIVE
HOLD SPECIMEN: NORMAL
KETONES UR QL STRIP: NEGATIVE
LEUKOCYTE ESTERASE UR QL STRIP.AUTO: NEGATIVE
LIPASE SERPL-CCNC: 7 U/L (ref 13–60)
LYMPHOCYTES # BLD MANUAL: 0.78 10*3/MM3 (ref 0.7–3.1)
LYMPHOCYTES NFR BLD MANUAL: 6 % (ref 5–12)
MACROCYTES BLD QL SMEAR: ABNORMAL
MAGNESIUM SERPL-MCNC: 2 MG/DL (ref 1.7–2.3)
MCH RBC QN AUTO: 33.1 PG (ref 26.6–33)
MCHC RBC AUTO-ENTMCNC: 31.1 G/DL (ref 31.5–35.7)
MCV RBC AUTO: 106.6 FL (ref 79–97)
MONOCYTES # BLD: 0.33 10*3/MM3 (ref 0.1–0.9)
NEUTROPHILS # BLD AUTO: 4.38 10*3/MM3 (ref 1.7–7)
NEUTROPHILS NFR BLD MANUAL: 79 % (ref 42.7–76)
NITRITE UR QL STRIP: NEGATIVE
NRBC SPEC MANUAL: 0 /100 WBC (ref 0–0.2)
PH UR STRIP.AUTO: 5.5 [PH] (ref 5–8)
PLATELET # BLD AUTO: 172 10*3/MM3 (ref 140–450)
PMV BLD AUTO: 9.4 FL (ref 6–12)
POTASSIUM SERPL-SCNC: 4.4 MMOL/L (ref 3.5–5.2)
PROT SERPL-MCNC: 6.8 G/DL (ref 6–8.5)
PROT UR QL STRIP: NEGATIVE
RBC # BLD AUTO: 3.02 10*6/MM3 (ref 4.14–5.8)
SARS-COV-2 RNA RESP QL NAA+PROBE: NOT DETECTED
SMALL PLATELETS BLD QL SMEAR: ADEQUATE
SODIUM SERPL-SCNC: 140 MMOL/L (ref 136–145)
SP GR UR STRIP: 1.01 (ref 1–1.03)
TROPONIN T SERPL HS-MCNC: 39 NG/L
UROBILINOGEN UR QL STRIP: NORMAL
VARIANT LYMPHS NFR BLD MANUAL: 14 % (ref 19.6–45.3)
WBC MORPH BLD: NORMAL
WBC NRBC COR # BLD AUTO: 5.55 10*3/MM3 (ref 3.4–10.8)
WHOLE BLOOD HOLD COAG: NORMAL
WHOLE BLOOD HOLD SPECIMEN: NORMAL

## 2024-06-16 PROCEDURE — 83690 ASSAY OF LIPASE: CPT | Performed by: EMERGENCY MEDICINE

## 2024-06-16 PROCEDURE — 70450 CT HEAD/BRAIN W/O DYE: CPT

## 2024-06-16 PROCEDURE — 85007 BL SMEAR W/DIFF WBC COUNT: CPT | Performed by: EMERGENCY MEDICINE

## 2024-06-16 PROCEDURE — 80053 COMPREHEN METABOLIC PANEL: CPT | Performed by: EMERGENCY MEDICINE

## 2024-06-16 PROCEDURE — 83735 ASSAY OF MAGNESIUM: CPT | Performed by: EMERGENCY MEDICINE

## 2024-06-16 PROCEDURE — 85025 COMPLETE CBC W/AUTO DIFF WBC: CPT | Performed by: EMERGENCY MEDICINE

## 2024-06-16 PROCEDURE — 81003 URINALYSIS AUTO W/O SCOPE: CPT | Performed by: EMERGENCY MEDICINE

## 2024-06-16 PROCEDURE — 87636 SARSCOV2 & INF A&B AMP PRB: CPT | Performed by: EMERGENCY MEDICINE

## 2024-06-16 PROCEDURE — 99284 EMERGENCY DEPT VISIT MOD MDM: CPT

## 2024-06-16 PROCEDURE — 93005 ELECTROCARDIOGRAM TRACING: CPT | Performed by: EMERGENCY MEDICINE

## 2024-06-16 PROCEDURE — 84484 ASSAY OF TROPONIN QUANT: CPT | Performed by: EMERGENCY MEDICINE

## 2024-06-16 PROCEDURE — 71045 X-RAY EXAM CHEST 1 VIEW: CPT

## 2024-06-16 PROCEDURE — 36415 COLL VENOUS BLD VENIPUNCTURE: CPT

## 2024-06-16 PROCEDURE — 74176 CT ABD & PELVIS W/O CONTRAST: CPT

## 2024-06-16 RX ORDER — SODIUM CHLORIDE 0.9 % (FLUSH) 0.9 %
10 SYRINGE (ML) INJECTION AS NEEDED
Status: DISCONTINUED | OUTPATIENT
Start: 2024-06-16 | End: 2024-06-17 | Stop reason: HOSPADM

## 2024-06-16 NOTE — ED PROVIDER NOTES
[facility]    EMERGENCY DEPARTMENT ENCOUNTER      Pt Name: Mukund Pool  MRN: [id number]  YOB: 1930  Date of evaluation: 6/16/2024  Provider: Oscar Reno MD    CHIEF COMPLAINT       Chief Complaint   Patient presents with    Fall         HISTORY OF PRESENT ILLNESS   Mukund Pool is a 89 y.o. male who presents to the emergency department after an unwitnessed fall.  Patient had no complaints.  Staff at the nursing facility heard the patient fall.  Specifically, he denies any headache, chest pain, abdominal pain.  He did apparently have 1 episode of vomiting for EMS prior to arrival.      Nursing notes were reviewed.    REVIEW OF SYSTEMS     ROS:  A chief complaint appropriate review of systems was completed and is negative except as noted in the HPI.      PAST MEDICAL HISTORY     Past Medical History:   Diagnosis Date    Arthritis     Chronic kidney disease     kidney stones    Coronary artery disease     GERD (gastroesophageal reflux disease)     History of radiation therapy 05/24/2021    laryngeal mass    Prostate cancer     Vocal cord cancer          SURGICAL HISTORY       Past Surgical History:   Procedure Laterality Date    CARDIAC ELECTROPHYSIOLOGY PROCEDURE  04/2023    cardiac loop recorder    CARDIAC ELECTROPHYSIOLOGY PROCEDURE N/A 10/25/2023    Procedure: Pacemaker DC new;  Surgeon: Levy Pickett MD;  Location:  [facility];  Service: Cardiology;  Laterality: N/A;    CARDIAC ELECTROPHYSIOLOGY PROCEDURE N/A 10/25/2023    Procedure: AV node ablation;  Surgeon: Levy Pickett MD;  Location:  [facility];  Service: Cardiovascular;  Laterality: N/A;    CHOLECYSTECTOMY      CORONARY ARTERY BYPASS GRAFT      PROSTATE SURGERY      VOCAL CORD MASS EXCISION  03/22/2021         CURRENT MEDICATIONS       Current Facility-Administered Medications:     sodium chloride 0.9 % bolus 1,000 mL, 1,000 mL, Intravenous, Once, Oscar Reno MD    sodium chloride  0.9 % flush 10 mL, 10 mL, Intravenous, PRN, Oscar Reno MD    Current Outpatient Medications:     apixaban (ELIQUIS) 2.5 MG tablet tablet, Take 1 tablet by mouth 2 (Two) Times a Day. Indications: Atrial Fibrillation, Disp: 60 tablet, Rfl: 0    aspirin 81 MG EC tablet, Take 1 tablet by mouth Daily., Disp: , Rfl:     Diclofenac Sodium (VOLTAREN) 1 % gel gel, Apply 2 g topically to the appropriate area as directed Every 4 (Four) Hours As Needed (for neck pain)., Disp: 2 g, Rfl: 0    ezetimibe (ZETIA) 10 MG tablet, Take 1 tablet by mouth Daily. Indications: High Amount of Fats in the Blood, Disp: , Rfl:     gabapentin (NEURONTIN) 300 MG capsule, Take 1 capsule by mouth 3 (Three) Times a Day. aking only 2 times a day   Indications: Neuropathic Pain, Disp: , Rfl:     HYDROcodone-acetaminophen (NORCO) 5-325 MG per tablet, , Disp: , Rfl:     multivitamin with minerals tablet tablet, Take 1 tablet by mouth Daily. Indications: vitamin deficiency, Disp: , Rfl:     nitroglycerin (NITROSTAT) 0.4 MG SL tablet, Place 1 tablet under the tongue Every 5 (Five) Minutes As Needed for Chest Pain. Indications: Acute Angina Pectoris, Disp: , Rfl:     omeprazole (priLOSEC) 20 MG capsule, Take 1 capsule by mouth Daily. Indications: Stomach Ulcer, Disp: , Rfl:     torsemide (DEMADEX) 20 MG tablet, Take 0.5 tablets by mouth Daily. Indications: Cardiac Failure, Disp: 30 tablet, Rfl: 0    ALLERGIES     Patient has no known allergies.    FAMILY HISTORY       Family History   Problem Relation Age of Onset    Cancer Mother     Breast cancer Mother     Heart attack Father     Ovarian cancer Sister           SOCIAL HISTORY       Social History     Socioeconomic History    Marital status:    Tobacco Use    Smoking status: Never     Passive exposure: Never    Smokeless tobacco: Never   Vaping Use    Vaping status: Never Used   Substance and Sexual Activity    Alcohol use: No    Drug use: No    Sexual activity: Defer         PHYSICAL EXAM  "   (up to 7 for level 4, 8 or more for level 5)     Vitals:    06/16/24 1911 06/16/24 2000 06/16/24 2030 06/16/24 2142   BP:       Pulse:  82 80    Resp: 15      Temp: 98.8 °F (37.1 °C)      TempSrc: Oral      SpO2:   98%    Weight:    80 kg (176 lb 5.9 oz)   Height:    185.4 cm (73\")       General: Awake, alert, no acute distress.  HEENT: Conjunctivae normal.  Neck: Trachea midline.  Cardiac: Heart regular rate, rhythm, no murmurs, rubs, or gallops  Lungs: Lungs are clear to auscultation, there is no wheezing, rhonchi, or rales. There is no use of accessory muscles.  Chest wall: There is no tenderness to palpation over the chest wall or over ribs  Abdomen: Abdomen is soft, nontender, nondistended. There are no firm or pulsatile masses, no rebound rigidity or guarding.   Musculoskeletal: No deformity.  Neuro: Alert.  Answering questions appropriately and moves all extremities equally.  Dermatology: Skin is warm and dry  Psych: Mentation is grossly normal, cognition is grossly normal. Affect is appropriate.        DIAGNOSTIC RESULTS     EKG: All EKGs are interpreted by the Emergency Department Physician who either signs or Co-signs this chart in the absence of a cardiologist.    ECG 12 Lead Other; vomiting   Preliminary Result   Test Reason : Other~   Blood Pressure :   */*   mmHG   Vent. Rate :  80 BPM     Atrial Rate : 277 BPM      P-R Int :   * ms          QRS Dur : 120 ms       QT Int : 414 ms       P-R-T Axes :   * -26 116 degrees      QTc Int : 477 ms      Ventricular-paced rhythm   Abnormal ECG   When compared with ECG of 26-OCT-2023 07:57,   Vent. rate has decreased BY   7 BPM      Referred By:            Confirmed By:       ECG 12 Lead ED Triage Standing Order; Weak / Dizzy / AMS    (Results Pending)         RADIOLOGY:   [x] Radiologist's Report Reviewed:  CT Abdomen Pelvis Without Contrast   Final Result   Impression:   1.No acute abdominal or pelvic abnormality.   2.Trace bilateral pleural effusions with " mild bibasilar atelectasis.   3.Small hiatal hernia.   4.Multiple pancreatic cystic lesions, which appear unchanged since at least 2021.   5.Status post cholecystectomy and prostatectomy.   6.4 mm nonobstructing right kidney stone.   7.Mild colonic diverticulosis.            Electronically Signed: Rickie Lamas MD     6/16/2024 9:18 PM EDT     Workstation ID: [id number]      CT Head Without Contrast   Final Result   Impression:   1.No acute intracranial abnormality.   2.Mild chronic small vessel ischemic change.   3.Mild left maxillary sinus mucosal disease.            Electronically Signed: Rickie Lamas MD     6/16/2024 9:10 PM EDT     Workstation ID: [id number]      XR Chest 1 View   Final Result   Impression:   No acute cardiopulmonary abnormality is identified.         Electronically Signed: Natalia Gtz     6/16/2024 6:12 PM EDT     Workstation ID: [id number]          I ordered and independently reviewed the above noted radiographic studies.        LABS:    I have reviewed and interpreted all of the currently available lab results from this visit (if applicable):  Results for orders placed or performed during the hospital encounter of 06/16/24   COVID-19 and FLU A/B PCR, 1 HR TAT - Swab, Nasopharynx    Specimen: Nasopharynx; Swab   Result Value Ref Range    COVID19 Not Detected Not Detected - Ref. Range    Influenza A PCR Not Detected Not Detected    Influenza B PCR Not Detected Not Detected   Comprehensive Metabolic Panel    Specimen: Blood   Result Value Ref Range    Glucose 112 (H) 65 - 99 mg/dL    BUN 25 (H) 8 - 23 mg/dL    Creatinine 1.41 (H) 0.76 - 1.27 mg/dL    Sodium 140 136 - 145 mmol/L    Potassium 4.4 3.5 - 5.2 mmol/L    Chloride 107 98 - 107 mmol/L    CO2 20.0 (L) 22.0 - 29.0 mmol/L    Calcium 8.5 8.2 - 9.6 mg/dL    Total Protein 6.8 6.0 - 8.5 g/dL    Albumin 3.4 (L) 3.5 - 5.2 g/dL    ALT (SGPT) 9 1 - 41 U/L    AST (SGOT) 18 1 - 40 U/L    Alkaline Phosphatase 89 39 - 117 U/L    Total Bilirubin 0.6 0.0  - 1.2 mg/dL    Globulin 3.4 gm/dL    A/G Ratio 1.0 g/dL    BUN/Creatinine Ratio 17.7 7.0 - 25.0    Anion Gap 13.0 5.0 - 15.0 mmol/L    eGFR 46.2 (L) >60.0 mL/min/1.73   Single High Sensitivity Troponin T    Specimen: Blood   Result Value Ref Range    HS Troponin T 39 (H) <22 ng/L   Magnesium    Specimen: Blood   Result Value Ref Range    Magnesium 2.0 1.7 - 2.3 mg/dL   Urinalysis With Microscopic If Indicated (No Culture) - Urine, Clean Catch    Specimen: Urine, Clean Catch   Result Value Ref Range    Color, UA Yellow Yellow, Straw    Appearance, UA Clear Clear    pH, UA 5.5 5.0 - 8.0    Specific Gravity, UA 1.015 1.001 - 1.030    Glucose, UA Negative Negative    Ketones, UA Negative Negative    Bilirubin, UA Negative Negative    Blood, UA Negative Negative    Protein, UA Negative Negative    Leuk Esterase, UA Negative Negative    Nitrite, UA Negative Negative    Urobilinogen, UA 1.0 E.U./dL 0.2 - 1.0 E.U./dL   CBC Auto Differential    Specimen: Blood   Result Value Ref Range    WBC 5.55 3.40 - 10.80 10*3/mm3    RBC 3.02 (L) 4.14 - 5.80 10*6/mm3    Hemoglobin 10.0 (L) 13.0 - 17.7 g/dL    Hematocrit 32.2 (L) 37.5 - 51.0 %    .6 (H) 79.0 - 97.0 fL    MCH 33.1 (H) 26.6 - 33.0 pg    MCHC 31.1 (L) 31.5 - 35.7 g/dL    RDW 13.0 12.3 - 15.4 %    RDW-SD 50.6 37.0 - 54.0 fl    MPV 9.4 6.0 - 12.0 fL    Platelets 172 140 - 450 10*3/mm3   Lipase    Specimen: Blood   Result Value Ref Range    Lipase 7 (L) 13 - 60 U/L   Manual Differential    Specimen: Blood   Result Value Ref Range    Neutrophil % 79.0 (H) 42.7 - 76.0 %    Lymphocyte % 14.0 (L) 19.6 - 45.3 %    Monocyte % 6.0 5.0 - 12.0 %    Eosinophil % 1.0 0.3 - 6.2 %    Basophil % 0.0 0.0 - 1.5 %    Neutrophils Absolute 4.38 1.70 - 7.00 10*3/mm3    Lymphocytes Absolute 0.78 0.70 - 3.10 10*3/mm3    Monocytes Absolute 0.33 0.10 - 0.90 10*3/mm3    Eosinophils Absolute 0.06 0.00 - 0.40 10*3/mm3    Basophils Absolute 0.00 0.00 - 0.20 10*3/mm3    nRBC 0.0 0.0 - 0.2 /100 WBC     Macrocytes Mod/2+ None Seen    WBC Morphology Normal Normal    Platelet Estimate Adequate Normal   ECG 12 Lead Other; vomiting   Result Value Ref Range    QT Interval 414 ms    QTC Interval 477 ms   Green Top (Gel)   Result Value Ref Range    Extra Tube Hold for add-ons.    Lavender Top   Result Value Ref Range    Extra Tube hold for add-on    Gold Top - SST   Result Value Ref Range    Extra Tube Hold for add-ons.    Gray Top   Result Value Ref Range    Extra Tube Hold for add-ons.    Light Blue Top   Result Value Ref Range    Extra Tube Hold for add-ons.         If labs were ordered, I independently reviewed the results and considered them in treating the patient.      EMERGENCY DEPARTMENT COURSE and DIFFERENTIAL DIAGNOSIS/MDM:   Vitals:  AS OF 22:29 EDT    BP - 139/75  HR - 80  TEMP - 98.8 °F (37.1 °C) (Oral)  O2 SATS - 98%        Discussion below represents my analysis of pertinent findings related to patient's condition, differential diagnosis, treatment plan and final disposition.      Differential diagnosis:  The differential diagnosis associated with the patient's presentation includes: Intracranial hemorrhage ACS, dysrhythmia, UTI, pneumonia, dehydration, electrolyte derangement      Independent interpretations (ECG/rhythm strip/X-ray/US/CT scan): I independently interpreted the patient's head CT and chest x-ray.  There is no evidence of pulmonary infiltrate and no evidence of intracranial hemorrhage.      Additional sources:  Discussed/obtained information from independent historians:   [] Spouse:   [] Parent:   [] Friend:   [x] EMS: Reports taken from EMS.  1 episode of vomiting and route.  Vital signs stable.   [] Other:  External (non-ED) record review:   [] Inpatient record:   [] Office record:   [] Outpatient record:   [] Prior Outpatient labs:   [] Prior Outpatient radiology:   [] Primary Care record:   [] Outside ED record:   [] Other:       Patient's care impacted by:   [] Diabetes   []  Hypertension   [x] Coronary Artery Disease   [] Cancer   [x] Other: CKD    Care significantly affected by Social Determinants of Health (housing and economic circumstances, unemployment)    [] Yes     [x] No   If yes, Patient's care significantly limited by  Social Determinants of Health including:    [] Inadequate housing    [] Low income    [] Alcoholism and drug addiction in family    [] Problems related to primary support group    [] Unemployment    [] Problems related to employment    [] Other Social Determinants of Health:       Consideration of admission/observation vs discharge: Considered observation mission, however patient very well-appearing with normal vital signs, no complaints, unremarkable workup in the ED and therefore feel that he is stable for discharge home with outpatient follow-up.      ED Course:           On reexamination, patient christoph very well-appearing and completely asymptomatic.  He is resting comfortably in bed.  He is requesting to be discharged and states that he is hungry.  ED workup is unremarkable without any concerning findings.  Feel that he is stable for discharge home with outpatient follow-up.    I had a discussion with the patient/family regarding diagnosis, diagnostic results, treatment plan, and medications.  The patient/family indicated understanding of these instructions.  I spent adequate time at the bedside preceding discharge necessary to personally discuss the aftercare instructions, giving patient education, providing explanations of the results of our evaluations/findings, and my decision making to assure that the patient/family understand the plan of care.  Time was allotted to answer questions at that time and throughout the ED course.  Emphasis was placed on timely follow-up after discharge.  I also discussed the potential for the development of an acute emergent condition requiring further evaluation, admission, or even surgical intervention. I discussed that  we found nothing during the visit today indicating the need for further workup, admission, or the presence of an unstable medical condition.  I encouraged the patient to return to the emergency department immediately for ANY concerns, worsening, new complaints, or if symptoms persist and unable to seek follow-up in a timely fashion.  The patient/family expressed understanding and agreement with this plan.  The patient will follow-up with their PCP in 1-2 days for reevaluation.           PROCEDURES:  Procedures    CRITICAL CARE TIME        FINAL IMPRESSION      1. Unwitnessed fall    2. History of coronary artery disease    3. Chronic kidney disease, unspecified CKD stage          DISPOSITION/PLAN     ED Disposition       ED Disposition   Discharge    Condition   Stable    Comment   --                 Comment: Please note this report has been produced using speech recognition software.      Oscar Reno MD  Attending Emergency Physician             Oscar Reno MD  06/16/24 3224

## 2024-06-17 LAB
QT INTERVAL: 414 MS
QTC INTERVAL: 477 MS

## 2024-06-27 LAB
QT INTERVAL: 416 MS
QTC INTERVAL: 479 MS

## 2024-06-28 NOTE — PROGRESS NOTES
Cardiology Outpatient Visit      Identification: Mukund Pool is a 89 y.o. male who resides in [place], [place]    Reason for visit:  Coronary artery disease involving native coronary artery of      Subjective      Patient is a 89-year-old gentleman who returns today for follow-up of his coronary artery disease, chronic systolic heart failure, paroxysmal atrial fibrillation, pacemaker and cardiac risk factors.  Since his last visit he did have an unwitnessed fall and presented to the emergency room.  Workup was benign and he was discharged home.  He has not had any further falls since.  He tells me it was upon standing.  He thinks he stood too fast.  He thinks when he fell he may have briefly lost consciousness.  Orthostatics obtained in the office today does show some decline in blood pressure upon standing.  Lying was 149/82, sitting 139/90 and standing 132/80.  He was not dizzy when he stood today.  He denies any chest pain or dyspnea.  He has not been retaining any fluid.  He is doing well on a daily water pill.  He has been refractory to initiating many guideline directed medical therapy for his heart failure due to orthostatic hypotension but is tolerating metoprolol.    Review of Systems   Constitutional: Negative for malaise/fatigue.   Eyes:  Negative for vision loss in left eye and vision loss in right eye.   Cardiovascular:  Negative for chest pain, dyspnea on exertion, near-syncope, orthopnea, palpitations, paroxysmal nocturnal dyspnea and syncope.   Musculoskeletal:  Negative for myalgias.   Neurological:  Negative for brief paralysis, excessive daytime sleepiness, focal weakness, numbness, paresthesias and weakness.   All other systems reviewed and are negative.      No Known Allergies      Current Outpatient Medications   Medication Instructions    apixaban (ELIQUIS) 2.5 mg, Oral, 2 Times Daily    aspirin 81 mg, Oral, Daily    Diclofenac Sodium (VOLTAREN) 2 g, Topical, Every 4 Hours PRN     "ezetimibe (ZETIA) 10 MG tablet 1 tablet, Oral, Daily    gabapentin (NEURONTIN) 300 MG capsule 1 capsule, Oral, 3 Times Daily, aking only 2 times a day     HYDROcodone-acetaminophen (NORCO) 5-325 MG per tablet     metoprolol succinate XL (TOPROL-XL) 37.5 mg, Oral, Daily    mirtazapine (REMERON) 15 mg, Oral, Daily    multivitamin with minerals tablet tablet 1 tablet, Oral, Daily    nitroglycerin (NITROSTAT) 0.4 mg, Sublingual, Every 5 Minutes PRN    omeprazole (priLOSEC) 20 MG capsule 1 capsule, Oral, Daily    oxyCODONE-acetaminophen (PERCOCET) 5-325 MG per tablet 1 tablet, Oral, Once As Needed    promethazine (PHENERGAN) 25 mg, Oral, Every 6 Hours PRN    sertraline (ZOLOFT) 25 MG tablet Take one tablet daily if tolerating after 7 days increase to two tablets daily.    torsemide (DEMADEX) 10 mg, Oral, Daily         Objective     /80 (BP Location: Left arm, Patient Position: Standing)   Pulse 81   Ht 185.4 cm (73\")   Wt 81.9 kg (180 lb 9.6 oz)   SpO2 97%   BMI 23.83 kg/m²       Constitutional:       General: Awake.      Appearance: Healthy appearance.   Neck:      Vascular: No JVD.   Pulmonary:      Effort: Pulmonary effort is normal.      Breath sounds: Normal breath sounds.   Cardiovascular:      Normal rate. Regular rhythm.      Murmurs: There is no murmur.   Pulses:     Intact distal pulses.   Edema:     Peripheral edema absent.   Abdominal:      Palpations: Abdomen is soft.   Skin:     General: Skin is warm and dry.   Neurological:      Mental Status: Alert and oriented to person, place and time.   Psychiatric:         Behavior: Behavior is cooperative.         Result Review  (reviewed with patient):            Lab Results   Component Value Date    GLUCOSE 112 (H) 06/16/2024    BUN 25 (H) 06/16/2024    CREATININE 1.41 (H) 06/16/2024    EGFR 46.2 (L) 06/16/2024    BCR 17.7 06/16/2024    K 4.4 06/16/2024    CO2 20.0 (L) 06/16/2024    CALCIUM 8.5 06/16/2024    ALBUMIN 3.4 (L) 06/16/2024    BILITOT 0.6 " 06/16/2024    AST 18 06/16/2024    ALT 9 06/16/2024     Lab Results   Component Value Date    WBC 5.55 06/16/2024    HGB 10.0 (L) 06/16/2024    HCT 32.2 (L) 06/16/2024    .6 (H) 06/16/2024     06/16/2024     Lab Results   Component Value Date    CHOL 132 09/30/2023    TRIG 51 09/30/2023    HDL 38 (L) 09/30/2023    LDL 83 09/30/2023     Lab Results   Component Value Date    HGBA1C 6.0 04/07/2015           Assessment     Diagnoses and all orders for this visit:    1. Coronary artery disease involving native coronary artery of native heart with angina pectoris (Primary)  Overview:  CABG 1980  Echo (2/23): LVEF EF 60%.  LVH with grade 2 DD.  No significant valvular disease  Pharmacologic nuclear stress (2/10/2023): Inferior infarct.  No ischemia.  LVEF 55%   Echo (9/30/2023): LVEF 25%.  Moderate MR.  RVSP <35 mmHg    Assessment & Plan:  No signs or symptoms of angina.  Continue to treat CAD medically  Continue aspirin 81 mg daily  Continue metoprolol succinate 37.5 mg daily  Sublingual nitroglycerin as needed for any episodes of angina      2. Chronic HFrEF (heart failure with reduced ejection fraction)  Overview:  Echo (2/23): LVEF EF 60%.  LVH with grade 2 DD.  No significant valvular disease  [facility] admission with FC IV heart failure symptoms, pulmonary edema on chest x-ray, and elevated proBNP, 9/30/2023  Echo (9/30/2023): LVEF 25%.  Moderate MR.  RVSP <35 mmHg    Assessment & Plan:  Stable NYHA class II symptoms  Continue Demadex 10 mg daily      3. Paroxysmal atrial fibrillation  Overview:  [id number] 4 (age >75, CAD, HTN)  4% A-fib burden on recent loop recorder interrogation  AV chaz ablation and placement of dual-chamber pacemaker for tachybradycardia syndrome/sick sinus syndrome 10/25/2023    Assessment & Plan:  Continue Eliquis 2.5 mg twice daily for stroke prophylaxis  Continue metoprolol succinate 37.5 mg daily      4. LBBB (left bundle branch block)    5. Presence of cardiac  pacemaker  Overview:  AV chaz ablation and placement of dual-chamber pacemaker by Dr. Pickett for tachybradycardia syndrome on 10/25/2023    Assessment & Plan:  Recent pacemaker check normal functioning device      6. Hyperlipidemia LDL goal <70  Assessment & Plan:  Defer statin therapy due to advanced age  Continue Zetia 10 mg daily            Plan   Continue current medications.  If patient has repeated falls may need to reconsider anticoagulation  Continue aspirin, statin and beta-blocker  Allow for some permissive hypertension given falls and orthostatic hypotension occurring at times      Follow-up   Return in about 6 months (around 1/2/2025), or if symptoms worsen or fail to improve, for Follow-up with Dr. Espinoza next visit.      Radha Billingsley, APRN  7/2/2024

## 2024-07-02 ENCOUNTER — OFFICE VISIT (OUTPATIENT)
Dept: CARDIOLOGY | Facility: CLINIC | Age: 89
End: 2024-07-02
Payer: MEDICARE

## 2024-07-02 VITALS
HEART RATE: 81 BPM | WEIGHT: 180.6 LBS | HEIGHT: 73 IN | BODY MASS INDEX: 23.94 KG/M2 | SYSTOLIC BLOOD PRESSURE: 132 MMHG | OXYGEN SATURATION: 97 % | DIASTOLIC BLOOD PRESSURE: 80 MMHG

## 2024-07-02 DIAGNOSIS — Z95.0 PRESENCE OF CARDIAC PACEMAKER: ICD-10-CM

## 2024-07-02 DIAGNOSIS — I25.119 CORONARY ARTERY DISEASE INVOLVING NATIVE CORONARY ARTERY OF NATIVE HEART WITH ANGINA PECTORIS: Primary | ICD-10-CM

## 2024-07-02 DIAGNOSIS — I48.0 PAROXYSMAL ATRIAL FIBRILLATION: ICD-10-CM

## 2024-07-02 DIAGNOSIS — I50.22 CHRONIC HFREF (HEART FAILURE WITH REDUCED EJECTION FRACTION): ICD-10-CM

## 2024-07-02 DIAGNOSIS — I44.7 LBBB (LEFT BUNDLE BRANCH BLOCK): ICD-10-CM

## 2024-07-02 DIAGNOSIS — E78.5 HYPERLIPIDEMIA LDL GOAL <70: ICD-10-CM

## 2024-07-02 PROCEDURE — 1159F MED LIST DOCD IN RCRD: CPT | Performed by: NURSE PRACTITIONER

## 2024-07-02 PROCEDURE — 1160F RVW MEDS BY RX/DR IN RCRD: CPT | Performed by: NURSE PRACTITIONER

## 2024-07-02 PROCEDURE — 99214 OFFICE O/P EST MOD 30 MIN: CPT | Performed by: NURSE PRACTITIONER

## 2024-07-02 RX ORDER — PROMETHAZINE HYDROCHLORIDE 25 MG/1
25 TABLET ORAL EVERY 6 HOURS PRN
COMMUNITY
Start: 2024-06-26

## 2024-07-02 RX ORDER — METOPROLOL SUCCINATE 25 MG/1
37.5 TABLET, EXTENDED RELEASE ORAL DAILY
COMMUNITY
Start: 2023-09-28 | End: 2024-09-27

## 2024-07-02 RX ORDER — MIRTAZAPINE 15 MG/1
15 TABLET, FILM COATED ORAL DAILY
COMMUNITY
Start: 2024-06-11 | End: 2024-09-09

## 2024-07-02 RX ORDER — OXYCODONE HYDROCHLORIDE AND ACETAMINOPHEN 5; 325 MG/1; MG/1
1 TABLET ORAL ONCE AS NEEDED
COMMUNITY
Start: 2024-06-26

## 2024-07-02 RX ORDER — SERTRALINE HYDROCHLORIDE 25 MG/1
TABLET, FILM COATED ORAL
COMMUNITY
Start: 2024-06-26

## 2024-07-02 NOTE — ASSESSMENT & PLAN NOTE
Continue Eliquis 2.5 mg twice daily for stroke prophylaxis  Continue metoprolol succinate 37.5 mg daily

## 2024-07-02 NOTE — ASSESSMENT & PLAN NOTE
No signs or symptoms of angina.  Continue to treat CAD medically  Continue aspirin 81 mg daily  Continue metoprolol succinate 37.5 mg daily  Sublingual nitroglycerin as needed for any episodes of angina

## 2024-07-04 LAB
QT INTERVAL: 414 MS
QTC INTERVAL: 477 MS

## 2024-07-09 ENCOUNTER — OFFICE VISIT (OUTPATIENT)
Dept: CARDIOLOGY | Facility: CLINIC | Age: 89
End: 2024-07-09
Payer: MEDICARE

## 2024-07-09 VITALS
DIASTOLIC BLOOD PRESSURE: 70 MMHG | SYSTOLIC BLOOD PRESSURE: 110 MMHG | HEIGHT: 73 IN | WEIGHT: 180 LBS | BODY MASS INDEX: 23.86 KG/M2 | HEART RATE: 80 BPM | OXYGEN SATURATION: 96 %

## 2024-07-09 DIAGNOSIS — Z95.0 PRESENCE OF CARDIAC PACEMAKER: Primary | ICD-10-CM

## 2024-07-09 DIAGNOSIS — I48.19 PERSISTENT ATRIAL FIBRILLATION: ICD-10-CM

## 2024-07-10 NOTE — PROGRESS NOTES
Cardiac Electrophysiology Outpatient Note  [facility]    Office Visit     Mukund Pool  [id number]  07/09/2024    Primary Care Physician: Slick uHbbard MD    Referred By: No ref. provider found    Subjective     Chief Complaint   Patient presents with    Coronary artery disease involving native coronary artery of       History of Present Illness:   Mukund Pool is a 89 y.o. male who presents to my electrophysiology clinic for follow up of persistent atrial fibrillation status post AV node ablation and pacemaker implantation.  Lead was placed in the left bundle branch area pacing position.  Since his last visit he is overall done okay.  Does note increased fatigue and decreased energy.  Denies any chest pain or shortness of breath on exertion.  Has had some episodes of presyncope.  Unclear if he has had any true syncopal episodes.  These seem to occur when standing up.  Has been tolerating Eliquis without any issues.  Has noted increasing weakness and difficulty with balance.  Also has noted nausea, especially after taking his pills.    Past Medical History:   Diagnosis Date    Arthritis     Chronic kidney disease     kidney stones    Coronary artery disease     GERD (gastroesophageal reflux disease)     History of radiation therapy 05/24/2021    laryngeal mass    Prostate cancer     Vocal cord cancer        Past Surgical History:   Procedure Laterality Date    CARDIAC ELECTROPHYSIOLOGY PROCEDURE  04/2023    cardiac loop recorder    CARDIAC ELECTROPHYSIOLOGY PROCEDURE N/A 10/25/2023    Procedure: Pacemaker DC new;  Surgeon: Levy Pickett MD;  Location:  [facility];  Service: Cardiology;  Laterality: N/A;    CARDIAC ELECTROPHYSIOLOGY PROCEDURE N/A 10/25/2023    Procedure: AV node ablation;  Surgeon: Levy Pickett MD;  Location:  [facility];  Service: Cardiovascular;  Laterality: N/A;    CHOLECYSTECTOMY       CORONARY ARTERY BYPASS GRAFT      PROSTATE SURGERY      VOCAL CORD MASS EXCISION  03/22/2021       Family History   Problem Relation Age of Onset    Cancer Mother     Breast cancer Mother     Heart attack Father     Ovarian cancer Sister        Social History     Socioeconomic History    Marital status:    Tobacco Use    Smoking status: Never     Passive exposure: Never    Smokeless tobacco: Never   Vaping Use    Vaping status: Never Used   Substance and Sexual Activity    Alcohol use: No    Drug use: No    Sexual activity: Defer         Current Outpatient Medications:     apixaban (ELIQUIS) 2.5 MG tablet tablet, Take 1 tablet by mouth 2 (Two) Times a Day. Indications: Atrial Fibrillation, Disp: 60 tablet, Rfl: 0    aspirin 81 MG EC tablet, Take 1 tablet by mouth Daily., Disp: , Rfl:     Diclofenac Sodium (VOLTAREN) 1 % gel gel, Apply 2 g topically to the appropriate area as directed Every 4 (Four) Hours As Needed (for neck pain)., Disp: 2 g, Rfl: 0    ezetimibe (ZETIA) 10 MG tablet, Take 1 tablet by mouth Daily. Indications: High Amount of Fats in the Blood, Disp: , Rfl:     gabapentin (NEURONTIN) 300 MG capsule, Take 1 capsule by mouth 3 (Three) Times a Day. aking only 2 times a day   Indications: Neuropathic Pain, Disp: , Rfl:     HYDROcodone-acetaminophen (NORCO) 5-325 MG per tablet, , Disp: , Rfl:     metoprolol succinate XL (TOPROL-XL) 25 MG 24 hr tablet, Take 1.5 tablets by mouth Daily., Disp: , Rfl:     mirtazapine (REMERON) 15 MG tablet, Take 1 tablet by mouth Daily., Disp: , Rfl:     multivitamin with minerals tablet tablet, Take 1 tablet by mouth Daily. Indications: vitamin deficiency, Disp: , Rfl:     nitroglycerin (NITROSTAT) 0.4 MG SL tablet, Place 1 tablet under the tongue Every 5 (Five) Minutes As Needed for Chest Pain. Indications: Acute Angina Pectoris, Disp: , Rfl:     omeprazole (priLOSEC) 20 MG capsule, Take 1 capsule by mouth Daily. Indications: Stomach Ulcer, Disp: , Rfl:      "oxyCODONE-acetaminophen (PERCOCET) 5-325 MG per tablet, Take 1 tablet by mouth 1 (One) Time As Needed for Moderate Pain., Disp: , Rfl:     promethazine (PHENERGAN) 25 MG tablet, Take 1 tablet by mouth Every 6 (Six) Hours As Needed., Disp: , Rfl:     sertraline (ZOLOFT) 25 MG tablet, Take one tablet daily if tolerating after 7 days increase to two tablets daily., Disp: , Rfl:     torsemide (DEMADEX) 20 MG tablet, Take 0.5 tablets by mouth Daily. Indications: Cardiac Failure, Disp: 30 tablet, Rfl: 0    Allergies:   No Known Allergies    Objective   Vital Signs: Blood pressure 110/70, pulse 80, height 185.4 cm (72.99\"), weight 81.6 kg (180 lb), SpO2 96%.    PHYSICAL EXAM  General appearance: Awake, alert, cooperative  Head: Normocephalic, without obvious abnormality, atraumatic  Neck: No JVD  Lungs: Clear to ascultation bilaterally  Heart: Regular rate and rhythm, no murmurs, 2+ LE pulses, no lower extremity swelling  Skin: Skin color, turgor normal, no rashes or lesions  Neurologic: Grossly normal     Lab Results   Component Value Date    GLUCOSE 112 (H) 06/16/2024    CALCIUM 8.5 06/16/2024     06/16/2024    K 4.4 06/16/2024    CO2 20.0 (L) 06/16/2024     06/16/2024    BUN 25 (H) 06/16/2024    CREATININE 1.41 (H) 06/16/2024    BCR 17.7 06/16/2024    ANIONGAP 13.0 06/16/2024     Lab Results   Component Value Date    WBC 5.55 06/16/2024    HGB 10.0 (L) 06/16/2024    HCT 32.2 (L) 06/16/2024    .6 (H) 06/16/2024     06/16/2024     Lab Results   Component Value Date    INR 1.05 10/25/2023    INR 0.98 04/20/2023    INR 0.96 04/07/2015    PROTIME 13.8 10/25/2023    PROTIME 10.7 04/20/2023    PROTIME 10.0 04/07/2015     Lab Results   Component Value Date    TSH 2.010 10/18/2023          Results for orders placed during the hospital encounter of 09/29/23    Adult Transthoracic Echo Complete W/ Cont if Necessary Per Protocol    Interpretation Summary    The left ventricle is mildly dilated and globally " hypokinetic.  Left ventricular systolic function is moderately decreased. Estimated left ventricular EF = 25%    Aortic valve is calcified with no significant stenosis or regurgitation.    Moderate mitral valve regurgitation is present.    Estimated right ventricular systolic pressure from tricuspid regurgitation is normal (<35 mmHg).    There is a small (<1cm) pericardial effusion adjacent to the left ventricle.    Bilateral pleural effusions present         I personally viewed and interpreted the patient's EKG/Telemetry/lab data    Procedures    Mukund Pool  reports that he has never smoked. He has never been exposed to tobacco smoke. He has never used smokeless tobacco.     Advance Care Planning   Advance Care Planning: ACP discussion was held with the patient during this visit. Patient does not have an advance directive, information provided.     Assessment & Plan    1. Presence of cardiac pacemaker  [employer] [place] dual-chamber pacemaker was interrogated and functioning well.  Lead is placed in the left bundle branch area pacing position.  He has approximately 7.5 years of battery life remaining.  He is pacing 1.4% of time in the atrium and greater than 99% of time in the ventricle.  He has 99% atrial fibrillation.  Initially after implant he was paroxysmal but is been persistently in atrial fibrillation the vast majority of the time.    Of note, his pacing rate is still set to 80.  There is a note that it was decreased from 80-70 at a prior visit in January but today it is still set to 80.  Will decrease to DDDR 70 today.    2. Persistent atrial fibrillation  He has a history of quite persistent atrial fibrillation at this point is status post AV node ablation.  Tolerating Eliquis well.  Will continue.       Follow Up:  Return in about 1 year (around 7/9/2025) for Recheck, [employer].      Thank you for allowing me to participate in the care of your patient. Please do not hesitate to contact me with  additional questions or concerns.      Levy Pickett M.D.  Cardiac Electrophysiologist  [facility] / [facility]

## 2024-07-27 LAB
QT INTERVAL: 416 MS
QTC INTERVAL: 479 MS

## 2024-11-23 ENCOUNTER — HOSPITAL ENCOUNTER (INPATIENT)
Facility: HOSPITAL | Age: 89
LOS: 3 days | Discharge: HOME HEALTH | End: 2024-11-29
Attending: EMERGENCY MEDICINE | Admitting: INTERNAL MEDICINE
Payer: MEDICARE

## 2024-11-23 ENCOUNTER — APPOINTMENT (OUTPATIENT)
Dept: GENERAL RADIOLOGY | Facility: HOSPITAL | Age: 89
End: 2024-11-23
Payer: MEDICARE

## 2024-11-23 ENCOUNTER — APPOINTMENT (OUTPATIENT)
Dept: CT IMAGING | Facility: HOSPITAL | Age: 89
End: 2024-11-23
Payer: MEDICARE

## 2024-11-23 DIAGNOSIS — D63.8 ANEMIA, CHRONIC DISEASE: ICD-10-CM

## 2024-11-23 DIAGNOSIS — R27.0 ATAXIA: ICD-10-CM

## 2024-11-23 DIAGNOSIS — E44.0 MODERATE MALNUTRITION: ICD-10-CM

## 2024-11-23 DIAGNOSIS — I50.22 CHRONIC HFREF (HEART FAILURE WITH REDUCED EJECTION FRACTION): ICD-10-CM

## 2024-11-23 DIAGNOSIS — G45.9 TIA (TRANSIENT ISCHEMIC ATTACK): ICD-10-CM

## 2024-11-23 DIAGNOSIS — R53.1 ACUTE LEFT-SIDED WEAKNESS: ICD-10-CM

## 2024-11-23 DIAGNOSIS — R51.9 NONINTRACTABLE EPISODIC HEADACHE, UNSPECIFIED HEADACHE TYPE: ICD-10-CM

## 2024-11-23 DIAGNOSIS — I25.119 CORONARY ARTERY DISEASE INVOLVING NATIVE CORONARY ARTERY OF NATIVE HEART WITH ANGINA PECTORIS: ICD-10-CM

## 2024-11-23 DIAGNOSIS — I95.1 ORTHOSTATIC HYPOTENSION: ICD-10-CM

## 2024-11-23 DIAGNOSIS — E78.5 HYPERLIPIDEMIA LDL GOAL <70: ICD-10-CM

## 2024-11-23 DIAGNOSIS — I44.7 LBBB (LEFT BUNDLE BRANCH BLOCK): ICD-10-CM

## 2024-11-23 DIAGNOSIS — R13.10 DYSPHAGIA, UNSPECIFIED TYPE: Primary | ICD-10-CM

## 2024-11-23 DIAGNOSIS — K21.9 GERD WITHOUT ESOPHAGITIS: ICD-10-CM

## 2024-11-23 DIAGNOSIS — N18.32 STAGE 3B CHRONIC KIDNEY DISEASE: ICD-10-CM

## 2024-11-23 DIAGNOSIS — R51.9 ACUTE NONINTRACTABLE HEADACHE, UNSPECIFIED HEADACHE TYPE: ICD-10-CM

## 2024-11-23 DIAGNOSIS — R53.1 LEFT-SIDED WEAKNESS: ICD-10-CM

## 2024-11-23 DIAGNOSIS — I48.0 PAROXYSMAL ATRIAL FIBRILLATION: ICD-10-CM

## 2024-11-23 DIAGNOSIS — Z95.0 PRESENCE OF CARDIAC PACEMAKER: ICD-10-CM

## 2024-11-23 DIAGNOSIS — J90 PLEURAL EFFUSION, BILATERAL: ICD-10-CM

## 2024-11-23 DIAGNOSIS — N17.9 AKI (ACUTE KIDNEY INJURY): ICD-10-CM

## 2024-11-23 DIAGNOSIS — Z85.89 HISTORY OF HEAD AND NECK CANCER: ICD-10-CM

## 2024-11-23 DIAGNOSIS — C32.0: ICD-10-CM

## 2024-11-23 LAB
ALT SERPL W P-5'-P-CCNC: 11 U/L (ref 1–41)
ANION GAP SERPL CALCULATED.3IONS-SCNC: 13 MMOL/L (ref 5–15)
APTT PPP: 27 SECONDS (ref 22–39)
AST SERPL-CCNC: 19 U/L (ref 1–40)
BASOPHILS # BLD AUTO: 0.06 10*3/MM3 (ref 0–0.2)
BASOPHILS NFR BLD AUTO: 0.7 % (ref 0–1.5)
BILIRUB UR QL STRIP: NEGATIVE
BUN SERPL-MCNC: 26 MG/DL (ref 8–23)
BUN/CREAT SERPL: 15.9 (ref 7–25)
CALCIUM SPEC-SCNC: 9.1 MG/DL (ref 8.2–9.6)
CHLORIDE SERPL-SCNC: 104 MMOL/L (ref 98–107)
CLARITY UR: CLEAR
CO2 SERPL-SCNC: 22 MMOL/L (ref 22–29)
COLOR UR: YELLOW
CREAT SERPL-MCNC: 1.64 MG/DL (ref 0.76–1.27)
DEPRECATED RDW RBC AUTO: 48.3 FL (ref 37–54)
EGFRCR SERPLBLD CKD-EPI 2021: 38.5 ML/MIN/1.73
EOSINOPHIL # BLD AUTO: 0.24 10*3/MM3 (ref 0–0.4)
EOSINOPHIL NFR BLD AUTO: 2.9 % (ref 0.3–6.2)
ERYTHROCYTE [DISTWIDTH] IN BLOOD BY AUTOMATED COUNT: 12.6 % (ref 12.3–15.4)
GLUCOSE BLDC GLUCOMTR-MCNC: 136 MG/DL (ref 70–130)
GLUCOSE BLDC GLUCOMTR-MCNC: 210 MG/DL (ref 70–130)
GLUCOSE SERPL-MCNC: 106 MG/DL (ref 65–99)
GLUCOSE UR STRIP-MCNC: NEGATIVE MG/DL
HCT VFR BLD AUTO: 37 % (ref 37.5–51)
HGB BLD-MCNC: 12.1 G/DL (ref 13–17.7)
HGB UR QL STRIP.AUTO: NEGATIVE
HOLD SPECIMEN: NORMAL
IMM GRANULOCYTES # BLD AUTO: 0.04 10*3/MM3 (ref 0–0.05)
IMM GRANULOCYTES NFR BLD AUTO: 0.5 % (ref 0–0.5)
INR PPP: 1.04 (ref 0.89–1.12)
KETONES UR QL STRIP: NEGATIVE
LEUKOCYTE ESTERASE UR QL STRIP.AUTO: NEGATIVE
LYMPHOCYTES # BLD AUTO: 2.51 10*3/MM3 (ref 0.7–3.1)
LYMPHOCYTES NFR BLD AUTO: 30.7 % (ref 19.6–45.3)
MCH RBC QN AUTO: 33.9 PG (ref 26.6–33)
MCHC RBC AUTO-ENTMCNC: 32.7 G/DL (ref 31.5–35.7)
MCV RBC AUTO: 103.6 FL (ref 79–97)
MONOCYTES # BLD AUTO: 0.73 10*3/MM3 (ref 0.1–0.9)
MONOCYTES NFR BLD AUTO: 8.9 % (ref 5–12)
NEUTROPHILS NFR BLD AUTO: 4.6 10*3/MM3 (ref 1.7–7)
NEUTROPHILS NFR BLD AUTO: 56.3 % (ref 42.7–76)
NITRITE UR QL STRIP: NEGATIVE
NRBC BLD AUTO-RTO: 0 /100 WBC (ref 0–0.2)
PH UR STRIP.AUTO: 7 [PH] (ref 5–8)
PLATELET # BLD AUTO: 191 10*3/MM3 (ref 140–450)
PMV BLD AUTO: 9.2 FL (ref 6–12)
POTASSIUM SERPL-SCNC: 4.6 MMOL/L (ref 3.5–5.2)
PROT UR QL STRIP: NEGATIVE
PROTHROMBIN TIME: 13.7 SECONDS (ref 12.2–14.5)
QT INTERVAL: 426 MS
QTC INTERVAL: 460 MS
RBC # BLD AUTO: 3.57 10*6/MM3 (ref 4.14–5.8)
SODIUM SERPL-SCNC: 139 MMOL/L (ref 136–145)
SP GR UR STRIP: 1.04 (ref 1–1.03)
TROPONIN T SERPL HS-MCNC: 44 NG/L
UROBILINOGEN UR QL STRIP: ABNORMAL
WBC NRBC COR # BLD AUTO: 8.18 10*3/MM3 (ref 3.4–10.8)
WHOLE BLOOD HOLD COAG: NORMAL
WHOLE BLOOD HOLD SPECIMEN: NORMAL

## 2024-11-23 PROCEDURE — 85610 PROTHROMBIN TIME: CPT | Performed by: EMERGENCY MEDICINE

## 2024-11-23 PROCEDURE — 85730 THROMBOPLASTIN TIME PARTIAL: CPT | Performed by: EMERGENCY MEDICINE

## 2024-11-23 PROCEDURE — 99222 1ST HOSP IP/OBS MODERATE 55: CPT | Performed by: NURSE PRACTITIONER

## 2024-11-23 PROCEDURE — G0378 HOSPITAL OBSERVATION PER HR: HCPCS

## 2024-11-23 PROCEDURE — 70498 CT ANGIOGRAPHY NECK: CPT

## 2024-11-23 PROCEDURE — 99222 1ST HOSP IP/OBS MODERATE 55: CPT | Performed by: HOSPITALIST

## 2024-11-23 PROCEDURE — 25510000001 IOPAMIDOL PER 1 ML: Performed by: EMERGENCY MEDICINE

## 2024-11-23 PROCEDURE — 81003 URINALYSIS AUTO W/O SCOPE: CPT | Performed by: NURSE PRACTITIONER

## 2024-11-23 PROCEDURE — 92523 SPEECH SOUND LANG COMPREHEN: CPT

## 2024-11-23 PROCEDURE — 84450 TRANSFERASE (AST) (SGOT): CPT | Performed by: EMERGENCY MEDICINE

## 2024-11-23 PROCEDURE — 82948 REAGENT STRIP/BLOOD GLUCOSE: CPT

## 2024-11-23 PROCEDURE — 84460 ALANINE AMINO (ALT) (SGPT): CPT | Performed by: EMERGENCY MEDICINE

## 2024-11-23 PROCEDURE — 25010000002 PROCHLORPERAZINE 10 MG/2ML SOLUTION: Performed by: NURSE PRACTITIONER

## 2024-11-23 PROCEDURE — 36415 COLL VENOUS BLD VENIPUNCTURE: CPT

## 2024-11-23 PROCEDURE — 85025 COMPLETE CBC W/AUTO DIFF WBC: CPT | Performed by: EMERGENCY MEDICINE

## 2024-11-23 PROCEDURE — 99291 CRITICAL CARE FIRST HOUR: CPT

## 2024-11-23 PROCEDURE — 93005 ELECTROCARDIOGRAM TRACING: CPT | Performed by: EMERGENCY MEDICINE

## 2024-11-23 PROCEDURE — 4A03X5D MEASUREMENT OF ARTERIAL FLOW, INTRACRANIAL, EXTERNAL APPROACH: ICD-10-PCS | Performed by: STUDENT IN AN ORGANIZED HEALTH CARE EDUCATION/TRAINING PROGRAM

## 2024-11-23 PROCEDURE — 84484 ASSAY OF TROPONIN QUANT: CPT | Performed by: EMERGENCY MEDICINE

## 2024-11-23 PROCEDURE — 92610 EVALUATE SWALLOWING FUNCTION: CPT

## 2024-11-23 PROCEDURE — 99222 1ST HOSP IP/OBS MODERATE 55: CPT | Performed by: INTERNAL MEDICINE

## 2024-11-23 PROCEDURE — 25010000002 DEXAMETHASONE PER 1 MG: Performed by: NURSE PRACTITIONER

## 2024-11-23 PROCEDURE — 80048 BASIC METABOLIC PNL TOTAL CA: CPT | Performed by: HOSPITALIST

## 2024-11-23 PROCEDURE — 70450 CT HEAD/BRAIN W/O DYE: CPT

## 2024-11-23 PROCEDURE — 25010000002 MAGNESIUM SULFATE IN D5W 1G/100ML (PREMIX) 1-5 GM/100ML-% SOLUTION: Performed by: NURSE PRACTITIONER

## 2024-11-23 PROCEDURE — 71045 X-RAY EXAM CHEST 1 VIEW: CPT

## 2024-11-23 PROCEDURE — 70496 CT ANGIOGRAPHY HEAD: CPT

## 2024-11-23 RX ORDER — POLYETHYLENE GLYCOL 3350 17 G/17G
17 POWDER, FOR SOLUTION ORAL DAILY PRN
Status: DISCONTINUED | OUTPATIENT
Start: 2024-11-23 | End: 2024-11-29 | Stop reason: HOSPADM

## 2024-11-23 RX ORDER — PANTOPRAZOLE SODIUM 40 MG/1
40 TABLET, DELAYED RELEASE ORAL
Status: DISCONTINUED | OUTPATIENT
Start: 2024-11-24 | End: 2024-11-29 | Stop reason: HOSPADM

## 2024-11-23 RX ORDER — SODIUM CHLORIDE 0.9 % (FLUSH) 0.9 %
10 SYRINGE (ML) INJECTION EVERY 12 HOURS SCHEDULED
Status: DISCONTINUED | OUTPATIENT
Start: 2024-11-23 | End: 2024-11-29 | Stop reason: HOSPADM

## 2024-11-23 RX ORDER — MIRTAZAPINE 15 MG/1
15 TABLET, FILM COATED ORAL NIGHTLY
COMMUNITY

## 2024-11-23 RX ORDER — MIRTAZAPINE 15 MG/1
15 TABLET, FILM COATED ORAL NIGHTLY
Status: DISCONTINUED | OUTPATIENT
Start: 2024-11-23 | End: 2024-11-29 | Stop reason: HOSPADM

## 2024-11-23 RX ORDER — BISACODYL 5 MG/1
5 TABLET, DELAYED RELEASE ORAL DAILY PRN
Status: DISCONTINUED | OUTPATIENT
Start: 2024-11-23 | End: 2024-11-29 | Stop reason: HOSPADM

## 2024-11-23 RX ORDER — SODIUM CHLORIDE 0.9 % (FLUSH) 0.9 %
10 SYRINGE (ML) INJECTION AS NEEDED
Status: DISCONTINUED | OUTPATIENT
Start: 2024-11-23 | End: 2024-11-29 | Stop reason: HOSPADM

## 2024-11-23 RX ORDER — IOPAMIDOL 755 MG/ML
75 INJECTION, SOLUTION INTRAVASCULAR
Status: COMPLETED | OUTPATIENT
Start: 2024-11-23 | End: 2024-11-23

## 2024-11-23 RX ORDER — SODIUM CHLORIDE 9 MG/ML
40 INJECTION, SOLUTION INTRAVENOUS AS NEEDED
Status: DISCONTINUED | OUTPATIENT
Start: 2024-11-23 | End: 2024-11-29 | Stop reason: HOSPADM

## 2024-11-23 RX ORDER — ASPIRIN 81 MG/1
81 TABLET ORAL DAILY
Status: DISCONTINUED | OUTPATIENT
Start: 2024-11-23 | End: 2024-11-23 | Stop reason: SDUPTHER

## 2024-11-23 RX ORDER — DEXAMETHASONE SODIUM PHOSPHATE 4 MG/ML
2 INJECTION, SOLUTION INTRA-ARTICULAR; INTRALESIONAL; INTRAMUSCULAR; INTRAVENOUS; SOFT TISSUE ONCE
Status: COMPLETED | OUTPATIENT
Start: 2024-11-23 | End: 2024-11-23

## 2024-11-23 RX ORDER — GABAPENTIN 100 MG/1
300 CAPSULE ORAL 2 TIMES DAILY
Status: DISCONTINUED | OUTPATIENT
Start: 2024-11-23 | End: 2024-11-29 | Stop reason: HOSPADM

## 2024-11-23 RX ORDER — BISACODYL 10 MG
10 SUPPOSITORY, RECTAL RECTAL DAILY PRN
Status: DISCONTINUED | OUTPATIENT
Start: 2024-11-23 | End: 2024-11-29 | Stop reason: HOSPADM

## 2024-11-23 RX ORDER — MAGNESIUM SULFATE 1 G/100ML
1 INJECTION INTRAVENOUS ONCE
Status: COMPLETED | OUTPATIENT
Start: 2024-11-23 | End: 2024-11-23

## 2024-11-23 RX ORDER — ASPIRIN 300 MG/1
300 SUPPOSITORY RECTAL DAILY
Status: DISCONTINUED | OUTPATIENT
Start: 2024-11-23 | End: 2024-11-29 | Stop reason: HOSPADM

## 2024-11-23 RX ORDER — TORSEMIDE 20 MG/1
20 TABLET ORAL DAILY
Status: DISCONTINUED | OUTPATIENT
Start: 2024-11-23 | End: 2024-11-29 | Stop reason: HOSPADM

## 2024-11-23 RX ORDER — PROCHLORPERAZINE EDISYLATE 5 MG/ML
5 INJECTION INTRAMUSCULAR; INTRAVENOUS ONCE
Status: COMPLETED | OUTPATIENT
Start: 2024-11-23 | End: 2024-11-23

## 2024-11-23 RX ORDER — AMOXICILLIN 250 MG
2 CAPSULE ORAL 2 TIMES DAILY PRN
Status: DISCONTINUED | OUTPATIENT
Start: 2024-11-23 | End: 2024-11-29 | Stop reason: HOSPADM

## 2024-11-23 RX ORDER — ACETAMINOPHEN 325 MG/1
650 TABLET ORAL ONCE
Status: COMPLETED | OUTPATIENT
Start: 2024-11-23 | End: 2024-11-23

## 2024-11-23 RX ORDER — ATORVASTATIN CALCIUM 40 MG/1
40 TABLET, FILM COATED ORAL NIGHTLY
Status: DISCONTINUED | OUTPATIENT
Start: 2024-11-23 | End: 2024-11-29 | Stop reason: HOSPADM

## 2024-11-23 RX ORDER — ASPIRIN 81 MG/1
81 TABLET, CHEWABLE ORAL DAILY
Status: DISCONTINUED | OUTPATIENT
Start: 2024-11-23 | End: 2024-11-29 | Stop reason: HOSPADM

## 2024-11-23 RX ADMIN — Medication 10 ML: at 21:26

## 2024-11-23 RX ADMIN — APIXABAN 2.5 MG: 2.5 TABLET, FILM COATED ORAL at 21:25

## 2024-11-23 RX ADMIN — MAGNESIUM SULFATE HEPTAHYDRATE 1 G: 10 INJECTION, SOLUTION INTRAVENOUS at 11:59

## 2024-11-23 RX ADMIN — IOPAMIDOL 75 ML: 755 INJECTION, SOLUTION INTRAVENOUS at 10:11

## 2024-11-23 RX ADMIN — ATORVASTATIN CALCIUM 40 MG: 40 TABLET, FILM COATED ORAL at 21:25

## 2024-11-23 RX ADMIN — Medication 10 ML: at 21:25

## 2024-11-23 RX ADMIN — Medication 10 ML: at 15:44

## 2024-11-23 RX ADMIN — MIRTAZAPINE 15 MG: 15 TABLET, FILM COATED ORAL at 21:25

## 2024-11-23 RX ADMIN — GABAPENTIN 300 MG: 100 CAPSULE ORAL at 15:40

## 2024-11-23 RX ADMIN — PROCHLORPERAZINE EDISYLATE 5 MG: 5 INJECTION INTRAMUSCULAR; INTRAVENOUS at 11:55

## 2024-11-23 RX ADMIN — DEXAMETHASONE SODIUM PHOSPHATE 2 MG: 4 INJECTION INTRA-ARTICULAR; INTRALESIONAL; INTRAMUSCULAR; INTRAVENOUS; SOFT TISSUE at 11:55

## 2024-11-23 RX ADMIN — TORSEMIDE 20 MG: 20 TABLET ORAL at 15:41

## 2024-11-23 RX ADMIN — ASPIRIN 81 MG 81 MG: 81 TABLET ORAL at 15:41

## 2024-11-23 RX ADMIN — SERTRALINE HYDROCHLORIDE 50 MG: 50 TABLET ORAL at 15:40

## 2024-11-23 RX ADMIN — ACETAMINOPHEN 650 MG: 325 TABLET ORAL at 11:53

## 2024-11-23 RX ADMIN — GABAPENTIN 300 MG: 100 CAPSULE ORAL at 21:37

## 2024-11-23 NOTE — PROGRESS NOTES
Patient has a dual chamber pacemaker and his entire system IS MRI compatible. Form filled out and placed on physical chart of patient.    CYNDEE Argueta

## 2024-11-23 NOTE — THERAPY EVALUATION
Acute Care - Speech Language Pathology   Swallow Initial Evaluation  Mario Alberto     Patient Name: Mukund Pool  : 1930  MRN: [id number]  Today's Date: 2024               Admit Date: 2024    Visit Dx:     ICD-10-CM ICD-9-CM   1. Dysphagia, unspecified type  R13.10 787.20   2. Acute nonintractable headache, unspecified headache type  R51.9 784.0   3. Ataxia  R27.0 781.3   4. 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)    A/C renal failure    GERD without esophagitis    Pleural effusion, bilateral    Moderate malnutrition    Presence of cardiac pacemaker    TIA (transient ischemic attack)    Headache    Left-sided weakness     Past Medical History:   Diagnosis Date    Arthritis     Chronic kidney disease     kidney stones    Coronary artery disease     GERD (gastroesophageal reflux disease)     History of radiation therapy 2021    laryngeal mass    Prostate cancer     Vocal cord cancer      Past Surgical History:   Procedure Laterality Date    CARDIAC ELECTROPHYSIOLOGY PROCEDURE  2023    cardiac loop recorder    CARDIAC ELECTROPHYSIOLOGY PROCEDURE N/A 10/25/2023    Procedure: Pacemaker DC new;  Surgeon: Levy Pickett MD;  Location:  [facility];  Service: Cardiology;  Laterality: N/A;    CARDIAC ELECTROPHYSIOLOGY PROCEDURE N/A 10/25/2023    Procedure: AV node ablation;  Surgeon: Levy Pickett MD;  Location:  [facility];  Service: Cardiovascular;  Laterality: N/A;    CHOLECYSTECTOMY      CORONARY ARTERY BYPASS GRAFT       PROSTATE SURGERY      VOCAL CORD MASS EXCISION  03/22/2021       SLP Recommendation and Plan  SLP Swallowing Diagnosis: oral dysphagia (11/23/24 1345)  SLP Diet Recommendation: soft to chew textures, chopped, thin liquids (11/23/24 1345)  Recommended Precautions and Strategies: upright posture during/after eating, small bites of food and sips of liquid (11/23/24 1345)  SLP Rec. for Method of Medication Administration: meds whole, with thin liquids (11/23/24 1345)     Monitor for Signs of Aspiration: yes, notify SLP if any concerns (11/23/24 1345)  Recommended Diagnostics: reassess via clinical swallow evaluation (11/23/24 1345)  Swallow Criteria for Skilled Therapeutic Interventions Met: demonstrates skilled criteria (11/23/24 1345)  Anticipated Discharge Disposition (SLP): home with assist (11/23/24 1345)  Rehab Potential/Prognosis, Swallowing: good, to achieve stated therapy goals (11/23/24 1345)  Therapy Frequency (Swallow): 5 days per week (11/23/24 1345)  Predicted Duration Therapy Intervention (Days): 1 week (11/23/24 1345)  Oral Care Recommendations: Oral Care BID/PRN, Toothbrush (11/23/24 1345)                                        Progress: improving      SWALLOW EVALUATION (Last 72 Hours)       SLP Adult Swallow Evaluation       Row Name 11/23/24 1345                   Rehab Evaluation    Document Type evaluation  -HG        Subjective Information no complaints  -HG        Patient Observations alert;cooperative;agree to therapy  -HG        Patient/Family/Caregiver Comments/Observations No family present  -HG        Patient Effort excellent  -HG        Symptoms Noted During/After Treatment none  -HG           General Information    Patient Profile Reviewed yes  -HG        Pertinent History Of Current Problem Pt is a 89 year old male admitted with hx of chronic HFrEF, PAF on Eliquis, bradycardia s/p PPM, CAD s/p CABG, CKD 3, HTN, HLD, head/neck cancer, prostate cancer, migraines, and GERD who presents  due to occipital headache, dysarthria, and left sided weakness which started this morning when he woke up. Headache is located posteriorly. He reports recent increase in frequent headaches, does have hx of migraines but says he doesn't take anything for them. Currently symptoms have improved, speech still a little slow however. Initial scans in the ER were unremarkable. Stroke Neurology consulted and patient was admitted for further workup.  -HG        Current Method of Nutrition NPO  -HG        Precautions/Limitations, Vision WFL with corrective lenses  -HG        Precautions/Limitations, Hearing WFL;for purposes of eval  -HG        Prior Level of Function-Communication WFL  -HG        Prior Level of Function-Swallowing no diet consistency restrictions  -HG        Plans/Goals Discussed with patient  -HG        Barriers to Rehab none identified  -HG        Patient's Goals for Discharge return to PO diet  -HG           Pain    Pretreatment Pain Rating 0/10 - no pain  -HG        Pre/Posttreatment Pain Comment Pt just noted that he was uncomfortable- RN present  -HG           Oral Motor Structure and Function    Dentition Assessment natural, present and adequate  -HG        Secretion Management WNL/WFL  -HG        Mucosal Quality moist, healthy  -HG        Volitional Swallow WFL  -HG        Volitional Cough weak  -HG           Oral Musculature and Cranial Nerve Assessment    Oral Motor General Assessment WFL  -HG           General Eating/Swallowing Observations    Respiratory Support Currently in Use room air  -HG        Eating/Swallowing Skills self-fed;fed by SLP  -HG        Positioning During Eating upright in bed  -HG        Utensils Used spoon;cup;straw  -HG        Consistencies Trialed ice chips;thin liquids;pureed;mixed consistency;regular textures  -HG           Respiratory    Respiratory Status WFL;room air  -HG           Clinical Swallow Eval    Oral Prep Phase impaired  -HG        Oral Transit WFL  -HG         Oral Residue impaired  -HG        Pharyngeal Phase --  One cough present out of multiple swallows and presentations given.  -HG        Esophageal Phase unremarkable  -HG        Clinical Swallow Evaluation Summary CSE completed this date. Pt tolerated ice chip, water via tsp, cup and straw, with no s/sx of aspiration. Pt tolerated applesauce with no difficulty. Mixed fruit in juice did result in increased mastication time. Pt tolerated josi cracker without difficulty. Pt used a liquid wash via straw, after the fruit and exhibited his one and only cough. Pt tolerated following swallows via straw, with no s/sx of aspiration exhibited.  SLP RECs thin and mech soft chopped at this time. Meds whole with thin.  -HG           Oral Prep Concerns    Oral Prep Concerns prolonged mastication  -HG        Prolonged Mastication mechanical soft  -HG           Oral Residue Concerns    Oral Residue Concerns diffuse residue throughout oral cavity  -HG        Diffuse Residue Throughout Oral Cavity mixed consistencies  -HG           SLP Evaluation Clinical Impression    SLP Swallowing Diagnosis oral dysphagia  -HG        Functional Impact risk of aspiration/pneumonia  -HG        Rehab Potential/Prognosis, Swallowing good, to achieve stated therapy goals  -        Swallow Criteria for Skilled Therapeutic Interventions Met demonstrates skilled criteria  -           SLP Treatment Clinical Impressions    Care Plan Review evaluation/treatment results reviewed  -           Recommendations    Therapy Frequency (Swallow) 5 days per week  -        Predicted Duration Therapy Intervention (Days) 1 week  -        SLP Diet Recommendation soft to chew textures;chopped;thin liquids  -        Recommended Diagnostics reassess via clinical swallow evaluation  -        Recommended Precautions and Strategies upright posture during/after eating;small bites of food and sips of liquid  -HG        Oral Care Recommendations Oral Care  BID/PRN;Toothbrush  -HG        SLP Rec. for Method of Medication Administration meds whole;with thin liquids  -HG        Monitor for Signs of Aspiration yes;notify SLP if any concerns  -HG        Anticipated Discharge Disposition (SLP) home with assist  -HG           (LTG) Swallow    (LTG) Swallow Pt will tolerate diet of mech soft chopped and thin liquids with no s/sx of aspiration  -HG        [place] (Swallow Long Term Goal) independently (over 90% accuracy)  -HG           (STG) Swallow 1    (STG) Swallow 1 Pt will tolerate recommended diet and liquids with no s/sx of aspiration  -HG        [place] (Swallow Short Term Goal 1) independently (over 90% accuracy)  -HG                  User Key  (r) = Recorded By, (t) = Taken By, (c) = Cosigned By      Initials Name Effective Dates    Moni Bell MS CCC-SLP 06/16/21 -                     EDUCATION  The patient has been educated in the following areas:   Dysphagia (Swallowing Impairment).        SLP GOALS       Row Name 11/23/24 1345             (LTG) Swallow    (LTG) Swallow Pt will tolerate diet of mech soft chopped and thin liquids with no s/sx of aspiration  -HG      [place] (Swallow Long Term Goal) independently (over 90% accuracy)  -HG         (STG) Swallow 1    (STG) Swallow 1 Pt will tolerate recommended diet and liquids with no s/sx of aspiration  -HG      [place] (Swallow Short Term Goal 1) independently (over 90% accuracy)  -HG                User Key  (r) = Recorded By, (t) = Taken By, (c) = Cosigned By      Initials Name Provider Type    Moni Bell MS CCC-SLP Speech and Language Pathologist                         Time Calculation:    Time Calculation- SLP       Row Name 11/23/24 1437             Time Calculation- SLP    SLP Start Time 1345  -HG      SLP Received On 11/23/24  -HG         Untimed Charges    48086-OM Eval Oral Pharyng Swallow Minutes 45  -HG         Total Minutes    Untimed Charges Total Minutes 45   -HG       Total Minutes 45  -HG                User Key  (r) = Recorded By, (t) = Taken By, (c) = Cosigned By      Initials Name Provider Type     Moni Mayes, MS CCC-SLP Speech and Language Pathologist                             Moni Mayes, MS CCC-SLP  11/23/2024

## 2024-11-23 NOTE — CONSULTS
Stroke Consult Note    Patient Name: Mukund Pool   MRN: [id number]  Age: 89 y.o.  Sex: male  : 1930    Primary Care Physician: Slick Hubbard MD  Referring Physician: Dr. Cronin    TIME STROKE TEAM CALLED:  EST     TIME PATIENT SEEN: 954 EST    Handedness: Right  Race:     Chief Complaint/Reason for Consultation: Left-sided weakness, dysarthria, posterior headache    HPI: Mukund Pool is a 89-year-old male with a PMH significant for A-fib with RVR (on Eliquis), HLD, memory loss, KIRSTEN, CKD, migraines, CAD s/p CABG, heart failure, aortic root dilatation, interstitial lung disease, bradycardia s/p PPM, prostate cancer s/p prostatectomy, laryngeal mass s/p radiation, anemia, and moderate malnutrition who presents to [facility] via EMS from his assisted living facility with complaints of left-sided weakness, dysarthria, and posterior headache.  LKW 2200.  Patient reports that he woke in the middle the night and took something for headache.  He does not remember the time.  When he woke this a.m., he noted difficulty with ambulation, left-sided weakness, dysarthria, and severe headache.  EMS was called.  On initial exam, he was noted to have left-sided weakness and dysarthria.  He was brought to the [facility] ED for further evaluation.    /86.  NIH 3 for dysarthria, LFD, RLE drift.  Continuing to complain of headache 6 out of 10.  CT head negative for any acute findings.   noted.  CTP deferred secondary to CKD and elevated creatinine.  CTA H/N with no LVO.  Mild atherosclerotic disease.    Patient reports that he does have significant migraines.  His last 1 was approximately 1 month ago.  He reports that he typically has diffuse headaches and generalized weakness as well as accompanying nausea and vomiting.  He states that this headache is posterior and denies any associated nausea and vomiting.    Last Known Normal Date/Time:  EST     Review of Systems   Constitutional:   Negative for chills and fever.   HENT:  Negative for congestion and trouble swallowing.    Eyes:  Positive for photophobia. Negative for visual disturbance.   Respiratory:  Negative for cough and shortness of breath.    Cardiovascular:  Negative for chest pain and palpitations.   Gastrointestinal:  Negative for nausea and vomiting.   Musculoskeletal:  Positive for gait problem.   Neurological:  Positive for facial asymmetry, speech difficulty, weakness, light-headedness and headaches. Negative for dizziness, seizures, syncope and numbness.      Past Medical History:   Diagnosis Date    Arthritis     Chronic kidney disease     kidney stones    Coronary artery disease     GERD (gastroesophageal reflux disease)     History of radiation therapy 05/24/2021    laryngeal mass    Prostate cancer     Vocal cord cancer      Past Surgical History:   Procedure Laterality Date    CARDIAC ELECTROPHYSIOLOGY PROCEDURE  04/2023    cardiac loop recorder    CARDIAC ELECTROPHYSIOLOGY PROCEDURE N/A 10/25/2023    Procedure: Pacemaker DC new;  Surgeon: Levy Pickett MD;  Location:  [facility];  Service: Cardiology;  Laterality: N/A;    CARDIAC ELECTROPHYSIOLOGY PROCEDURE N/A 10/25/2023    Procedure: AV node ablation;  Surgeon: Levy Pickett MD;  Location:  [facility];  Service: Cardiovascular;  Laterality: N/A;    CHOLECYSTECTOMY      CORONARY ARTERY BYPASS GRAFT      PROSTATE SURGERY      VOCAL CORD MASS EXCISION  03/22/2021     Family History   Problem Relation Age of Onset    Cancer Mother     Breast cancer Mother     Heart attack Father     Ovarian cancer Sister      Social History     Socioeconomic History    Marital status:    Tobacco Use    Smoking status: Never     Passive exposure: Never    Smokeless tobacco: Never   Vaping Use    Vaping status: Never Used   Substance and Sexual Activity    Alcohol use: No    Drug use: No    Sexual activity: Defer     No Known Allergies  Prior to Admission  medications    Medication Sig Start Date End Date Taking? Authorizing Provider   apixaban (ELIQUIS) 2.5 MG tablet tablet Take 1 tablet by mouth 2 (Two) Times a Day. Indications: Atrial Fibrillation 10/27/23   Mayra Clarke APRN   aspirin 81 MG EC tablet Take 1 tablet by mouth Daily.    Gilmer Ortez MD   Diclofenac Sodium (VOLTAREN) 1 % gel gel Apply 2 g topically to the appropriate area as directed Every 4 (Four) Hours As Needed (for neck pain). 10/26/23   Mayra Clarke APRN   ezetimibe (ZETIA) 10 MG tablet Take 1 tablet by mouth Daily. Indications: High Amount of Fats in the Blood 11/28/22   Gilmer Ortez MD   gabapentin (NEURONTIN) 300 MG capsule Take 1 capsule by mouth 3 (Three) Times a Day. aking only 2 times a day   Indications: Neuropathic Pain 1/7/21   Gilmer Ortez MD   HYDROcodone-acetaminophen (NORCO) 5-325 MG per tablet  11/28/23   Gilmer Ortez MD   multivitamin with minerals tablet tablet Take 1 tablet by mouth Daily. Indications: vitamin deficiency    Gilmer Ortez MD   nitroglycerin (NITROSTAT) 0.4 MG SL tablet Place 1 tablet under the tongue Every 5 (Five) Minutes As Needed for Chest Pain. Indications: Acute Angina Pectoris 3/16/21   Gilmer Ortez MD   omeprazole (priLOSEC) 20 MG capsule Take 1 capsule by mouth Daily. Indications: Stomach Ulcer 3/30/21   Gilmer Ortez MD   oxyCODONE-acetaminophen (PERCOCET) 5-325 MG per tablet Take 1 tablet by mouth 1 (One) Time As Needed for Moderate Pain. 6/26/24   Gilmer Ortez MD   promethazine (PHENERGAN) 25 MG tablet Take 1 tablet by mouth Every 6 (Six) Hours As Needed. 6/26/24   Gilmer Ortez MD   sertraline (ZOLOFT) 25 MG tablet Take one tablet daily if tolerating after 7 days increase to two tablets daily. 6/26/24   Gilmer Ortez MD   torsemide (DEMADEX) 20 MG tablet Take 0.5 tablets by mouth Daily. Indications: Cardiac Failure 11/14/23   Radha Billingsley APRN         Temp:   [98.1 °F (36.7 °C)] 98.1 °F (36.7 °C)  Heart Rate:  [70] 70  Resp:  [16] 16  BP: (161)/(86) 161/86  Neurological Exam  Mental Status  Alert. Oriented to person, place, and time. Mild dysarthria present. Language is fluent with no aphasia.    Cranial Nerves  CN II: Right visual acuity: Counts fingers. Left visual acuity: Counts fingers.  CN III, IV, VI: Extraocular movements intact bilaterally. Pupils equal round and reactive to light bilaterally.  CN V: Facial sensation is normal.  CN VII:  Left: There is central facial weakness.  CN IX, X: Palate elevates symmetrically  CN XI: Shoulder shrug strength is normal.  CN XII: Tongue midline without atrophy or fasciculations.    Motor    RLE drift, LLE with weakness noted with dorsiflexion and plantarflexion of the left foot.  No limb drift in BUE.    Sensory  Light touch is normal in upper and lower extremities.     Coordination    No overt dysmetria on finger-to-nose.    Gait    Not observed.      Physical Exam  Constitutional:       General: He is not in acute distress.  HENT:      Head: Normocephalic and atraumatic.   Eyes:      Extraocular Movements: Extraocular movements intact.      Pupils: Pupils are equal, round, and reactive to light.   Pulmonary:      Effort: Pulmonary effort is normal. No respiratory distress.   Abdominal:      General: There is no distension.      Palpations: Abdomen is soft.   Musculoskeletal:      Cervical back: Normal range of motion and neck supple.      Right lower leg: No edema.      Left lower leg: No edema.   Skin:     General: Skin is warm and dry.      Capillary Refill: Capillary refill takes less than 2 seconds.   Neurological:      Mental Status: He is alert and oriented to person, place, and time.      Cranial Nerves: Cranial nerve deficit and dysarthria present.      Sensory: No sensory deficit.      Motor: Weakness present.      Coordination: Coordination normal.       Acute Stroke Data    Thrombolytic Inclusion / Exclusion  Criteria    Time: 10:14 EST  Person Administering Scale: CYNDEE Rush    Inclusion Criteria  [x]   18 years of age or greater   []   Onset of symptoms < 4.5 hours before beginning treatment (stroke onset = time patient was last seen well or without symptoms).   []   Diagnosis of acute ischemic stroke causing measurable disabling deficit (Complete Hemianopia, Any Aphasia, Visual or Sensory Extinction, Any weakness limiting sustained effort against gravity)   []   Any remaining deficit considered potentially disabling in view of patient and practitioner   Exclusion criteria (Do not proceed with Alteplase if any are checked under exclusion criteria)  [x]   Onset unknown or GREATER than 4.5 hours   []   ICH on CT/MRI   []   CT demonstrates hypodensity representing acute or subacute infarct   []   Significant head trauma or prior stroke in the previous 3 months   []   Symptoms suggestive of subarachnoid hemorrhage   []   History of un-ruptured intracranial aneurysm GREATER than 10 mm   []   Recent intracranial or intraspinal surgery within the last 3 months   []   Arterial puncture at a non-compressible site in the previous 7 days   []   Active internal bleeding   []   Acute bleeding tendency   []   Platelet count LESS than 100,000 for known hematological diseases such as leukemia, thrombocytopenia or chronic cirrhosis   []   Current use of anticoagulant with INR GREATER than 1.7 or PT GREATER than 15 seconds, aPTT GREATER than 40 seconds   []   Heparin received within 48 hours, resulting in abnormally elevated aPTT GREATER than upper limit of normal   [x]   Current use of direct thrombin inhibitors or direct factor Xa inhibitors in the past 48 hours   []   Elevated blood pressure refractory to treatment (systolic GREATER than 185 mm/Hg or diastolic  GREATER than 110 mm/Hg   []   Suspected infective endocarditis and aortic arch dissection   []   Current use of therapeutic treatment dose of  low-molecular-weight heparin (LMWH) within the previous 24 hours   []   Structural GI malignancy or bleed   Relative exclusion for all patients  []   Only minor non-disabling symptoms   []   Pregnancy   []   Seizure at onset with postictal residual neurological impairments   []   Major surgery or previous trauma within past 14 days   []   History of previous spontaneous ICH, intracranial neoplasm, or AV malformation   []   Postpartum (within previous 14 days)   []   Recent GI or urinary tract hemorrhage (within previous 21 days)   []   Recent acute MI (within previous 3 months)   []   History of un-ruptured intracranial aneurysm LESS than 10 mm   []   History of ruptured intracranial aneurysm   []   Blood glucose LESS than 50 mg/dL (2.7 mmol/L)   []   Dural puncture within the last 7 days   []   Known GREATER than 10 cerebral microbleeds   Additional exclusions for patients with symptoms onset between 3 and 4.5 hours.  []   Age > 80.   []   On any anticoagulants regardless of INR  >>> Warfarin (Coumadin), Heparin, Enoxaparin (Lovenox), fondaparinux (Arixtra), bivalirudin (Angiomax), Argatroban, dabigatran (Pradaxa), rivaroxaban (Xarelto), or apixaban (Eliquis)   []   Severe stroke (NIHSS > 25).   []   History of BOTH diabetes and previous ischemic stroke.   []   The risks and benefits have been discussed with the patient or family related to the administration of IV thrombolytic therapy for stroke symptoms.   []   I have discussed and reviewed the patient's case and imaging with the attending prior to IV thrombolytic therapy.    Time IV thrombolytic administered       Hospital Meds:  Scheduled-    Infusions-     PRNs-   sodium chloride    Functional Status Prior to Current Stroke/[place] Score: 1    NIH Stroke Scale  Time: 0955  Person Administering Scale: Grisel Marquis, APRN    Interval: baseline  1a. Level of Consciousness: 0-->Alert, keenly responsive  1b. LOC Questions: 0-->Answers both questions  correctly  1c. LOC Commands: 0-->Performs both tasks correctly  2. Best Gaze: 0-->Normal  3. Visual: 0-->No visual loss  4. Facial Palsy: 1-->Minor paralysis (flattened nasolabial fold, asymmetry on smiling)  5a. Motor Arm, Left: 0-->No drift, limb holds 90 (or 45) degrees for full 10 secs  5b. Motor Arm, Right: 0-->No drift, limb holds 90 (or 45) degrees for full 10 secs  6a. Motor Leg, Left: 0-->No drift, leg holds 30 degree position for full 5 secs  6b. Motor Leg, Right: 1-->Drift, leg falls by the end of the 5-sec period but does not hit bed  7. Limb Ataxia: 0-->Absent  8. Sensory: 0-->Normal, no sensory loss  9. Best Language: 0-->No aphasia, normal  10. Dysarthria: 1-->Mild-to-moderate dysarthria, patient slurs at least some words and, at worst, can be understood with some difficulty  11. Extinction and Inattention (formerly Neglect): 0-->No abnormality    Total (NIH Stroke Scale): 3      Results Reviewed:  I have personally reviewed current lab, radiology, and data  XR Chest 1 View    Result Date: 11/23/2024  Impression: No acute process. Electronically Signed: Soto Matos MD  11/23/2024 11:05 AM EST  Workstation ID: [id number]    CT Angiogram Head w AI Analysis of LVO    Result Date: 11/23/2024  Impression: No abrupt cut off/large vessel occlusion, flow-limiting stenosis, dissection, or aneurysm. Moderate atherosclerosis of the bilateral carotid bifurcations and proximal cervical ICAs with some luminal irregularity and mild stenosis, without flow-limiting stenosis per NASCET criteria (less than 50% narrowing). Electronically Signed: Madi Pena MD  11/23/2024 10:29 AM EST  Workstation ID: [id number]    CT Angiogram Neck    Result Date: 11/23/2024  Impression: No abrupt cut off/large vessel occlusion, flow-limiting stenosis, dissection, or aneurysm. Moderate atherosclerosis of the bilateral carotid bifurcations and proximal cervical ICAs with some luminal irregularity and mild stenosis, without flow-limiting  stenosis per NASCET criteria (less than 50% narrowing). Electronically Signed: Madi Pena MD  11/23/2024 10:29 AM EST  Workstation ID: [id number]    CT Head Without Contrast Stroke Protocol    Result Date: 11/23/2024  Impression: No acute intracranial findings. Chronic and senescent changes as above. Electronically Signed: Madi Pena MD  11/23/2024 10:03 AM EST  Workstation ID: [id number]     Results for orders placed during the hospital encounter of 09/29/23    Adult Transthoracic Echo Complete W/ Cont if Necessary Per Protocol    Interpretation Summary    The left ventricle is mildly dilated and globally hypokinetic.  Left ventricular systolic function is moderately decreased. Estimated left ventricular EF = 25%    Aortic valve is calcified with no significant stenosis or regurgitation.    Moderate mitral valve regurgitation is present.    Estimated right ventricular systolic pressure from tricuspid regurgitation is normal (<35 mmHg).    There is a small (<1cm) pericardial effusion adjacent to the left ventricle.    Bilateral pleural effusions present       Assessment/Plan:  89-year-old male with a PMH significant for A-fib with RVR (on Eliquis), HLD, memory loss, migraines, KIRSTEN, CKD, CAD s/p CABG, heart failure, aortic root dilatation, interstitial lung disease, bradycardia s/p PPM, prostate cancer s/p prostatectomy, laryngeal mass s/p radiation, anemia, and moderate malnutrition who presented to [facility] via EMS from his assisted living facility on 11/23/2024 with complaints of left-sided weakness, dysarthria, and posterior headache.  CT head negative for any acute findings.  Not a candidate for TNK due to current Eliquis use.  Not a candidate for emergent neurointervention as no LVO was noted on advanced imaging.  He will be admitted for further evaluation.      Antiplatelet PTA: Aspirin 81 mg  Anticoagulant PTA: Eliquis 2.5 mg twice daily    #Dysarthria, LFD, left-sided weakness (RLE weakness on exam)  #A-fib  (on Eliquis 2.5 twice daily)  #iCAD  #Headache, history of migraines  -Possible TIA, stroke, complex migraine, or metabolic in etiology  -MRI brain with and without pending  -Cardiology consult for MRI clearance  -Continue aspirin 81 mg daily  -Continue Eliquis 2.5 mg twice daily  -Start atorvastatin 40 mg nightly.  Can reassess after lipid panel is reviewed in the AM.  On Zetia 10 mg at home.  -Hemoglobin A1c, lipid panel in the a.m.  -SBP less than 180, DBP less than 110.  Management per primary team.  -Compazine 5 mg, Decadron 2 mg, magnesium 1 g IV given in the ED for headache. Start magnesium 400 mg daily for headache prevention.  -N.p.o.   -PT/OT/SLP  -Activity as tolerated  -Fall precautions  -Plan discussed with the patient, Dr. Cronin, and nursing staff.  Stroke neurology will continue to follow.  Please call with any questions or concerns.      CYNDEE Rush, AGACNP-BC  November 23, 2024  10:01 EST

## 2024-11-23 NOTE — THERAPY EVALUATION
Acute Care - Speech Language Pathology   Swallow Initial Evaluation  Mario Alberto     Patient Name: Mukund Pool  : 1930  MRN: [id number]  Today's Date: 2024               Admit Date: 2024    Visit Dx:     ICD-10-CM ICD-9-CM   1. Dysphagia, unspecified type  R13.10 787.20   2. Acute nonintractable headache, unspecified headache type  R51.9 784.0   3. Ataxia  R27.0 781.3   4. 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)    A/C renal failure    GERD without esophagitis    Pleural effusion, bilateral    Moderate malnutrition    Presence of cardiac pacemaker    TIA (transient ischemic attack)    Headache    Left-sided weakness     Past Medical History:   Diagnosis Date    Arthritis     Chronic kidney disease     kidney stones    Coronary artery disease     GERD (gastroesophageal reflux disease)     History of radiation therapy 2021    laryngeal mass    Prostate cancer     Vocal cord cancer      Past Surgical History:   Procedure Laterality Date    CARDIAC ELECTROPHYSIOLOGY PROCEDURE  2023    cardiac loop recorder    CARDIAC ELECTROPHYSIOLOGY PROCEDURE N/A 10/25/2023    Procedure: Pacemaker DC new;  Surgeon: Levy Pickett MD;  Location:  [facility];  Service: Cardiology;  Laterality: N/A;    CARDIAC ELECTROPHYSIOLOGY PROCEDURE N/A 10/25/2023    Procedure: AV node ablation;  Surgeon: Levy Pickett MD;  Location:  [facility];  Service: Cardiovascular;  Laterality: N/A;    CHOLECYSTECTOMY      CORONARY ARTERY BYPASS GRAFT       PROSTATE SURGERY      VOCAL CORD MASS EXCISION  03/22/2021       SLP Recommendation and Plan  SLP Swallowing Diagnosis: oral dysphagia (11/23/24 1345)  SLP Diet Recommendation: soft to chew textures, chopped, thin liquids (11/23/24 1345)  Recommended Precautions and Strategies: upright posture during/after eating, small bites of food and sips of liquid (11/23/24 1345)  SLP Rec. for Method of Medication Administration: meds whole, with thin liquids (11/23/24 1345)     Monitor for Signs of Aspiration: yes, notify SLP if any concerns (11/23/24 1345)  Recommended Diagnostics: reassess via clinical swallow evaluation (11/23/24 1345)  Swallow Criteria for Skilled Therapeutic Interventions Met: demonstrates skilled criteria (11/23/24 1345)  Anticipated Discharge Disposition (SLP): home with assist (11/23/24 1345)  Rehab Potential/Prognosis, Swallowing: good, to achieve stated therapy goals (11/23/24 1345)  Therapy Frequency (Swallow): 5 days per week (11/23/24 1345)  Predicted Duration Therapy Intervention (Days): 1 week (11/23/24 1345)  Oral Care Recommendations: Oral Care BID/PRN, Toothbrush (11/23/24 1345)                                        Progress: improving      SWALLOW EVALUATION (Last 72 Hours)       SLP Adult Swallow Evaluation       Row Name 11/23/24 1345                   Rehab Evaluation    Document Type evaluation  -HG        Subjective Information no complaints  -HG        Patient Observations alert;cooperative;agree to therapy  -HG        Patient/Family/Caregiver Comments/Observations No family present  -HG        Patient Effort excellent  -HG        Symptoms Noted During/After Treatment none  -HG           General Information    Patient Profile Reviewed yes  -HG        Pertinent History Of Current Problem Pt is a 89 year old male admitted with hx of chronic HFrEF, PAF on Eliquis, bradycardia s/p PPM, CAD s/p CABG, CKD 3, HTN, HLD, head/neck cancer, prostate cancer, migraines, and GERD who presents  due to occipital headache, dysarthria, and left sided weakness which started this morning when he woke up. Headache is located posteriorly. He reports recent increase in frequent headaches, does have hx of migraines but says he doesn't take anything for them. Currently symptoms have improved, speech still a little slow however. Initial scans in the ER were unremarkable. Stroke Neurology consulted and patient was admitted for further workup.  -HG        Current Method of Nutrition NPO  -HG        Precautions/Limitations, Vision WFL with corrective lenses  -HG        Precautions/Limitations, Hearing WFL;for purposes of eval  -HG        Prior Level of Function-Communication WFL  -HG        Prior Level of Function-Swallowing no diet consistency restrictions  -HG        Plans/Goals Discussed with patient  -HG        Barriers to Rehab none identified  -HG        Patient's Goals for Discharge return to PO diet  -HG           Pain    Pretreatment Pain Rating 0/10 - no pain  -HG        Pre/Posttreatment Pain Comment Pt just noted that he was uncomfortable- RN present  -HG           Oral Motor Structure and Function    Dentition Assessment natural, present and adequate  -HG        Secretion Management WNL/WFL  -HG        Mucosal Quality moist, healthy  -HG        Volitional Swallow WFL  -HG        Volitional Cough weak  -HG           Oral Musculature and Cranial Nerve Assessment    Oral Motor General Assessment WFL  -HG           General Eating/Swallowing Observations    Respiratory Support Currently in Use room air  -HG        Eating/Swallowing Skills self-fed;fed by SLP  -HG        Positioning During Eating upright in bed  -HG        Utensils Used spoon;cup;straw  -HG        Consistencies Trialed ice chips;thin liquids;pureed;mixed consistency;regular textures  -HG           Respiratory    Respiratory Status WFL;room air  -HG           Clinical Swallow Eval    Oral Prep Phase impaired  -HG        Oral Transit WFL  -HG         Oral Residue impaired  -HG        Pharyngeal Phase --  One cough present out of multiple swallows and presentations given.  -HG        Esophageal Phase unremarkable  -HG        Clinical Swallow Evaluation Summary CSE completed this date. Pt tolerated ice chip, water via tsp, cup and straw, with no s/sx of aspiration. Pt tolerated applesauce with no difficulty. Mixed fruit in juice did result in increased mastication time. Pt tolerated josi cracker without difficulty. Pt used a liquid wash via straw, after the fruit and exhibited his one and only cough. Pt tolerated following swallows via straw, with no s/sx of aspiration exhibited.  SLP RECs thin and mech soft chopped at this time. Meds whole with thin.  -HG           Oral Prep Concerns    Oral Prep Concerns prolonged mastication  -HG        Prolonged Mastication mechanical soft  -HG           Oral Residue Concerns    Oral Residue Concerns diffuse residue throughout oral cavity  -HG        Diffuse Residue Throughout Oral Cavity mixed consistencies  -HG           SLP Evaluation Clinical Impression    SLP Swallowing Diagnosis oral dysphagia  -HG        Functional Impact risk of aspiration/pneumonia  -HG        Rehab Potential/Prognosis, Swallowing good, to achieve stated therapy goals  -        Swallow Criteria for Skilled Therapeutic Interventions Met demonstrates skilled criteria  -           SLP Treatment Clinical Impressions    Care Plan Review evaluation/treatment results reviewed  -           Recommendations    Therapy Frequency (Swallow) 5 days per week  -        Predicted Duration Therapy Intervention (Days) 1 week  -        SLP Diet Recommendation soft to chew textures;chopped;thin liquids  -        Recommended Diagnostics reassess via clinical swallow evaluation  -        Recommended Precautions and Strategies upright posture during/after eating;small bites of food and sips of liquid  -HG        Oral Care Recommendations Oral Care  BID/PRN;Toothbrush  -HG        SLP Rec. for Method of Medication Administration meds whole;with thin liquids  -HG        Monitor for Signs of Aspiration yes;notify SLP if any concerns  -HG        Anticipated Discharge Disposition (SLP) home with assist  -HG           (LTG) Swallow    (LTG) Swallow Pt will tolerate diet of mech soft chopped and thin liquids with no s/sx of aspiration  -HG        [place] (Swallow Long Term Goal) independently (over 90% accuracy)  -HG           (STG) Swallow 1    (STG) Swallow 1 Pt will tolerate recommended diet and liquids with no s/sx of aspiration  -HG        [place] (Swallow Short Term Goal 1) independently (over 90% accuracy)  -HG                  User Key  (r) = Recorded By, (t) = Taken By, (c) = Cosigned By      Initials Name Effective Dates     Moni Mayes, MS CCC-SLP 06/16/21 -                     EDUCATION  The patient has been educated in the following areas:   Dysphagia (Swallowing Impairment).        SLP GOALS       Row Name 11/23/24 1345             (LTG) Swallow    (LTG) Swallow Pt will tolerate diet of mech soft chopped and thin liquids with no s/sx of aspiration  -HG      [place] (Swallow Long Term Goal) independently (over 90% accuracy)  -HG         (STG) Swallow 1    (STG) Swallow 1 Pt will tolerate recommended diet and liquids with no s/sx of aspiration  -HG      [place] (Swallow Short Term Goal 1) independently (over 90% accuracy)  -HG         Patient will demonstrate functional cognitive-linguistic skills for return to discharge environment    [place] Independently  -HG      Time frame 1 week  -HG         Memory Skills Goal 1 (SLP)    Improve Memory Skills Through Goal 1 (SLP) recalling related word lists with an imposed delay;recalling unrelated word lists immediately;recalling unrelated word lists with an imposed delay;listen to a paragraph and answer questions;90%;independently (over 90% accuracy)  -HG      Time Frame (Memory  Skills Goal 1, SLP) short term goal (STG)  -         Organizational Skills Goal 1 (SLP)    Improve Thought Organization Through Goal 1 (SLP) completing a divergent naming task;completing a convergent naming task;90%;independently (over 90% accuracy)  -      Time Frame (Thought Organization Skills Goal 1, SLP) short term goal (STG)  -HG         Reasoning Goal 1 (SLP)    Improve Reasoning Through Goal 1 (SLP) complete basic reasoning task;complete high level reasoning task;complete deductive reasoning task;complete mental flexibility task;90%;independently (over 90% accuracy)  -      Time Frame (Reasoning Goal 1, SLP) short term goal (STG)  -                User Key  (r) = Recorded By, (t) = Taken By, (c) = Cosigned By      Initials Name Provider Type    Moni Bell MS CCC-SLP Speech and Language Pathologist                         Time Calculation:    Time Calculation- SLP       Row Name 24 1508 24 1437          Time Calculation- SLP    SLP Start Time 1345  -HG 1345  -HG     SLP Received On 24  -HG 24  -HG        Untimed Charges    32844-VV Eval Speech and Production w/ Language Minutes 30  -HG --     [id number] Eval Oral Pharyng Swallow Minutes -- 45  -HG        Total Minutes    Untimed Charges Total Minutes 30  -HG 45  -HG      Total Minutes 30  -HG 45  -HG               User Key  (r) = Recorded By, (t) = Taken By, (c) = Cosigned By      Initials Name Provider Type     Moni Mayes MS CCC-SLP Speech and Language Pathologist                    Therapy Charges for Today       Code Description Service Date Service Provider Modifiers Qty    [id number]  ST EVAL ORAL PHARYNG SWALLOW 3 2024 Moni Mayes MS CCC-SLP GN 1                 Moni Mayes MS CCC-SLP  2024   and Acute Care - Speech Language Pathology Initial Evaluation   Mario Alberto     Patient Name: Mukund Pool  : 1930  MRN: [id number]  Today's Date: 2024                Admit Date: 11/23/2024     Visit Dx:    ICD-10-CM ICD-9-CM   1. Dysphagia, unspecified type  R13.10 787.20   2. Acute nonintractable headache, unspecified headache type  R51.9 784.0   3. Ataxia  R27.0 781.3   4. 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)    A/C renal failure    GERD without esophagitis    Pleural effusion, bilateral    Moderate malnutrition    Presence of cardiac pacemaker    TIA (transient ischemic attack)    Headache    Left-sided weakness     Past Medical History:   Diagnosis Date    Arthritis     Chronic kidney disease     kidney stones    Coronary artery disease     GERD (gastroesophageal reflux disease)     History of radiation therapy 05/24/2021    laryngeal mass    Prostate cancer     Vocal cord cancer      Past Surgical History:   Procedure Laterality Date    CARDIAC ELECTROPHYSIOLOGY PROCEDURE  04/2023    cardiac loop recorder    CARDIAC ELECTROPHYSIOLOGY PROCEDURE N/A 10/25/2023    Procedure: Pacemaker DC new;  Surgeon: Levy Pickett MD;  Location:  [facility];  Service: Cardiology;  Laterality: N/A;    CARDIAC ELECTROPHYSIOLOGY PROCEDURE N/A 10/25/2023    Procedure: AV node ablation;  Surgeon: Levy Pickett MD;  Location:  [facility];  Service: Cardiovascular;  Laterality: N/A;    CHOLECYSTECTOMY      CORONARY ARTERY BYPASS GRAFT      PROSTATE SURGERY      VOCAL CORD MASS EXCISION  03/22/2021       SLP Recommendation and Plan  SLP Diagnosis: mild, cognitive-linguistic disorder (11/23/24 1345)  SLP Diagnosis  Comments: Pt feels that he would've done better at answer questions prior to his symptoms starting. (11/23/24 1345)     Monitor for Signs of Aspiration: yes, notify SLP if any concerns (11/23/24 1345)  Swallow Criteria for Skilled Therapeutic Interventions Met: demonstrates skilled criteria (11/23/24 1345)  SLC Criteria for Skilled Therapy Interventions Met: yes (11/23/24 1345)  Anticipated Discharge Disposition (SLP): home with assist (11/23/24 1345)     Therapy Frequency (Swallow): 5 days per week (11/23/24 1345)     Predicted Duration Therapy Intervention (Days): 1 week (11/23/24 1345)  Oral Care Recommendations: Oral Care BID/PRN, Toothbrush (11/23/24 1345)                          Progress: improving (11/23/24 1436)      SLP EVALUATION (Last 72 Hours)       SLP SLC Evaluation       Row Name 11/23/24 1345                   General Information    Patient Level of Education Master's  -HG        Prior Level of Function-Communication WFL  -HG        Patient's Goals for Discharge patient did not state  -HG           Auditory Comprehension Assessment/Intervention    Auditory Comprehension (Communication) WFL  -HG           Reading Comprehension Assessment/Intervention    Reading Comprehension (Communication) WFL  -HG           Verbal Expression Assessment/Intervention    Verbal Expression WFL  -HG           Graphic Expression Assessment/Intervention    Graphic Expression, Comment did not assess  -HG           Motor Speech Assessment/Intervention    Motor Speech Function WFL  -HG           Cursory Voice Assessment/Intervention    Quality and Resonance (Voice) hoarse  -HG        Voice, Comment Pt notes hoarseness and it is a new symptom.  -HG           Cognitive Assessment Intervention- SLP    Cognitive Function (Cognition) mild impairment  -HG        Orientation Status (Cognition) WFL;mild impairment  -HG        Memory (Cognitive) simple;immediate;delayed;related;mild impairment  -HG        Attention (Cognitive)  sustained;alternating;WFL  -HG        Thought Organization (Cognitive) verbal sequencing;WFL;concrete divergent;mental manipulation;mild impairment  -        Reasoning (Cognitive) simple;logic/creative thinking;[facility]  -        Problem Solving (Cognitive) simple;L  -        Functional Math (Cognitive) simple;L  -        Executive Function (Cognition) [facility]  -        Pragmatics (Communication) [facility]  -           SLP Evaluation Clinical Impressions    SLP Diagnosis mild;cognitive-linguistic disorder  -        SLP Diagnosis Comments Pt feels that he would've done better at answer questions prior to his symptoms starting.  -        Rehab Potential/Prognosis excellent  -[facility] Criteria for Skilled Therapy Interventions Met yes  -           Patient will demonstrate functional cognitive-linguistic skills for return to discharge environment    [place] Independently  -        Time frame 1 week  -           Memory Skills Goal 1 (SLP)    Improve Memory Skills Through Goal 1 (SLP) recalling related word lists with an imposed delay;recalling unrelated word lists immediately;recalling unrelated word lists with an imposed delay;listen to a paragraph and answer questions;90%;independently (over 90% accuracy)  -        Time Frame (Memory Skills Goal 1, SLP) short term goal (STG)  -           Organizational Skills Goal 1 (SLP)    Improve Thought Organization Through Goal 1 (SLP) completing a divergent naming task;completing a convergent naming task;90%;independently (over 90% accuracy)  -        Time Frame (Thought Organization Skills Goal 1, SLP) short term goal (STG)  -           Reasoning Goal 1 (SLP)    Improve Reasoning Through Goal 1 (SLP) complete basic reasoning task;complete high level reasoning task;complete deductive reasoning task;complete mental flexibility task;90%;independently (over 90% accuracy)  -        Time Frame (Reasoning Goal 1, SLP) short term goal (STG)  -                   User Key  (r) = Recorded By, (t) = Taken By, (c) = Cosigned By      Initials Name Effective Dates     Moni Mayes MS CCC-SLP 06/16/21 -                        EDUCATION  The patient has been educated in the following areas:     Cognitive Impairment Communication Impairment.           SLP GOALS       Row Name 11/23/24 1345             (LTG) Swallow    (LTG) Swallow Pt will tolerate diet of mech soft chopped and thin liquids with no s/sx of aspiration  -HG      [place] (Swallow Long Term Goal) independently (over 90% accuracy)  -HG         (STG) Swallow 1    (STG) Swallow 1 Pt will tolerate recommended diet and liquids with no s/sx of aspiration  -HG      [place] (Swallow Short Term Goal 1) independently (over 90% accuracy)  -HG         Patient will demonstrate functional cognitive-linguistic skills for return to discharge environment    [place] Independently  -HG      Time frame 1 week  -HG         Memory Skills Goal 1 (SLP)    Improve Memory Skills Through Goal 1 (SLP) recalling related word lists with an imposed delay;recalling unrelated word lists immediately;recalling unrelated word lists with an imposed delay;listen to a paragraph and answer questions;90%;independently (over 90% accuracy)  -HG      Time Frame (Memory Skills Goal 1, SLP) short term goal (STG)  -HG         Organizational Skills Goal 1 (SLP)    Improve Thought Organization Through Goal 1 (SLP) completing a divergent naming task;completing a convergent naming task;90%;independently (over 90% accuracy)  -HG      Time Frame (Thought Organization Skills Goal 1, SLP) short term goal (STG)  -HG         Reasoning Goal 1 (SLP)    Improve Reasoning Through Goal 1 (SLP) complete basic reasoning task;complete high level reasoning task;complete deductive reasoning task;complete mental flexibility task;90%;independently (over 90% accuracy)  -HG      Time Frame (Reasoning Goal 1, SLP) short term goal (STG)  -HG                User  Key  (r) = Recorded By, (t) = Taken By, (c) = Cosigned By      Initials Name Provider Type    Moni Bell MS CCC-SLP Speech and Language Pathologist                              Time Calculation:      Time Calculation- SLP       Row Name 11/23/24 1508 11/23/24 1437          Time Calculation- SLP    SLP Start Time 1345  -HG 1345  -HG     SLP Received On 11/23/24  -HG 11/23/24  -HG        Untimed Charges    [id number] Eval Speech and Production w/ Language Minutes 30  -HG --     [id number] Eval Oral Pharyng Swallow Minutes -- 45  -HG        Total Minutes    Untimed Charges Total Minutes 30  -HG 45  -HG      Total Minutes 30  -HG 45  -HG               User Key  (r) = Recorded By, (t) = Taken By, (c) = Cosigned By      Initials Name Provider Type    Moni Bell MS CCC-SLP Speech and Language Pathologist                    Therapy Charges for Today       Code Description Service Date Service Provider Modifiers Qty    [id number] HC ST EVAL ORAL PHARYNG SWALLOW 3 11/23/2024 Moni Mayes MS CCC-SLP GN 1                       Moni Mayes MS CCC-SLP  11/23/2024

## 2024-11-23 NOTE — PLAN OF CARE
Goal Outcome Evaluation:  Plan of Care Reviewed With: patient        Progress: improving       Anticipated Discharge Disposition (SLP): home with assist          SLP Swallowing Diagnosis: oral dysphagia (11/23/24 0695)

## 2024-11-23 NOTE — CONSULTS
Referring Provider: Hospitalist  Reason for Consultation: Pacemaker    Patient Care Team:  Slick Hubbard MD as PCP - General (Family Medicine)  Ethan Lu MD as Referring Physician (Otolaryngology)  Chano Licona MD as Consulting Physician (Radiation Oncology)  Domingo Rodriguez MD as Consulting Physician (Interventional Cardiology)  Levy Pickett MD as Consulting Physician (Cardiology)  Radha Billingsley APRN as Nurse Practitioner (Interventional Cardiology)    Chief complaint weakness    Subjective .     History of present illness: 89-year-old male that follows with Dr. Pickett and Dr. Espinoza.  Patient does have a pacemaker with left bundle pacing.  Patient had this done after an AV chaz ablation.  Patient presented here with left-sided weakness dysarthria and headache.  Patient was recommended to have MRI.  Cardiology is consulted for pacemaker clearance.  Patient did undergo CTA with intracerebral disease noted.    Atrial fibrillation  Failed medical therapy including amiodarone therapy with episodes of RVR  Status post AV node ablation [facility]emaker 10/25/2023  Eliquis therapy, [id number] score equal to 5  Coronary disease  Remote CABG 1980  Stress test February 10,023 inferior infarct no ischemia EF 55%.  Chronic congestive heart failure  Echocardiogram September 30, 2023 EF 25%  Chronic kidney disease stage III    Review of Systems   Pertinent items are noted in HPI, all other systems reviewed and negative    History  Past Medical History:   Diagnosis Date    Arthritis     Chronic kidney disease     kidney stones    Coronary artery disease     GERD (gastroesophageal reflux disease)     History of radiation therapy 05/24/2021    laryngeal mass    Prostate cancer     Vocal cord cancer    ,   Past Surgical History:   Procedure Laterality Date    CARDIAC ELECTROPHYSIOLOGY PROCEDURE  04/2023    cardiac loop recorder    CARDIAC ELECTROPHYSIOLOGY PROCEDURE N/A 10/25/2023     Procedure: Pacemaker DC new;  Surgeon: Levy Pickett MD;  Location:  [facility];  Service: Cardiology;  Laterality: N/A;    CARDIAC ELECTROPHYSIOLOGY PROCEDURE N/A 10/25/2023    Procedure: AV node ablation;  Surgeon: Levy Pickett MD;  Location:  [facility];  Service: Cardiovascular;  Laterality: N/A;    CHOLECYSTECTOMY      CORONARY ARTERY BYPASS GRAFT      PROSTATE SURGERY      VOCAL CORD MASS EXCISION  03/22/2021   ,   Family History   Problem Relation Age of Onset    Cancer Mother     Breast cancer Mother     Heart attack Father     Ovarian cancer Sister    ,   Social History     Socioeconomic History    Marital status:    Tobacco Use    Smoking status: Never     Passive exposure: Never    Smokeless tobacco: Never   Vaping Use    Vaping status: Never Used   Substance and Sexual Activity    Alcohol use: No    Drug use: No    Sexual activity: Defer     E-cigarette/Vaping    E-cigarette/Vaping Use Never User     Passive Exposure No     Counseling Given No      E-cigarette/Vaping Substances    Nicotine No     THC No     CBD No     Flavoring No      E-cigarette/Vaping Devices    Disposable No     Pre-filled or Refillable Cartridge No     Refillable Tank No     Pre-filled Pod No          ,   Medications Prior to Admission   Medication Sig Dispense Refill Last Dose/Taking    apixaban (ELIQUIS) 2.5 MG tablet tablet Take 1 tablet by mouth 2 (Two) Times a Day. Indications: Atrial Fibrillation 60 tablet 0 11/22/2024    aspirin 81 MG EC tablet Take 1 tablet by mouth Daily. OTC   11/22/2024 Morning    mirtazapine (REMERON) 15 MG tablet Take 1 tablet by mouth Every Night.   11/22/2024    multivitamin with minerals tablet tablet Take 1 tablet by mouth Daily. OTC  Indications: vitamin deficiency   11/22/2024    omeprazole (priLOSEC) 20 MG capsule Take 1 capsule by mouth Daily. Indications: Stomach Ulcer   11/22/2024    sertraline (ZOLOFT) 50 MG tablet Take 1 tablet by mouth Daily.    11/22/2024    torsemide (DEMADEX) 20 MG tablet Take 0.5 tablets by mouth Daily. Indications: Cardiac Failure (Patient taking differently: Take 1 tablet by mouth Daily. Indications: Cardiac Failure) 30 tablet 0 11/22/2024    ezetimibe (ZETIA) 10 MG tablet Take 1 tablet by mouth Daily. Indications: High Amount of Fats in the Blood   Unknown    gabapentin (NEURONTIN) 300 MG capsule Take 1 capsule by mouth 2 (Two) Times a Day. Indications: Neuropathic Pain   Unknown    nitroglycerin (NITROSTAT) 0.4 MG SL tablet Place 1 tablet under the tongue Every 5 (Five) Minutes As Needed for Chest Pain. Indications: Acute Angina Pectoris   Unknown   , Scheduled Meds:  apixaban, 2.5 mg, Oral, Q12H  aspirin, 81 mg, Oral, Daily   Or  aspirin, 300 mg, Rectal, Daily  atorvastatin, 40 mg, Oral, Nightly  gabapentin, 300 mg, Oral, BID  [START ON 11/24/2024] magnesium oxide, 400 mg, Oral, Daily  mirtazapine, 15 mg, Oral, Nightly  [START ON 11/24/2024] pantoprazole, 40 mg, Oral, Q AM  sertraline, 50 mg, Oral, Daily  sodium chloride, 10 mL, Intravenous, Q12H  sodium chloride, 10 mL, Intravenous, Q12H  torsemide, 20 mg, Oral, Daily   , Continuous Infusions:   , PRN Meds:    senna-docusate sodium **AND** polyethylene glycol **AND** bisacodyl **AND** bisacodyl    sodium chloride    sodium chloride    sodium chloride    sodium chloride    sodium chloride, and Allergies:  Patient has no known allergies.    Objective     Vital Signs  Temp:  [98.1 °F (36.7 °C)] 98.1 °F (36.7 °C)  Heart Rate:  [70-75] 70  Resp:  [16] 16  BP: (137-161)/(75-86) 137/81  Body mass index is 23.75 kg/m².  No intake or output data in the 24 hours ending 11/23/24 1443  No intake/output data recorded.    Physical Exam:      General Appearance:  Alert, cooperative, in no acute distress   Head:  Normocephalic, without obvious abnormality, atraumatic   Eyes:  Lids and lashes normal, conjunctivae and sclerae normal, no icterus, no pallor, corneas clear, PERRLA   Ears:  Ears appear  "intact with no abnormalities noted   Throat:  No oral lesions, no thrush, oral mucosa moist   Neck:  No adenopathy, supple, trachea midline, no thyromegaly, no carotid bruit, no JVD   Back:  No kyphosis present, no scoliosis present, no skin lesions, erythema or scars, no tenderness to percussion or palpation, range of motion normal   Lungs:  Clear to auscultation, respirations regular, even and unlabored    Heart:  Regular rhythm and normal rate, normal S1 and S2, no murmur, no gallop, no rub, no click   Chest Wall:  No abnormalities observed   Abdomen:  Normal bowel sounds, no masses, no organomegaly, soft non-tender, non-distended, no guarding, no rebound tenderness   Rectal:  Deferred   Extremities:  Moves all extremities well, no edema, no cyanosis, no redness   Pulses:  Pulses palpable and equal bilaterally   Skin:  No bleeding, bruising or rash   Lymph nodes:  No palpable adenopathy   Neurologic:  Cranial nerves 2 - 12 grossly intact, sensation intact, DTR present and equal bilaterally                                                                               Results Review:    I reviewed the patient's new clinical results.  I reviewed the patient's new imaging results and agree with the interpretation.  I reviewed the patient's other test results and agree with the interpretation  I personally viewed and interpreted the patient's EKG/Telemetry data       WBC WBC   Date Value Ref Range Status   11/23/2024 8.18 3.40 - 10.80 10*3/mm3 Final      HGB Hemoglobin   Date Value Ref Range Status   11/23/2024 12.1 (L) 13.0 - 17.7 g/dL Final      HCT Hematocrit   Date Value Ref Range Status   11/23/2024 37.0 (L) 37.5 - 51.0 % Final      Platlets No results found for: \"LABPLAT\"     PT/INR:      Protime   Date Value Ref Range Status   11/23/2024 13.7 12.2 - 14.5 Seconds Final   /  INR   Date Value Ref Range Status   11/23/2024 1.04 0.89 - 1.12 Final       Sodium Sodium   Date Value Ref Range Status   11/23/2024 139 136 " "- 145 mmol/L Final      Potassium Potassium   Date Value Ref Range Status   11/23/2024 4.6 3.5 - 5.2 mmol/L Final     Comment:     Slight hemolysis detected by analyzer. Result may be falsely elevated.      Chloride Chloride   Date Value Ref Range Status   11/23/2024 104 98 - 107 mmol/L Final      Bicarbonate No results found for: \"PLASMABICARB\"   BUN BUN   Date Value Ref Range Status   11/23/2024 26 (H) 8 - 23 mg/dL Final      Creatinine Creatinine   Date Value Ref Range Status   11/23/2024 1.64 (H) 0.76 - 1.27 mg/dL Final      Calcium Calcium   Date Value Ref Range Status   11/23/2024 9.1 8.2 - 9.6 mg/dL Final      Magnesium No results found for: \"MG\"     pH No results found for: \"PHART\"   pO2 No results found for: \"PO2ART\"   pCO2 No results found for: \"NMZ7FKS\"   HCO3 No results found for: \"GKZ0MQL\"     Assessment & Plan     Patient Active Problem List   Diagnosis Code    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    History of head and neck cancer Z85.89    Paroxysmal atrial fibrillation I48.0    Stage 3b chronic kidney disease N18.32    Anemia, chronic disease D63.8    Coronary artery disease involving native coronary artery of native heart with angina pectoris I25.119    Hyperlipidemia LDL goal <70 E78.5    LBBB (left bundle branch block) I44.7    Chronic HFrEF (heart failure with reduced ejection fraction) I50.22    A/C renal failure N17.9    GERD without esophagitis K21.9    Pleural effusion, bilateral J90    Moderate malnutrition E44.0    Presence of cardiac pacemaker Z95.0    TIA (transient ischemic attack) G45.9    Headache R51.9    Left-sided weakness R53.1       Possible CVA  Atrial fibrillation, history of  Bradycardia status post permanent pacemaker    Plan: Will fill out appropriate documentation for patient to have pacemaker clearance for MRI.  Continue to follow along here.  Continue anticoagulation as long as okay with neurology.      Bg Villanueva MD  11/23/24  14:43 EST        "

## 2024-11-23 NOTE — ED NOTES
Mukund Pool    Nursing Report ED to Floor:  Mental status: A&O X3  Ambulatory status: bedbound  Oxygen Therapy:  RA  Cardiac Rhythm: NSR  Admitted from: ED  Safety Concerns:  none  Social Issues: none  ED Room #:  21    ED Nurse Phone Extension - [phone] or may call [phone].      HPI:   Chief Complaint   Patient presents with    Stroke       Past Medical History:  Past Medical History:   Diagnosis Date    Arthritis     Chronic kidney disease     kidney stones    Coronary artery disease     GERD (gastroesophageal reflux disease)     History of radiation therapy 05/24/2021    laryngeal mass    Prostate cancer     Vocal cord cancer         Past Surgical History:  Past Surgical History:   Procedure Laterality Date    CARDIAC ELECTROPHYSIOLOGY PROCEDURE  04/2023    cardiac loop recorder    CARDIAC ELECTROPHYSIOLOGY PROCEDURE N/A 10/25/2023    Procedure: Pacemaker DC new;  Surgeon: Levy Pickett MD;  Location:  [facility];  Service: Cardiology;  Laterality: N/A;    CARDIAC ELECTROPHYSIOLOGY PROCEDURE N/A 10/25/2023    Procedure: AV node ablation;  Surgeon: Levy Pickett MD;  Location: [facility];  Service: Cardiovascular;  Laterality: N/A;    CHOLECYSTECTOMY      CORONARY ARTERY BYPASS GRAFT      PROSTATE SURGERY      VOCAL CORD MASS EXCISION  03/22/2021        Admitting Doctor:   Florence QUINTERO MD    Consulting Provider(s):  Consults       Date and Time Order Name Status Description    11/23/2024  9:55 AM Inpatient Neurology Consult Stroke Completed              Admitting Diagnosis:   The primary encounter diagnosis was Ataxia. Diagnoses of Acute nonintractable headache, unspecified headache type and Acute left-sided weakness were also pertinent to this visit.    Most Recent Vitals:   Vitals:    11/23/24 0956   BP: 161/86   BP Location: Right arm   Patient Position: Lying   Pulse: 70   Resp: 16   Temp: 98.1 °F (36.7 °C)   TempSrc: Oral   SpO2: 99%   Weight: 81.6 kg (180 lb)  "  Height: 185.4 cm (73\")       Active LDAs/IV Access:   Lines, Drains & Airways       Active LDAs       Name Placement date Placement time Site Days    Peripheral IV 11/23/24 1005 Right Antecubital 11/23/24  1005  Antecubital  less than 1                    Labs (abnormal labs have a star):   Labs Reviewed   SINGLE HS TROPONIN T - Abnormal; Notable for the following components:       Result Value    HS Troponin T 44 (*)     All other components within normal limits    Narrative:     High Sensitive Troponin T Reference Range:  <14.0 ng/L- Negative Female for AMI  <22.0 ng/L- Negative Male for AMI  >=14 - Abnormal Female indicating possible myocardial injury.  >=22 - Abnormal Male indicating possible myocardial injury.   Clinicians would have to utilize clinical acumen, EKG, Troponin, and serial changes to determine if it is an Acute Myocardial Infarction or myocardial injury due to an underlying chronic condition.        CBC WITH AUTO DIFFERENTIAL - Abnormal; Notable for the following components:    RBC 3.57 (*)     Hemoglobin 12.1 (*)     Hematocrit 37.0 (*)     .6 (*)     MCH 33.9 (*)     All other components within normal limits   BASIC METABOLIC PANEL - Abnormal; Notable for the following components:    Glucose 106 (*)     BUN 26 (*)     Creatinine 1.64 (*)     eGFR 38.5 (*)     All other components within normal limits    Narrative:     GFR Normal >60  Chronic Kidney Disease <60  Kidney Failure <15    The GFR formula is only valid for adults with stable renal function between ages 18 and 70.   PROTIME-INR - Normal   APTT - Normal    Narrative:     PTT = The equivalent PTT values for the therapeutic range of heparin levels at 0.3 to 0.5 U/ml are 60 to 70 seconds.   AST - Normal   ALT - Normal   RAINBOW DRAW    Narrative:     The following orders were created for panel order [place] Draw.  Procedure                               Abnormality         Status                     ---------                        "        -----------         ------                     Green Top (Gel)[[id number]]                                  Final result               Lavender Top[[id number]]                                     Final result               Gold Top - SST[[id number]]                                   Final result               Gray Top[[id number]]                                         Final result               Light Blue Top[[id number]]                                   Final result                 Please view results for these tests on the individual orders.   URINALYSIS W/ CULTURE IF INDICATED   POCT CHEM 8   CBC AND DIFFERENTIAL    Narrative:     The following orders were created for panel order CBC & Differential.  Procedure                               Abnormality         Status                     ---------                               -----------         ------                     CBC Auto Differential[[id number]]        Abnormal            Final result                 Please view results for these tests on the individual orders.   GREEN TOP   LAVENDER TOP   GOLD TOP - SST   GRAY TOP   LIGHT BLUE TOP       Meds Given in ED:   Medications   sodium chloride 0.9 % flush 10 mL (has no administration in time range)   iopamidol (ISOVUE-370) 76 % injection 75 mL (75 mL Intravenous Given 11/23/24 1011)   magnesium sulfate in D5W 1g/100mL (PREMIX) (1 g Intravenous New Bag 11/23/24 1159)   acetaminophen (TYLENOL) tablet 650 mg (650 mg Oral Given 11/23/24 1153)   prochlorperazine (COMPAZINE) injection 5 mg (5 mg Intravenous Given 11/23/24 1155)   dexAMETHasone (DECADRON) injection 2 mg (2 mg Intravenous Given 11/23/24 1155)           Last NIH score:  Interval: baseline  1a. Level of Consciousness: 0-->Alert, keenly responsive  1b. LOC Questions: 0-->Answers both questions correctly  1c. LOC Commands: 0-->Performs both tasks correctly  2. Best Gaze: 0-->Normal  3. Visual: 0-->No visual loss  4. Facial Palsy: 0-->Normal symmetrical  movements  5a. Motor Arm, Left: 0-->No drift, limb holds 90 (or 45) degrees for full 10 secs  5b. Motor Arm, Right: 0-->No drift, limb holds 90 (or 45) degrees for full 10 secs  6a. Motor Leg, Left: 0-->No drift, leg holds 30 degree position for full 5 secs  6b. Motor Leg, Right: 0-->No drift, leg holds 30 degree position for full 5 secs  7. Limb Ataxia: 0-->Absent  8. Sensory: 0-->Normal, no sensory loss  9. Best Language: 0-->No aphasia, normal  10. Dysarthria: 1-->Mild-to-moderate dysarthria, patient slurs at least some words and, at worst, can be understood with some difficulty  11. Extinction and Inattention (formerly Neglect): 0-->No abnormality    Total (NIH Stroke Scale): 1     Dysphagia screening results:  Patient Factors Component (Dysphagia:Stroke or Rule-out)  Best Eye Response: 4-->(E4) spontaneous (11/23/24 1152)  Best Motor Response: 6-->(M6) obeys commands (11/23/24 1152)  Best Verbal Response: 4-->(V4) confused (11/23/24 1152)  Biggs Coma Scale Score: 14 (11/23/24 1152)  Is there Facial Asymmetry/Weakness?: No (11/23/24 1152)  Is there Tongue Asymmetry/Weakness?: No (11/23/24 1152)  Is there Palatal Asymmetry/Weakness?: No (11/23/24 1152)  Patient Assessment Result: Pass - Proceed to Water Test (11/23/24 1152)     Biggs Coma Scale:  No data recorded     CIWA:        Restraint Type:            Isolation Status:  No active isolations

## 2024-11-23 NOTE — Clinical Note
Level of Care: Telemetry [5]   Diagnosis: TIA (transient ischemic attack) [164675]   Admitting Physician: NAVYA CALIX [[id number]]

## 2024-11-23 NOTE — PLAN OF CARE
Goal Outcome Evaluation:         Patient alert and oriented x4. NIH 0. Continent bowel and bladder. Ambulated to bathroom  with walker x1 assist. Wears prescription glasses. Cardiology clearance for mri completed, metal screen completed.

## 2024-11-23 NOTE — LETTER
EMS Transport Request  For use at [facility], [place], [place], [place], and [place] only   Patient Name: Mukund Pool : 1930   Weight:81.6 kg (180 lb) Pick-up Location: [facility] BLS/ALS: BLS/ALS: BLS   Insurance: [employer] MEDICARE REPLACEMENT Auth End Date:    Pre-Cert #: D/C Summary complete:    Destination: Other [facility] Room 3002   Contact Precautions: None   Equipment (O2, Fluids, etc.): None   Arrive By Date/Time: 2024 Stretcher/WC: Stretcher   CM Requesting: Stacy Guadarraam RN Ext: [phone]   Notes/Medical Necessity: weakness, fall risk     ______________________________________________________________________    *Only 2 patient bags OR 1 carry-on size bag are permitted.  Wheelchairs and walkers CANNOT transported with the patient. Acknowledge: Yes

## 2024-11-23 NOTE — H&P
[facility] Medicine Services  HISTORY AND PHYSICAL    Patient Name: Mukund Pool  : 1930  MRN: [id number]  Primary Care Physician: Slick Hubbard MD  Date of admission: 2024      Subjective   Subjective     Chief Complaint:  Left sided weakness, headache, dysarthria    HPI:  Mukund Pool is a 89 y.o. male with hx of chronic HFrEF, PAF on Eliquis, bradycardia s/p PPM, CAD s/p CABG, CKD 3, HTN, HLD, head/neck cancer, prostate cancer, migraines, and GERD who presents due to occipital headache, dysarthria, and left sided weakness which started this morning when he woke up. Headache is located posteriorly. He reports recent increase in frequent headaches, does have hx of migraines but says he doesn't take anything for them. Currently symptoms have improved, speech still a little slow however. Initial scans in the ER were unremarkable. Stroke Neurology consulted and patient was admitted for further workup.      Personal History     Past Medical History:   Diagnosis Date    Arthritis     Chronic kidney disease     kidney stones    Coronary artery disease     GERD (gastroesophageal reflux disease)     History of radiation therapy 2021    laryngeal mass    Prostate cancer     Vocal cord cancer        Past Surgical History:   Procedure Laterality Date    CARDIAC ELECTROPHYSIOLOGY PROCEDURE  2023    cardiac loop recorder    CARDIAC ELECTROPHYSIOLOGY PROCEDURE N/A 10/25/2023    Procedure: Pacemaker DC new;  Surgeon: Levy Pickett MD;  Location:  [facility];  Service: Cardiology;  Laterality: N/A;    CARDIAC ELECTROPHYSIOLOGY PROCEDURE N/A 10/25/2023    Procedure: AV node ablation;  Surgeon: Levy Pickett MD;  Location:  [facility];  Service: Cardiovascular;  Laterality: N/A;    CHOLECYSTECTOMY      CORONARY ARTERY BYPASS GRAFT      PROSTATE SURGERY      VOCAL CORD MASS EXCISION  2021       Family History: family  history includes Breast cancer in his mother; Cancer in his mother; Heart attack in his father; Ovarian cancer in his sister.     Social History:  reports that he has never smoked. He has never been exposed to tobacco smoke. He has never used smokeless tobacco. He reports that he does not drink alcohol and does not use drugs.  Social History     Social History Narrative    Not on file       Medications:  Available home medication information reviewed.  Diclofenac Sodium, HYDROcodone-acetaminophen, apixaban, aspirin, ezetimibe, gabapentin, multivitamin with minerals, nitroglycerin, omeprazole, oxyCODONE-acetaminophen, promethazine, sertraline, and torsemide    No Known Allergies    Objective   Objective     Vital Signs:   Temp:  [98.1 °F (36.7 °C)] 98.1 °F (36.7 °C)  Heart Rate:  [70] 70  Resp:  [16] 16  BP: (161)/(86) 161/86  Total (NIH Stroke Scale): 1    Physical Exam   Constitutional: Awake, alert  Eyes: PERRLA, sclerae anicteric, no conjunctival injection  HENT: NCAT, mucous membranes moist  Neck: Supple, no thyromegaly, no lymphadenopathy, trachea midline  Respiratory: Clear to auscultation bilaterally, nonlabored respirations   Cardiovascular: RRR, no murmurs, rubs, or gallops, palpable pedal pulses bilaterally  Gastrointestinal: Positive bowel sounds, soft, nontender, nondistended  Musculoskeletal: No bilateral ankle edema, no clubbing or cyanosis to extremities  Psychiatric: Appropriate affect, cooperative  Neurologic: Oriented x 3, strength symmetric in all extremities, Cranial Nerves grossly intact to confrontation, speech slow and slightly dysarthric  Skin: No rashes    Result Review:  I have personally reviewed the results from the time of this admission to 11/23/2024 11:26 EST and agree with these findings:  [x]  Laboratory list / accordion  []  Microbiology  [x]  Radiology  []  EKG/Telemetry   []  Cardiology/Vascular   []  Pathology  [x]  Old records  []  Other:    LAB RESULTS:      Lab 11/23/24  1008    WBC 8.18   HEMOGLOBIN 12.1*   HEMATOCRIT 37.0*   PLATELETS 191   NEUTROS ABS 4.60   IMMATURE GRANS (ABS) 0.04   LYMPHS ABS 2.51   MONOS ABS 0.73   EOS ABS 0.24   .6*   PROTIME 13.7   INR 1.04   APTT 27.0             Lab 11/23/24  1005   ALT (SGPT) 11   AST (SGOT) 19         Lab 11/23/24  1005   HSTROP T 44*                 UA          6/16/2024    20:05   Urinalysis   Specific [place], UA 1.015    Ketones, UA Negative    Blood, UA Negative    Leukocytes, UA Negative    Nitrite, UA Negative        Microbiology Results (last 10 days)       ** No results found for the last 240 hours. **            XR Chest 1 View    Result Date: 11/23/2024  XR CHEST 1 VW Date of Exam: 11/23/2024 10:36 AM EST Indication: Acute Stroke Protocol (onset < 12 hrs) Comparison: 6/16/2024 Findings: Prior sternotomy. Lungs are without consolidation. No pneumothorax or pleural effusion. Aortic arch atherosclerosis. Left-sided AICD and cardiac loop recorder noted.     Impression: Impression: No acute process. Electronically Signed: Soto Matos MD  11/23/2024 11:05 AM EST  Workstation ID: [id number]    CT Angiogram Head w AI Analysis of LVO    Result Date: 11/23/2024  CT ANGIOGRAM HEAD W AI ANALYSIS OF LVO, CT ANGIOGRAM NECK Date of Exam: 11/23/2024 10:06 AM EST Indication: Neuro Deficit, acute, Stroke suspected Neuro deficit, acute stroke suspected. Comparison: None available. Technique: CTA of the head and neck was performed after the uneventful intravenous administration of 75 mL Isovue-370. Reconstructed coronal and sagittal images were also obtained. In addition, a 3-D volume rendered image was created for interpretation. Automated exposure control and iterative reconstruction methods were used. Findings: CTA head: Dominant right vertebral artery system with the left V4 segment terminating as the PICA origin, normal variant, with the basilar artery arising solely from the right V4 segment. There is some atherosclerosis at the carotid  siphons with mild luminal irregularity without flow-limiting stenosis. No abrupt cut off/large vessel occlusion, flow-limiting stenosis, dissection, or aneurysm. The cerebral veins and dural venous sinuses appear grossly patent. CTA neck: Dominant right vertebral artery system with decreased caliber of the entire left cervical vertebral artery. No abrupt cut off/large vessel occlusion, flow-limiting stenosis, dissection, or aneurysm. There is moderate calcified and noncalcified atherosclerosis of the bilateral carotid bifurcations and proximal cervical ICAs which results in luminal irregularity and mild stenosis, without significant or flow-limiting stenosis (<50% narrowing per NASCET criteria). Extravascular findings: Partial visualization of median sternotomy wires and cardiac pacer with left chest pulse generator. Upper lungs are grossly clear. Unremarkable appearance of the pharyngeal and laryngeal structures. No acute or suspicious bony findings.     Impression: Impression: No abrupt cut off/large vessel occlusion, flow-limiting stenosis, dissection, or aneurysm. Moderate atherosclerosis of the bilateral carotid bifurcations and proximal cervical ICAs with some luminal irregularity and mild stenosis, without flow-limiting stenosis per NASCET criteria (less than 50% narrowing). Electronically Signed: Madi Pena MD  11/23/2024 10:29 AM EST  Workstation ID: [id number]    CT Angiogram Neck    Result Date: 11/23/2024  CT ANGIOGRAM HEAD W AI ANALYSIS OF LVO, CT ANGIOGRAM NECK Date of Exam: 11/23/2024 10:06 AM EST Indication: Neuro Deficit, acute, Stroke suspected Neuro deficit, acute stroke suspected. Comparison: None available. Technique: CTA of the head and neck was performed after the uneventful intravenous administration of 75 mL Isovue-370. Reconstructed coronal and sagittal images were also obtained. In addition, a 3-D volume rendered image was created for interpretation. Automated exposure control and  iterative reconstruction methods were used. Findings: CTA head: Dominant right vertebral artery system with the left V4 segment terminating as the PICA origin, normal variant, with the basilar artery arising solely from the right V4 segment. There is some atherosclerosis at the carotid siphons with mild luminal irregularity without flow-limiting stenosis. No abrupt cut off/large vessel occlusion, flow-limiting stenosis, dissection, or aneurysm. The cerebral veins and dural venous sinuses appear grossly patent. CTA neck: Dominant right vertebral artery system with decreased caliber of the entire left cervical vertebral artery. No abrupt cut off/large vessel occlusion, flow-limiting stenosis, dissection, or aneurysm. There is moderate calcified and noncalcified atherosclerosis of the bilateral carotid bifurcations and proximal cervical ICAs which results in luminal irregularity and mild stenosis, without significant or flow-limiting stenosis (<50% narrowing per NASCET criteria). Extravascular findings: Partial visualization of median sternotomy wires and cardiac pacer with left chest pulse generator. Upper lungs are grossly clear. Unremarkable appearance of the pharyngeal and laryngeal structures. No acute or suspicious bony findings.     Impression: Impression: No abrupt cut off/large vessel occlusion, flow-limiting stenosis, dissection, or aneurysm. Moderate atherosclerosis of the bilateral carotid bifurcations and proximal cervical ICAs with some luminal irregularity and mild stenosis, without flow-limiting stenosis per NASCET criteria (less than 50% narrowing). Electronically Signed: Madi Pena MD  11/23/2024 10:29 AM EST  Workstation ID: [id number]    CT Head Without Contrast Stroke Protocol    Result Date: 11/23/2024  CT HEAD WO CONTRAST STROKE PROTOCOL Date of Exam: 11/23/2024 9:55 AM EST Indication: Neuro deficit, acute, stroke suspected Neuro Deficit, acute, Stroke suspected. Comparison: Head CT 6/16/2024  Technique: Axial CT images were obtained of the head without contrast administration.  Reconstructed coronal images were also obtained. Automated exposure control and iterative construction methods were used. Scan Time: 9:55 a.m. 11/23/2024 Results discussed with stroke navigator by Dr. Madi Pena via telephone at 10:00 a.m. 11/23/2024. Findings: No acute intracranial hemorrhage. No acute large territory infarct. There is some senescent mineralization of the basal ganglia. There are scattered subcortical and periventricular white matter hypodensities which are nonspecific and can be seen in the setting of chronic small vessel ischemic change. No extra-axial collections. No midline shift or herniation. Normal size and configuration of the ventricles. Unremarkable appearance of the orbits. The paranasal sinuses and mastoid air cells are grossly clear. No acute or suspicious bony findings.     Impression: Impression: No acute intracranial findings. Chronic and senescent changes as above. Electronically Signed: Madi Pena MD  11/23/2024 10:03 AM EST  Workstation ID: [id number]     Results for orders placed during the hospital encounter of 09/29/23    Adult Transthoracic Echo Complete W/ Cont if Necessary Per Protocol    Interpretation Summary    The left ventricle is mildly dilated and globally hypokinetic.  Left ventricular systolic function is moderately decreased. Estimated left ventricular EF = 25%    Aortic valve is calcified with no significant stenosis or regurgitation.    Moderate mitral valve regurgitation is present.    Estimated right ventricular systolic pressure from tricuspid regurgitation is normal (<35 mmHg).    There is a small (<1cm) pericardial effusion adjacent to the left ventricle.    Bilateral pleural effusions present      Assessment & Plan   Assessment & Plan       TIA (transient ischemic attack)    Paroxysmal atrial fibrillation    Stage 3b chronic kidney disease    Chronic HFrEF (heart  failure with reduced ejection fraction)    Presence of cardiac pacemaker    Headache    Left-sided weakness        89 yo M with hx of chronic HFrEF, PAF on Eliquis, bradycardia s/p PPM, CAD s/p CABG, CKD 3, HTN, HLD, head/neck cancer, prostate cancer, migraines, and GERD who presents due to occipital headache, dysarthria, and left sided weakness.    Left sided weakness, dysarthria  Headache with history of migraines  --TIA vs stroke vs complex migraine  --CT head without contrast, CTA head/neck no acute abnormalities, no LVO  --MRI brain ordered, need Cardiology clearance for pacemaker  --CXR negative, UA pending  --Migraine cocktail ordered  --Continue ASA, Eliquis; start Lipitor  --PT/OT/SLP evaluations    HTN  HLD  --Allow permissive HTN, keep SBP <180  --Check lipid panel  --On Zetia at home, added Lipitor as above    PAF  Chronic HFrEF  CAD s/p CABG  Bradycardia s/p PPM  --Continue home meds    CKD 3  --Recent baseline Cr ~1.4-1.6  --Stable    GERD  --Continue PPI    Hx of head/neck cancer  Hx of prostate cancer      VTE Prophylaxis:  Mechanical & pharmacologic VTE prophylaxis orders are signed & held.           CODE STATUS:    Code Status and Medical Interventions: CPR (Attempt to Resuscitate); Full Support   Ordered at: 11/23/24 1207     Code Status (Patient has no pulse and is not breathing):    CPR (Attempt to Resuscitate)     Medical Interventions (Patient has pulse or is breathing):    Full Support       Expected Discharge   Expected discharge date/ time has not been documented.     Florence Hebert MD  11/23/24

## 2024-11-23 NOTE — ED PROVIDER NOTES
Subjective   History of Present Illness  Mr. Pool arrives by ambulance from his assisted living facility.  He tells me he noticed upon awakening this morning that he had an occipital headache and unsteady gait.  EMS advised they detected left-sided weakness.  He tells me he felt at baseline yesterday.  He acknowledges history of migraines.      Review of Systems    Past Medical History:   Diagnosis Date    Arthritis     Chronic kidney disease     kidney stones    Coronary artery disease     GERD (gastroesophageal reflux disease)     History of radiation therapy 05/24/2021    laryngeal mass    Prostate cancer     Vocal cord cancer        No Known Allergies    Past Surgical History:   Procedure Laterality Date    CARDIAC ELECTROPHYSIOLOGY PROCEDURE  04/2023    cardiac loop recorder    CARDIAC ELECTROPHYSIOLOGY PROCEDURE N/A 10/25/2023    Procedure: Pacemaker DC new;  Surgeon: Levy Pickett MD;  Location: [facility];  Service: Cardiology;  Laterality: N/A;    CARDIAC ELECTROPHYSIOLOGY PROCEDURE N/A 10/25/2023    Procedure: AV node ablation;  Surgeon: Levy Pickett MD;  Location: [facility];  Service: Cardiovascular;  Laterality: N/A;    CHOLECYSTECTOMY      CORONARY ARTERY BYPASS GRAFT      PROSTATE SURGERY      VOCAL CORD MASS EXCISION  03/22/2021       Family History   Problem Relation Age of Onset    Cancer Mother     Breast cancer Mother     Heart attack Father     Ovarian cancer Sister        Social History     Socioeconomic History    Marital status:    Tobacco Use    Smoking status: Never     Passive exposure: Never    Smokeless tobacco: Never   Vaping Use    Vaping status: Never Used   Substance and Sexual Activity    Alcohol use: No    Drug use: No    Sexual activity: Defer           Objective   Physical Exam  Vitals and nursing note reviewed.   Constitutional:       General: He is not in acute distress.     Appearance: Normal appearance.   HENT:      Head:  Normocephalic and atraumatic.      Nose: Nose normal. No congestion or rhinorrhea.   Eyes:      General: No scleral icterus.     Conjunctiva/sclera: Conjunctivae normal.   Neck:      Comments: No JVD   Cardiovascular:      Rate and Rhythm: Normal rate and regular rhythm.      Heart sounds: No murmur heard.     No friction rub.   Pulmonary:      Effort: Pulmonary effort is normal.      Breath sounds: Normal breath sounds. No wheezing or rales.   Abdominal:      General: Bowel sounds are normal.      Palpations: Abdomen is soft.      Tenderness: There is no abdominal tenderness. There is no guarding or rebound.   Musculoskeletal:         General: No tenderness.      Cervical back: Normal range of motion and neck supple.      Right lower leg: No edema.      Left lower leg: No edema.   Skin:     General: Skin is warm and dry.      Coloration: Skin is not pale.      Findings: No erythema.   Neurological:      General: No focal deficit present.      Mental Status: He is alert.      Motor: No weakness.      Coordination: Coordination normal.   Psychiatric:         Mood and Affect: Mood normal.         Behavior: Behavior normal.         Thought Content: Thought content normal.         Critical Care    Performed by: Aj Cronin MD  Authorized by: Florence Hebert MD    Critical care provider statement:     Critical care time (minutes):  35    Critical care was necessary to treat or prevent imminent or life-threatening deterioration of the following conditions:  CNS failure or compromise    Critical care was time spent personally by me on the following activities:  Obtaining history from patient or surrogate, discussions with consultants, ordering and review of laboratory studies, ordering and review of radiographic studies, pulse oximetry, re-evaluation of patient's condition and review of old charts  Comments:      Saw him on arrival due to code stroke page.  Reviewed CT scan at bedside.  Had multiple evaluations.   Ordered and interpreted labs.             ED Course  ED Course as of 11/23/24 1539   Sat Nov 23, 2024   1001 I saw him on arrival.  I interviewed paramedics.  I interviewed Mr. Pool.  Exam at this time is nonfocal.  I reviewed CT scan at bedside and do not see any obvious acute abnormality although I am awaiting final read from radiology.  Conferred with the stroke navigator.  Not a candidate for consideration of thrombolysis as exam is nonfocal, he is on Eliquis for A-fib, and last known well was yesterday.  Will proceed with angiograms and perfusion study.  Has pacemaker so no MRI. [DT]   1005 Bedside creatinine 1.9 yielding GFR of 32.  Unable to have MRI due to pacemaker.  Conferred with stroke team.  Will proceed with angiograms without perfusion study. [DT]   1103 Have conferred with stroke navigator.  CT and angiograms do not show anything acute.  Stroke navigator is going to order migraine cocktail.  I have ordered Tylenol.  Will admit [DT]   1104 Spoke with Mr. Pool and his niece who is at bedside.  She tells me he was definitely weak on the left side this morning.  He tells me he still has a little bit of a headache but overall is slowly improving.  I updated them on findings and plan for admission [DT]      ED Course User Index  [DT] Aj Cronin MD                                                       Medical Decision Making  Problems Addressed:  Acute left-sided weakness: complicated acute illness or injury  Acute nonintractable headache, unspecified headache type: complicated acute illness or injury  Ataxia: complicated acute illness or injury    Amount and/or Complexity of Data Reviewed  Independent Historian: EMS  External Data Reviewed: notes.  Labs: ordered. Decision-making details documented in ED Course.  Radiology: ordered. Decision-making details documented in ED Course.  ECG/medicine tests: ordered. Decision-making details documented in ED Course.    Risk  OTC drugs.  Prescription  drug management.  Decision regarding hospitalization.    Critical Care  Total time providing critical care: 35 minutes        Final diagnoses:   Acute nonintractable headache, unspecified headache type   Ataxia   Acute left-sided weakness       ED Disposition  ED Disposition       ED Disposition   Decision to Admit    Condition   --    Comment   Level of Care: Telemetry [5]   Diagnosis: TIA (transient ischemic attack) [266804]   Admitting Physician: NAVYA CALIX [[id number]]                 No follow-up provider specified.       Medication List      No changes were made to your prescriptions during this visit.            Aj Cronin MD  11/23/24 8009

## 2024-11-23 NOTE — THERAPY EVALUATION
Acute Care - Speech Language Pathology   Swallow Initial Evaluation  Mario Alberto     Patient Name: Mukund Pool  : 1930  MRN: [id number]  Today's Date: 2024               Admit Date: 2024    Visit Dx:     ICD-10-CM ICD-9-CM   1. Dysphagia, unspecified type  R13.10 787.20   2. Acute nonintractable headache, unspecified headache type  R51.9 784.0   3. Ataxia  R27.0 781.3   4. 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)    A/C renal failure    GERD without esophagitis    Pleural effusion, bilateral    Moderate malnutrition    Presence of cardiac pacemaker    TIA (transient ischemic attack)    Headache    Left-sided weakness     Past Medical History:   Diagnosis Date    Arthritis     Chronic kidney disease     kidney stones    Coronary artery disease     GERD (gastroesophageal reflux disease)     History of radiation therapy 2021    laryngeal mass    Prostate cancer     Vocal cord cancer      Past Surgical History:   Procedure Laterality Date    CARDIAC ELECTROPHYSIOLOGY PROCEDURE  2023    cardiac loop recorder    CARDIAC ELECTROPHYSIOLOGY PROCEDURE N/A 10/25/2023    Procedure: Pacemaker DC new;  Surgeon: Levy Pickett MD;  Location:  [facility];  Service: Cardiology;  Laterality: N/A;    CARDIAC ELECTROPHYSIOLOGY PROCEDURE N/A 10/25/2023    Procedure: AV node ablation;  Surgeon: Levy Pickett MD;  Location:  [facility];  Service: Cardiovascular;  Laterality: N/A;    CHOLECYSTECTOMY      CORONARY ARTERY BYPASS GRAFT       PROSTATE SURGERY      VOCAL CORD MASS EXCISION  03/22/2021       SLP Recommendation and Plan  SLP Swallowing Diagnosis: oral dysphagia (11/23/24 1345)  SLP Diet Recommendation: soft to chew textures, chopped, thin liquids (11/23/24 1345)  Recommended Precautions and Strategies: upright posture during/after eating, small bites of food and sips of liquid (11/23/24 1345)  SLP Rec. for Method of Medication Administration: meds whole, with thin liquids (11/23/24 1345)     Monitor for Signs of Aspiration: yes, notify SLP if any concerns (11/23/24 1345)  Recommended Diagnostics: reassess via clinical swallow evaluation (11/23/24 1345)  Swallow Criteria for Skilled Therapeutic Interventions Met: demonstrates skilled criteria (11/23/24 1345)  Anticipated Discharge Disposition (SLP): home with assist (11/23/24 1345)  Rehab Potential/Prognosis, Swallowing: good, to achieve stated therapy goals (11/23/24 1345)  Therapy Frequency (Swallow): 5 days per week (11/23/24 1345)  Predicted Duration Therapy Intervention (Days): 1 week (11/23/24 1345)  Oral Care Recommendations: Oral Care BID/PRN, Toothbrush (11/23/24 1345)                                        Progress: improving      SWALLOW EVALUATION (Last 72 Hours)       SLP Adult Swallow Evaluation       Row Name 11/23/24 1345                   Rehab Evaluation    Document Type evaluation  -HG        Subjective Information no complaints  -HG        Patient Observations alert;cooperative;agree to therapy  -HG        Patient/Family/Caregiver Comments/Observations No family present  -HG        Patient Effort excellent  -HG        Symptoms Noted During/After Treatment none  -HG           General Information    Patient Profile Reviewed yes  -HG        Pertinent History Of Current Problem Pt is a 89 year old male admitted with hx of chronic HFrEF, PAF on Eliquis, bradycardia s/p PPM, CAD s/p CABG, CKD 3, HTN, HLD, head/neck cancer, prostate cancer, migraines, and GERD who presents  due to occipital headache, dysarthria, and left sided weakness which started this morning when he woke up. Headache is located posteriorly. He reports recent increase in frequent headaches, does have hx of migraines but says he doesn't take anything for them. Currently symptoms have improved, speech still a little slow however. Initial scans in the ER were unremarkable. Stroke Neurology consulted and patient was admitted for further workup.  -HG        Current Method of Nutrition NPO  -HG        Precautions/Limitations, Vision WFL with corrective lenses  -HG        Precautions/Limitations, Hearing WFL;for purposes of eval  -HG        Prior Level of Function-Communication WFL  -HG        Prior Level of Function-Swallowing no diet consistency restrictions  -HG        Plans/Goals Discussed with patient  -HG        Barriers to Rehab none identified  -HG        Patient's Goals for Discharge return to PO diet  -HG           Pain    Pretreatment Pain Rating 0/10 - no pain  -HG        Pre/Posttreatment Pain Comment Pt just noted that he was uncomfortable- RN present  -HG           Oral Motor Structure and Function    Dentition Assessment natural, present and adequate  -HG        Secretion Management WNL/WFL  -HG        Mucosal Quality moist, healthy  -HG        Volitional Swallow WFL  -HG        Volitional Cough weak  -HG           Oral Musculature and Cranial Nerve Assessment    Oral Motor General Assessment WFL  -HG           General Eating/Swallowing Observations    Respiratory Support Currently in Use room air  -HG        Eating/Swallowing Skills self-fed;fed by SLP  -HG        Positioning During Eating upright in bed  -HG        Utensils Used spoon;cup;straw  -HG        Consistencies Trialed ice chips;thin liquids;pureed;mixed consistency;regular textures  -HG           Respiratory    Respiratory Status WFL;room air  -HG           Clinical Swallow Eval    Oral Prep Phase impaired  -HG        Oral Transit WFL  -HG         Oral Residue impaired  -HG        Pharyngeal Phase --  One cough present out of multiple swallows and presentations given.  -HG        Esophageal Phase unremarkable  -HG        Clinical Swallow Evaluation Summary CSE completed this date. Pt tolerated ice chip, water via tsp, cup and straw, with no s/sx of aspiration. Pt tolerated applesauce with no difficulty. Mixed fruit in juice did result in increased mastication time. Pt tolerated josi cracker without difficulty. Pt used a liquid wash via straw, after the fruit and exhibited his one and only cough. Pt tolerated following swallows via straw, with no s/sx of aspiration exhibited.  SLP RECs thin and mech soft chopped at this time. Meds whole with thin.  -HG           Oral Prep Concerns    Oral Prep Concerns prolonged mastication  -HG        Prolonged Mastication mechanical soft  -HG           Oral Residue Concerns    Oral Residue Concerns diffuse residue throughout oral cavity  -HG        Diffuse Residue Throughout Oral Cavity mixed consistencies  -HG           SLP Evaluation Clinical Impression    SLP Swallowing Diagnosis oral dysphagia  -HG        Functional Impact risk of aspiration/pneumonia  -HG        Rehab Potential/Prognosis, Swallowing good, to achieve stated therapy goals  -        Swallow Criteria for Skilled Therapeutic Interventions Met demonstrates skilled criteria  -           SLP Treatment Clinical Impressions    Care Plan Review evaluation/treatment results reviewed  -           Recommendations    Therapy Frequency (Swallow) 5 days per week  -        Predicted Duration Therapy Intervention (Days) 1 week  -        SLP Diet Recommendation soft to chew textures;chopped;thin liquids  -        Recommended Diagnostics reassess via clinical swallow evaluation  -        Recommended Precautions and Strategies upright posture during/after eating;small bites of food and sips of liquid  -HG        Oral Care Recommendations Oral Care  BID/PRN;Toothbrush  -HG        SLP Rec. for Method of Medication Administration meds whole;with thin liquids  -HG        Monitor for Signs of Aspiration yes;notify SLP if any concerns  -HG        Anticipated Discharge Disposition (SLP) home with assist  -HG           (LTG) Swallow    (LTG) Swallow Pt will tolerate diet of mech soft chopped and thin liquids with no s/sx of aspiration  -HG        [place] (Swallow Long Term Goal) independently (over 90% accuracy)  -HG           (STG) Swallow 1    (STG) Swallow 1 Pt will tolerate recommended diet and liquids with no s/sx of aspiration  -HG        [place] (Swallow Short Term Goal 1) independently (over 90% accuracy)  -HG                  User Key  (r) = Recorded By, (t) = Taken By, (c) = Cosigned By      Initials Name Effective Dates     Moni Mayes, MS CCC-SLP 06/16/21 -                     EDUCATION  The patient has been educated in the following areas:   Dysphagia (Swallowing Impairment).        SLP GOALS       Row Name 11/23/24 1345             (LTG) Swallow    (LTG) Swallow Pt will tolerate diet of mech soft chopped and thin liquids with no s/sx of aspiration  -HG      [place] (Swallow Long Term Goal) independently (over 90% accuracy)  -HG         (STG) Swallow 1    (STG) Swallow 1 Pt will tolerate recommended diet and liquids with no s/sx of aspiration  -HG      [place] (Swallow Short Term Goal 1) independently (over 90% accuracy)  -HG         Patient will demonstrate functional cognitive-linguistic skills for return to discharge environment    [place] Independently  -HG      Time frame 1 week  -HG         Memory Skills Goal 1 (SLP)    Improve Memory Skills Through Goal 1 (SLP) recalling related word lists with an imposed delay;recalling unrelated word lists immediately;recalling unrelated word lists with an imposed delay;listen to a paragraph and answer questions;90%;independently (over 90% accuracy)  -HG      Time Frame (Memory  Skills Goal 1, SLP) short term goal (STG)  -         Organizational Skills Goal 1 (SLP)    Improve Thought Organization Through Goal 1 (SLP) completing a divergent naming task;completing a convergent naming task;90%;independently (over 90% accuracy)  -      Time Frame (Thought Organization Skills Goal 1, SLP) short term goal (STG)  -         Reasoning Goal 1 (SLP)    Improve Reasoning Through Goal 1 (SLP) complete basic reasoning task;complete high level reasoning task;complete deductive reasoning task;complete mental flexibility task;90%;independently (over 90% accuracy)  -      Time Frame (Reasoning Goal 1, SLP) short term goal (STG)  -                User Key  (r) = Recorded By, (t) = Taken By, (c) = Cosigned By      Initials Name Provider Type    Moni Bell MS CCC-SLP Speech and Language Pathologist                         Time Calculation:    Time Calculation- SLP       Row Name 24 1437             Time Calculation- SLP    SLP Start Time 1345  -HG      SLP Received On 24  -HG         Untimed Charges    [id number] Eval Oral Pharyng Swallow Minutes 45  -HG         Total Minutes    Untimed Charges Total Minutes 45  -HG       Total Minutes 45  -HG                User Key  (r) = Recorded By, (t) = Taken By, (c) = Cosigned By      Initials Name Provider Type     Moni Mayes MS CCC-SLP Speech and Language Pathologist                    Therapy Charges for Today       Code Description Service Date Service Provider Modifiers Qty    [id number]  ST EVAL ORAL PHARYNG SWALLOW 3 2024 Moni Mayes MS CCC-SLP GN 1                 Moni Mayes MS CCC-SLP  2024   and Acute Care - Speech Language Pathology Initial Evaluation  [facility]     Patient Name: Mukund Pool  : 1930  MRN: [id number]  Today's Date: 2024               Admit Date: 2024     Visit Dx:    ICD-10-CM ICD-9-CM   1. Dysphagia, unspecified type  R13.10 787.20   2. Acute  nonintractable headache, unspecified headache type  R51.9 784.0   3. Ataxia  R27.0 781.3   4. 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)    A/C renal failure    GERD without esophagitis    Pleural effusion, bilateral    Moderate malnutrition    Presence of cardiac pacemaker    TIA (transient ischemic attack)    Headache    Left-sided weakness     Past Medical History:   Diagnosis Date    Arthritis     Chronic kidney disease     kidney stones    Coronary artery disease     GERD (gastroesophageal reflux disease)     History of radiation therapy 05/24/2021    laryngeal mass    Prostate cancer     Vocal cord cancer      Past Surgical History:   Procedure Laterality Date    CARDIAC ELECTROPHYSIOLOGY PROCEDURE  04/2023    cardiac loop recorder    CARDIAC ELECTROPHYSIOLOGY PROCEDURE N/A 10/25/2023    Procedure: Pacemaker DC new;  Surgeon: Levy Pickett MD;  Location:  [facility];  Service: Cardiology;  Laterality: N/A;    CARDIAC ELECTROPHYSIOLOGY PROCEDURE N/A 10/25/2023    Procedure: AV node ablation;  Surgeon: Levy Pickett MD;  Location:  [facility];  Service: Cardiovascular;  Laterality: N/A;    CHOLECYSTECTOMY      CORONARY ARTERY BYPASS GRAFT      PROSTATE SURGERY      VOCAL CORD MASS EXCISION  03/22/2021       SLP Recommendation and Plan  SLP Diagnosis: mild, cognitive-linguistic disorder (11/23/24 4920)  SLP Diagnosis Comments: Pt feels that he would've done better at answer questions prior to his symptoms starting. (11/23/24 1345)     Monitor for  Signs of Aspiration: yes, notify SLP if any concerns (11/23/24 1345)  Swallow Criteria for Skilled Therapeutic Interventions Met: demonstrates skilled criteria (11/23/24 1345)  SLC Criteria for Skilled Therapy Interventions Met: yes (11/23/24 1345)  Anticipated Discharge Disposition (SLP): home with assist (11/23/24 1345)     Therapy Frequency (Swallow): 5 days per week (11/23/24 1345)     Predicted Duration Therapy Intervention (Days): 1 week (11/23/24 1345)  Oral Care Recommendations: Oral Care BID/PRN, Toothbrush (11/23/24 1345)                          Progress: improving (11/23/24 1436)      SLP EVALUATION (Last 72 Hours)       SLP SLC Evaluation       Row Name 11/23/24 1345                   General Information    Patient Level of Education Master's  -HG        Prior Level of Function-Communication WFL  -HG        Patient's Goals for Discharge patient did not state  -HG           Auditory Comprehension Assessment/Intervention    Auditory Comprehension (Communication) WFL  -HG           Reading Comprehension Assessment/Intervention    Reading Comprehension (Communication) WFL  -HG           Verbal Expression Assessment/Intervention    Verbal Expression WFL  -HG           Graphic Expression Assessment/Intervention    Graphic Expression, Comment did not assess  -HG           Motor Speech Assessment/Intervention    Motor Speech Function WFL  -HG           Cursory Voice Assessment/Intervention    Quality and Resonance (Voice) hoarse  -HG        Voice, Comment Pt notes hoarseness and it is a new symptom.  -HG           Cognitive Assessment Intervention- SLP    Cognitive Function (Cognition) mild impairment  -HG        Orientation Status (Cognition) WFL;mild impairment  -HG        Memory (Cognitive) simple;immediate;delayed;related;mild impairment  -HG        Attention (Cognitive) sustained;alternating;WFL  -HG        Thought Organization (Cognitive) verbal sequencing;WFL;concrete divergent;mental manipulation;mild  impairment  -        Reasoning (Cognitive) simple;logic/creative thinking;L  -        Problem Solving (Cognitive) simple;[facility]  -        Functional Math (Cognitive) simple;[facility]  -        Executive Function (Cognition) [facility]  -        Pragmatics (Communication) [facility]  -           SLP Evaluation Clinical Impressions    SLP Diagnosis mild;cognitive-linguistic disorder  -        SLP Diagnosis Comments Pt feels that he would've done better at answer questions prior to his symptoms starting.  -        Rehab Potential/Prognosis excellent  -[facility] Criteria for Skilled Therapy Interventions Met yes  -           Patient will demonstrate functional cognitive-linguistic skills for return to discharge environment    [place] Independently  -        Time frame 1 week  -           Memory Skills Goal 1 (SLP)    Improve Memory Skills Through Goal 1 (SLP) recalling related word lists with an imposed delay;recalling unrelated word lists immediately;recalling unrelated word lists with an imposed delay;listen to a paragraph and answer questions;90%;independently (over 90% accuracy)  -        Time Frame (Memory Skills Goal 1, SLP) short term goal (STG)  -           Organizational Skills Goal 1 (SLP)    Improve Thought Organization Through Goal 1 (SLP) completing a divergent naming task;completing a convergent naming task;90%;independently (over 90% accuracy)  -        Time Frame (Thought Organization Skills Goal 1, SLP) short term goal (STG)  -           Reasoning Goal 1 (SLP)    Improve Reasoning Through Goal 1 (SLP) complete basic reasoning task;complete high level reasoning task;complete deductive reasoning task;complete mental flexibility task;90%;independently (over 90% accuracy)  -        Time Frame (Reasoning Goal 1, SLP) short term goal (STG)  -                  User Key  (r) = Recorded By, (t) = Taken By, (c) = Cosigned By      Initials Name Effective Dates     Moni Mayes, MS  CCC-SLP 06/16/21 -                        EDUCATION  The patient has been educated in the following areas:     Cognitive Impairment Communication Impairment.           SLP GOALS       Row Name 11/23/24 1345             (LTG) Swallow    (LTG) Swallow Pt will tolerate diet of mech soft chopped and thin liquids with no s/sx of aspiration  -HG      [place] (Swallow Long Term Goal) independently (over 90% accuracy)  -HG         (STG) Swallow 1    (STG) Swallow 1 Pt will tolerate recommended diet and liquids with no s/sx of aspiration  -HG      [place] (Swallow Short Term Goal 1) independently (over 90% accuracy)  -HG         Patient will demonstrate functional cognitive-linguistic skills for return to discharge environment    [place] Independently  -HG      Time frame 1 week  -HG         Memory Skills Goal 1 (SLP)    Improve Memory Skills Through Goal 1 (SLP) recalling related word lists with an imposed delay;recalling unrelated word lists immediately;recalling unrelated word lists with an imposed delay;listen to a paragraph and answer questions;90%;independently (over 90% accuracy)  -HG      Time Frame (Memory Skills Goal 1, SLP) short term goal (STG)  -HG         Organizational Skills Goal 1 (SLP)    Improve Thought Organization Through Goal 1 (SLP) completing a divergent naming task;completing a convergent naming task;90%;independently (over 90% accuracy)  -HG      Time Frame (Thought Organization Skills Goal 1, SLP) short term goal (STG)  -HG         Reasoning Goal 1 (SLP)    Improve Reasoning Through Goal 1 (SLP) complete basic reasoning task;complete high level reasoning task;complete deductive reasoning task;complete mental flexibility task;90%;independently (over 90% accuracy)  -HG      Time Frame (Reasoning Goal 1, SLP) short term goal (STG)  -HG                User Key  (r) = Recorded By, (t) = Taken By, (c) = Cosigned By      Initials Name Provider Type    HG Moni Mayes MS CCC-SLP Speech  and Language Pathologist                              Time Calculation:      Time Calculation- SLP       Row Name 11/23/24 1437             Time Calculation- SLP    SLP Start Time 1345  -HG      SLP Received On 11/23/24  -HG         Untimed Charges    32695-DT Eval Oral Pharyng Swallow Minutes 45  -HG         Total Minutes    Untimed Charges Total Minutes 45  -HG       Total Minutes 45  -HG                User Key  (r) = Recorded By, (t) = Taken By, (c) = Cosigned By      Initials Name Provider Type     Moni Mayes MS CCC-SLP Speech and Language Pathologist                    Therapy Charges for Today       Code Description Service Date Service Provider Modifiers Qty    [id number]  ST EVAL ORAL PHARYNG SWALLOW 3 11/23/2024 Moni Mayes MS CCC-SLP GN 1                       Moni Mayes MS CCC-SLP  11/23/2024

## 2024-11-23 NOTE — PLAN OF CARE
Goal Outcome Evaluation:  Plan of Care Reviewed With: patient        Progress: improving       Anticipated Discharge Disposition (SLP): home with assist    SLP Diagnosis: mild, cognitive-linguistic disorder (11/23/24 1345)  SLP Diagnosis Comments: Pt feels that he would've done better at answer questions prior to his symptoms starting. (11/23/24 1345)  SLP Swallowing Diagnosis: oral dysphagia (11/23/24 1345)

## 2024-11-24 ENCOUNTER — APPOINTMENT (OUTPATIENT)
Dept: CARDIOLOGY | Facility: HOSPITAL | Age: 89
End: 2024-11-24
Payer: MEDICARE

## 2024-11-24 LAB
ANION GAP SERPL CALCULATED.3IONS-SCNC: 13 MMOL/L (ref 5–15)
ASCENDING AORTA: 3.5 CM
BH CV ECHO MEAS - AI P1/2T: 421.6 MSEC
BH CV ECHO MEAS - AO MAX PG: 10.6 MMHG
BH CV ECHO MEAS - AO MEAN PG: 5.8 MMHG
BH CV ECHO MEAS - AO ROOT AREA (BSA CORRECTED): 2 CM2
BH CV ECHO MEAS - AO ROOT DIAM: 4.2 CM
BH CV ECHO MEAS - AO V2 MAX: 162.4 CM/SEC
BH CV ECHO MEAS - AO V2 VTI: 28.8 CM
BH CV ECHO MEAS - AVA(I,D): 2.07 CM2
BH CV ECHO MEAS - EDV(CUBED): 79.5 ML
BH CV ECHO MEAS - EDV(MOD-SP2): 86.1 ML
BH CV ECHO MEAS - EDV(MOD-SP4): 148 ML
BH CV ECHO MEAS - EF(MOD-BP): 64.5 %
BH CV ECHO MEAS - EF(MOD-SP2): 65.9 %
BH CV ECHO MEAS - EF(MOD-SP4): 55.9 %
BH CV ECHO MEAS - ESV(CUBED): 22 ML
BH CV ECHO MEAS - ESV(MOD-SP2): 29.4 ML
BH CV ECHO MEAS - ESV(MOD-SP4): 65.2 ML
BH CV ECHO MEAS - FS: 34.9 %
BH CV ECHO MEAS - IVS/LVPW: 1.08 CM
BH CV ECHO MEAS - IVSD: 1.4 CM
BH CV ECHO MEAS - LA DIMENSION: 4.3 CM
BH CV ECHO MEAS - LAT PEAK E' VEL: 7.6 CM/SEC
BH CV ECHO MEAS - LV DIASTOLIC VOL/BSA (35-75): 71.9 CM2
BH CV ECHO MEAS - LV MASS(C)D: 219.8 GRAMS
BH CV ECHO MEAS - LV MAX PG: 4.1 MMHG
BH CV ECHO MEAS - LV MEAN PG: 2 MMHG
BH CV ECHO MEAS - LV SYSTOLIC VOL/BSA (12-30): 31.7 CM2
BH CV ECHO MEAS - LV V1 MAX: 101.2 CM/SEC
BH CV ECHO MEAS - LV V1 VTI: 19 CM
BH CV ECHO MEAS - LVIDD: 4.3 CM
BH CV ECHO MEAS - LVIDS: 2.8 CM
BH CV ECHO MEAS - LVOT AREA: 3.1 CM2
BH CV ECHO MEAS - LVOT DIAM: 2 CM
BH CV ECHO MEAS - LVPWD: 1.3 CM
BH CV ECHO MEAS - MED PEAK E' VEL: 6.4 CM/SEC
BH CV ECHO MEAS - MV A MAX VEL: 50.1 CM/SEC
BH CV ECHO MEAS - MV DEC SLOPE: 500 CM/SEC2
BH CV ECHO MEAS - MV DEC TIME: 0.19 SEC
BH CV ECHO MEAS - MV E MAX VEL: 111.5 CM/SEC
BH CV ECHO MEAS - MV E/A: 2.23
BH CV ECHO MEAS - MV P1/2T: 73.2 MSEC
BH CV ECHO MEAS - MVA(P1/2T): 3 CM2
BH CV ECHO MEAS - PA ACC TIME: 0.1 SEC
BH CV ECHO MEAS - PA V2 MAX: 84.8 CM/SEC
BH CV ECHO MEAS - PAPD(PI EDV): 10 MMHG
BH CV ECHO MEAS - PI END-D VEL: 155 CM/SEC
BH CV ECHO MEAS - RAP SYSTOLE: 8 MMHG
BH CV ECHO MEAS - RVSP: 30 MMHG
BH CV ECHO MEAS - SV(LVOT): 59.5 ML
BH CV ECHO MEAS - SV(MOD-SP2): 56.7 ML
BH CV ECHO MEAS - SV(MOD-SP4): 82.8 ML
BH CV ECHO MEAS - SVI(LVOT): 28.9 ML/M2
BH CV ECHO MEAS - SVI(MOD-SP2): 27.6 ML/M2
BH CV ECHO MEAS - SVI(MOD-SP4): 40.2 ML/M2
BH CV ECHO MEAS - TAPSE (>1.6): 1.42 CM
BH CV ECHO MEAS - TR MAX PG: 22.2 MMHG
BH CV ECHO MEAS - TR MAX VEL: 235.6 CM/SEC
BH CV ECHO MEASUREMENTS AVERAGE E/E' RATIO: 15.93
BH CV XLRA - RV BASE: 4.7 CM
BH CV XLRA - RV LENGTH: 7.6 CM
BH CV XLRA - RV MID: 3.9 CM
BH CV XLRA - TDI S': 7.7 CM/SEC
BUN SERPL-MCNC: 31 MG/DL (ref 8–23)
BUN/CREAT SERPL: 18.3 (ref 7–25)
CALCIUM SPEC-SCNC: 9.5 MG/DL (ref 8.2–9.6)
CHLORIDE SERPL-SCNC: 102 MMOL/L (ref 98–107)
CHOLEST SERPL-MCNC: 197 MG/DL (ref 0–200)
CO2 SERPL-SCNC: 24 MMOL/L (ref 22–29)
CREAT SERPL-MCNC: 1.69 MG/DL (ref 0.76–1.27)
DEPRECATED RDW RBC AUTO: 48.5 FL (ref 37–54)
EGFRCR SERPLBLD CKD-EPI 2021: 37.2 ML/MIN/1.73
ERYTHROCYTE [DISTWIDTH] IN BLOOD BY AUTOMATED COUNT: 12.9 % (ref 12.3–15.4)
GLUCOSE BLDC GLUCOMTR-MCNC: 104 MG/DL (ref 70–130)
GLUCOSE SERPL-MCNC: 115 MG/DL (ref 65–99)
HBA1C MFR BLD: 5.6 % (ref 4.8–5.6)
HCT VFR BLD AUTO: 36.4 % (ref 37.5–51)
HDLC SERPL-MCNC: 48 MG/DL (ref 40–60)
HGB BLD-MCNC: 11.8 G/DL (ref 13–17.7)
LDLC SERPL CALC-MCNC: 136 MG/DL (ref 0–100)
LDLC/HDLC SERPL: 2.8 {RATIO}
LEFT ATRIUM VOLUME INDEX: 50 ML/M2
LV EF 2D ECHO EST: 60 %
MCH RBC QN AUTO: 33.7 PG (ref 26.6–33)
MCHC RBC AUTO-ENTMCNC: 32.4 G/DL (ref 31.5–35.7)
MCV RBC AUTO: 104 FL (ref 79–97)
PLATELET # BLD AUTO: 194 10*3/MM3 (ref 140–450)
PMV BLD AUTO: 9.4 FL (ref 6–12)
POTASSIUM SERPL-SCNC: 5.6 MMOL/L (ref 3.5–5.2)
RBC # BLD AUTO: 3.5 10*6/MM3 (ref 4.14–5.8)
SODIUM SERPL-SCNC: 139 MMOL/L (ref 136–145)
TRIGL SERPL-MCNC: 72 MG/DL (ref 0–150)
VLDLC SERPL-MCNC: 13 MG/DL (ref 5–40)
WBC NRBC COR # BLD AUTO: 9.09 10*3/MM3 (ref 3.4–10.8)

## 2024-11-24 PROCEDURE — 97161 PT EVAL LOW COMPLEX 20 MIN: CPT

## 2024-11-24 PROCEDURE — 82948 REAGENT STRIP/BLOOD GLUCOSE: CPT

## 2024-11-24 PROCEDURE — 97116 GAIT TRAINING THERAPY: CPT

## 2024-11-24 PROCEDURE — 80061 LIPID PANEL: CPT | Performed by: NURSE PRACTITIONER

## 2024-11-24 PROCEDURE — 97165 OT EVAL LOW COMPLEX 30 MIN: CPT

## 2024-11-24 PROCEDURE — 80048 BASIC METABOLIC PNL TOTAL CA: CPT | Performed by: HOSPITALIST

## 2024-11-24 PROCEDURE — 97535 SELF CARE MNGMENT TRAINING: CPT

## 2024-11-24 PROCEDURE — G0378 HOSPITAL OBSERVATION PER HR: HCPCS

## 2024-11-24 PROCEDURE — 93306 TTE W/DOPPLER COMPLETE: CPT

## 2024-11-24 PROCEDURE — 83036 HEMOGLOBIN GLYCOSYLATED A1C: CPT | Performed by: NURSE PRACTITIONER

## 2024-11-24 PROCEDURE — 93306 TTE W/DOPPLER COMPLETE: CPT | Performed by: INTERNAL MEDICINE

## 2024-11-24 PROCEDURE — 85027 COMPLETE CBC AUTOMATED: CPT | Performed by: HOSPITALIST

## 2024-11-24 PROCEDURE — 99232 SBSQ HOSP IP/OBS MODERATE 35: CPT | Performed by: INTERNAL MEDICINE

## 2024-11-24 PROCEDURE — 99233 SBSQ HOSP IP/OBS HIGH 50: CPT | Performed by: STUDENT IN AN ORGANIZED HEALTH CARE EDUCATION/TRAINING PROGRAM

## 2024-11-24 RX ADMIN — Medication 10 ML: at 09:04

## 2024-11-24 RX ADMIN — PANTOPRAZOLE SODIUM 40 MG: 40 TABLET, DELAYED RELEASE ORAL at 05:38

## 2024-11-24 RX ADMIN — ASPIRIN 81 MG 81 MG: 81 TABLET ORAL at 08:59

## 2024-11-24 RX ADMIN — MIRTAZAPINE 15 MG: 15 TABLET, FILM COATED ORAL at 21:49

## 2024-11-24 RX ADMIN — Medication 10 ML: at 21:49

## 2024-11-24 RX ADMIN — GABAPENTIN 300 MG: 100 CAPSULE ORAL at 09:00

## 2024-11-24 RX ADMIN — SERTRALINE HYDROCHLORIDE 50 MG: 50 TABLET ORAL at 09:00

## 2024-11-24 RX ADMIN — APIXABAN 2.5 MG: 2.5 TABLET, FILM COATED ORAL at 21:49

## 2024-11-24 RX ADMIN — GABAPENTIN 300 MG: 100 CAPSULE ORAL at 21:49

## 2024-11-24 RX ADMIN — Medication 10 ML: at 09:05

## 2024-11-24 RX ADMIN — ATORVASTATIN CALCIUM 40 MG: 40 TABLET, FILM COATED ORAL at 21:49

## 2024-11-24 RX ADMIN — APIXABAN 2.5 MG: 2.5 TABLET, FILM COATED ORAL at 08:59

## 2024-11-24 RX ADMIN — MAGNESIUM OXIDE TAB 400 MG (241.3 MG ELEMENTAL MG) 400 MG: 400 (241.3 MG) TAB at 08:59

## 2024-11-24 RX ADMIN — TORSEMIDE 20 MG: 20 TABLET ORAL at 09:00

## 2024-11-24 NOTE — PLAN OF CARE
Goal Outcome Evaluation:  Plan of Care Reviewed With: patient        Progress: no change  Outcome Evaluation: Pt presents near baseline for ADL completion with standing balance deficit, decreased endurance, and mild generalized weakness - OT will follow while admitted to promote maintenance of and return to PLOF. Rec return to facility with staff assist, HHOT/PT, and AD for mobility (defer to PT for recs).    Anticipated Discharge Disposition (OT): assisted living, home with home health

## 2024-11-24 NOTE — PLAN OF CARE
Goal Outcome Evaluation:           Progress: improving  Outcome Evaluation: Patient A&O x4, NIH 1 r/t sensory which patient reports is baseline. Up in recliner today, able to ambulate with 1 assist and walker. VSS, plans for MRI tomorrow. Eating and drinking well.

## 2024-11-24 NOTE — PROGRESS NOTES
[facility]  PROGRESS NOTE    Patient Name: Mukund Pool  : 1930  MRN: [id number]    Date of Admission: 2024  Primary Care Physician: Slick Hubbard MD    Subjective   Subjective     CC:  F/u stroke rule out    HPI:  Patient reports feeling improved. States that his symptoms have mostly resolved. Was able to walk down the [place] with therapy.      Objective   Objective     Vital Signs:   Temp:  [97.2 °F (36.2 °C)-98.2 °F (36.8 °C)] 97.5 °F (36.4 °C)  Heart Rate:  [70-83] 83  Resp:  [16] 16  BP: (130-165)/(71-87) 145/79     Physical Exam:  Constitutional: No acute distress, awake, alert  HENT: NCAT, mucous membranes moist  Respiratory: Clear to auscultation bilaterally, respiratory effort normal   Cardiovascular: RRR, no murmurs, rubs, or gallops  Gastrointestinal: soft, nontender, nondistended  Musculoskeletal: No bilateral ankle edema  Psychiatric: Appropriate affect, cooperative  Neurologic: Oriented x 3, speech clear, no focal deficits  Skin: No rashes      Results Reviewed:  LAB RESULTS:      Lab 24  0559 24  1005   WBC 9.09 8.18   HEMOGLOBIN 11.8* 12.1*   HEMATOCRIT 36.4* 37.0*   PLATELETS 194 191   NEUTROS ABS  --  4.60   IMMATURE GRANS (ABS)  --  0.04   LYMPHS ABS  --  2.51   MONOS ABS  --  0.73   EOS ABS  --  0.24   .0* 103.6*   PROTIME  --  13.7   APTT  --  27.0   HSTROP T  --  44*         Lab 24  0559 24  1005   SODIUM 139 139   POTASSIUM 5.6* 4.6   CHLORIDE 102 104   CO2 24.0 22.0   ANION GAP 13.0 13.0   BUN 31* 26*   CREATININE 1.69* 1.64*   EGFR 37.2* 38.5*   GLUCOSE 115* 106*   CALCIUM 9.5 9.1   HEMOGLOBIN A1C 5.60  --          Lab 24  1005   ALT (SGPT) 11   AST (SGOT) 19         Lab 24  1005   HSTROP T 44*   PROTIME 13.7   INR 1.04         Lab 24  0559   CHOLESTEROL 197   LDL CHOL 136*   HDL CHOL 48   TRIGLYCERIDES 72             Brief Urine Lab Results  (Last result in the past  365 days)        Color   Clarity   Blood   Leuk Est   Nitrite   Protein   CREAT   Urine HCG        11/23/24 1434 Yellow   Clear   Negative   Negative   Negative   Negative                   Microbiology Results Abnormal       None            XR Chest 1 View    Result Date: 11/23/2024  XR CHEST 1 VW Date of Exam: 11/23/2024 10:36 AM EST Indication: Acute Stroke Protocol (onset < 12 hrs) Comparison: 6/16/2024 Findings: Prior sternotomy. Lungs are without consolidation. No pneumothorax or pleural effusion. Aortic arch atherosclerosis. Left-sided AICD and cardiac loop recorder noted.     Impression: Impression: No acute process. Electronically Signed: Soto Matos MD  11/23/2024 11:05 AM EST  Workstation ID: [id number]    CT Angiogram Head w AI Analysis of LVO    Result Date: 11/23/2024  CT ANGIOGRAM HEAD W AI ANALYSIS OF LVO, CT ANGIOGRAM NECK Date of Exam: 11/23/2024 10:06 AM EST Indication: Neuro Deficit, acute, Stroke suspected Neuro deficit, acute stroke suspected. Comparison: None available. Technique: CTA of the head and neck was performed after the uneventful intravenous administration of 75 mL Isovue-370. Reconstructed coronal and sagittal images were also obtained. In addition, a 3-D volume rendered image was created for interpretation. Automated exposure control and iterative reconstruction methods were used. Findings: CTA head: Dominant right vertebral artery system with the left V4 segment terminating as the PICA origin, normal variant, with the basilar artery arising solely from the right V4 segment. There is some atherosclerosis at the carotid siphons with mild luminal irregularity without flow-limiting stenosis. No abrupt cut off/large vessel occlusion, flow-limiting stenosis, dissection, or aneurysm. The cerebral veins and dural venous sinuses appear grossly patent. CTA neck: Dominant right vertebral artery system with decreased caliber of the entire left cervical vertebral artery. No abrupt cut off/large  vessel occlusion, flow-limiting stenosis, dissection, or aneurysm. There is moderate calcified and noncalcified atherosclerosis of the bilateral carotid bifurcations and proximal cervical ICAs which results in luminal irregularity and mild stenosis, without significant or flow-limiting stenosis (<50% narrowing per NASCET criteria). Extravascular findings: Partial visualization of median sternotomy wires and cardiac pacer with left chest pulse generator. Upper lungs are grossly clear. Unremarkable appearance of the pharyngeal and laryngeal structures. No acute or suspicious bony findings.     Impression: Impression: No abrupt cut off/large vessel occlusion, flow-limiting stenosis, dissection, or aneurysm. Moderate atherosclerosis of the bilateral carotid bifurcations and proximal cervical ICAs with some luminal irregularity and mild stenosis, without flow-limiting stenosis per NASCET criteria (less than 50% narrowing). Electronically Signed: Madi Pena MD  11/23/2024 10:29 AM EST  Workstation ID: [id number]    CT Angiogram Neck    Result Date: 11/23/2024  CT ANGIOGRAM HEAD W AI ANALYSIS OF LVO, CT ANGIOGRAM NECK Date of Exam: 11/23/2024 10:06 AM EST Indication: Neuro Deficit, acute, Stroke suspected Neuro deficit, acute stroke suspected. Comparison: None available. Technique: CTA of the head and neck was performed after the uneventful intravenous administration of 75 mL Isovue-370. Reconstructed coronal and sagittal images were also obtained. In addition, a 3-D volume rendered image was created for interpretation. Automated exposure control and iterative reconstruction methods were used. Findings: CTA head: Dominant right vertebral artery system with the left V4 segment terminating as the PICA origin, normal variant, with the basilar artery arising solely from the right V4 segment. There is some atherosclerosis at the carotid siphons with mild luminal irregularity without flow-limiting stenosis. No abrupt cut  off/large vessel occlusion, flow-limiting stenosis, dissection, or aneurysm. The cerebral veins and dural venous sinuses appear grossly patent. CTA neck: Dominant right vertebral artery system with decreased caliber of the entire left cervical vertebral artery. No abrupt cut off/large vessel occlusion, flow-limiting stenosis, dissection, or aneurysm. There is moderate calcified and noncalcified atherosclerosis of the bilateral carotid bifurcations and proximal cervical ICAs which results in luminal irregularity and mild stenosis, without significant or flow-limiting stenosis (<50% narrowing per NASCET criteria). Extravascular findings: Partial visualization of median sternotomy wires and cardiac pacer with left chest pulse generator. Upper lungs are grossly clear. Unremarkable appearance of the pharyngeal and laryngeal structures. No acute or suspicious bony findings.     Impression: Impression: No abrupt cut off/large vessel occlusion, flow-limiting stenosis, dissection, or aneurysm. Moderate atherosclerosis of the bilateral carotid bifurcations and proximal cervical ICAs with some luminal irregularity and mild stenosis, without flow-limiting stenosis per NASCET criteria (less than 50% narrowing). Electronically Signed: Madi Pena MD  11/23/2024 10:29 AM EST  Workstation ID: [id number]    CT Head Without Contrast Stroke Protocol    Result Date: 11/23/2024  CT HEAD WO CONTRAST STROKE PROTOCOL Date of Exam: 11/23/2024 9:55 AM EST Indication: Neuro deficit, acute, stroke suspected Neuro Deficit, acute, Stroke suspected. Comparison: Head CT 6/16/2024 Technique: Axial CT images were obtained of the head without contrast administration.  Reconstructed coronal images were also obtained. Automated exposure control and iterative construction methods were used. Scan Time: 9:55 a.m. 11/23/2024 Results discussed with stroke navigator by Dr. Madi Pena via telephone at 10:00 a.m. 11/23/2024. Findings: No acute  intracranial hemorrhage. No acute large territory infarct. There is some senescent mineralization of the basal ganglia. There are scattered subcortical and periventricular white matter hypodensities which are nonspecific and can be seen in the setting of chronic small vessel ischemic change. No extra-axial collections. No midline shift or herniation. Normal size and configuration of the ventricles. Unremarkable appearance of the orbits. The paranasal sinuses and mastoid air cells are grossly clear. No acute or suspicious bony findings.     Impression: Impression: No acute intracranial findings. Chronic and senescent changes as above. Electronically Signed: Madi Pena MD  11/23/2024 10:03 AM EST  Workstation ID: [id number]     Results for orders placed during the hospital encounter of 09/29/23    Adult Transthoracic Echo Complete W/ Cont if Necessary Per Protocol    Interpretation Summary    The left ventricle is mildly dilated and globally hypokinetic.  Left ventricular systolic function is moderately decreased. Estimated left ventricular EF = 25%    Aortic valve is calcified with no significant stenosis or regurgitation.    Moderate mitral valve regurgitation is present.    Estimated right ventricular systolic pressure from tricuspid regurgitation is normal (<35 mmHg).    There is a small (<1cm) pericardial effusion adjacent to the left ventricle.    Bilateral pleural effusions present      Current medications:  Scheduled Meds:apixaban, 2.5 mg, Oral, Q12H  aspirin, 81 mg, Oral, Daily   Or  aspirin, 300 mg, Rectal, Daily  atorvastatin, 40 mg, Oral, Nightly  gabapentin, 300 mg, Oral, BID  magnesium oxide, 400 mg, Oral, Daily  mirtazapine, 15 mg, Oral, Nightly  pantoprazole, 40 mg, Oral, Q AM  sertraline, 50 mg, Oral, Daily  sodium chloride, 10 mL, Intravenous, Q12H  sodium chloride, 10 mL, Intravenous, Q12H  torsemide, 20 mg, Oral, Daily      Continuous Infusions:   PRN Meds:.  senna-docusate sodium **AND**  polyethylene glycol **AND** bisacodyl **AND** bisacodyl    sodium chloride    sodium chloride    sodium chloride    sodium chloride    sodium chloride    Assessment & Plan   Assessment & Plan     Active Hospital Problems    Diagnosis  POA    **TIA (transient ischemic attack) [G45.9]  Yes    Headache [R51.9]  Yes    Left-sided weakness [R53.1]  Yes    Presence of cardiac pacemaker [Z95.0]  Yes    Chronic HFrEF (heart failure with reduced ejection fraction) [I50.22]  Yes    Stage 3b chronic kidney disease [N18.32]  Yes    Paroxysmal atrial fibrillation [I48.0]  Yes      Resolved Hospital Problems   No resolved problems to display.        Brief Hospital Course to date:  Mukund Pool is a 89 y.o. male with hx of chronic HFrEF, PAF on Eliquis, bradycardia s/p PPM, CAD s/p CABG, CKD 3, HTN, HLD, head/neck cancer, prostate cancer, migraines, and GERD who presents due to occipital headache, dysarthria, and left sided weakness.     Left sided weakness, dysarthria  Headache with history of migraines  --TIA vs stroke vs complex migraine  --CT head without contrast, CTA head/neck no acute abnormalities, no LVO  --MRI brain ordered and pending, PPM has been cleared  --CXR negative  --s/p Migraine cocktail with improvement  --Continue ASA, Eliquis; started Lipitor  --PT/OT recommending SNF vs. [facility] w/HHPT      HTN  HLD  --Allow permissive HTN, keep SBP <180  --Check lipid panel  --On Zetia at home, added Lipitor as above     PAF  Chronic HFrEF  CAD s/p CABG  Bradycardia s/p PPM  --Continue home meds    CKD 3  --Recent baseline Cr ~1.4-1.6  --Stable     GERD  --Continue PPI     Hx of head/neck cancer  Hx of prostate cancer    Expected Discharge Location and Transportation: Pending MRI, SNF vs. Return to [facility]  Expected Discharge   Expected discharge date/ time has not been documented.     VTE Prophylaxis:  Pharmacologic & mechanical VTE prophylaxis orders are present.         AM-PAC 6 Clicks Score (PT): 17 (11/24/24  0936)    CODE STATUS:   Code Status and Medical Interventions: CPR (Attempt to Resuscitate); Full Support   Ordered at: 11/23/24 1207     Code Status (Patient has no pulse and is not breathing):    CPR (Attempt to Resuscitate)     Medical Interventions (Patient has pulse or is breathing):    Full Support       Caitlin Zacarias,   11/24/24

## 2024-11-24 NOTE — PROGRESS NOTES
Stroke Progress Note       Chief Complaint: Slurred speech.  Subjective    Subjective     Subjective:  Patient is doing okay, was having his breakfast this morning    Objective      Temp:  [97.2 °F (36.2 °C)-98.2 °F (36.8 °C)] 97.5 °F (36.4 °C)  Heart Rate:  [70-83] 83  Resp:  [16] 16  BP: (130-165)/(71-87) 145/79          NEURO    MENTAL STATUS: AAOx3, memory intact, fund of knowledge appropriate    LANG/SPEECH: Naming and repetition intact, fluent, follows 3-step commands    CRANIAL NERVES:    Pupils equal and reactive, EOMI intact, no gaze deviation, no nystagmus  No facial droop, cough and gag intact, shoulder shrug intact, tongue midline     MOTOR:  Moves all extremities equally    SENSORY: Normal to light touch all throughout         STATION: Not assessed due to patient condition    GAIT: Not assessed due to patient condition     Results Review:    I reviewed the patient's new clinical results.    Lab Results (last 24 hours)       Procedure Component Value Units Date/Time    Lipid Panel [[id number]]  (Abnormal) Collected: 11/24/24 0559    Specimen: Blood Updated: 11/24/24 0804     Total Cholesterol 197 mg/dL      Triglycerides 72 mg/dL      HDL Cholesterol 48 mg/dL      LDL Cholesterol  136 mg/dL      VLDL Cholesterol 13 mg/dL      LDL/HDL Ratio 2.80    Narrative:      Cholesterol Reference Ranges  (U.S. Department of Health and Human Services ATP III Classifications)    Desirable          <200 mg/dL  Borderline High    200-239 mg/dL  High Risk          >240 mg/dL      Triglyceride Reference Ranges  (U.S. Department of Health and Human Services ATP III Classifications)    Normal           <150 mg/dL  Borderline High  150-199 mg/dL  High             200-499 mg/dL  Very High        >500 mg/dL    HDL Reference Ranges  (U.S. Department of Health and Human Services ATP III Classifications)    Low     <40 mg/dl (major risk factor for CHD)  High    >60 mg/dl ('negative' risk factor for CHD)        LDL Reference  Ranges  (U.S. Department of Health and Human Services ATP III Classifications)    Optimal          <100 mg/dL  Near Optimal     100-129 mg/dL  Borderline High  130-159 mg/dL  High             160-189 mg/dL  Very High        >189 mg/dL    Basic Metabolic Panel [[id number]]  (Abnormal) Collected: 11/24/24 0559    Specimen: Blood Updated: 11/24/24 0804     Glucose 115 mg/dL      BUN 31 mg/dL      Creatinine 1.69 mg/dL      Sodium 139 mmol/L      Potassium 5.6 mmol/L      Chloride 102 mmol/L      CO2 24.0 mmol/L      Calcium 9.5 mg/dL      BUN/Creatinine Ratio 18.3     Anion Gap 13.0 mmol/L      eGFR 37.2 mL/min/1.73     Narrative:      GFR Normal >60  Chronic Kidney Disease <60  Kidney Failure <15    The GFR formula is only valid for adults with stable renal function between ages 18 and 70.    Hemoglobin A1c [[id number]]  (Normal) Collected: 11/24/24 0559    Specimen: Blood Updated: 11/24/24 0708     Hemoglobin A1C 5.60 %     Narrative:      Hemoglobin A1C Ranges:    Increased Risk for Diabetes  5.7% to 6.4%  Diabetes                     >= 6.5%  Diabetic Goal                < 7.0%    CBC (No Diff) [[id number]]  (Abnormal) Collected: 11/24/24 0559    Specimen: Blood Updated: 11/24/24 0636     WBC 9.09 10*3/mm3      RBC 3.50 10*6/mm3      Hemoglobin 11.8 g/dL      Hematocrit 36.4 %      .0 fL      MCH 33.7 pg      MCHC 32.4 g/dL      RDW 12.9 %      RDW-SD 48.5 fl      MPV 9.4 fL      Platelets 194 10*3/mm3     POC Glucose Once [[id number]]  (Normal) Collected: 11/24/24 0508    Specimen: Blood Updated: 11/24/24 0509     Glucose 104 mg/dL     POC Glucose Once [[id number]]  (Abnormal) Collected: 11/23/24 2319    Specimen: Blood Updated: 11/23/24 2320     Glucose 136 mg/dL     POC Glucose Once [[id number]]  (Abnormal) Collected: 11/23/24 1809    Specimen: Blood Updated: 11/23/24 1810     Glucose 210 mg/dL     Urinalysis With Culture If Indicated - Urine, Clean Catch [[id number]]  (Abnormal) Collected: 11/23/24 1353     Specimen: Urine, Clean Catch Updated: 11/23/24 1454     Color, UA Yellow     Appearance, UA Clear     pH, UA 7.0     Specific Gravity, UA 1.040     Glucose, UA Negative     Ketones, UA Negative     Bilirubin, UA Negative     Blood, UA Negative     Protein, UA Negative     Leuk Esterase, UA Negative     Nitrite, UA Negative     Urobilinogen, UA 0.2 E.U./dL    Narrative:      In absence of clinical symptoms, the presence of pyuria, bacteria, and/or nitrites on the urinalysis result does not correlate with infection.  Urine microscopic not indicated.          XR Chest 1 View    Result Date: 11/23/2024  Impression: No acute process. Electronically Signed: Soto Matos MD  11/23/2024 11:05 AM EST  Workstation ID: [id number]    CT Angiogram Head w AI Analysis of LVO    Result Date: 11/23/2024  Impression: No abrupt cut off/large vessel occlusion, flow-limiting stenosis, dissection, or aneurysm. Moderate atherosclerosis of the bilateral carotid bifurcations and proximal cervical ICAs with some luminal irregularity and mild stenosis, without flow-limiting stenosis per NASCET criteria (less than 50% narrowing). Electronically Signed: Madi Pena MD  11/23/2024 10:29 AM EST  Workstation ID: [id number]    CT Angiogram Neck    Result Date: 11/23/2024  Impression: No abrupt cut off/large vessel occlusion, flow-limiting stenosis, dissection, or aneurysm. Moderate atherosclerosis of the bilateral carotid bifurcations and proximal cervical ICAs with some luminal irregularity and mild stenosis, without flow-limiting stenosis per NASCET criteria (less than 50% narrowing). Electronically Signed: Madi Pena MD  11/23/2024 10:29 AM EST  Workstation ID: [id number]    CT Head Without Contrast Stroke Protocol    Result Date: 11/23/2024  Impression: No acute intracranial findings. Chronic and senescent changes as above. Electronically Signed: Madi Pena MD  11/23/2024 10:03 AM EST  Workstation ID: [id number]   Results for orders  placed during the hospital encounter of 09/29/23    Adult Transthoracic Echo Complete W/ Cont if Necessary Per Protocol    Interpretation Summary    The left ventricle is mildly dilated and globally hypokinetic.  Left ventricular systolic function is moderately decreased. Estimated left ventricular EF = 25%    Aortic valve is calcified with no significant stenosis or regurgitation.    Moderate mitral valve regurgitation is present.    Estimated right ventricular systolic pressure from tricuspid regurgitation is normal (<35 mmHg).    There is a small (<1cm) pericardial effusion adjacent to the left ventricle.    Bilateral pleural effusions present            Assessment/Plan     Assessment/Plan:  89-year-old presented with transient episode of left facial droop and dysarthria.  CT is open, no significant atherosclerosis.    Likely differential transient ischemic attack versus complex migraine  -Patient is already on max medical therapy, continue Eliquis, aspirin 81 daily.  -The management is not going to change based on MRI brain.  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  -Normal blood pressure goals.    Stroke team will continue to follow with the rest of the results.      Dhaval Lee MD  11/24/24  12:33 EST

## 2024-11-24 NOTE — THERAPY EVALUATION
Patient Name: Mukund Pool  : 1930    MRN: [id number]                              Today's Date: 2024       Admit Date: 2024    Visit Dx:     ICD-10-CM ICD-9-CM   1. Dysphagia, unspecified type  R13.10 787.20   2. Acute nonintractable headache, unspecified headache type  R51.9 784.0   3. Ataxia  R27.0 781.3   4. 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)    A/C renal failure    GERD without esophagitis    Pleural effusion, bilateral    Moderate malnutrition    Presence of cardiac pacemaker    TIA (transient ischemic attack)    Headache    Left-sided weakness     Past Medical History:   Diagnosis Date    Arthritis     Chronic kidney disease     kidney stones    Coronary artery disease     GERD (gastroesophageal reflux disease)     History of radiation therapy 2021    laryngeal mass    Prostate cancer     Vocal cord cancer      Past Surgical History:   Procedure Laterality Date    CARDIAC ELECTROPHYSIOLOGY PROCEDURE  2023    cardiac loop recorder    CARDIAC ELECTROPHYSIOLOGY PROCEDURE N/A 10/25/2023    Procedure: Pacemaker DC new;  Surgeon: Leyv Pickett MD;  Location:  [facility];  Service: Cardiology;  Laterality: N/A;    CARDIAC ELECTROPHYSIOLOGY PROCEDURE N/A 10/25/2023    Procedure: AV node ablation;  Surgeon: Levy Pickett MD;  Location:  [facility];  Service: Cardiovascular;  Laterality: N/A;    CHOLECYSTECTOMY      CORONARY ARTERY BYPASS GRAFT      PROSTATE SURGERY      VOCAL CORD MASS EXCISION  2021       General Information       Row Name 11/24/24 0915          Physical Therapy Time and Intention    Document Type evaluation  -CD     Mode of Treatment physical therapy  -CD       Row Name 11/24/24 0915          General Information    Patient Profile Reviewed yes  -CD     Prior Level of Function independent:;all household mobility;gait;bed mobility;ADL's;dependent:;cooking;home management;driving  -CD     Existing Precautions/Restrictions fall  -CD     Barriers to Rehab medically complex  -CD       Row Name 11/24/24 0915          Living Environment    People in Home spouse  -CD       Row Name 11/24/24 0915          Home Main Entrance    Number of Stairs, Main Entrance none  -CD       Row Name 11/24/24 0915          Stairs Within Home, Primary    Number of Stairs, Within Home, Primary none  -CD       Row Name 11/24/24 0915          Cognition    Orientation Status (Cognition) oriented x 3  PT ORIENTED BUT IS EASILY DISTRACTED AND NEEDS CUES TO STAY ON TASK  -CD       Row Name 11/24/24 0915          Safety Issues/Impairments Affecting Functional Mobility    Safety Issues Affecting Function (Mobility) insight into deficits/self-awareness;positioning of assistive device;safety precaution awareness;safety precautions follow-through/compliance;awareness of need for assistance  -CD     Impairments Affecting Function (Mobility) balance;coordination;cognition;strength  -CD     Cognitive Impairments, Mobility Safety/Performance insight into deficits/self-awareness;safety precaution awareness;safety precaution follow-through;problem-solving/reasoning;awareness, need for assistance  -CD     Comment, Safety Issues/Impairments (Mobility) PT USED CANE PTA BUT CURRENTLY NEEDS R WALKER FOR INCREASED STABILITY/SAFETY WITH GAIT.  -CD               User Key  (r) = Recorded By, (t) = Taken By, (c) = Cosigned By      Initials Name Provider Type    CD Claudia Ayoub PT Physical Therapist                   Mobility       Row Name  11/24/24 0918          Bed Mobility    Comment, (Bed Mobility) PT UIC UPON ARRIVAL ON RA.  -CD       Row Name 11/24/24 0918          Transfers    Comment, (Transfers) CUES FOR HAND PLACEMENT. STS FROM RECLINER. MILDLY UNSTEADY UPON STANDING BUT NO OVERT LOB.  -CD       Row Name 11/24/24 0918          Sit-Stand Transfer    Sit-Stand [place] (Transfers) contact guard;verbal cues  -CD     Assistive Device (Sit-Stand Transfers) walker, front-wheeled  -CD       Row Name 11/24/24 0918          Gait/Stairs (Locomotion)    [place] Level (Gait) verbal cues;contact guard  -CD     Assistive Device (Gait) walker, front-wheeled  -CD     Distance in Feet (Gait) 100  -CD     Deviations/Abnormal Patterns (Gait) jess decreased;gait speed decreased;stride length decreased  -CD     Bilateral Gait Deviations forward flexed posture;heel strike decreased  -CD     Comment, (Gait/Stairs) GAIT UNSTEADY DESPITE R WALKER. NEEDS MORE ASSIST WHEN NEGOTIATING TURNS. DENIED DIZZINESS. PT CUED FOR UPRIGHT POSTURE AND PROPER WALKER PLACEMENT.  -CD               User Key  (r) = Recorded By, (t) = Taken By, (c) = Cosigned By      Initials Name Provider Type    CD Claudia Ayoub, PT Physical Therapist                   Obj/Interventions       Row Name 11/24/24 0921          Range of Motion Comprehensive    General Range of Motion bilateral lower extremity ROM WFL  -CD       Row Name 11/24/24 0921          Strength Comprehensive (MMT)    General Manual Muscle Testing (MMT) Assessment lower extremity strength deficits identified  -CD     Comment, General Manual Muscle Testing (MMT) Assessment B LE GROSSLY 4 /5 HIP FLEX, 4+/5 DF, KNEE EXT.  -CD       Row Name 11/24/24 0921          Motor Skills    Motor Skills coordination;functional endurance  -CD     Coordination gross motor deficit;left;lower extremity;minimal impairment;heel to shin;ataxia  -CD     Functional Endurance O2 SATS STABLE ON RA.  -CD       Row Name 11/24/24 0921           Balance    Balance Assessment sitting static balance;sitting dynamic balance;standing static balance;standing dynamic balance  -CD     Static Sitting Balance standby assist  -CD     Dynamic Sitting Balance contact guard  -CD     Position, Sitting Balance unsupported;sitting in chair  -CD     Static Standing Balance contact guard  -CD     Dynamic Standing Balance contact guard  -CD     Position/Device Used, Standing Balance supported;walker, rolling  -CD     Balance Interventions sitting;standing;sit to stand;supported;static;dynamic  -CD     Comment, Balance GAIT UNSTEADY DESPITE R WALKER ESPECIALLY WHEN NEGOTIATING TURNS. HAS FORWARD FLEXED POSTURE AND SHORTENED STEP LENGTH. STOOD X 2 MINUTES FOR TOILEING AT COMMODE WITH CGA FOR BALANCE.  -CD       Row Name 11/24/24 0921          Sensory Assessment (Somatosensory)    Sensory Assessment (Somatosensory) sensation intact  B LE  -CD               User Key  (r) = Recorded By, (t) = Taken By, (c) = Cosigned By      Initials Name Provider Type    CD Claudia Ayoub, PT Physical Therapist                   Goals/Plan       Row Name 11/24/24 0921          Bed Mobility Goal 1 (PT)    Activity/Assistive Device (Bed Mobility Goal 1, PT) sit to supine/supine to sit  -CD     [place] Level/Cues Needed (Bed Mobility Goal 1, PT) independent  -CD     Time Frame (Bed Mobility Goal 1, PT) short term goal (STG);5 days  -CD       Row Name 11/24/24 0934          Transfer Goal 1 (PT)    Activity/Assistive Device (Transfer Goal 1, PT) sit-to-stand/stand-to-sit;bed-to-chair/chair-to-bed;walker, rolling  -CD     Time Frame (Transfer Goal 1, PT) long term goal (LTG);10 days  -CD       Row Name 11/24/24 0964          Gait Training Goal 1 (PT)    Activity/Assistive Device (Gait Training Goal 1, PT) gait (walking locomotion);assistive device use  LRAD  -CD     [place] Level (Gait Training Goal 1, PT) standby assist  -CD     Distance (Gait Training Goal 1, PT) 300  -CD     Time Frame  (Gait Training Goal 1, PT) long term goal (LTG);10 days  -CD       Row Name 11/24/24 0934          Therapy Assessment/Plan (PT)    Planned Therapy Interventions (PT) balance training;bed mobility training;gait training;home exercise program;strengthening;transfer training;postural re-education;patient/family education;motor coordination training  -CD               User Key  (r) = Recorded By, (t) = Taken By, (c) = Cosigned By      Initials Name Provider Type    CD Claudia Ayoub PT Physical Therapist                   Clinical Impression       Row Name 11/24/24 0929          Pain    Pretreatment Pain Rating 0/10 - no pain  -CD     Posttreatment Pain Rating 0/10 - no pain  -CD     Pre/Posttreatment Pain Comment PT DENIES HA/PAIN.  -CD       Row Name 11/24/24 0929          Plan of Care Review    Plan of Care Reviewed With patient  -CD     Outcome Evaluation PT PRESENTS WITH EVOLVING SYMPTOMS TO INCLUDE IMPAIRED BALANCE, GENERALIZED WEAKNESS, AND DECLINE IN FUNCTIONAL MOBILITY FROM BASELINE INDEPENDENT GAIT WITH CANE. PT IS A&O BUT IS EASILY DISTRACTED AND HAD SOME DIFFICULTY FOLLOWING COMMANDS FOR MMT/ROM ASSESSMENT. GAIT IS UNSTEADY DESPITE USE OF R WALKER REQUIRING CGA OF 1 FOR SAFETY. RECOMMEND SNF AT D/C OR BACK TO [facility] WITH HHPT IF STAFF AVAILABLE INITIALLY TO PROVIDE ASSIST WITH MOBILITY FOR SAFETY.  -CD       Row Name 11/24/24 0929          Therapy Assessment/Plan (PT)    Patient/Family Therapy Goals Statement (PT) TO GO BACK TO [facility].  -CD     Rehab Potential (PT) good  -CD     Criteria for Skilled Interventions Met (PT) yes;meets criteria  -CD     Predicted Duration of Therapy Intervention (PT) 10 DAYS  -CD       Row Name 11/24/24 0929          Vital Signs    Pre Systolic BP Rehab 145  -CD     Pre Treatment Diastolic BP 79  -CD     Post Systolic BP Rehab 148  -CD     Post Treatment Diastolic BP 81  -CD     Posttreatment Heart Rate (beats/min) 73  -CD     Pre SpO2 (%) 98  -CD     O2 Delivery Pre Treatment room  air  -CD     O2 Delivery Intra Treatment room air  -CD     Post SpO2 (%) 98  -CD     O2 Delivery Post Treatment room air  -CD     Pre Patient Position Sitting  -CD     Intra Patient Position Standing  -CD     Post Patient Position Sitting  -CD       Row Name 11/24/24 0929          Positioning and Restraints    Pre-Treatment Position sitting in chair/recliner  -CD     Post Treatment Position chair  -CD     In Chair reclined;call light within reach;encouraged to call for assist;exit alarm on;with nsg;legs elevated  -CD               User Key  (r) = Recorded By, (t) = Taken By, (c) = Cosigned By      Initials Name Provider Type    CD Claudia Ayoub, PT Physical Therapist                   Outcome Measures       Row Name 11/24/24 0936 11/24/24 0845       How much help from another person do you currently need...    Turning from your back to your side while in flat bed without using bedrails? 3  -CD 3  -VL    Moving from lying on back to sitting on the side of a flat bed without bedrails? 3  -CD 3  -VL    Moving to and from a bed to a chair (including a wheelchair)? 3  -CD 3  -VL    Standing up from a chair using your arms (e.g., wheelchair, bedside chair)? 3  -CD 3  -VL    Climbing 3-5 steps with a railing? 2  -CD 2  -VL    To walk in hospital room? 3  -CD 3  -VL    AM-PAC 6 Clicks Score (PT) 17  -CD 17  -VL              User Key  (r) = Recorded By, (t) = Taken By, (c) = Cosigned By      Initials Name Provider Type    Claudia Tejada, PT Physical Therapist     Precious Montana RN Registered Nurse                                 Physical Therapy Education       Title: PT OT SLP Therapies (Done)       Topic: Physical Therapy (Done)       Point: Mobility training (Done)       Learning Progress Summary            Patient Acceptance, E, VU,NR by CD at 11/24/2024 0937    Comment: BENEFITS OF OOB ACTIVITY, SAFETY WITH MOBILITY, PROGRESSION OF POC, D/C PLANNING,                      Point: Home exercise program (Done)        Learning Progress Summary            Patient Acceptance, E, VU,NR by CD at 11/24/2024 0937    Comment: BENEFITS OF OOB ACTIVITY, SAFETY WITH MOBILITY, PROGRESSION OF POC, D/C PLANNING,                      Point: Body mechanics (Done)       Learning Progress Summary            Patient Acceptance, E, VU,NR by CD at 11/24/2024 0937    Comment: BENEFITS OF OOB ACTIVITY, SAFETY WITH MOBILITY, PROGRESSION OF POC, D/C PLANNING,                      Point: Precautions (Done)       Learning Progress Summary            Patient Acceptance, E, VU,NR by CD at 11/24/2024 0937    Comment: BENEFITS OF OOB ACTIVITY, SAFETY WITH MOBILITY, PROGRESSION OF POC, D/C PLANNING,                                      User Key       Initials Effective Dates Name Provider Type Discipline    CD 02/03/23 -  Claudia Ayoub, PT Physical Therapist PT                  PT Recommendation and Plan  Planned Therapy Interventions (PT): balance training, bed mobility training, gait training, home exercise program, strengthening, transfer training, postural re-education, patient/family education, motor coordination training  Outcome Evaluation: PT PRESENTS WITH EVOLVING SYMPTOMS TO INCLUDE IMPAIRED BALANCE, GENERALIZED WEAKNESS, AND DECLINE IN FUNCTIONAL MOBILITY FROM BASELINE INDEPENDENT GAIT WITH CANE. PT IS A&O BUT IS EASILY DISTRACTED AND HAD SOME DIFFICULTY FOLLOWING COMMANDS FOR MMT/ROM ASSESSMENT. GAIT IS UNSTEADY DESPITE USE OF R WALKER REQUIRING CGA OF 1 FOR SAFETY. RECOMMEND SNF AT D/C OR BACK TO [facility] WITH HHPT IF STAFF AVAILABLE INITIALLY TO PROVIDE ASSIST WITH MOBILITY FOR SAFETY.     Time Calculation:   PT Evaluation Complexity  History, PT Evaluation Complexity: 3 or more personal factors and/or comorbidities  Examination of Body Systems (PT Eval Complexity): total of 4 or more elements  Clinical Presentation (PT Evaluation Complexity): evolving  Clinical Decision Making (PT Evaluation Complexity): low complexity  Overall Complexity (PT  Evaluation Complexity): low complexity     PT Charges       Row Name 11/24/24 0939 11/24/24 0828          Time Calculation    Start Time 0845  -CD --     PT Received On 11/24/24  -CD --     PT Goal Re-Cert Due Date 12/04/24  -CD --        Time Calculation- PT    Total Timed Code Minutes- PT 10 minute(s)  -CD --        Timed Charges    89887 - Gait Training Minutes  10  -CD --        Untimed Charges    PT Eval/Re-eval Minutes 47  -CD --        Total Minutes    Timed Charges Total Minutes 10  -CD --     Untimed Charges Total Minutes 47  -CD --      Total Minutes 57  -CD --        PT/SLP KX Modifier    Exception criteria met to exceed therapy cap -- Apply KX Modifier  -CD               User Key  (r) = Recorded By, (t) = Taken By, (c) = Cosigned By      Initials Name Provider Type    CD Claudia Ayoub, PT Physical Therapist                  Therapy Charges for Today       Code Description Service Date Service Provider Modifiers Qty    13954523269 HC GAIT TRAINING EA 15 MIN 11/24/2024 Claudia Ayoub, PT GP, KX 1    61541283001 HC PT EVAL LOW COMPLEXITY 4 11/24/2024 Claudia Ayoub, PT GP, KX 1            PT G-Codes  AM-PAC 6 Clicks Score (PT): 17  PT Discharge Summary  Anticipated Discharge Disposition (PT): skilled nursing facility, home with assist, home with home health (TBD BASED ON ASSIST AVAILABLE AT [facility].)    Claudia Ayoub, PT  11/24/2024

## 2024-11-24 NOTE — THERAPY EVALUATION
Patient Name: Mukund Pool  : 1930    MRN: [id number]                              Today's Date: 2024       Admit Date: 2024    Visit Dx:     ICD-10-CM ICD-9-CM   1. Dysphagia, unspecified type  R13.10 787.20   2. Acute nonintractable headache, unspecified headache type  R51.9 784.0   3. Ataxia  R27.0 781.3   4. 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)    A/C renal failure    GERD without esophagitis    Pleural effusion, bilateral    Moderate malnutrition    Presence of cardiac pacemaker    TIA (transient ischemic attack)    Headache    Left-sided weakness     Past Medical History:   Diagnosis Date    Arthritis     Chronic kidney disease     kidney stones    Coronary artery disease     GERD (gastroesophageal reflux disease)     History of radiation therapy 2021    laryngeal mass    Prostate cancer     Vocal cord cancer      Past Surgical History:   Procedure Laterality Date    CARDIAC ELECTROPHYSIOLOGY PROCEDURE  2023    cardiac loop recorder    CARDIAC ELECTROPHYSIOLOGY PROCEDURE N/A 10/25/2023    Procedure: Pacemaker DC new;  Surgeon: Levy Pickett MD;  Location:  [facility];  Service: Cardiology;  Laterality: N/A;    CARDIAC ELECTROPHYSIOLOGY PROCEDURE N/A 10/25/2023    Procedure: AV node ablation;  Surgeon: Levy Pickett MD;  Location:  [facility];  Service: Cardiovascular;  Laterality: N/A;    CHOLECYSTECTOMY      CORONARY ARTERY BYPASS GRAFT      PROSTATE SURGERY      VOCAL CORD MASS EXCISION  2021       General Information       Row Name 11/24/24 0959          OT Time and Intention    Document Type evaluation  -CS     Mode of Treatment occupational therapy  -CS       Row Name 11/24/24 0959          General Information    Patient Profile Reviewed yes  -CS     Prior Level of Function independent:;all household mobility;ADL's;dependent:;cooking;driving;shopping;cleaning  Kadeem Villagomez in [place], ambulates to common areas, Ind with ADLs, seated bathing, reports use of AD for mobility (SPC > RW)  -CS     Existing Precautions/Restrictions fall  -CS     Barriers to Rehab medically complex  -CS       Row Name 11/24/24 0959          Living Environment    People in Home facility resident  -CS       Row Name 11/24/24 0959          Cognition    Orientation Status (Cognition) oriented x 3  -CS       Row Name 11/24/24 0959          Safety Issues/Impairments Affecting Functional Mobility    Safety Issues Affecting Function (Mobility) insight into deficits/self-awareness;safety precaution awareness;safety precautions follow-through/compliance  -CS     Impairments Affecting Function (Mobility) balance;strength;endurance/activity tolerance;cognition  -CS     Cognitive Impairments, Mobility Safety/Performance insight into deficits/self-awareness  -CS               User Key  (r) = Recorded By, (t) = Taken By, (c) = Cosigned By      Initials Name Provider Type    CS Rigoberto Kennedy OT Occupational Therapist                     Mobility/ADL's       Row Name 11/24/24 1001          Bed Mobility    Bed Mobility supine-sit;scooting/bridging  -CS     Scooting/Bridging [place] (Bed Mobility) standby assist  -CS     Supine-Sit [place] (Bed Mobility) standby assist  -CS     Assistive Device (Bed Mobility) head of bed elevated;bed rails  -CS     Comment, (Bed Mobility) appropriate sequencing intact, no dizziness reported  -CS       Row Name 11/24/24 1001          Transfers    Transfers sit-stand transfer;bed-chair transfer   -CS     Comment, (Transfers) RUE hand-held support, no dizziness upon sitting, no LOB during steps to recliner  -       Row Name 11/24/24 1001          Bed-Chair Transfer    Bed-Chair [place] (Transfers) contact guard;verbal cues;nonverbal cues (demo/gesture)  -       Row Name 11/24/24 1001          Sit-Stand Transfer    Sit-Stand [place] (Transfers) standby assist;verbal cues;nonverbal cues (demo/gesture)  -[facility] Name 11/24/24 1001          Functional Mobility    Functional Mobility- Comment defer to PT for specifics and recs  -CS     Patient was able to Ambulate yes  -       Row Name 11/24/24 1001          Activities of Daily Living    BADL Assessment/Intervention lower body dressing;grooming;feeding  -CS       Row Name 11/24/24 1001          Lower Body Dressing Assessment/Training    [place] Level (Lower Body Dressing) don;socks;shoes/slippers;contact guard assist  -CS     Comment, (Lower Body Dressing) balance during reach to distal BLEs intact  -[facility] Name 11/24/24 1001          Grooming Assessment/Training    [place] Level (Grooming) hair care, combing/brushing;wash face, hands;set up;independent  -CS     Position (Grooming) supported sitting  -[facility] Name 11/24/24 1001          Self-Feeding Assessment/Training    [place] Level (Feeding) feeding skills;finger foods;liquids to mouth;prepare tray/open items;scoop food and bring to mouth;independent  -CS     Position (Feeding) supported sitting  -CS               User Key  (r) = Recorded By, (t) = Taken By, (c) = Cosigned By      Initials Name Provider Type     Rigoberto Kennedy OT Occupational Therapist                   Obj/Interventions       Row Name 11/24/24 1003          Sensory Assessment (Somatosensory)    Sensory Assessment (Somatosensory) UE sensation intact  -[facility] Name 11/24/24 1003          Vision Assessment/Intervention    Visual Impairment/Limitations WFL;corrective lenses full-time  -      Vision Assessment Comment full to confrontation, no dizziness reported  -CS       Row Name 11/24/24 1003          Range of Motion Comprehensive    General Range of Motion no range of motion deficits identified  -CS       Row Name 11/24/24 1003          Strength Comprehensive (MMT)    General Manual Muscle Testing (MMT) Assessment upper extremity strength deficits identified  -CS     Comment, General Manual Muscle Testing (MMT) Assessment BUE grossly 4+/5, no signficant asymmetry, appropriate for age  -CS       Row Name 11/24/24 1003          Motor Skills    Motor Skills coordination;functional endurance  -CS     Coordination bilateral;upper extremity;finger to nose;bimanual skills;WFL  -CS     Functional Endurance O2 sats stable on RA  -CS       Row Name 11/24/24 1003          Balance    Balance Assessment sitting dynamic balance;sitting static balance;standing static balance;standing dynamic balance  -CS     Static Sitting Balance standby assist  -CS     Dynamic Sitting Balance standby assist  -CS     Position, Sitting Balance sitting edge of bed;unsupported  -CS     Static Standing Balance standby assist  -CS     Dynamic Standing Balance contact guard  -CS     Position/Device Used, Standing Balance supported  -CS     Balance Interventions sitting;occupation based/functional task;sit to stand;standing  -               User Key  (r) = Recorded By, (t) = Taken By, (c) = Cosigned By      Initials Name Provider Type    CS Rigoberto Kennedy, OT Occupational Therapist                   Goals/Plan       Row Name 11/24/24 1007          Bed Mobility Goal 1 (OT)    Activity/Assistive Device (Bed Mobility Goal 1, OT) supine to sit;sit to supine;scooting  -     [place] Level/Cues Needed (Bed Mobility Goal 1, OT) independent  -CS     Time Frame (Bed Mobility Goal 1, OT) short term goal (STG);5 days  -CS     Progress/Outcomes (Bed Mobility Goal 1, OT) new goal  -CS       Row Name 11/24/24 1007          Dressing Goal 1  (OT)    Activity/Device (Dressing Goal 1, OT) lower body dressing  -CS     [place]/Cues Needed (Dressing Goal 1, OT) independent;modified independence  -CS     Time Frame (Dressing Goal 1, OT) long term goal (LTG);1 week  -CS     Strategies/Barriers (Dressing Goal 1, OT) AE prn, pants  -CS     Progress/Outcome (Dressing Goal 1, OT) new goal  -CS       Row Name 11/24/24 1007          Grooming Goal 1 (OT)    Activity/Device (Grooming Goal 1, OT) hair care;oral care;wash face, hands  -CS     [place] (Grooming Goal 1, OT) modified independence;supervision required  -CS     Time Frame (Grooming Goal 1, OT) long term goal (LTG);1 week  -CS     Strategies/Barriers (Grooming Goal 1, OT) sinkside standing vs. seated pending improved balance/endurance  -CS     Progress/Outcome (Grooming Goal 1, OT) new goal  -CS       Row Name 11/24/24 1007          Therapy Assessment/Plan (OT)    Planned Therapy Interventions (OT) activity tolerance training;functional balance retraining;ROM/therapeutic exercise;strengthening exercise;occupation/activity based interventions;transfer/mobility retraining;patient/caregiver education/training;neuromuscular control/coordination retraining;adaptive equipment training  -CS               User Key  (r) = Recorded By, (t) = Taken By, (c) = Cosigned By      Initials Name Provider Type    CS Rigoberto Kennedy, OT Occupational Therapist                   Clinical Impression       Row Name 11/24/24 1004          Pain Assessment    Pretreatment Pain Rating 0/10 - no pain  -CS     Posttreatment Pain Rating 0/10 - no pain  -CS       Row Name 11/24/24 1004          Plan of Care Review    Plan of Care Reviewed With patient  -CS     Progress no change  -CS     Outcome Evaluation Pt presents near baseline for ADL completion with standing balance deficit, decreased endurance, and mild generalized weakness - OT will follow while admitted to promote maintenance of and return to PLOF. Rec return to facility  with staff assist, HHOT/PT, and AD for mobility (defer to PT for recs).  -CS       Row Name 11/24/24 1004          Therapy Assessment/Plan (OT)    Patient/Family Therapy Goal Statement (OT) return to facility  -CS     Rehab Potential (OT) good  -CS     Criteria for Skilled Therapeutic Interventions Met (OT) meets criteria;skilled treatment is necessary  -CS     Therapy Frequency (OT) daily  -CS     Predicted Duration of Therapy Intervention (OT) 5 days  -CS       Row Name 11/24/24 1004          Therapy Plan Review/Discharge Plan (OT)    Anticipated Discharge Disposition (OT) assisted living;home with home health  -CS       Row Name 11/24/24 1004          Vital Signs    Pre Systolic BP Rehab 136  RN cleared for eval  -CS     Pre Treatment Diastolic BP 86  -CS     Post Systolic BP Rehab 145  -CS     Post Treatment Diastolic BP 79  -CS     O2 Delivery Pre Treatment room air  -CS     O2 Delivery Intra Treatment room air  -CS     O2 Delivery Post Treatment room air  -CS     Pre Patient Position Supine  -CS     Intra Patient Position Standing  -CS     Post Patient Position Sitting  -CS       Row Name 11/24/24 1004          Positioning and Restraints    Pre-Treatment Position in bed  -CS     Post Treatment Position chair  -CS     In Chair notified nsg;reclined;sitting;call light within reach;waffle cushion;encouraged to call for assist;exit alarm on;legs elevated  -CS               User Key  (r) = Recorded By, (t) = Taken By, (c) = Cosigned By      Initials Name Provider Type    CS Rigoberto Kennedy, OT Occupational Therapist                   Outcome Measures       Row Name 11/24/24 1008          How much help from another is currently needed...    Putting on and taking off regular lower body clothing? 3  -CS     Bathing (including washing, rinsing, and drying) 3  -CS     Toileting (which includes using toilet bed pan or urinal) 3  -CS     Putting on and taking off regular upper body clothing 4  -CS     Taking care of  personal grooming (such as brushing teeth) 4  -CS     Eating meals 4  -CS     AM-PAC 6 Clicks Score (OT) 21  -CS       Row Name 11/24/24 0936 11/24/24 0845       How much help from another person do you currently need...    Turning from your back to your side while in flat bed without using bedrails? 3  -CD 3  -VL    Moving from lying on back to sitting on the side of a flat bed without bedrails? 3  -CD 3  -VL    Moving to and from a bed to a chair (including a wheelchair)? 3  -CD 3  -VL    Standing up from a chair using your arms (e.g., wheelchair, bedside chair)? 3  -CD 3  -VL    Climbing 3-5 steps with a railing? 2  -CD 2  -VL    To walk in hospital room? 3  -CD 3  -VL    AM-PAC 6 Clicks Score (PT) 17  -CD 17  -VL      Row Name 11/24/24 1008          Modified [place] Scale    Modified Teresa Scale 1 - No significant disability despite symptoms.  Able to carry out all usual duties and activities.  -       Row Name 11/24/24 1008          Functional Assessment    Outcome Measure Options AM-PAC 6 Clicks Daily Activity (OT);Modified [place]  -CS               User Key  (r) = Recorded By, (t) = Taken By, (c) = Cosigned By      Initials Name Provider Type    CD Claudia Ayoub, PT Physical Therapist    VL Precious Montana, RN Registered Nurse    CS Rigoberto Kennedy, OT Occupational Therapist                    Occupational Therapy Education        No education to display                  OT Recommendation and Plan  Planned Therapy Interventions (OT): activity tolerance training, functional balance retraining, ROM/therapeutic exercise, strengthening exercise, occupation/activity based interventions, transfer/mobility retraining, patient/caregiver education/training, neuromuscular control/coordination retraining, adaptive equipment training  Therapy Frequency (OT): daily  Plan of Care Review  Plan of Care Reviewed With: patient  Progress: no change  Outcome Evaluation: Pt presents near baseline for ADL completion with standing  balance deficit, decreased endurance, and mild generalized weakness - OT will follow while admitted to promote maintenance of and return to PLOF. Rec return to facility with staff assist, HHOT/PT, and AD for mobility (defer to PT for recs).     Time Calculation:   Evaluation Complexity (OT)  Review Occupational Profile/Medical/Therapy History Complexity: brief/low complexity  Assessment, Occupational Performance/Identification of Deficit Complexity: 1-3 performance deficits  Clinical Decision Making Complexity (OT): problem focused assessment/low complexity  Overall Complexity of Evaluation (OT): low complexity     Time Calculation- OT       Row Name 11/24/24 1017 11/24/24 0939          Time Calculation- OT    OT Start Time 0741  -CS --     OT Received On 11/24/24  -CS --     OT Goal Re-Cert Due Date 12/04/24  -CS --        Timed Charges    43394 - Gait Training Minutes  -- 10  -CD     29056 - OT Therapeutic Activity Minutes 4  -CS --     24242 - OT Self Care/Mgmt Minutes 6  -CS --        Untimed Charges    OT Eval/Re-eval Minutes 48  -CS --        Total Minutes    Timed Charges Total Minutes 10  -CS 10  -CD     Untimed Charges Total Minutes 48  -CS --      Total Minutes 58  -CS 10  -CD               User Key  (r) = Recorded By, (t) = Taken By, (c) = Cosigned By      Initials Name Provider Type    Claudia Tejada PT Physical Therapist    CS Rigoberto Kennedy, OT Occupational Therapist                  Therapy Charges for Today       Code Description Service Date Service Provider Modifiers Qty    16626326348 HC OT SELF CARE/MGMT/TRAIN EA 15 MIN 11/24/2024 Rigoberto Kennedy OT HAMIDA CHAMBERSX 1    [id number] HC OT EVAL LOW COMPLEXITY 4 11/24/2024 Rigoberto Kennedy OT TRISH KX 1                 Rigoberto Kennedy OT  11/24/2024

## 2024-11-24 NOTE — PLAN OF CARE
Goal Outcome Evaluation:  Plan of Care Reviewed With: patient           Outcome Evaluation: PT PRESENTS WITH EVOLVING SYMPTOMS TO INCLUDE IMPAIRED BALANCE, GENERALIZED WEAKNESS, AND DECLINE IN FUNCTIONAL MOBILITY FROM BASELINE INDEPENDENT GAIT WITH CANE. PT IS A&O BUT IS EASILY DISTRACTED AND HAD SOME DIFFICULTY FOLLOWING COMMANDS FOR MMT/ROM ASSESSMENT. GAIT IS UNSTEADY DESPITE USE OF R WALKER REQUIRING CGA OF 1 FOR SAFETY. RECOMMEND SNF AT D/C OR BACK TO [facility] WITH HHPT IF STAFF AVAILABLE INITIALLY TO PROVIDE ASSIST WITH MOBILITY FOR SAFETY.    Anticipated Discharge Disposition (PT): skilled nursing facility, home with assist, home with home health (TBD BASED ON ASSIST AVAILABLE AT [facility].)

## 2024-11-25 PROCEDURE — G0378 HOSPITAL OBSERVATION PER HR: HCPCS

## 2024-11-25 PROCEDURE — 99232 SBSQ HOSP IP/OBS MODERATE 35: CPT | Performed by: NURSE PRACTITIONER

## 2024-11-25 PROCEDURE — 99232 SBSQ HOSP IP/OBS MODERATE 35: CPT | Performed by: INTERNAL MEDICINE

## 2024-11-25 RX ADMIN — GABAPENTIN 300 MG: 100 CAPSULE ORAL at 08:11

## 2024-11-25 RX ADMIN — MAGNESIUM OXIDE TAB 400 MG (241.3 MG ELEMENTAL MG) 400 MG: 400 (241.3 MG) TAB at 08:11

## 2024-11-25 RX ADMIN — TORSEMIDE 20 MG: 20 TABLET ORAL at 08:11

## 2024-11-25 RX ADMIN — Medication 10 ML: at 08:11

## 2024-11-25 RX ADMIN — SENNOSIDES AND DOCUSATE SODIUM 2 TABLET: 50; 8.6 TABLET ORAL at 08:12

## 2024-11-25 RX ADMIN — Medication 10 ML: at 21:09

## 2024-11-25 RX ADMIN — ATORVASTATIN CALCIUM 40 MG: 40 TABLET, FILM COATED ORAL at 21:08

## 2024-11-25 RX ADMIN — SERTRALINE HYDROCHLORIDE 50 MG: 50 TABLET ORAL at 08:11

## 2024-11-25 RX ADMIN — APIXABAN 2.5 MG: 2.5 TABLET, FILM COATED ORAL at 08:10

## 2024-11-25 RX ADMIN — MIRTAZAPINE 15 MG: 15 TABLET, FILM COATED ORAL at 21:08

## 2024-11-25 RX ADMIN — ASPIRIN 81 MG 81 MG: 81 TABLET ORAL at 08:11

## 2024-11-25 RX ADMIN — GABAPENTIN 300 MG: 100 CAPSULE ORAL at 21:08

## 2024-11-25 RX ADMIN — PANTOPRAZOLE SODIUM 40 MG: 40 TABLET, DELAYED RELEASE ORAL at 05:28

## 2024-11-25 RX ADMIN — APIXABAN 2.5 MG: 2.5 TABLET, FILM COATED ORAL at 21:08

## 2024-11-25 NOTE — DISCHARGE INSTRUCTIONS
- Continue Eliquis 2.5 mg twice daily  - Continue aspirin 81 mg daily  - Continue atorvastatin 40 mg daily.  LDL cholesterol 136.  - Start magnesium glycinate 400 mg in the evenings for headache prevention  - Follow-up with CYNDEE Conrad in the headache clinic as scheduled

## 2024-11-25 NOTE — CASE MANAGEMENT/SOCIAL WORK
Continued Stay Note  Psychiatric     Patient Name: Mukund Pool  MRN: [id number]  Today's Date: 11/25/2024    Admit Date: 11/23/2024    Plan: [facility]   Discharge Plan       Row Name 11/25/24 1059       Plan    Plan [facility]    Patient/Family in Agreement with Plan yes    Plan Comments Spoke to patient and spouse at bedside. Patient would like a referral made to [facility]. CM spoke to [place] at [facility] and made the referral. CM will continue to follow.    Final Discharge Disposition Code 03 - skilled nursing facility (SNF)      Row Name 11/25/24 0928       [facility]    [facility]    Patient/Family in Agreement with Plan yes    Plan Comments Spoke to patient at bedside. [facility] Grisel [facility]sp[phone] [place]. Is dependent with ADL's. No problems affording [employer] Medicare or medications. Uses [facility]. Uses straight cane and rolling walker. PCP is Slick Hubbard MD. Plan is back to [facility] with spouse. Family will transport. CM will continue to follow    Final Discharge Disposition Code 01 - home or self-care                   Discharge Codes    No documentation.                       Gary Villalobos, RN

## 2024-11-25 NOTE — CONSULTS
Order noted for diabetes education per chart review. A1c 5.6%. No history of diabetes per chart review. Does not qualify for OP diabetes/stroke class.

## 2024-11-25 NOTE — PLAN OF CARE
Goal Outcome Evaluation:           Progress: improving  Outcome Evaluation: Patient A&O x4, getting up with one assist and walker and ambulating to bathroom however gait is unsteady at times. Plans for rehab at discharge for strengthening.

## 2024-11-25 NOTE — PROGRESS NOTES
Stroke Progress Note       Chief Complaint: Slurred speech.  Subjective    Subjective     Subjective:  No adverse events overnight.  No acute distress noted.  All symptoms have resolved.  Patient is unable to have MRI until tomorrow.  Suspect his symptoms are due to complex migraine.  He is okay to discharge home from a neurology perspective and follow-up in the headache clinic with Dr. Sam or Rita COTTER.    Objective      Temp:  [97.4 °F (36.3 °C)-97.8 °F (36.6 °C)] 97.6 °F (36.4 °C)  Heart Rate:  [70-83] 70  Resp:  [16-18] 18  BP: ()/(58-94) 137/81        Physical Exam  Constitutional:       General: He is not in acute distress.     Appearance: He is not ill-appearing.   HENT:      Head: Normocephalic and atraumatic.   Eyes:      Extraocular Movements: Extraocular movements intact.      Pupils: Pupils are equal, round, and reactive to light.   Cardiovascular:      Rate and Rhythm: Normal rate and regular rhythm.   Musculoskeletal:         General: Normal range of motion.      Cervical back: Normal range of motion and neck supple.   Skin:     General: Skin is warm and dry.      Capillary Refill: Capillary refill takes less than 2 seconds.   Neurological:      General: No focal deficit present.      Mental Status: He is alert and oriented to person, place, and time. Mental status is at baseline.   Psychiatric:         Mood and Affect: Mood normal.         Behavior: Behavior normal.           Results Review:    I reviewed the patient's new clinical results.    Lab Results (last 24 hours)       ** No results found for the last 24 hours. **          XR Chest 1 View    Result Date: 11/23/2024  Impression: No acute process. Electronically Signed: Soto Matos MD  11/23/2024 11:05 AM EST  Workstation ID: [id number]    CT Angiogram Head w AI Analysis of LVO    Result Date: 11/23/2024  Impression: No abrupt cut off/large vessel occlusion, flow-limiting stenosis, dissection, or aneurysm. Moderate  atherosclerosis of the bilateral carotid bifurcations and proximal cervical ICAs with some luminal irregularity and mild stenosis, without flow-limiting stenosis per NASCET criteria (less than 50% narrowing). Electronically Signed: Madi Pena MD  11/23/2024 10:29 AM EST  Workstation ID: [id number]    CT Angiogram Neck    Result Date: 11/23/2024  Impression: No abrupt cut off/large vessel occlusion, flow-limiting stenosis, dissection, or aneurysm. Moderate atherosclerosis of the bilateral carotid bifurcations and proximal cervical ICAs with some luminal irregularity and mild stenosis, without flow-limiting stenosis per NASCET criteria (less than 50% narrowing). Electronically Signed: Madi Pena MD  11/23/2024 10:29 AM EST  Workstation ID: [id number]    CT Head Without Contrast Stroke Protocol    Result Date: 11/23/2024  Impression: No acute intracranial findings. Chronic and senescent changes as above. Electronically Signed: Madi Pena MD  11/23/2024 10:03 AM EST  Workstation ID: [id number]   Results for orders placed during the hospital encounter of 11/23/24    Adult Transthoracic Echo Complete W/ Cont if Necessary Per Protocol (With Agitated Saline)    Interpretation Summary    Left ventricular ejection fraction appears to be 56 - 60%.    Left ventricular wall thickness is consistent with mild concentric hypertrophy.    Left ventricular diastolic function is consistent with (grade II w/high LAP) pseudonormalization.    Mildly reduced right ventricular systolic function noted.    The left atrial cavity is mildly dilated.    There is mild calcification of the aortic valve mainly affecting the non-coronary, left coronary and right coronary cusp(s).    Aortic valve maximum pressure gradient is 11 mmHg. Aortic valve mean pressure gradient is 6 mmHg.    Calculated right ventricular systolic pressure from tricuspid regurgitation is 30 mmHg.            Assessment/Plan     Assessment/Plan:  89-year-old presented  with transient episode of left facial droop and dysarthria.  CT is open, no significant atherosclerosis.    Suspected complex migraine   - All symptoms currently resolved.  Patient reports that he had a significant migraine the day that he was admitted.  He reports that all symptoms resolved once headache are improved.  - Continue home Eliquis 2.5 mg twice daily and aspirin 81 mg daily  - Follow-up with Dr. Sam or Rita COTTER in the headache clinic after discharge  - Start magnesium glycinate 400 mg in the evening for headache prevention for now.  - Continue atorvastatin 40 mg daily given LDL of 136.  Discontinue home Zetia 10 mg daily  - Patient is okay to discharge home from a stroke perspective.  MRI is unable to be obtained until tomorrow.  Discussed with the patient and family at length who verbalized understanding and are okay with discharge.  -Plan discussed with the patient, his caregiver, primary team, and nursing staff.  Stroke neurology will sign off.  Please call with any questions or concerns.      CYNDEE Rush, AGACNP-BC  11/25/24  08:44 EST

## 2024-11-25 NOTE — CASE MANAGEMENT/SOCIAL WORK
Discharge Planning Assessment  [facility]     Patient Name: Mukund Pool  MRN: [id number]  Today's Date: 11/25/2024    Admit Date: 11/23/2024    Plan: Back to [facility] with spouse   Discharge Needs Assessment       Row Name 11/25/24 0929       Living Environment    People in Home facility resident    Name(s) of People in Home [facility] with Grisel (spouse) [phone]    Current Living Arrangements assisted living facility    Potentially Unsafe Housing Conditions none    In the past 12 months has the electric, gas, oil, or water company threatened to shut off services in your home? No    Primary Care Provided by self    Provides Primary Care For no one, unable/limited ability to care for self    Family Caregiver if Needed spouse;other (see comments)    Family Caregiver Names Spouse and caregiver    Able to Return to Prior Arrangements yes       Resource/Environmental Concerns    Resource/Environmental Concerns none    Transportation Concerns none       Transportation Needs    In the past 12 months, has lack of transportation kept you from medical appointments or from getting medications? no    In the past 12 months, has lack of transportation kept you from meetings, work, or from getting things needed for daily living? No       Food Insecurity    Within the past 12 months, you worried that your food would run out before you got the money to buy more. Never true    Within the past 12 months, the food you bought just didn't last and you didn't have money to get more. Never true       Transition Planning    Patient/Family Anticipates Transition to home with family    Patient/Family Anticipated Services at Transition     Transportation Anticipated family or friend will provide       Discharge Needs Assessment    Readmission Within the Last 30 Days no previous admission in last 30 days    Equipment Currently Used at Home cane, straight;walker, rolling    Concerns to be  Addressed denies needs/concerns at this time    Do you want help finding or keeping work or a job? I do not need or want help    Do you want help with school or training? For example, starting or completing job training or getting a high school diploma, GED or equivalent No    Anticipated Changes Related to Illness none    Equipment Needed After Discharge none                   Discharge Plan       Row Name 11/25/24 0930       Plan    Plan Back to [facility] with spouse    Patient/Family in Agreement with Plan yes    Plan Comments Spoke to patient at bedside. Lives with Grisel (spouse) [phone] at [facility] in [facility]. Is dependent with ADL's. No problems affording [employer] Medicare or medications. Uses "[facility]. Uses straight cane and rolling walker. PCP is Slick Hubbard MD. Plan is back to [facility] with spouse. Family will transport. CM will continue to follow    Final Discharge Disposition Code 01 - home or self-care                  Continued Care and Services - Admitted Since 11/23/2024    No active coordination exists for this encounter.          Demographic Summary       Row Name 11/25/24 0928       General Information    Admission Type observation    Arrived From emergency department    Referral Source admission list    Reason for Consult discharge planning    Preferred Language English       Contact Information    Permission Granted to Share Info With     Contact Information Obtained for                    Functional Status       Row Name 11/25/24 0954       Functional Status    Usual Activity Tolerance moderate    Current Activity Tolerance moderate       Physical Activity    On average, how many days per week do you engage in moderate to strenuous exercise (like a brisk walk)? 0 days    On average, how many minutes do you engage in exercise at this level? 0 min    Number of minutes of exercise per week 0       Functional Status, IADL     Medications assistive person    Meal Preparation assistive person    Housekeeping assistive person    Laundry assistive person    Shopping assistive person    If for any reason you need help with day-to-day activities such as bathing, preparing meals, shopping, managing finances, etc., do you get the help you need? I get all the help I need       Mental Status    General Appearance WDL WDL       Mental Status Summary    Recent Changes in Mental Status/Cognitive Functioning no changes       Employment/    Employment Status retired                   Psychosocial    No documentation.                  Abuse/Neglect    No documentation.                  Legal    No documentation.                  Substance Abuse    No documentation.                  Patient Forms    No documentation.                     Gary Villalobos RN

## 2024-11-25 NOTE — PROGRESS NOTES
[facility] Medicine Services  PROGRESS NOTE    Patient Name: Mukund Pool  : 1930  MRN: [id number]    Date of Admission: 2024  Primary Care Physician: Slick Hubbard MD    Subjective   Subjective     CC:  F/u stroke rule out    HPI:  Patient resting in bed. Symptoms have resolved. No headache      Objective   Objective     Vital Signs:   Temp:  [97.4 °F (36.3 °C)-97.7 °F (36.5 °C)] 97.4 °F (36.3 °C)  Heart Rate:  [70-83] 76  Resp:  [16-18] 18  BP: ()/(58-94) 139/81     Physical Exam:  Constitutional: No acute distress, awake, alert  HENT: NCAT, mucous membranes moist  Respiratory: Clear to auscultation bilaterally, respiratory effort normal   Cardiovascular: RRR, no murmurs, rubs, or gallops  Gastrointestinal: soft, nontender, nondistended  Musculoskeletal: No bilateral ankle edema  Psychiatric: Appropriate affect, cooperative  Neurologic: Oriented x 3, speech clear, no focal deficits  Skin: No rashes    Exam unchanged from       Results Reviewed:  LAB RESULTS:      Lab 24  0559 24  1005   WBC 9.09 8.18   HEMOGLOBIN 11.8* 12.1*   HEMATOCRIT 36.4* 37.0*   PLATELETS 194 191   NEUTROS ABS  --  4.60   IMMATURE GRANS (ABS)  --  0.04   LYMPHS ABS  --  2.51   MONOS ABS  --  0.73   EOS ABS  --  0.24   .0* 103.6*   PROTIME  --  13.7   APTT  --  27.0   HSTROP T  --  44*         Lab 24  0559 24  1005   SODIUM 139 139   POTASSIUM 5.6* 4.6   CHLORIDE 102 104   CO2 24.0 22.0   ANION GAP 13.0 13.0   BUN 31* 26*   CREATININE 1.69* 1.64*   EGFR 37.2* 38.5*   GLUCOSE 115* 106*   CALCIUM 9.5 9.1   HEMOGLOBIN A1C 5.60  --          Lab 24  1005   ALT (SGPT) 11   AST (SGOT) 19         Lab 24  1005   HSTROP T 44*   PROTIME 13.7   INR 1.04         Lab 24  0559   CHOLESTEROL 197   LDL CHOL 136*   HDL CHOL 48   TRIGLYCERIDES 72             Brief Urine Lab Results  (Last result in the past 365 days)        Color   Clarity    Blood   Leuk Est   Nitrite   Protein   CREAT   Urine HCG        11/23/24 1434 Yellow   Clear   Negative   Negative   Negative   Negative                   Microbiology Results Abnormal       None            No radiology results from the last 24 hrs    Results for orders placed during the hospital encounter of 11/23/24    Adult Transthoracic Echo Complete W/ Cont if Necessary Per Protocol (With Agitated Saline)    Interpretation Summary    Left ventricular ejection fraction appears to be 56 - 60%.    Left ventricular wall thickness is consistent with mild concentric hypertrophy.    Left ventricular diastolic function is consistent with (grade II w/high LAP) pseudonormalization.    Mildly reduced right ventricular systolic function noted.    The left atrial cavity is mildly dilated.    There is mild calcification of the aortic valve mainly affecting the non-coronary, left coronary and right coronary cusp(s).    Aortic valve maximum pressure gradient is 11 mmHg. Aortic valve mean pressure gradient is 6 mmHg.    Calculated right ventricular systolic pressure from tricuspid regurgitation is 30 mmHg.      Current medications:  Scheduled Meds:apixaban, 2.5 mg, Oral, Q12H  aspirin, 81 mg, Oral, Daily   Or  aspirin, 300 mg, Rectal, Daily  atorvastatin, 40 mg, Oral, Nightly  gabapentin, 300 mg, Oral, BID  magnesium oxide, 400 mg, Oral, Daily  mirtazapine, 15 mg, Oral, Nightly  pantoprazole, 40 mg, Oral, Q AM  sertraline, 50 mg, Oral, Daily  sodium chloride, 10 mL, Intravenous, Q12H  sodium chloride, 10 mL, Intravenous, Q12H  torsemide, 20 mg, Oral, Daily      Continuous Infusions:   PRN Meds:.  senna-docusate sodium **AND** polyethylene glycol **AND** bisacodyl **AND** bisacodyl    sodium chloride    sodium chloride    sodium chloride    sodium chloride    sodium chloride    Assessment & Plan   Assessment & Plan     Active Hospital Problems    Diagnosis  POA    **TIA (transient ischemic attack) [G45.9]  Yes    Headache  [R51.9]  Yes    Left-sided weakness [R53.1]  Yes    Presence of cardiac pacemaker [Z95.0]  Yes    Chronic HFrEF (heart failure with reduced ejection fraction) [I50.22]  Yes    Stage 3b chronic kidney disease [N18.32]  Yes    Paroxysmal atrial fibrillation [I48.0]  Yes      Resolved Hospital Problems   No resolved problems to display.        Brief Hospital Course to date:  Mukund Pool is a 89 y.o. male with hx of chronic HFrEF, PAF on Eliquis, bradycardia s/p PPM, CAD s/p CABG, CKD 3, HTN, HLD, head/neck cancer, prostate cancer, migraines, and GERD who presents due to occipital headache, dysarthria, and left sided weakness.     Left sided weakness, dysarthria - now resolved, likely complex migraine  Headache with history of migraines  -- felt to be most likely a complex migraine  --CT head without contrast, CTA head/neck no acute abnormalities, no LVO  --MRI brain cancelled as will not  at this time  --s/p Migraine cocktail with improvement  --Continue ASA, Eliquis; Lipitor  --PT/OT recommending rehab, patient would like to go to      HTN  HLD  --resume home regimen     PAF  Chronic HFrEF  CAD s/p CABG  Bradycardia s/p PPM  --Continue home meds    CKD 3  --Recent baseline Cr ~1.4-1.6  --Stable     GERD  --Continue PPI     Hx of head/neck cancer  Hx of prostate cancer    Expected Discharge Location and Transportation:[facility]   Expected Discharge   Expected discharge date/ time has not been documented.     VTE Prophylaxis:  Pharmacologic & mechanical VTE prophylaxis orders are present.         AM-PAC 6 Clicks Score (PT): 18 (11/25/24 0800)    CODE STATUS:   Code Status and Medical Interventions: CPR (Attempt to Resuscitate); Full Support   Ordered at: 11/23/24 1207     Code Status (Patient has no pulse and is not breathing):    CPR (Attempt to Resuscitate)     Medical Interventions (Patient has pulse or is breathing):    Full Support       Caitlin Zacarias, DO  11/25/24

## 2024-11-26 PROBLEM — I95.1 ORTHOSTATIC HYPOTENSION: Status: ACTIVE | Noted: 2024-11-26

## 2024-11-26 LAB — POTASSIUM SERPL-SCNC: 5.8 MMOL/L (ref 3.5–5.2)

## 2024-11-26 PROCEDURE — 97110 THERAPEUTIC EXERCISES: CPT

## 2024-11-26 PROCEDURE — 25810000003 SODIUM CHLORIDE 0.9 % SOLUTION: Performed by: INTERNAL MEDICINE

## 2024-11-26 PROCEDURE — 97530 THERAPEUTIC ACTIVITIES: CPT

## 2024-11-26 PROCEDURE — 92526 ORAL FUNCTION THERAPY: CPT

## 2024-11-26 PROCEDURE — 92507 TX SP LANG VOICE COMM INDIV: CPT

## 2024-11-26 PROCEDURE — 84132 ASSAY OF SERUM POTASSIUM: CPT | Performed by: INTERNAL MEDICINE

## 2024-11-26 PROCEDURE — 99232 SBSQ HOSP IP/OBS MODERATE 35: CPT | Performed by: INTERNAL MEDICINE

## 2024-11-26 RX ORDER — SODIUM CHLORIDE 9 MG/ML
100 INJECTION, SOLUTION INTRAVENOUS CONTINUOUS
Status: ACTIVE | OUTPATIENT
Start: 2024-11-26 | End: 2024-11-27

## 2024-11-26 RX ADMIN — Medication 10 ML: at 20:08

## 2024-11-26 RX ADMIN — SERTRALINE HYDROCHLORIDE 50 MG: 50 TABLET ORAL at 08:46

## 2024-11-26 RX ADMIN — ATORVASTATIN CALCIUM 40 MG: 40 TABLET, FILM COATED ORAL at 20:09

## 2024-11-26 RX ADMIN — GABAPENTIN 300 MG: 100 CAPSULE ORAL at 20:09

## 2024-11-26 RX ADMIN — GABAPENTIN 300 MG: 100 CAPSULE ORAL at 08:47

## 2024-11-26 RX ADMIN — APIXABAN 2.5 MG: 2.5 TABLET, FILM COATED ORAL at 20:09

## 2024-11-26 RX ADMIN — Medication 10 ML: at 08:48

## 2024-11-26 RX ADMIN — SODIUM ZIRCONIUM CYCLOSILICATE 10 G: 10 POWDER, FOR SUSPENSION ORAL at 20:08

## 2024-11-26 RX ADMIN — APIXABAN 2.5 MG: 2.5 TABLET, FILM COATED ORAL at 08:46

## 2024-11-26 RX ADMIN — PANTOPRAZOLE SODIUM 40 MG: 40 TABLET, DELAYED RELEASE ORAL at 05:56

## 2024-11-26 RX ADMIN — MIRTAZAPINE 15 MG: 15 TABLET, FILM COATED ORAL at 20:09

## 2024-11-26 RX ADMIN — ASPIRIN 81 MG 81 MG: 81 TABLET ORAL at 08:47

## 2024-11-26 RX ADMIN — TORSEMIDE 20 MG: 20 TABLET ORAL at 08:46

## 2024-11-26 RX ADMIN — MAGNESIUM OXIDE TAB 400 MG (241.3 MG ELEMENTAL MG) 400 MG: 400 (241.3 MG) TAB at 08:47

## 2024-11-26 RX ADMIN — SODIUM CHLORIDE 100 ML/HR: 9 INJECTION, SOLUTION INTRAVENOUS at 20:10

## 2024-11-26 NOTE — PROGRESS NOTES
[facility] Medicine Services  PROGRESS NOTE    Patient Name: Mukund Pool  : 1930  MRN: [id number]    Date of Admission: 2024  Primary Care Physician: Slick Hubbard MD    Subjective   Subjective     CC:  F/u TIA/migraine    HPI:  States that he feels okay, no events reported overnight, rehab referrals out.  Family at bedside      Objective   Objective     Vital Signs:   Temp:  [96.4 °F (35.8 °C)-98.3 °F (36.8 °C)] 97.7 °F (36.5 °C)  Heart Rate:  [70-75] 73  Resp:  [18] 18  BP: ()/(59-87) 120/59     Physical Exam:  Constitutional: Awake, alert, elderly male laying in bed no acute distress  Respiratory: Clear to auscultation bilaterally, respiratory effort normal   Cardiovascular: RRR, palpable radial pulse  Gastrointestinal: Positive bowel sounds, soft, nontender, nondistended  Psychiatric: Appropriate affect, cooperative  Neurologic: Speech clear and fluent    Results Reviewed:  LAB RESULTS:      Lab 24  0559 24  1005   WBC 9.09 8.18   HEMOGLOBIN 11.8* 12.1*   HEMATOCRIT 36.4* 37.0*   PLATELETS 194 191   NEUTROS ABS  --  4.60   IMMATURE GRANS (ABS)  --  0.04   LYMPHS ABS  --  2.51   MONOS ABS  --  0.73   EOS ABS  --  0.24   .0* 103.6*   PROTIME  --  13.7   APTT  --  27.0   HSTROP T  --  44*         Lab 24  1323 24  0559 24  1005   SODIUM  --  139 139   POTASSIUM 5.8* 5.6* 4.6   CHLORIDE  --  102 104   CO2  --  24.0 22.0   ANION GAP  --  13.0 13.0   BUN  --  31* 26*   CREATININE  --  1.69* 1.64*   EGFR  --  37.2* 38.5*   GLUCOSE  --  115* 106*   CALCIUM  --  9.5 9.1   HEMOGLOBIN A1C  --  5.60  --          Lab 24  1005   ALT (SGPT) 11   AST (SGOT) 19         Lab 24  1005   HSTROP T 44*   PROTIME 13.7   INR 1.04         Lab 24  0559   CHOLESTEROL 197   LDL CHOL 136*   HDL CHOL 48   TRIGLYCERIDES 72             Brief Urine Lab Results  (Last result in the past 365 days)        Color   Clarity   Blood    Leuk Est   Nitrite   Protein   CREAT   Urine HCG        11/23/24 1434 Yellow   Clear   Negative   Negative   Negative   Negative                   Microbiology Results Abnormal       None            No radiology results from the last 24 hrs    Results for orders placed during the hospital encounter of 11/23/24    Adult Transthoracic Echo Complete W/ Cont if Necessary Per Protocol (With Agitated Saline)    Interpretation Summary    Left ventricular ejection fraction appears to be 56 - 60%.    Left ventricular wall thickness is consistent with mild concentric hypertrophy.    Left ventricular diastolic function is consistent with (grade II w/high LAP) pseudonormalization.    Mildly reduced right ventricular systolic function noted.    The left atrial cavity is mildly dilated.    There is mild calcification of the aortic valve mainly affecting the non-coronary, left coronary and right coronary cusp(s).    Aortic valve maximum pressure gradient is 11 mmHg. Aortic valve mean pressure gradient is 6 mmHg.    Calculated right ventricular systolic pressure from tricuspid regurgitation is 30 mmHg.      Current medications:  Scheduled Meds:apixaban, 2.5 mg, Oral, Q12H  aspirin, 81 mg, Oral, Daily   Or  aspirin, 300 mg, Rectal, Daily  atorvastatin, 40 mg, Oral, Nightly  gabapentin, 300 mg, Oral, BID  magnesium oxide, 400 mg, Oral, Daily  mirtazapine, 15 mg, Oral, Nightly  pantoprazole, 40 mg, Oral, Q AM  sertraline, 50 mg, Oral, Daily  sodium chloride, 10 mL, Intravenous, Q12H  sodium chloride, 10 mL, Intravenous, Q12H  torsemide, 20 mg, Oral, Daily      Continuous Infusions:   PRN Meds:.  senna-docusate sodium **AND** polyethylene glycol **AND** bisacodyl **AND** bisacodyl    sodium chloride    sodium chloride    sodium chloride    sodium chloride    sodium chloride    Assessment & Plan   Assessment & Plan     Active Hospital Problems    Diagnosis  POA    **TIA (transient ischemic attack) [G45.9]  Yes    Headache [R51.9]  Yes     Left-sided weakness [R53.1]  Yes    Presence of cardiac pacemaker [Z95.0]  Yes    Chronic HFrEF (heart failure with reduced ejection fraction) [I50.22]  Yes    Stage 3b chronic kidney disease [N18.32]  Yes    Paroxysmal atrial fibrillation [I48.0]  Yes      Resolved Hospital Problems   No resolved problems to display.        Brief Hospital Course to date:  Mukund Pool is a 89 y.o. male w/ HFrEF, paroxysmal A-fib, bradycardia s/p PPM, CAD s/p CABG, CKD 3, HTN, HLD, H&N cancer, prostate cancer, migraines, GERD, who presented to the ED for evaluation of inability to walk, LT sided weakness, speech difficulty with drooling and difficulty handling secretions, in the setting of headache; he was admitted for stroke evaluation, initial CT imaging was without acute process, stroke neurology followed and felt that symptoms were attributed to complex migraine, recommended MRI not necessary as it would not alter their management, they recommended continue home oral AC and follow-up with Dr. Sam/CYNDEE Garcia for headache clinic, and cleared him for discharge; he remains hospitalized waiting IPR eval    The following problems are new to me today    Assessment/plan    Acute headache with LT weakness, speech difficulty  Hx migraines  -Initial CT imaging without acute process  -Seen by stroke neuro, diagnosed with complex migraine, symptoms improved with migraine cocktail  -Per stroke service start magnesium glycinate, okay to discharge and follow-up in headache clinic  -CM follows for rehab placement    Orthostatic hypotension  -d/w OT, patient with orthostatic hypotension (73/43 mmHg) with standing  -Hold torsemide, 1L IVF ordered    HTN/HLD  Paroxysmal A-fib  Chronic HFrEF, improved EF  CAD s/p CABG  Hx bradycardia s/p PPM  -Eliquis, aspirin, statin    CKD3 - baseline Cr 1.4-1.7; eGFR 30's; and elevated serum potassium 11/24, recheck  GERD - PPI    Hx H&N cancer  Hx prostate cancer    Expected Discharge  Location and Transportation: [facility] pending CM eval/insurance pre-CERT  Expected Discharge   Expected Discharge Date: 11/27/2024; Expected Discharge Time:      VTE Prophylaxis:  Pharmacologic & mechanical VTE prophylaxis orders are present.         AM-PAC 6 Clicks Score (PT): 17 (11/26/24 1604)    CODE STATUS:   Code Status and Medical Interventions: CPR (Attempt to Resuscitate); Full Support   Ordered at: 11/23/24 1207     Code Status (Patient has no pulse and is not breathing):    CPR (Attempt to Resuscitate)     Medical Interventions (Patient has pulse or is breathing):    Full Support       Rickie Fontana, DO  11/26/24

## 2024-11-26 NOTE — PROGRESS NOTES
"          Clinical Nutrition Assessment     Patient Name: Mukund Pool  YOB: 1930  MRN: [id number]  Date of Encounter: 11/25/24 19:21 EST  Admission date: 11/23/2024  Reason for Visit: Identified at risk by screening criteria, MST score 2+, Nonhealing wound or pressure ulcer, Reduced oral intake, \"Unsure\" unintentional weight loss    Assessment   Nutrition Assessment   Admission Diagnosis:  TIA (transient ischemic attack) [G45.9]    Problem List:    TIA (transient ischemic attack)    Paroxysmal atrial fibrillation    Stage 3b chronic kidney disease    Chronic HFrEF (heart failure with reduced ejection fraction)    Presence of cardiac pacemaker    Headache    Left-sided weakness      PMH:   He  has a past medical history of Arthritis, Chronic kidney disease, Coronary artery disease, GERD (gastroesophageal reflux disease), History of radiation therapy (05/24/2021), Prostate cancer, and Vocal cord cancer.    PSH:  He  has a past surgical history that includes Prostate surgery; Coronary artery bypass graft; Cholecystectomy; VOCAL CORD MASS EXCISION (03/22/2021); Cardiac electrophysiology procedure (04/2023); Cardiac electrophysiology procedure (N/A, 10/25/2023); and Cardiac electrophysiology procedure (N/A, 10/25/2023).    Applicable Nutrition History:   HFrEF  CKD 3  Prostate Ca  Head/neck ca  GERD  CAD s/p CABG    Anthropometrics     Height: Height: 185.4 cm (73\")  Last Filed Weight: Weight: 81.6 kg (180 lb) (11/24/24 1156)  Method: Weight Method: Stated  BMI: BMI (Calculated): 23.8    UBW:     Weight      Weight (kg) Weight (lbs) Weight Method   11/14/2023 77.565 kg  171 lb     12/19/2023 80.196 kg  176 lb 12.8 oz     6/16/2024 80 kg  176 lb 5.9 oz  Estimated    7/2/2024 81.92 kg  180 lb 9.6 oz     7/9/2024 81.647 kg  180 lb     11/23/2024 81.647 kg  180 lb  Stated    11/24/2024 81.647 kg  180 lb       Weight change: No significant changes    Nutrition Focused Physical Exam    Date:  11/25   "     Does not meet malnutrition criteria at this time. Wasting noted most likely 2/2 adv age    Subjective   Reported/Observed/Food/Nutrition Related History:     11/25  Patient reported his appetite has been decreased since his increase in headaches. Stated his appetite and PO intake have been improving the past few days. Noted he normally takes in 3 meals per day (25-75%/meal). Would like to trial christopher boost plus BID to support PO intake. Denied any difficulties chewing/swallowing. NKFA.    Current Nutrition Prescription   PO: Diet: Regular/House; Texture: Soft to Chew (NDD 3); Soft to Chew: Chopped Meat; Fluid Consistency: Thin (IDDSI 0)  Oral Nutrition Supplement: n/a  Intake: Insufficient data    Assessment & Plan   Nutrition Diagnosis   Date:  11/25            Updated:    Problem Inadequate oral intake    Etiology migraines   Signs/Symptoms Reported decrease in PO intake   Status: New    Goal:   Nutrition to support treatment and Establish PO      Nutrition Intervention      Follow treatment progress, Care plan reviewed, Interview for preferences, Encourage intake, Supplement provided    Nutrition POC  Ordering boost plus BID to support PO intake  Encourage adequate nutrition    Monitoring/Evaluation:   Per protocol, PO intake, Supplement intake, Weight, Symptoms, POC/GOC    Jalyn Horvath, MS,RD,LD  Time Spent: 30min

## 2024-11-26 NOTE — PLAN OF CARE
Goal Outcome Evaluation:  Plan of Care Reviewed With: (P) patient, caregiver                Anticipated Discharge Disposition (SLP): (P) No further SLP services warranted             Treatment Assessment (SLP): (P) toleration of diet, improved, cognitive-linguistic disorder (11/26/24 1005)  Treatment Assessment Comments (SLP): (P) Discussed pt baseline for swallowing and language/cognition/speech with pt and caregiver. Both endorse that pt is now functioning at baseline in both areas. No further SLP treatment needs at this time. (11/26/24 1005)  Plan for Continued Treatment (SLP): (P) patient/family has declined further intervention, other (see comments) (Pt has returned to baseline level of function) (11/26/24 1005)

## 2024-11-26 NOTE — THERAPY TREATMENT NOTE
Patient Name: Mukund Pool  : 1930    MRN: [id number]                              Today's Date: 2024       Admit Date: 2024    Visit Dx:     ICD-10-CM ICD-9-CM   1. Dysphagia, unspecified type  R13.10 787.20   2. Acute nonintractable headache, unspecified headache type  R51.9 784.0   3. Ataxia  R27.0 781.3   4. Acute left-sided weakness  R53.1 728.87   5. 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)    A/C renal failure    GERD without esophagitis    Pleural effusion, bilateral    Moderate malnutrition    Presence of cardiac pacemaker    TIA (transient ischemic attack)    Headache    Left-sided weakness     Past Medical History:   Diagnosis Date    Arthritis     Chronic kidney disease     kidney stones    Coronary artery disease     GERD (gastroesophageal reflux disease)     History of radiation therapy 2021    laryngeal mass    Prostate cancer     Vocal cord cancer      Past Surgical History:   Procedure Laterality Date    CARDIAC ELECTROPHYSIOLOGY PROCEDURE  2023    cardiac loop recorder    CARDIAC ELECTROPHYSIOLOGY PROCEDURE N/A 10/25/2023    Procedure: Pacemaker DC new;  Surgeon: Levy Pickett MD;  Location:  [facility];  Service: Cardiology;  Laterality: N/A;    CARDIAC ELECTROPHYSIOLOGY PROCEDURE N/A 10/25/2023    Procedure: AV node ablation;  Surgeon: Levy Pickett MD;  Location:  [facility];  Service: Cardiovascular;  Laterality: N/A;    CHOLECYSTECTOMY      CORONARY ARTERY  BYPASS GRAFT      PROSTATE SURGERY      VOCAL CORD MASS EXCISION  03/22/2021      General Information       [facility] Name 11/26/24 1546          Physical Therapy Time and Intention    Document Type therapy note (daily note)  -     Mode of Treatment physical therapy  -[facility] Name 11/26/24 1546          General Information    Patient Profile Reviewed yes  -     Existing Precautions/Restrictions fall;orthostatic hypotension  -     Barriers to Rehab medically complex  -[facility] Name 11/26/24 1546          Cognition    Orientation Status (Cognition) oriented x 4  -[facility] Name 11/26/24 1546          Safety Issues/Impairments Affecting Functional Mobility    Safety Issues Affecting Function (Mobility) awareness of need for assistance;insight into deficits/self-awareness;safety precaution awareness;safety precautions follow-through/compliance;sequencing abilities  -     Impairments Affecting Function (Mobility) balance;strength;endurance/activity tolerance  -               User Key  (r) = Recorded By, (t) = Taken By, (c) = Cosigned By      Initials Name Provider Type     Joan Villareal PT Physical Therapist                   Mobility       [facility] Name 11/26/24 1547          Bed Mobility    Comment, (Bed Mobility) UIC  -[facility] Name 11/26/24 1547          Transfers    Comment, (Transfers) VCs for hand placement and sequencing. Orthostatics taken: 102/51 reclined, 91/51 sitting, 66/40 standing w/ reported dizziness. 113/61 once reclined. RN notified and ok'd t/f to [facility]  -[facility] Name 11/26/24 1547          Bed-Chair Transfer    Bed-Chair [place] (Transfers) minimum assist (75% patient effort);verbal cues  -     Comment, (Bed-Chair Transfer) steps from chair<>BSC. Unsteady throughout w/ posterior lean but no overt LOB  -[facility] Name 11/26/24 1547          Sit-Stand Transfer    Sit-Stand [place] (Transfers) minimum assist (75% patient effort);verbal cues  -     Assistive Device  (Sit-Stand Transfers) walker, front-wheeled  -[facility] Name 11/26/24 1547          Gait/Stairs (Locomotion)    [place] Level (Gait) unable to assess  -     Patient was able to Ambulate no, other medical factors prevent ambulation  -     Reason Patient was unable to Ambulate Hypotension  -     Comment, (Gait/Stairs) Deferred this date d/t orthostatic hypotension  -               User Key  (r) = Recorded By, (t) = Taken By, (c) = Cosigned By      Initials Name Provider Type     Joan Villareal PT Physical Therapist                   Obj/Interventions       [facility] Name 11/26/24 0497          Motor Skills    Therapeutic Exercise hip;knee;ankle  -[facility] Name 11/26/24 1557          Hip (Therapeutic Exercise)    Hip (Therapeutic Exercise) strengthening exercise  -     Hip Strengthening (Therapeutic Exercise) bilateral;marching while seated;10 repetitions;2 sets  -[facility] Name 11/26/24 1557          Knee (Therapeutic Exercise)    Knee (Therapeutic Exercise) strengthening exercise  -     Knee Strengthening (Therapeutic Exercise) bilateral;LAQ (long arc quad);10 repetitions;2 sets  -LH       Row Name 11/26/24 1557          Ankle (Therapeutic Exercise)    Ankle (Therapeutic Exercise) AROM (active range of motion)  -     Ankle AROM (Therapeutic Exercise) bilateral;dorsiflexion;plantarflexion;10 repetitions  -[facility] Name 11/26/24 4587          Balance    Balance Assessment sitting static balance;sitting dynamic balance;standing static balance;standing dynamic balance  -     Static Sitting Balance standby assist  -     Dynamic Sitting Balance contact guard  -     Position, Sitting Balance unsupported;sitting edge of bed  -     Static Standing Balance contact guard  -     Dynamic Standing Balance minimal assist;verbal cues  -     Position/Device Used, Standing Balance supported;walker, front-wheeled  -     Balance Interventions sitting;standing;sit to  stand;supported;static;dynamic  -               User Key  (r) = Recorded By, (t) = Taken By, (c) = Cosigned By      Initials Name Provider Type     Joan Villareal PT Physical Therapist                   Goals/Plan    No documentation.                  Clinical Impression       Row Name 11/26/24 1559          Pain    Pretreatment Pain Rating 0/10 - no pain  -     Posttreatment Pain Rating 0/10 - no pain  -       Row Name 11/26/24 1559          Plan of Care Review    Plan of Care Reviewed With patient  -     Progress no change  -     Outcome Evaluation Pt continues to present below baseline function d/t orthostatic hypotension, generalized weakness, balance deficits, and decreased activity tolerance. Orthostatics taken: 102/51 reclined, 91/51 sitting, 66/40 standing w/ reported dizziness. 113/61 once reclined. RN notified and ok'd t/f to BS. He required [facility] w/ use of FWW for STS and to t/f to BSC. Pt would continue to benefit from skilled IP PT. Recommend IRF at d/c.  -       Row Name 11/26/24 1559          Vital Signs    Pre Systolic BP Rehab 102  -LH     Pre Treatment Diastolic BP 51  -LH     Intra Systolic BP Rehab 91  symptomatic  -LH     Intra Treatment Diastolic BP 51  -LH     Post Systolic BP Rehab 66   symptomatic. RN notified, ok'd t/f  -LH     Post Treatment Diastolic BP 40  113/61 reclined after standing. 111/80 reclined after t/f  -LH     Pre Patient Position Supine  -     Intra Patient Position Sitting  -     Post Patient Position Standing  -       Row Name 11/26/24 1559          Positioning and Restraints    Pre-Treatment Position sitting in chair/recliner  -     Post Treatment Position chair  -LH     In Chair notified nsg;reclined;sitting;call light within reach;encouraged to call for assist;exit alarm on;waffle cushion;legs elevated  -               User Key  (r) = Recorded By, (t) = Taken By, (c) = Cosigned By      Initials Name Provider Type    Joan Mcallister, PT  Physical Therapist                   Outcome Measures       Row Name 11/26/24 1604 11/26/24 0800       How much help from another person do you currently need...    Turning from your back to your side while in flat bed without using bedrails? 3  - 4  -HB    Moving from lying on back to sitting on the side of a flat bed without bedrails? 3  - 3  -HB    Moving to and from a bed to a chair (including a wheelchair)? 3  - 3  -HB    Standing up from a chair using your arms (e.g., wheelchair, bedside chair)? 3  - 3  -HB    Climbing 3-5 steps with a railing? 2  - 2  -HB    To walk in hospital room? 3  - 3  -HB    AM-PAC 6 Clicks Score (PT) 17  - 18  -HB    Highest Level of Mobility Goal 5 --> Static standing  - 6 --> Walk 10 steps or more  -HB      Row Name 11/26/24 1604 11/26/24 1135       Functional Assessment    Outcome Measure Options AM-PAC 6 Clicks Basic Mobility (PT)  - AM-PAC 6 Clicks Daily Activity (OT)  -              User Key  (r) = Recorded By, (t) = Taken By, (c) = Cosigned By      Initials Name Provider Type    CS Rigoberto Kennedy, OT Occupational Therapist     Joan Villareal, PT Physical Therapist     Zoey Samuels, RN Registered Nurse                                 Physical Therapy Education       Title: PT OT SLP Therapies (In Progress)       Topic: Physical Therapy (Done)       Point: Mobility training (Done)       Learning Progress Summary            Patient Acceptance, E, VU,DU,NR by  at 11/26/2024 1604    Acceptance, E, VU,NR by CD at 11/24/2024 0937    Comment: BENEFITS OF OOB ACTIVITY, SAFETY WITH MOBILITY, PROGRESSION OF POC, D/C PLANNING,                      Point: Home exercise program (Done)       Learning Progress Summary            Patient Acceptance, E, VU,DU,NR by  at 11/26/2024 1604    Acceptance, E, VU,NR by CD at 11/24/2024 0937    Comment: BENEFITS OF OOB ACTIVITY, SAFETY WITH MOBILITY, PROGRESSION OF POC, D/C PLANNING,                      Point: Body  mechanics (Done)       Learning Progress Summary            Patient Acceptance, E, VU,DU,NR by  at 11/26/2024 1604    Acceptance, E, VU,NR by  at 11/24/2024 0937    Comment: BENEFITS OF OOB ACTIVITY, SAFETY WITH MOBILITY, PROGRESSION OF POC, D/C PLANNING,                      Point: Precautions (Done)       Learning Progress Summary            Patient Acceptance, E, VU,DU,NR by  at 11/26/2024 1604    Acceptance, E, VU,NR by  at 11/24/2024 0937    Comment: BENEFITS OF OOB ACTIVITY, SAFETY WITH MOBILITY, PROGRESSION OF POC, D/C PLANNING,                                      User Key       Initials Effective Dates Name Provider Type Discipline     02/03/23 -  Claudia Ayoub PT Physical Therapist PT     09/21/23 -  Joan Villareal PT Physical Therapist PT                  PT Recommendation and Plan     Progress: no change  Outcome Evaluation: Pt continues to present below baseline function d/t orthostatic hypotension, generalized weakness, balance deficits, and decreased activity tolerance. Orthostatics taken: 102/51 reclined, 91/51 sitting, 66/40 standing w/ reported dizziness. 113/61 once reclined. RN notified and ok'd t/f to [facility]. He required [facility] w/ use of FWW for STS and to t/f to [facility]. Pt would continue to benefit from skilled IP PT. Recommend IRF at d/c.     Time Calculation:         PT Charges       Row Name 11/26/24 1604 11/26/24 1546 11/26/24 1035       Time Calculation    Start Time 1438  - -- --    PT Received On 11/26/24 - -- --    PT Goal Re-Cert Due Date 12/04/24  - -- --       Timed Charges    95237 - PT Therapeutic Activity Minutes 23 -LH -- --       Total Minutes    Timed Charges Total Minutes 23 -LH -- --     Total Minutes 23 -LH -- --       PT/SLP KX Modifier    Exception criteria met to exceed therapy cap -- Apply KX Modifier  - Apply KX Modifier  -AC (r) SM (t) AC (c)      Row Name 11/26/24 0946             PT/SLP KX Modifier    Exception criteria met to exceed therapy cap  Apply KX Modifier  -                User Key  (r) = Recorded By, (t) = Taken By, (c) = Cosigned By      Initials Name Provider Type    AC Pattie Giles, MS CCC-SLP Speech and Language Pathologist    Joan Mcallister, PT Physical Therapist    Wendi Ramos, Speech Therapy Student SLP Student                  Therapy Charges for Today       Code Description Service Date Service Provider Modifiers Qty    [id number] HC PT THERAPEUTIC ACT EA 15 MIN 11/26/2024 Joan Villareal, PT GP, KX 2            PT G-Codes  Outcome Measure Options: AM-PAC 6 Clicks Basic Mobility (PT)  AM-PAC 6 Clicks Score (PT): 17  AM-PAC 6 Clicks Score (OT): 17  Modified [place] Scale: 1 - No significant disability despite symptoms.  Able to carry out all usual duties and activities.  PT Discharge Summary  Anticipated Discharge Disposition (PT): inpatient rehabilitation facility    Joan Villareal PT  11/26/2024

## 2024-11-26 NOTE — PLAN OF CARE
Goal Outcome Evaluation:  Plan of Care Reviewed With: patient        Progress: no change  Outcome Evaluation: Baseline ADL completion remains limited by ongoing orthostatic hypotension alongside strength, balance, and functional endurance deficits warranting continued IP OT services to promote return to PLOF. BP drop from 99/64 <> 73/43 (supine-stand), Grossly Tommy for LB tasks and CGA for mobility. Based on today's performance, Rec d/c to IRF for best functional outcomes.    Anticipated Discharge Disposition (OT): inpatient rehabilitation facility

## 2024-11-26 NOTE — CASE MANAGEMENT/SOCIAL WORK
Continued Stay Note  [facility]     Patient Name: Mukund Pool  MRN: [id number]  Today's Date: 11/26/2024    Admit Date: 11/23/2024    Plan: Home at [facility] with Home Health   Discharge Plan       Row Name 11/26/24 1608       Plan    Plan Home at [facility] with Home Health    Patient/Family in Agreement with Plan yes    Plan Comments Spoke with Mr. Polo and Spouse at the bedside. Insurance did not approve rehab at [facility]. Discussed with family and they would like to return to [facility] with home health. CM spoke with  (Tomy) with [facility]. Patient will need to be evaluated before he can return to [facility]. CM will arrange once patient is medically ready for discharge. CM will continue to follow up.    Final Discharge Disposition Code 06 - home with home health care                   Discharge Codes    No documentation.                       Stacy Guadarrama RN

## 2024-11-26 NOTE — THERAPY TREATMENT NOTE
Patient Name: Mukund Pool  : 1930    MRN: [id number]                              Today's Date: 2024       Admit Date: 2024    Visit Dx:     ICD-10-CM ICD-9-CM   1. Dysphagia, unspecified type  R13.10 787.20   2. Acute nonintractable headache, unspecified headache type  R51.9 784.0   3. Ataxia  R27.0 781.3   4. Acute left-sided weakness  R53.1 728.87   5. 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)    A/C renal failure    GERD without esophagitis    Pleural effusion, bilateral    Moderate malnutrition    Presence of cardiac pacemaker    TIA (transient ischemic attack)    Headache    Left-sided weakness     Past Medical History:   Diagnosis Date    Arthritis     Chronic kidney disease     kidney stones    Coronary artery disease     GERD (gastroesophageal reflux disease)     History of radiation therapy 2021    laryngeal mass    Prostate cancer     Vocal cord cancer      Past Surgical History:   Procedure Laterality Date    CARDIAC ELECTROPHYSIOLOGY PROCEDURE  2023    cardiac loop recorder    CARDIAC ELECTROPHYSIOLOGY PROCEDURE N/A 10/25/2023    Procedure: Pacemaker DC new;  Surgeon: Levy Pickett MD;  Location:  [facility];  Service: Cardiology;  Laterality: N/A;    CARDIAC ELECTROPHYSIOLOGY PROCEDURE N/A 10/25/2023    Procedure: AV node ablation;  Surgeon: Levy Pickett MD;  Location:  [facility];  Service: Cardiovascular;  Laterality: N/A;    CHOLECYSTECTOMY      CORONARY ARTERY  BYPASS GRAFT      PROSTATE SURGERY      VOCAL CORD MASS EXCISION  03/22/2021      General Information       Row Name 11/26/24 1126          OT Time and Intention    Document Type therapy note (daily note)  -CS     Mode of Treatment occupational therapy  -CS     Patient Effort good  -CS     Symptoms Noted During/After Treatment dizziness;significant change in vital signs  -CS     Comment orthostatic hypotension  -CS       Row Name 11/26/24 1126          General Information    Patient Profile Reviewed yes  -CS     Existing Precautions/Restrictions fall;orthostatic hypotension  -CS     Barriers to Rehab medically complex  -CS       Row Name 11/26/24 1126          Cognition    Orientation Status (Cognition) oriented x 4  -CS       Row Name 11/26/24 1126          Safety Issues/Impairments Affecting Functional Mobility    Impairments Affecting Function (Mobility) balance;strength;endurance/activity tolerance;cognition  -CS     Cognitive Impairments, Mobility Safety/Performance insight into deficits/self-awareness;problem-solving/reasoning;safety precaution awareness;safety precaution follow-through  -CS               User Key  (r) = Recorded By, (t) = Taken By, (c) = Cosigned By      Initials Name Provider Type    CS Rigoberto Kennedy, OT Occupational Therapist                     Mobility/ADL's       Row Name 11/26/24 1127          Bed Mobility    Scooting/Bridging [place] (Bed Mobility) standby assist  -CS     Supine-Sit [place] (Bed Mobility) contact guard  -CS     Assistive Device (Bed Mobility) head of bed elevated;repositioning sheet  -CS     Comment, (Bed Mobility) 9pt drop systolic  -CS       Row Name 11/26/24 1127          Transfers    Transfers sit-stand transfer;bed-chair transfer  -CS     Comment, (Transfers) BP drop to 73/43, reports mild dizziness, steps to recliner  -CS       Row Name 11/26/24 1127          Bed-Chair Transfer    Bed-Chair [place] (Transfers) contact guard;1 person assist  -CS      Assistive Device (Bed-Chair Transfers) walker, front-wheeled  -CS       Row Name 11/26/24 1127          Sit-Stand Transfer    Sit-Stand [place] (Transfers) contact guard;1 person assist;verbal cues  -     Assistive Device (Sit-Stand Transfers) walker, front-wheeled  -CS       Row Name 11/26/24 1127          Functional Mobility    Patient was able to Ambulate yes  -       Row Name 11/26/24 1127          Activities of Daily Living    BADL Assessment/Intervention lower body dressing;grooming;feeding  -       Row Name 11/26/24 1127          Lower Body Dressing Assessment/Training    [place] Level (Lower Body Dressing) don;socks;minimum assist (75% patient effort)  -CS     Position (Lower Body Dressing) edge of bed sitting  -       Row Name 11/26/24 1127          Grooming Assessment/Training    [place] Level (Grooming) hair care, combing/brushing;wash face, hands;set up  -CS     Position (Grooming) supported sitting  -       Row Name 11/26/24 1127          Self-Feeding Assessment/Training    [place] Level (Feeding) liquids to mouth;independent  -CS     Position (Feeding) supported sitting  -CS               User Key  (r) = Recorded By, (t) = Taken By, (c) = Cosigned By      Initials Name Provider Type     Rigoberto Kennedy, OT Occupational Therapist                   Obj/Interventions       Row Name 11/26/24 1130          Shoulder (Therapeutic Exercise)    Shoulder (Therapeutic Exercise) AROM (active range of motion)  -     Shoulder AROM (Therapeutic Exercise) bilateral;flexion;extension;2 sets;5 repetitions  -       Row Name 11/26/24 1130          Elbow/Forearm (Therapeutic Exercise)    Elbow/Forearm (Therapeutic Exercise) AROM (active range of motion)  -     Elbow/Forearm AROM (Therapeutic Exercise) bilateral;extension;flexion;2 sets;5 repetitions  -       Row Name 11/26/24 1130          Motor Skills    Therapeutic Exercise shoulder;other (see comments);elbow/forearm  EOB  BUE/BLE AROM in an effort to mitigate effects of orthostatic hypotension, BLE: LAQs and D/P flexion  -       Row Name 11/26/24 1130          Balance    Balance Assessment sitting static balance;sitting dynamic balance;standing static balance;standing dynamic balance  -CS     Static Sitting Balance standby assist  -CS     Dynamic Sitting Balance contact guard  -CS     Position, Sitting Balance unsupported;sitting edge of bed  -CS     Static Standing Balance contact guard  -CS     Dynamic Standing Balance minimal assist  -CS     Position/Device Used, Standing Balance supported;walker, front-wheeled  -CS     Balance Interventions sitting;standing;occupation based/functional task  -CS               User Key  (r) = Recorded By, (t) = Taken By, (c) = Cosigned By      Initials Name Provider Type    CS Rigoberto Kennedy, OT Occupational Therapist                   Goals/Plan    No documentation.                  Clinical Impression       Row Name 11/26/24 1131          Pain Assessment    Additional Documentation Pain Scale: FACES Pre/Post-Treatment (Group)  -       Row Name 11/26/24 1131          Pain Scale: FACES Pre/Post-Treatment    Pain: FACES Scale, Pretreatment 0-->no hurt  -     Posttreatment Pain Rating 0-->no hurt  -       Row Name 11/26/24 1131          Plan of Care Review    Plan of Care Reviewed With patient  -CS     Progress no change  -CS     Outcome Evaluation Baseline ADL completion remains limited by ongoing orthostatic hypotension alongside strength, balance, and functional endurance deficits warranting continued IP OT services to promote return to PLOF. BP drop from 99/64 <> 73/43 (supine-stand), Grossly Tommy for LB tasks and CGA for mobility. Based on today's performance, Rec d/c to IRF for best functional outcomes.  -       Row Name 11/26/24 1131          Therapy Plan Review/Discharge Plan (OT)    Anticipated Discharge Disposition (OT) inpatient rehabilitation facility  -       Row Name  11/26/24 1131          Vital Signs    Pre Systolic BP Rehab 99  RN cleared for tx  -CS     Pre Treatment Diastolic BP 64  -CS     Intra Systolic BP Rehab 90  -CS     Intra Treatment Diastolic BP 51  -CS     Post Systolic BP Rehab 73  -CS     Post Treatment Diastolic BP 43   standing, returned to 127/72 once reclined  -CS     O2 Delivery Pre Treatment room air  -CS     O2 Delivery Intra Treatment room air  -CS     O2 Delivery Post Treatment room air  -CS     Pre Patient Position Supine  -CS     Intra Patient Position Standing  -CS     Post Patient Position Sitting  -CS       Row Name 11/26/24 1131          Positioning and Restraints    Pre-Treatment Position in bed  -CS     Post Treatment Position chair  -CS     In Chair notified nsg;reclined;sitting;call light within reach;encouraged to call for assist;exit alarm on;LUE elevated;RUE elevated  -CS               User Key  (r) = Recorded By, (t) = Taken By, (c) = Cosigned By      Initials Name Provider Type    CS Rigoberto Kennedy, OT Occupational Therapist                   Outcome Measures       Row Name 11/26/24 1135          How much help from another is currently needed...    Putting on and taking off regular lower body clothing? 2  -CS     Bathing (including washing, rinsing, and drying) 2  -CS     Toileting (which includes using toilet bed pan or urinal) 3  -CS     Putting on and taking off regular upper body clothing 3  -CS     Taking care of personal grooming (such as brushing teeth) 3  -CS     Eating meals 4  -CS     AM-PAC 6 Clicks Score (OT) 17  -CS       Row Name 11/26/24 0800          How much help from another person do you currently need...    Turning from your back to your side while in flat bed without using bedrails? 4  -HB     Moving from lying on back to sitting on the side of a flat bed without bedrails? 3  -HB     Moving to and from a bed to a chair (including a wheelchair)? 3  -HB     Standing up from a chair using your arms (e.g., wheelchair,  bedside chair)? 3  -HB     Climbing 3-5 steps with a railing? 2  -HB     To walk in hospital room? 3  -HB     AM-PAC 6 Clicks Score (PT) 18  -HB       Row Name 11/26/24 1135          Functional Assessment    Outcome Measure Options AM-PAC 6 Clicks Daily Activity (OT)  -               User Key  (r) = Recorded By, (t) = Taken By, (c) = Cosigned By      Initials Name Provider Type    Rigoberto Macias OT Occupational Therapist    Zoey Monge RN Registered Nurse                    Occupational Therapy Education       Title: PT OT SLP Therapies (In Progress)       Topic: Occupational Therapy (Done)       Point: ADL training (Done)       Description:   Instruct learner(s) on proper safety adaptation and remediation techniques during self care or transfers.   Instruct in proper use of assistive devices.                  Learning Progress Summary            Patient Acceptance, E,D, VU,DU by  at 11/26/2024 1137                      Point: Home exercise program (Done)       Description:   Instruct learner(s) on appropriate technique for monitoring, assisting and/or progressing therapeutic exercises/activities.                  Learning Progress Summary            Patient Acceptance, E,D, VU,DU by  at 11/26/2024 1137                      Point: Precautions (Done)       Description:   Instruct learner(s) on prescribed precautions during self-care and functional transfers.                  Learning Progress Summary            Patient Acceptance, E,D, VU,DU by  at 11/26/2024 1137                      Point: Body mechanics (Done)       Description:   Instruct learner(s) on proper positioning and spine alignment during self-care, functional mobility activities and/or exercises.                  Learning Progress Summary            Patient Acceptance, E,D, VU,DU by  at 11/26/2024 1137                                      User Key       Initials Effective Dates Name Provider Type [facility] 06/16/21 -   Rigoberto Kennedy OT Occupational Therapist OT                  OT Recommendation and Plan  Planned Therapy Interventions (OT): activity tolerance training, functional balance retraining, ROM/therapeutic exercise, strengthening exercise, occupation/activity based interventions, transfer/mobility retraining, patient/caregiver education/training, neuromuscular control/coordination retraining, adaptive equipment training  Therapy Frequency (OT): daily  Plan of Care Review  Plan of Care Reviewed With: patient  Progress: no change  Outcome Evaluation: Baseline ADL completion remains limited by ongoing orthostatic hypotension alongside strength, balance, and functional endurance deficits warranting continued IP OT services to promote return to PLOF. BP drop from 99/64 <> 73/43 (supine-stand), Grossly Tommy for LB tasks and CGA for mobility. Based on today's performance, Rec d/c to IRF for best functional outcomes.     Time Calculation:   Evaluation Complexity (OT)  Review Occupational Profile/Medical/Therapy History Complexity: brief/low complexity  Assessment, Occupational Performance/Identification of Deficit Complexity: 1-3 performance deficits  Clinical Decision Making Complexity (OT): problem focused assessment/low complexity  Overall Complexity of Evaluation (OT): low complexity     Time Calculation- OT       Row Name 11/26/24 1137             Time Calculation- OT    OT Start Time 1111  -CS      OT Received On 11/26/24  -CS      OT Goal Re-Cert Due Date 12/04/24  -CS         Timed Charges    98349 - OT Therapeutic Exercise Minutes 6  -CS      48857 - OT Therapeutic Activity Minutes 15  -CS      48603 - OT Self Care/Mgmt Minutes 4  -CS         Total Minutes    Timed Charges Total Minutes 25  -CS       Total Minutes 25  -CS                User Key  (r) = Recorded By, (t) = Taken By, (c) = Cosigned By      Initials Name Provider Type    CS Rigoberto Kennedy OT Occupational Therapist                  Therapy Charges for  Today       Code Description Service Date Service Provider Modifiers Qty    [id number]  OT THER PROC EA 15 MIN 11/26/2024 Rigoberto Kennedy OT GO, KX 1    [id number]  OT THERAPEUTIC ACT EA 15 MIN 11/26/2024 Rigoberto Kennedy OT GO, KX 1                 Rigoberto Kennedy OT  11/26/2024

## 2024-11-26 NOTE — THERAPY DISCHARGE NOTE
Acute Care - Speech Language Pathology   Swallow Treatment Note/Discharge [facility]     Patient Name: Mukund Pool  : 1930  MRN: [id number]  Today's Date: 2024               Admit Date: 2024    Visit Dx:    ICD-10-CM ICD-9-CM   1. Dysphagia, unspecified type  R13.10 787.20   2. Acute nonintractable headache, unspecified headache type  R51.9 784.0   3. Ataxia  R27.0 781.3   4. Acute left-sided weakness  R53.1 728.87   5. 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)    A/C renal failure    GERD without esophagitis    Pleural effusion, bilateral    Moderate malnutrition    Presence of cardiac pacemaker    TIA (transient ischemic attack)    Headache    Left-sided weakness     Past Medical History:   Diagnosis Date    Arthritis     Chronic kidney disease     kidney stones    Coronary artery disease     GERD (gastroesophageal reflux disease)     History of radiation therapy 2021    laryngeal mass    Prostate cancer     Vocal cord cancer      Past Surgical History:   Procedure Laterality Date    CARDIAC ELECTROPHYSIOLOGY PROCEDURE  2023    cardiac loop recorder    CARDIAC ELECTROPHYSIOLOGY PROCEDURE N/A 10/25/2023    Procedure: Pacemaker DC new;  Surgeon: Levy Pickett MD;  Location:  [facility];  Service: Cardiology;  Laterality: N/A;    CARDIAC ELECTROPHYSIOLOGY PROCEDURE N/A 10/25/2023    Procedure: AV node ablation;  Surgeon: Levy Pickett MD;  Location:  [facility];  Service:  Cardiovascular;  Laterality: N/A;    CHOLECYSTECTOMY      CORONARY ARTERY BYPASS GRAFT      PROSTATE SURGERY      VOCAL CORD MASS EXCISION  03/22/2021       SLP Recommendation and Plan     SLP Diet Recommendation: (P) regular textures, thin liquids (11/26/24 1005)     Monitor for Signs of Aspiration: (P) yes, notify SLP if any concerns (11/26/24 1005)        Anticipated Discharge Disposition (SLP): (P) No further SLP services warranted (11/26/24 1005)              Daily Summary of Progress (SLP): (P) progress toward functional goals is good (11/26/24 1005)     Anticipated Discharge Disposition (SLP): (P) No further SLP services warranted (11/26/24 1005)                 Treatment Assessment (SLP): (P) toleration of diet, improved, cognitive-linguistic disorder (11/26/24 1005)  Treatment Assessment Comments (SLP): (P) Discussed pt baseline for swallowing and language/cognition/speech with pt and caregiver. Both endorse that pt is now functioning at baseline in both areas. No further SLP treatment needs at this time. (11/26/24 1005)  Plan for Continued Treatment (SLP): (P) patient/family has declined further intervention, other (see comments) (Pt has returned to baseline level of function) (11/26/24 1005)         SWALLOW EVALUATION (Last 72 Hours)       SLP Adult Swallow Evaluation       Row Name 11/26/24 1005 11/23/24 1345                Rehab Evaluation    Document Type discharge treatment (P)   -SM evaluation  -HG       Subjective Information no complaints (P)   -SM no complaints  -HG       Patient Observations alert;cooperative (P)   -SM alert;cooperative;agree to therapy  -HG       Patient/Family/Caregiver Comments/Observations Caregiver in room (P)   -SM No family present  -HG       Patient Effort good (P)   -SM excellent  -HG       Symptoms Noted During/After Treatment none (P)   -SM none  -HG          General Information    Patient Profile Reviewed -- yes  -HG       Pertinent History Of Current Problem -- Pt is  a 89 year old male admitted with hx of chronic HFrEF, PAF on Eliquis, bradycardia s/p PPM, CAD s/p CABG, CKD 3, HTN, HLD, head/neck cancer, prostate cancer, migraines, and GERD who presents due to occipital headache, dysarthria, and left sided weakness which started this morning when he woke up. Headache is located posteriorly. He reports recent increase in frequent headaches, does have hx of migraines but says he doesn't take anything for them. Currently symptoms have improved, speech still a little slow however. Initial scans in the ER were unremarkable. Stroke Neurology consulted and patient was admitted for further workup.  -HG       Current Method of Nutrition -- NPO  -HG       Precautions/Limitations, Vision -- WFL with corrective lenses  -HG       Precautions/Limitations, Hearing -- WFL;for purposes of eval  -HG       Prior Level of Function-Communication -- WFL  -HG       Prior Level of Function-Swallowing -- no diet consistency restrictions  -HG       Plans/Goals Discussed with -- patient  -HG       Barriers to Rehab -- none identified  -HG       Patient's Goals for Discharge -- return to PO diet  -HG          Pain    Pretreatment Pain Rating 0/10 - no pain (P)   -SM 0/10 - no pain  -HG       Posttreatment Pain Rating 0/10 - no pain (P)   -SM --       Pre/Posttreatment Pain Comment -- Pt just noted that he was uncomfortable- RN present  -HG          Oral Motor Structure and Function    Dentition Assessment natural, present and adequate (P)   -SM natural, present and adequate  -HG       Secretion Management WNL/WFL (P)   -SM WNL/WFL  -HG       Mucosal Quality moist, healthy (P)   -SM moist, healthy  -HG       Volitional Swallow -- WFL  -HG       Volitional Cough -- weak  -HG          Oral Musculature and Cranial Nerve Assessment    Oral Motor General Assessment WFL (P)   -SM WFL  -HG       Vocal Impairment, Detail. Cranial Nerve X (Vagus) vocal quality abnormality (see comments);other (see comments) (P)    Rough vocal quality and reduced vocal intensity that pt endorses as baseline  -SM --          General Eating/Swallowing Observations    Respiratory Support Currently in Use -- room air  -       Eating/Swallowing Skills -- self-fed;fed by SLP  -       Positioning During Eating -- upright in bed  -       Utensils Used -- spoon;cup;straw  -       Consistencies Trialed -- ice chips;thin liquids;pureed;mixed consistency;regular textures  -          Respiratory    Respiratory Status -- WFL;room air  -HG          Clinical Swallow Eval    Oral Prep Phase -- impaired  -HG       Oral Transit -- WFL  -HG       Oral Residue -- impaired  -HG       Pharyngeal Phase -- --  One cough present out of multiple swallows and presentations given.  -HG       Esophageal Phase -- unremarkable  -       Clinical Swallow Evaluation Summary -- CSE completed this date. Pt tolerated ice chip, water via tsp, cup and straw, with no s/sx of aspiration. Pt tolerated applesauce with no difficulty. Mixed fruit in juice did result in increased mastication time. Pt tolerated josi cracker without difficulty. Pt used a liquid wash via straw, after the fruit and exhibited his one and only cough. Pt tolerated following swallows via straw, with no s/sx of aspiration exhibited.  SLP RECs thin and mech soft chopped at this time. Meds whole with thin.  -HG          Oral Prep Concerns    Oral Prep Concerns -- prolonged mastication  -HG       Prolonged Mastication -- mechanical soft  -HG          Oral Residue Concerns    Oral Residue Concerns -- diffuse residue throughout oral cavity  -HG       Diffuse Residue Throughout Oral Cavity -- mixed consistencies  -HG          SLP Evaluation Clinical Impression    SLP Swallowing Diagnosis -- oral dysphagia  -HG       Functional Impact -- risk of aspiration/pneumonia  -       Rehab Potential/Prognosis, Swallowing -- good, to achieve stated therapy goals  -       Swallow Criteria for Skilled Therapeutic  Interventions Met -- demonstrates skilled criteria  -          SLP Treatment Clinical Impressions    Treatment Assessment (SLP) toleration of diet;improved;cognitive-linguistic disorder (P)   - --       Treatment Assessment Comments (SLP) Discussed pt baseline for swallowing and language/cognition/speech with pt and caregiver. Both endorse that pt is now functioning at baseline in both areas. No further SLP treatment needs at this time. (P)   -SM --       Daily Summary of Progress (SLP) progress toward functional goals is good (P)   - --       Plan for Continued Treatment (SLP) patient/family has declined further intervention;other (see comments) (P)   Pt has returned to baseline level of function  - --       Care Plan Review care plan/treatment goals reviewed;patient/other agree to care plan ()   - evaluation/treatment results reviewed  -       Care Plan Review, Other Participant(s) caregiver (P)   - --          Recommendations    Therapy Frequency (Swallow) -- 5 days per week  -       Predicted Duration Therapy Intervention (Days) -- 1 week  -       SLP Diet Recommendation regular textures;thin liquids (P)   - soft to chew textures;chopped;thin liquids  -       Recommended Diagnostics -- reassess via clinical swallow evaluation  -       Recommended Precautions and Strategies general aspiration precautions (P)   - upright posture during/after eating;small bites of food and sips of liquid  -       Oral Care Recommendations Oral Care BID/PRN;Toothbrush (P)   - Oral Care BID/PRN;Toothbrush  -       SLP Rec. for Method of Medication Administration meds whole;with thin liquids;as tolerated (P)   - meds whole;with thin liquids  -       Monitor for Signs of Aspiration yes;notify SLP if any concerns (P)   - yes;notify SLP if any concerns  -       Anticipated Discharge Disposition (SLP) No further SLP services warranted (P)   - home with assist  -          (LTG) Swallow    (LTG)  Swallow -- Pt will tolerate diet of mech soft chopped and thin liquids with no s/sx of aspiration  -HG       [place] (Swallow Long Term Goal) -- independently (over 90% accuracy)  -HG          (STG) Swallow 1    (STG) Swallow 1 -- Pt will tolerate recommended diet and liquids with no s/sx of aspiration  -HG       [place] (Swallow Short Term Goal 1) -- independently (over 90% accuracy)  -HG                 User Key  (r) = Recorded By, (t) = Taken By, (c) = Cosigned By      Initials Name Effective Dates     Moni Mayes, MS CCC-SLP 06/16/21 -     Wendi Ramos, Speech Therapy Student 07/30/24 -                     EDUCATION  The patient has been educated in the following areas:   Cognitive Impairment Communication Impairment Dysphagia (Swallowing Impairment).         SLP GOALS       Row Name 11/26/24 1005 11/23/24 1345          (LTG) Swallow    (LTG) Swallow Pt will tolerate diet of mech soft chopped and thin liquids with no s/sx of aspiration (P)   -SM Pt will tolerate diet of mech soft chopped and thin liquids with no s/sx of aspiration  -HG     [place] (Swallow Long Term Goal) independently (over 90% accuracy) (P)   -SM independently (over 90% accuracy)  -HG     Time Frame (Swallow Long Term Goal) 1 week (P)   -SM --     Progress/Outcomes (Swallow Long Term Goal) goal met (P)   -SM --     Comment (Swallow Long Term Goal) Upgraded to regular textures with extra sauce/gravy after observing with cracker, mixed, and extensive trials of thin. Some mild oral residue noted that cleared with liquid wash. Intermittent baseline throat clear throughout treatment session, not appearing to be clearly in response to any PO trial. 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  Pt and caregiver endorse no concerns with swallow. Discussed upgrading diet with pt and caregiver and both in agreement. Understood risks of aspiration and is not interested in further swallow  intervention at this time. (P)   -SM --        (STG) Swallow 1    (STG) Swallow 1 Pt will tolerate recommended diet and liquids with no s/sx of aspiration (P)   -SM Pt will tolerate recommended diet and liquids with no s/sx of aspiration  -HG     [place] (Swallow Short Term Goal 1) independently (over 90% accuracy) (P)   -SM independently (over 90% accuracy)  -HG     Time Frame (Swallow Short Term Goal 1) 1 week (P)   -SM --     Progress/Outcomes (Swallow Short Term Goal 1) goal met (P)   -SM --        Patient will demonstrate functional cognitive-linguistic skills for return to discharge environment    [place] Independently (P)   -SM Independently  -HG     Time frame 1 week (P)   -SM 1 week  -HG     Progress/Outcomes goal met (P)   -SM --     Comments Pt recieves support at baseline with all complex ADL's. Pt and caregiver endorse that he is at his baseline. (P)   -SM --        Memory Skills Goal 1 (SLP)    Improve Memory Skills Through Goal 1 (SLP) recalling related word lists with an imposed delay;recalling unrelated word lists immediately;recalling unrelated word lists with an imposed delay;listen to a paragraph and answer questions;90%;independently (over 90% accuracy) (P)   -SM recalling related word lists with an imposed delay;recalling unrelated word lists immediately;recalling unrelated word lists with an imposed delay;listen to a paragraph and answer questions;90%;independently (over 90% accuracy)  -HG     Time Frame (Memory Skills Goal 1, SLP) short term goal (STG) (P)   -SM short term goal (STG)  -HG     Progress (Memory Skills Goal 1, SLP) 80%;independently (over 90% accuracy) (P)   -SM --     Progress/Outcomes (Memory Skills Goal 1, SLP) goal partially met (P)   -SM --        Organizational Skills Goal 1 (SLP)    Improve Thought Organization Through Goal 1 (SLP) completing a divergent naming task;completing a convergent naming task;90%;independently (over 90% accuracy) (P)   -SM completing a  divergent naming task;completing a convergent naming task;90%;independently (over 90% accuracy)  -HG     Time Frame (Thought Organization Skills Goal 1, SLP) short term goal (STG) (P)   -SM short term goal (STG)  -HG     Progress (Thought Organization Skills Goal 1, SLP) 70%;independently (over 90% accuracy) (P)   -SM --     Progress/Outcomes (Thought Organization Skills Goal 1, SLP) goal partially met (P)   -SM --        Reasoning Goal 1 (SLP)    Improve Reasoning Through Goal 1 (SLP) complete basic reasoning task;complete high level reasoning task;complete deductive reasoning task;complete mental flexibility task;90%;independently (over 90% accuracy) (P)   -SM complete basic reasoning task;complete high level reasoning task;complete deductive reasoning task;complete mental flexibility task;90%;independently (over 90% accuracy)  -     Time Frame (Reasoning Goal 1, SLP) short term goal (STG) (P)   -SM short term goal (STG)  -HG     Progress (Reasoning Goal 1, SLP) 90%;independently (over 90% accuracy) (P)   -SM --     Progress/Outcomes (Reasoning Goal 1, SLP) goal met (P)   -SM --               User Key  (r) = Recorded By, (t) = Taken By, (c) = Cosigned By      Initials Name Provider Type     Moni Mayes, MS CCC-SLP Speech and Language Pathologist     Wendi Beth, Speech Therapy Student SLP Student                           Time Calculation:    Time Calculation- SLP       Row Name 11/26/24 1100 11/26/24 1035 11/26/24 0946       Time Calculation- [facility]    SLP Start Time 1005 (P)   - -- --    SLP Received On 11/26/24 (P)   - -- --       Untimed Charges    77152-GJ Treatment/ST Modification Prosth Aug Alter  45 (P)   - -- --    23400-US Treatment Swallow Minutes 25 (P)   - -- --       Total Minutes    Untimed Charges Total Minutes 70 (P)   - -- --     Total Minutes 70 (P)   - -- --       PT/SLP KX Modifier    Exception criteria met to exceed therapy cap -- Apply KX Modifier (P)   - Apply  HAMIDAX Modifier  -              User Key  (r) = Recorded By, (t) = Taken By, (c) = Cosigned By      Initials Name Provider Type     Joan Villareal, PT Physical Therapist    Wendi Ramos, Speech Therapy Student SLP Student                    Therapy Charges for Today       Code Description Service Date Service Provider Modifiers Qty    49366576792 HC ST TREATMENT SPEECH 3 11/26/2024 Wendi Beth, Speech Therapy Student SEBASTIAN HAGAN 1    [id number] HC ST TREATMENT SWALLOW 2 11/26/2024 Wendi Beth, Speech Therapy Student DANYA KX 1                 SLP Discharge Summary  Anticipated Discharge Disposition (SLP): (P) No further SLP services warranted    Wendi Beth Speech Therapy Student  11/26/2024

## 2024-11-26 NOTE — PLAN OF CARE
Goal Outcome Evaluation:  Plan of Care Reviewed With: patient        Progress: no change  Outcome Evaluation: Pt continues to present below baseline function d/t orthostatic hypotension, generalized weakness, balance deficits, and decreased activity tolerance. Orthostatics taken: 102/51 reclined, 91/51 sitting, 66/40 standing w/ reported dizziness. 113/61 once reclined. RN notified and ok'd t/f to [facility]. He required [facility] w/ use of [facility] for STS and to t/f to [facility]. Pt would continue to benefit from skilled IP PT. Recommend IRF at d/c.    Anticipated Discharge Disposition (PT): inpatient rehabilitation facility

## 2024-11-27 LAB
ANION GAP SERPL CALCULATED.3IONS-SCNC: 10 MMOL/L (ref 5–15)
BUN SERPL-MCNC: 44 MG/DL (ref 8–23)
BUN/CREAT SERPL: 24.9 (ref 7–25)
CALCIUM SPEC-SCNC: 8.6 MG/DL (ref 8.2–9.6)
CHLORIDE SERPL-SCNC: 105 MMOL/L (ref 98–107)
CO2 SERPL-SCNC: 25 MMOL/L (ref 22–29)
CORTIS SERPL-MCNC: 10.3 MCG/DL
CREAT SERPL-MCNC: 1.77 MG/DL (ref 0.76–1.27)
EGFRCR SERPLBLD CKD-EPI 2021: 35.1 ML/MIN/1.73
GLUCOSE SERPL-MCNC: 130 MG/DL (ref 65–99)
MAGNESIUM SERPL-MCNC: 2.1 MG/DL (ref 1.7–2.3)
POTASSIUM SERPL-SCNC: 4.6 MMOL/L (ref 3.5–5.2)
SODIUM SERPL-SCNC: 140 MMOL/L (ref 136–145)

## 2024-11-27 PROCEDURE — 99232 SBSQ HOSP IP/OBS MODERATE 35: CPT

## 2024-11-27 PROCEDURE — 82533 TOTAL CORTISOL: CPT

## 2024-11-27 PROCEDURE — 97530 THERAPEUTIC ACTIVITIES: CPT

## 2024-11-27 PROCEDURE — 83735 ASSAY OF MAGNESIUM: CPT | Performed by: INTERNAL MEDICINE

## 2024-11-27 PROCEDURE — 80048 BASIC METABOLIC PNL TOTAL CA: CPT | Performed by: INTERNAL MEDICINE

## 2024-11-27 RX ORDER — MIDODRINE HYDROCHLORIDE 5 MG/1
2.5 TABLET ORAL DAILY
Status: DISCONTINUED | OUTPATIENT
Start: 2024-11-27 | End: 2024-11-28

## 2024-11-27 RX ADMIN — GABAPENTIN 300 MG: 100 CAPSULE ORAL at 08:07

## 2024-11-27 RX ADMIN — APIXABAN 2.5 MG: 2.5 TABLET, FILM COATED ORAL at 08:00

## 2024-11-27 RX ADMIN — ATORVASTATIN CALCIUM 40 MG: 40 TABLET, FILM COATED ORAL at 21:21

## 2024-11-27 RX ADMIN — APIXABAN 2.5 MG: 2.5 TABLET, FILM COATED ORAL at 21:21

## 2024-11-27 RX ADMIN — GABAPENTIN 300 MG: 100 CAPSULE ORAL at 21:21

## 2024-11-27 RX ADMIN — MIDODRINE HYDROCHLORIDE 2.5 MG: 5 TABLET ORAL at 15:32

## 2024-11-27 RX ADMIN — MIRTAZAPINE 15 MG: 15 TABLET, FILM COATED ORAL at 21:21

## 2024-11-27 RX ADMIN — SERTRALINE HYDROCHLORIDE 50 MG: 50 TABLET ORAL at 08:00

## 2024-11-27 RX ADMIN — ASPIRIN 81 MG 81 MG: 81 TABLET ORAL at 08:00

## 2024-11-27 RX ADMIN — MAGNESIUM OXIDE TAB 400 MG (241.3 MG ELEMENTAL MG) 400 MG: 400 (241.3 MG) TAB at 08:00

## 2024-11-27 RX ADMIN — Medication 10 ML: at 21:22

## 2024-11-27 RX ADMIN — PANTOPRAZOLE SODIUM 40 MG: 40 TABLET, DELAYED RELEASE ORAL at 06:02

## 2024-11-27 NOTE — PLAN OF CARE
Goal Outcome Evaluation:  Plan of Care Reviewed With: patient        Progress: improving  Outcome Evaluation: Pt motivated to participate in therapy session. Limited by symptomatic orthostatic hypotension w/ drop from 112/84 sitting to 82/43. Improvements noted w/ transfers this date. RN notified of pt's BP. Continue to recommend per current POC as able.    Anticipated Discharge Disposition (OT): inpatient rehabilitation facility

## 2024-11-27 NOTE — PLAN OF CARE
Goal Outcome Evaluation:  Plan of Care Reviewed With: patient        Progress: no change  Outcome Evaluation: Activity limited by orthostatic hypotension this date. Bed>chair transfer completed with CGA and RW. Pt continues to present below baseline with weakness, impaired activity tolerance, and balance deficits. Further IPPT is warrented. PT will progress as able per POC.    Anticipated Discharge Disposition (PT): inpatient rehabilitation facility

## 2024-11-27 NOTE — PAYOR COMM NOTE
"Mukund Meier (89 y.o. Male)       Date of Birth   04/13/1930    Social Security Number       Address   [address] [place] [address]    Home Phone   [phone]    MRN   [id number]       Confucianist   None    Marital Status                               Admission Date   11/23/24    Admission Type   Emergency    Admitting Provider   Rickie Fontana DO    Attending Provider   Rickie Fontana DO    Department, Room/Bed   [facility] 3F, S323/1       Discharge Date       Discharge Disposition       Discharge Destination                                 Attending Provider: Rickie Fontana DO    Allergies: No Known Allergies    Isolation: None   Infection: None   Code Status: CPR    Ht: 185.4 cm (73\")   Wt: 81.6 kg (180 lb)    Admission Cmt: None   Principal Problem: TIA (transient ischemic attack) [G45.9]                   Active Insurance as of 11/23/2024       Primary Coverage       Payor Plan Insurance Group Employer/Plan Group    AETNA MEDICARE REPLACEMENT AETNA MEDICARE REPLACEMENT [id number]       Payor Plan Address Payor Plan Phone Number Payor Plan Fax Number Effective Dates    [address] [phone]  1/1/2021 - None Entered    [facility] [id number]         Subscriber Name Subscriber Birth Date Member ID       MUKUND MEIER 4/13/1930 [id number]                     Emergency Contacts        (Rel.) Home Phone Work Phone Mobile Phone    RHODA MEIER (Spouse) [phone] -- --    MATTHEW BISHOP (Relative) -- -- [phone]    Stacy Reed (Relative) -- -- [phone]              [place]: [facility] [id number] Tax ID [id number]  Insurance Information                  AETNA MEDICARE REPLACEMENT/AETNA MEDICARE REPLACEMENT Phone: [phone]    Subscriber: Mukund Meier Subscriber#: [id number]    Group#: [id number] Precert#: --    Authorization#: -- Effective Date: --             History & Physical        Florence Hebert " MD LEON at 24 1126              [facility] Medicine Services  HISTORY AND PHYSICAL    Patient Name: Mukund Pool  : 1930  MRN: [id number]  Primary Care Physician: Slick Hubbard MD  Date of admission: 2024      Subjective   Subjective     Chief Complaint:  Left sided weakness, headache, dysarthria    HPI:  Mukund Pool is a 89 y.o. male with hx of chronic HFrEF, PAF on Eliquis, bradycardia s/p PPM, CAD s/p CABG, CKD 3, HTN, HLD, head/neck cancer, prostate cancer, migraines, and GERD who presents due to occipital headache, dysarthria, and left sided weakness which started this morning when he woke up. Headache is located posteriorly. He reports recent increase in frequent headaches, does have hx of migraines but says he doesn't take anything for them. Currently symptoms have improved, speech still a little slow however. Initial scans in the ER were unremarkable. Stroke Neurology consulted and patient was admitted for further workup.      Personal History     Past Medical History:   Diagnosis Date    Arthritis     Chronic kidney disease     kidney stones    Coronary artery disease     GERD (gastroesophageal reflux disease)     History of radiation therapy 2021    laryngeal mass    Prostate cancer     Vocal cord cancer        Past Surgical History:   Procedure Laterality Date    CARDIAC ELECTROPHYSIOLOGY PROCEDURE  2023    cardiac loop recorder    CARDIAC ELECTROPHYSIOLOGY PROCEDURE N/A 10/25/2023    Procedure: Pacemaker DC new;  Surgeon: Levy Pickett MD;  Location: [facility] EP INVASIVE LOCATION;  Service: Cardiology;  Laterality: N/A;    CARDIAC ELECTROPHYSIOLOGY PROCEDURE N/A 10/25/2023    Procedure: AV node ablation;  Surgeon: Levy Pickett MD;  Location: [facility] EP INVASIVE LOCATION;  Service: Cardiovascular;  Laterality: N/A;    CHOLECYSTECTOMY      CORONARY ARTERY BYPASS GRAFT      PROSTATE SURGERY      VOCAL CORD MASS EXCISION  2021        Family History: family history includes Breast cancer in his mother; Cancer in his mother; Heart attack in his father; Ovarian cancer in his sister.     Social History:  reports that he has never smoked. He has never been exposed to tobacco smoke. He has never used smokeless tobacco. He reports that he does not drink alcohol and does not use drugs.  Social History     Social History Narrative    Not on file       Medications:  Available home medication information reviewed.  Diclofenac Sodium, HYDROcodone-acetaminophen, apixaban, aspirin, ezetimibe, gabapentin, multivitamin with minerals, nitroglycerin, omeprazole, oxyCODONE-acetaminophen, promethazine, sertraline, and torsemide    No Known Allergies    Objective   Objective     Vital Signs:   Temp:  [98.1 °F (36.7 °C)] 98.1 °F (36.7 °C)  Heart Rate:  [70] 70  Resp:  [16] 16  BP: (161)/(86) 161/86  Total (NIH Stroke Scale): 1    Physical Exam   Constitutional: Awake, alert  Eyes: PERRLA, sclerae anicteric, no conjunctival injection  HENT: NCAT, mucous membranes moist  Neck: Supple, no thyromegaly, no lymphadenopathy, trachea midline  Respiratory: Clear to auscultation bilaterally, nonlabored respirations   Cardiovascular: RRR, no murmurs, rubs, or gallops, palpable pedal pulses bilaterally  Gastrointestinal: Positive bowel sounds, soft, nontender, nondistended  Musculoskeletal: No bilateral ankle edema, no clubbing or cyanosis to extremities  Psychiatric: Appropriate affect, cooperative  Neurologic: Oriented x 3, strength symmetric in all extremities, Cranial Nerves grossly intact to confrontation, speech slow and slightly dysarthric  Skin: No rashes    Result Review:  I have personally reviewed the results from the time of this admission to 11/23/2024 11:26 EST and agree with these findings:  [x]  Laboratory list / accordion  []  Microbiology  [x]  Radiology  []  EKG/Telemetry   []  Cardiology/Vascular   []  Pathology  [x]  Old records  []  Other:    LAB  RESULTS:      Lab 11/23/24  1005   WBC 8.18   HEMOGLOBIN 12.1*   HEMATOCRIT 37.0*   PLATELETS 191   NEUTROS ABS 4.60   IMMATURE GRANS (ABS) 0.04   LYMPHS ABS 2.51   MONOS ABS 0.73   EOS ABS 0.24   .6*   PROTIME 13.7   INR 1.04   APTT 27.0             Lab 11/23/24  1005   ALT (SGPT) 11   AST (SGOT) 19         Lab 11/23/24  1005   HSTROP T 44*                 UA          6/16/2024    20:05   Urinalysis   Specific [place], UA 1.015    Ketones, UA Negative    Blood, UA Negative    Leukocytes, UA Negative    Nitrite, UA Negative        Microbiology Results (last 10 days)       ** No results found for the last 240 hours. **            XR Chest 1 View    Result Date: 11/23/2024  XR CHEST 1 VW Date of Exam: 11/23/2024 10:36 AM EST Indication: Acute Stroke Protocol (onset < 12 hrs) Comparison: 6/16/2024 Findings: Prior sternotomy. Lungs are without consolidation. No pneumothorax or pleural effusion. Aortic arch atherosclerosis. Left-sided AICD and cardiac loop recorder noted.     Impression: Impression: No acute process. Electronically Signed: Soto Matos MD  11/23/2024 11:05 AM EST  Workstation ID: [id number]    CT Angiogram Head w AI Analysis of LVO    Result Date: 11/23/2024  CT ANGIOGRAM HEAD W AI ANALYSIS OF LVO, CT ANGIOGRAM NECK Date of Exam: 11/23/2024 10:06 AM EST Indication: Neuro Deficit, acute, Stroke suspected Neuro deficit, acute stroke suspected. Comparison: None available. Technique: CTA of the head and neck was performed after the uneventful intravenous administration of 75 mL Isovue-370. Reconstructed coronal and sagittal images were also obtained. In addition, a 3-D volume rendered image was created for interpretation. Automated exposure control and iterative reconstruction methods were used. Findings: CTA head: Dominant right vertebral artery system with the left V4 segment terminating as the PICA origin, normal variant, with the basilar artery arising solely from the right V4 segment. There is some  atherosclerosis at the carotid siphons with mild luminal irregularity without flow-limiting stenosis. No abrupt cut off/large vessel occlusion, flow-limiting stenosis, dissection, or aneurysm. The cerebral veins and dural venous sinuses appear grossly patent. CTA neck: Dominant right vertebral artery system with decreased caliber of the entire left cervical vertebral artery. No abrupt cut off/large vessel occlusion, flow-limiting stenosis, dissection, or aneurysm. There is moderate calcified and noncalcified atherosclerosis of the bilateral carotid bifurcations and proximal cervical ICAs which results in luminal irregularity and mild stenosis, without significant or flow-limiting stenosis (<50% narrowing per NASCET criteria). Extravascular findings: Partial visualization of median sternotomy wires and cardiac pacer with left chest pulse generator. Upper lungs are grossly clear. Unremarkable appearance of the pharyngeal and laryngeal structures. No acute or suspicious bony findings.     Impression: Impression: No abrupt cut off/large vessel occlusion, flow-limiting stenosis, dissection, or aneurysm. Moderate atherosclerosis of the bilateral carotid bifurcations and proximal cervical ICAs with some luminal irregularity and mild stenosis, without flow-limiting stenosis per NASCET criteria (less than 50% narrowing). Electronically Signed: Madi Pena MD  11/23/2024 10:29 AM EST  Workstation ID: [id number]    CT Angiogram Neck    Result Date: 11/23/2024  CT ANGIOGRAM HEAD W AI ANALYSIS OF LVO, CT ANGIOGRAM NECK Date of Exam: 11/23/2024 10:06 AM EST Indication: Neuro Deficit, acute, Stroke suspected Neuro deficit, acute stroke suspected. Comparison: None available. Technique: CTA of the head and neck was performed after the uneventful intravenous administration of 75 mL Isovue-370. Reconstructed coronal and sagittal images were also obtained. In addition, a 3-D volume rendered image was created for interpretation.  Automated exposure control and iterative reconstruction methods were used. Findings: CTA head: Dominant right vertebral artery system with the left V4 segment terminating as the PICA origin, normal variant, with the basilar artery arising solely from the right V4 segment. There is some atherosclerosis at the carotid siphons with mild luminal irregularity without flow-limiting stenosis. No abrupt cut off/large vessel occlusion, flow-limiting stenosis, dissection, or aneurysm. The cerebral veins and dural venous sinuses appear grossly patent. CTA neck: Dominant right vertebral artery system with decreased caliber of the entire left cervical vertebral artery. No abrupt cut off/large vessel occlusion, flow-limiting stenosis, dissection, or aneurysm. There is moderate calcified and noncalcified atherosclerosis of the bilateral carotid bifurcations and proximal cervical ICAs which results in luminal irregularity and mild stenosis, without significant or flow-limiting stenosis (<50% narrowing per NASCET criteria). Extravascular findings: Partial visualization of median sternotomy wires and cardiac pacer with left chest pulse generator. Upper lungs are grossly clear. Unremarkable appearance of the pharyngeal and laryngeal structures. No acute or suspicious bony findings.     Impression: Impression: No abrupt cut off/large vessel occlusion, flow-limiting stenosis, dissection, or aneurysm. Moderate atherosclerosis of the bilateral carotid bifurcations and proximal cervical ICAs with some luminal irregularity and mild stenosis, without flow-limiting stenosis per NASCET criteria (less than 50% narrowing). Electronically Signed: Madi Pena MD  11/23/2024 10:29 AM EST  Workstation ID: [id number]    CT Head Without Contrast Stroke Protocol    Result Date: 11/23/2024  CT HEAD WO CONTRAST STROKE PROTOCOL Date of Exam: 11/23/2024 9:55 AM EST Indication: Neuro deficit, acute, stroke suspected Neuro Deficit, acute, Stroke suspected.  Comparison: Head CT 6/16/2024 Technique: Axial CT images were obtained of the head without contrast administration.  Reconstructed coronal images were also obtained. Automated exposure control and iterative construction methods were used. Scan Time: 9:55 a.m. 11/23/2024 Results discussed with stroke navigator by Dr. Madi Pena via telephone at 10:00 a.m. 11/23/2024. Findings: No acute intracranial hemorrhage. No acute large territory infarct. There is some senescent mineralization of the basal ganglia. There are scattered subcortical and periventricular white matter hypodensities which are nonspecific and can be seen in the setting of chronic small vessel ischemic change. No extra-axial collections. No midline shift or herniation. Normal size and configuration of the ventricles. Unremarkable appearance of the orbits. The paranasal sinuses and mastoid air cells are grossly clear. No acute or suspicious bony findings.     Impression: Impression: No acute intracranial findings. Chronic and senescent changes as above. Electronically Signed: Madi Pena MD  11/23/2024 10:03 AM EST  Workstation ID: [id number]     Results for orders placed during the hospital encounter of 09/29/23    Adult Transthoracic Echo Complete W/ Cont if Necessary Per Protocol    Interpretation Summary    The left ventricle is mildly dilated and globally hypokinetic.  Left ventricular systolic function is moderately decreased. Estimated left ventricular EF = 25%    Aortic valve is calcified with no significant stenosis or regurgitation.    Moderate mitral valve regurgitation is present.    Estimated right ventricular systolic pressure from tricuspid regurgitation is normal (<35 mmHg).    There is a small (<1cm) pericardial effusion adjacent to the left ventricle.    Bilateral pleural effusions present      Assessment & Plan   Assessment & Plan       TIA (transient ischemic attack)    Paroxysmal atrial fibrillation    Stage 3b chronic kidney  disease    Chronic HFrEF (heart failure with reduced ejection fraction)    Presence of cardiac pacemaker    Headache    Left-sided weakness        89 yo M with hx of chronic HFrEF, PAF on Eliquis, bradycardia s/p PPM, CAD s/p CABG, CKD 3, HTN, HLD, head/neck cancer, prostate cancer, migraines, and GERD who presents due to occipital headache, dysarthria, and left sided weakness.    Left sided weakness, dysarthria  Headache with history of migraines  --TIA vs stroke vs complex migraine  --CT head without contrast, CTA head/neck no acute abnormalities, no LVO  --MRI brain ordered, need Cardiology clearance for pacemaker  --CXR negative, UA pending  --Migraine cocktail ordered  --Continue ASA, Eliquis; start Lipitor  --PT/OT/SLP evaluations    HTN  HLD  --Allow permissive HTN, keep SBP <180  --Check lipid panel  --On Zetia at home, added Lipitor as above    PAF  Chronic HFrEF  CAD s/p CABG  Bradycardia s/p PPM  --Continue home meds    CKD 3  --Recent baseline Cr ~1.4-1.6  --Stable    GERD  --Continue PPI    Hx of head/neck cancer  Hx of prostate cancer      VTE Prophylaxis:  Mechanical & pharmacologic VTE prophylaxis orders are signed & held.           CODE STATUS:    Code Status and Medical Interventions: CPR (Attempt to Resuscitate); Full Support   Ordered at: 11/23/24 1207     Code Status (Patient has no pulse and is not breathing):    CPR (Attempt to Resuscitate)     Medical Interventions (Patient has pulse or is breathing):    Full Support       Expected Discharge   Expected discharge date/ time has not been documented.     Florence Hebert MD  11/23/24      Electronically signed by Florence Hebert MD at 11/23/24 1207       Current Facility-Administered Medications   Medication Dose Route Frequency Provider Last Rate Last Admin    apixaban (ELIQUIS) tablet 2.5 mg  2.5 mg Oral Q12H Grisel Marquis APRN   2.5 mg at 11/27/24 0800    aspirin chewable tablet 81 mg  81 mg Oral Daily Grisel Marquis, APRN   81  mg at 11/27/24 0800    Or    aspirin suppository 300 mg  300 mg Rectal Daily Grisel Marquis APRN        atorvastatin (LIPITOR) tablet 40 mg  40 mg Oral Nightly Grisel Marquis APRN   40 mg at 11/26/24 2009    sennosides-docusate (PERICOLACE) 8.6-50 MG per tablet 2 tablet  2 tablet Oral BID PRN Florence Hebert MD   2 tablet at 11/25/24 0812    And    polyethylene glycol (MIRALAX) packet 17 g  17 g Oral Daily PRN Florence Hebert MD        And    bisacodyl (DULCOLAX) EC tablet 5 mg  5 mg Oral Daily PRN Florence Hebert MD        And    bisacodyl (DULCOLAX) suppository 10 mg  10 mg Rectal Daily PRN Florence Hebert MD        gabapentin (NEURONTIN) capsule 300 mg  300 mg Oral BID Florence Hebert MD   300 mg at 11/27/24 0807    magnesium oxide (MAG-OX) tablet 400 mg  400 mg Oral Daily Grisel Marquis APRN   400 mg at 11/27/24 0800    mirtazapine (REMERON) tablet 15 mg  15 mg Oral Nightly Florence Hebert MD   15 mg at 11/26/24 2009    pantoprazole (PROTONIX) EC tablet 40 mg  40 mg Oral Q AM Florence Hebert MD   40 mg at 11/27/24 0602    sertraline (ZOLOFT) tablet 50 mg  50 mg Oral Daily Florence Hebert MD   50 mg at 11/27/24 0800    sodium chloride 0.9 % flush 10 mL  10 mL Intravenous PRN Aj Cronin MD        sodium chloride 0.9 % flush 10 mL  10 mL Intravenous Q12H Grisel Marquis APRN   10 mL at 11/26/24 2008    sodium chloride 0.9 % flush 10 mL  10 mL Intravenous PRN Grisel Marquis APRN        sodium chloride 0.9 % flush 10 mL  10 mL Intravenous Q12H Florence Hebert MD   10 mL at 11/26/24 0848    sodium chloride 0.9 % flush 10 mL  10 mL Intravenous PRN Florence Hebert MD        sodium chloride 0.9 % infusion 40 mL  40 mL Intravenous PRN Grisel Marquis APRN        sodium chloride 0.9 % infusion 40 mL  40 mL Intravenous PRN Florence Hebert MD        [Held by provider] torsemide (DEMADEX) tablet 20 mg  20 mg Oral Daily Florence Hebert MD   20 mg at 11/26/24 0846     Lab  Results (last 24 hours)       Procedure Component Value Units Date/Time    Basic Metabolic Panel [[id number]] Updated: 11/27/24 0733    Specimen: Blood     Magnesium [[id number]] Updated: 11/27/24 0733    Specimen: Blood     Potassium [[id number]]  (Abnormal) Collected: 11/26/24 1323    Specimen: Blood Updated: 11/26/24 1356     Potassium 5.8 mmol/L           Imaging Results (Last 24 Hours)       ** No results found for the last 24 hours. **          Orders (last 24 hrs)        Start     Ordered    11/27/24 0808  Orthostatic Blood Pressure  Daily       11/27/24 0807    11/27/24 0733  Basic Metabolic Panel  Morning Draw         11/26/24 1847    11/27/24 0733  Magnesium  Morning Draw         11/26/24 1847 11/26/24 1957  Inpatient Admission  Once         11/26/24 1956 11/26/24 1945  sodium zirconium cyclosilicate (LOKELMA) packet 10 g  Once         11/26/24 1846 11/26/24 1930  sodium chloride 0.9 % infusion  Continuous         11/26/24 1843    11/26/24 1200  DIET MESSAGE Please send extra sauce/gravy on trays.  Daily With Breakfast, Lunch & Dinner      Comments: Please send extra sauce/gravy on trays.    11/26/24 1031    11/26/24 1032  Swallow Precautions  Continuous         11/26/24 1031    11/26/24 1031  Diet: Regular/House; Texture: Regular (IDDSI 7); Fluid Consistency: Thin (IDDSI 0)  Diet Effective Now         11/26/24 1031    11/25/24 1800  Dietary Nutrition Supplements Boost Plus; chocolate  Daily With Breakfast & Dinner       11/25/24 1728    11/25/24 0000  Ambulatory Referral to Neurology        Comments: Recurrent migraines, next available follow-up    11/25/24 0850    11/24/24 0900  magnesium oxide (MAG-OX) tablet 400 mg  Daily         11/23/24 1346    11/24/24 0600  pantoprazole (PROTONIX) EC tablet 40 mg  Every Early Morning         11/23/24 1307    11/23/24 2100  atorvastatin (LIPITOR) tablet 40 mg  Nightly         11/23/24 1346    11/23/24 2100  apixaban (ELIQUIS) tablet 2.5 mg  Every 12 Hours  "Scheduled         11/23/24 1346    11/23/24 2100  mirtazapine (REMERON) tablet 15 mg  Nightly         11/23/24 1307    11/23/24 1800  Oral Care  2 Times Daily       11/23/24 1346    11/23/24 1600  Intake and Output  Every 4 Hours       11/23/24 1346    11/23/24 1600  Vital Signs  Every 4 Hours       11/23/24 1346    11/23/24 1445  sodium chloride 0.9 % flush 10 mL  Every 12 Hours Scheduled         11/23/24 1346    11/23/24 1445  aspirin chewable tablet 81 mg  Daily        Placed in \"Or\" Linked Group    11/23/24 1346    11/23/24 1445  aspirin suppository 300 mg  Daily        Placed in \"Or\" Linked Group    11/23/24 1346    11/23/24 1445  sodium chloride 0.9 % flush 10 mL  Every 12 Hours Scheduled         11/23/24 1346    11/23/24 1347  Intake & Output  Every Shift       11/23/24 1346    11/23/24 1346  sodium chloride 0.9 % flush 10 mL  As Needed         11/23/24 1346    11/23/24 1346  sodium chloride 0.9 % infusion 40 mL  As Needed         11/23/24 1346    11/23/24 1346  sodium chloride 0.9 % flush 10 mL  As Needed         11/23/24 1346    11/23/24 1346  sodium chloride 0.9 % infusion 40 mL  As Needed         11/23/24 1346    11/23/24 1346  sennosides-docusate (PERICOLACE) 8.6-50 MG per tablet 2 tablet  2 Times Daily PRN        Placed in \"And\" Linked Group    11/23/24 1346    11/23/24 1346  polyethylene glycol (MIRALAX) packet 17 g  Daily PRN        Placed in \"And\" Linked Group    11/23/24 1346    11/23/24 1346  bisacodyl (DULCOLAX) EC tablet 5 mg  Daily PRN        Placed in \"And\" Linked Group    11/23/24 1346    11/23/24 1346  bisacodyl (DULCOLAX) suppository 10 mg  Daily PRN        Placed in \"And\" Linked Group    11/23/24 1346    11/23/24 1323  gabapentin (NEURONTIN) capsule 300 mg  2 Times Daily         11/23/24 1307    11/23/24 1323  sertraline (ZOLOFT) tablet 50 mg  Daily         11/23/24 1307    11/23/24 1323  [Held by provider]  torsemide (DEMADEX) tablet 20 mg  Daily        (On hold since yesterday at 1843 " until manually unheld; held by Rickie Fontana DOHold Reason: Other (Comment Required))    11/23/24 1307    11/23/24 1000  Neuro Checks  Every 2 Hours      Comments: On arrival and handoff, increase frequency as clinically indicated or for thrombolytic administration, otherwise Q2 hours.  Documentation of arrival assessment and any neuro change required.    11/23/24 0954    11/23/24 0954  sodium chloride 0.9 % flush 10 mL  As Needed         11/23/24 0954    Unscheduled  Oxygen Therapy- Nasal Cannula; Titrate 1-6 LPM Per SpO2; 90 - 95%  Continuous PRN       11/23/24 0954    Unscheduled  Order CT Head Without Contrast for Neurological Decline  As Needed       11/23/24 1346    --  mirtazapine (REMERON) 15 MG tablet  Nightly         11/23/24 1217                  Operative/Procedure Notes (last 24 hours)  Notes from 11/26/24 0842 through 11/27/24 0842   No notes of this type exist for this encounter.       Physician Progress Notes (last 24 hours)  Notes from 11/26/24 0842 through 11/27/24 0842   No notes of this type exist for this encounter.       Consult Notes (last 24 hours)  Notes from 11/26/24 0842 through 11/27/24 0842   No notes of this type exist for this encounter.

## 2024-11-27 NOTE — CASE MANAGEMENT/SOCIAL WORK
Continued Stay Note  [facility]     Patient Name: Mukund Pool  MRN: [id number]  Today's Date: 11/27/2024    Admit Date: 11/23/2024    Plan: [facility]   Discharge Plan       Row Name 11/27/24 1511       Plan    Plan [facility]    Patient/Family in Agreement with Plan yes    Plan Comments Spoke with Mr. Pool at the bedside and Spouse by telephone. They would like to proceed with family appeal for the rehab at [facility]. Spouse will be representing patient and we can not move forward with appeal process until she signs an AOR form. She is unable to come to the hospital until Friday to sign the form. AOR form left at bedside for Spouse to sign. CM will follow up on friday to assist family with appeal process.    Final Discharge Disposition Code 62 - inpatient rehab facility                   Discharge Codes    No documentation.                 Expected Discharge Date and Time       Expected Discharge Date Expected Discharge Time    Nov 27, 2024               Stacy Guadarrama RN

## 2024-11-27 NOTE — PLAN OF CARE
Problem: Adult Inpatient Plan of Care  Goal: Plan of Care Review  Outcome: Progressing  Flowsheets (Taken 11/27/2024 0427)  Progress: improving  Plan of Care Reviewed With: patient  Goal: Patient-Specific Goal (Individualized)  Outcome: Progressing  Goal: Absence of Hospital-Acquired Illness or Injury  Outcome: Progressing  Intervention: Identify and Manage Fall Risk  Recent Flowsheet Documentation  Taken 11/27/2024 0426 by Bernice Buenrostro RN  Safety Promotion/Fall Prevention:   activity supervised   assistive device/personal items within reach   safety round/check completed  Taken 11/27/2024 0236 by Bernice Buenrostro RN  Safety Promotion/Fall Prevention:   activity supervised   assistive device/personal items within reach   safety round/check completed  Taken 11/27/2024 0005 by Bernice Buenrostro RN  Safety Promotion/Fall Prevention:   activity supervised   assistive device/personal items within reach   safety round/check completed  Taken 11/26/2024 2210 by Bernice Buenrostro RN  Safety Promotion/Fall Prevention:   activity supervised   assistive device/personal items within reach   safety round/check completed  Taken 11/26/2024 2000 by Bernice Buenrostro RN  Safety Promotion/Fall Prevention:   activity supervised   assistive device/personal items within reach   clutter free environment maintained   fall prevention program maintained   lighting adjusted   muscle strengthening facilitated   nonskid shoes/slippers when out of bed   room organization consistent   safety round/check completed  Intervention: Prevent Skin Injury  Recent Flowsheet Documentation  Taken 11/27/2024 0426 by Bernice Buenrostro RN  Body Position: sitting up in bed  Taken 11/27/2024 0236 by Bernice Buenrostro RN  Body Position:   supine   position changed independently  Taken 11/27/2024 0005 by Bernice Buenrostro RN  Body Position:   supine   position changed independently  Taken 11/26/2024 2210 by Bernice Buenrostro RN  Body Position:   supine   position  changed independently  Taken 11/26/2024 2000 by Bernice Buenrostro RN  Body Position:   sitting up in bed   position changed independently  Skin Protection:   drying agents applied   incontinence pads utilized  Intervention: Prevent and Manage VTE (Venous Thromboembolism) Risk  Recent Flowsheet Documentation  Taken 11/26/2024 2000 by Bernice Buenrostro RN  VTE Prevention/Management:   bilateral   SCDs (sequential compression devices) off  Intervention: Prevent Infection  Recent Flowsheet Documentation  Taken 11/27/2024 0426 by Bernice Buenrostro RN  Infection Prevention:   hand hygiene promoted   rest/sleep promoted  Taken 11/27/2024 0236 by Bernice Buenrostro RN  Infection Prevention:   hand hygiene promoted   rest/sleep promoted  Taken 11/27/2024 0005 by Bernice Buenrostro RN  Infection Prevention:   hand hygiene promoted   rest/sleep promoted  Taken 11/26/2024 2210 by Bernice Buenrostro RN  Infection Prevention:   hand hygiene promoted   rest/sleep promoted  Taken 11/26/2024 2000 by Bernice Buenrostro RN  Infection Prevention:   hand hygiene promoted   rest/sleep promoted  Goal: Optimal Comfort and Wellbeing  Outcome: Progressing  Intervention: Provide Person-Centered Care  Recent Flowsheet Documentation  Taken 11/27/2024 0426 by Bernice Buenrostro RN  Trust Relationship/Rapport: care explained  Taken 11/27/2024 0236 by Bernice Buenrostro RN  Trust Relationship/Rapport: care explained  Taken 11/27/2024 0005 by Bernice Buenrostro RN  Trust Relationship/Rapport: care explained  Taken 11/26/2024 2210 by Bernice Buenrostro RN  Trust Relationship/Rapport: care explained  Taken 11/26/2024 2000 by Bernice Buenrostro RN  Trust Relationship/Rapport:   care explained   choices provided   questions answered   questions encouraged   reassurance provided  Goal: Readiness for Transition of Care  Outcome: Progressing     Problem: Fall Injury Risk  Goal: Absence of Fall and Fall-Related Injury  Outcome: Progressing  Intervention: Identify and Manage  Contributors  Recent Flowsheet Documentation  Taken 11/27/2024 0426 by Bernice Buenrostro RN  Self-Care Promotion: independence encouraged  Taken 11/27/2024 0236 by Bernice Buenrostro RN  Self-Care Promotion: independence encouraged  Taken 11/27/2024 0005 by Bernice Buenrostor RN  Self-Care Promotion: independence encouraged  Taken 11/26/2024 2210 by Bernice Buenrostro RN  Self-Care Promotion: independence encouraged  Taken 11/26/2024 2000 by Bernice Buenrostro RN  Medication Review/Management: medications reviewed  Self-Care Promotion: independence encouraged  Intervention: Promote Injury-Free Environment  Recent Flowsheet Documentation  Taken 11/27/2024 0426 by Bernice Buenrostro RN  Safety Promotion/Fall Prevention:   activity supervised   assistive device/personal items within reach   safety round/check completed  Taken 11/27/2024 0236 by Bernice Buenrostro RN  Safety Promotion/Fall Prevention:   activity supervised   assistive device/personal items within reach   safety round/check completed  Taken 11/27/2024 0005 by Bernice Buenrostro RN  Safety Promotion/Fall Prevention:   activity supervised   assistive device/personal items within reach   safety round/check completed  Taken 11/26/2024 2210 by Bernice Buenrostro RN  Safety Promotion/Fall Prevention:   activity supervised   assistive device/personal items within reach   safety round/check completed  Taken 11/26/2024 2000 by Bernice Buenrostro RN  Safety Promotion/Fall Prevention:   activity supervised   assistive device/personal items within reach   clutter free environment maintained   fall prevention program maintained   lighting adjusted   muscle strengthening facilitated   nonskid shoes/slippers when out of bed   room organization consistent   safety round/check completed     Problem: Skin Injury Risk Increased  Goal: Skin Health and Integrity  Outcome: Progressing  Intervention: Optimize Skin Protection  Recent Flowsheet Documentation  Taken 11/27/2024 0426 by Bernice Buenrostro  E, RN  Activity Management: activity encouraged  Head of Bed (HOB) Positioning: HOB elevated  Taken 11/27/2024 0236 by Bernice Buenrostro RN  Activity Management: activity encouraged  Head of Bed (HOB) Positioning: HOB elevated  Taken 11/27/2024 0005 by Bernice Buenrostro RN  Activity Management: sitting, edge of bed  Head of Bed (HOB) Positioning: HOB elevated  Taken 11/26/2024 2210 by Bernice Buenrostro RN  Activity Management: activity encouraged  Head of Bed (HOB) Positioning: HOB elevated  Taken 11/26/2024 2000 by Bernice Buenrostro RN  Activity Management: activity encouraged  Pressure Reduction Techniques: frequent weight shift encouraged  Head of Bed (HOB) Positioning: HOB elevated  Pressure Reduction Devices: pressure-redistributing mattress utilized  Skin Protection:   drying agents applied   incontinence pads utilized     Problem: Stroke, Ischemic (Includes Transient Ischemic Attack)  Goal: Optimal Coping  Outcome: Progressing  Intervention: Support Psychosocial Response to Stroke  Recent Flowsheet Documentation  Taken 11/26/2024 2000 by Bernice Buenrostro RN  Family/Support System Care:   support provided   self-care encouraged  Goal: Effective Bowel Elimination  Outcome: Progressing  Goal: Optimal Cerebral Tissue Perfusion  Outcome: Progressing  Intervention: Protect and Optimize Cerebral Perfusion  Recent Flowsheet Documentation  Taken 11/26/2024 2000 by Bernice Buenrostro RN  Stabilization Measures: airway opened  Goal: Improved Communication Skills  Outcome: Progressing  Goal: Optimal Functional Ability  Outcome: Progressing  Intervention: Optimize Functional Ability  Recent Flowsheet Documentation  Taken 11/27/2024 0426 by Bernice Buenrostro RN  Activity Management: activity encouraged  Self-Care Promotion: independence encouraged  Taken 11/27/2024 0236 by Bernice Buenrostro RN  Activity Management: activity encouraged  Self-Care Promotion: independence encouraged  Taken 11/27/2024 0005 by Bernice Buenrostro  RN  Activity Management: sitting, edge of bed  Self-Care Promotion: independence encouraged  Taken 11/26/2024 2210 by Bernice Buenrostro RN  Activity Management: activity encouraged  Self-Care Promotion: independence encouraged  Taken 11/26/2024 2000 by Bernice Buenrostro RN  Activity Management: activity encouraged  Self-Care Promotion: independence encouraged  Goal: Optimal Nutrition Intake  Outcome: Progressing  Goal: Improved Sensorimotor Function  Outcome: Progressing  Intervention: Optimize Range of Motion, Motor Control and Function  Recent Flowsheet Documentation  Taken 11/27/2024 0426 by Bernice Buenrostro RN  Positioning/Transfer Devices:   pillows   in use  Taken 11/27/2024 0236 by Bernice Buenrostro RN  Positioning/Transfer Devices:   pillows   in use  Taken 11/27/2024 0005 by Bernice Buenrostro RN  Positioning/Transfer Devices:   pillows   in use  Taken 11/26/2024 2210 by Bernice Buenrostro RN  Positioning/Transfer Devices:   pillows   in use  Taken 11/26/2024 2000 by Bernice Buenrostro RN  Positioning/Transfer Devices:   pillows   in use  Range of Motion: active ROM (range of motion) encouraged  Intervention: Optimize Sensory and Perceptual Ability  Recent Flowsheet Documentation  Taken 11/26/2024 2000 by Bernice Buenrsotro RN  Pressure Reduction Techniques: frequent weight shift encouraged  Pressure Reduction Devices: pressure-redistributing mattress utilized   Goal Outcome Evaluation:  Plan of Care Reviewed With: patient      Pt currently in bed resting quietly. No complaints of pain or discomfort at this time. VSS. Neuro checks WNL. Pt up with 1 assist and walker. Continent of bowel and bladder. Pt hopeful to discharge home today. No other observations at this time, call bell in reach  Progress: improving

## 2024-11-27 NOTE — PROGRESS NOTES
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    PSH:  He  has a past surgical history that includes Prostate surgery; Coronary artery bypass graft; Cholecystectomy; VOCAL CORD MASS EXCISION (03/22/2021); Cardiac electrophysiology procedure (04/2023); Cardiac electrophysiology procedure (N/A, 10/25/2023); and Cardiac electrophysiology procedure (N/A, 10/25/2023).    Applicable Nutrition History:   HFrEF  CKD 3  Prostate Ca  Head/neck ca  GERD  CAD s/p CABG    (11/26) SLP: regular textures, thin liquids     Anthropometrics     Height: Height: 185.4 cm (73\")  Last Filed Weight: Weight: 81.6 kg (180 lb) (11/24/24 1156)  Method: Weight Method: Stated  BMI: BMI (Calculated): 23.8    UBW:     Weight      Weight (kg) Weight (lbs) Weight Method   11/14/2023 77.565 kg  171 lb     12/19/2023 80.196 kg  176 lb 12.8 oz     6/16/2024 80 kg  176 lb 5.9 oz  Estimated    7/2/2024 81.92 kg  180 lb 9.6 oz     7/9/2024 81.647 kg  180 lb     11/23/2024 81.647 kg  180 lb  Stated    11/24/2024 81.647 kg  180 lb       Weight change: No significant changes    Nutrition Focused Physical Exam    Date:  11/25       Does not meet " malnutrition criteria at this time. Wasting noted most likely 2/2 adv age    Subjective   Reported/Observed/Food/Nutrition Related History:   11/27/24  Patient with increased appetite and good PO intake. No new nutrition needs or concerns at this time.    11/25  Patient reported his appetite has been decreased since his increase in headaches. Stated his appetite and PO intake have been improving the past few days. Noted he normally takes in 3 meals per day (25-75%/meal). Would like to trial christopher boost plus BID to support PO intake. Denied any difficulties chewing/swallowing. NKFA.    Current Nutrition Prescription   PO: Diet: Regular/House; Texture: Regular (IDDSI 7); Fluid Consistency: Thin (IDDSI 0)  Oral Nutrition Supplement: n/a  Intake: Last three meals documented: 75, 100, 75% PO intake    Assessment & Plan   Nutrition Diagnosis   Date:  11/25            Updated:  11/27  Problem Inadequate oral intake    Etiology migraines   Signs/Symptoms Reported decrease in PO intake   Status: Resolved    Goal:   Maintain nutrition and Maintain intake    Nutrition Intervention      Care plan reviewed    No further nutrition monitoring warranted at this time. RDN will follow peripherally. Available via consult.    Monitoring/Evaluation:   Per protocol    Jalyn Horvath MS,RD,LD  Time Spent: 25min

## 2024-11-27 NOTE — PROGRESS NOTES
[facility] Medicine Services  PROGRESS NOTE    Patient Name: Mukund Pool  : 1930  MRN: [id number]    Date of Admission: 2024  Primary Care Physician: Slick Hubbard MD    Subjective   Subjective     CC:  F/u TIA/migraine    HPI:  Patient reports he is eager to return to his assisted living facility, his orthostatic BP remains positive, discussed he is not ready for discharge. Patient expressed understanding.       Objective   Objective     Vital Signs:   Temp:  [97.5 °F (36.4 °C)-97.8 °F (36.6 °C)] 97.5 °F (36.4 °C)  Heart Rate:  [73-89] 76  Resp:  [16-18] 16  BP: ()/(59-78) 112/66     Physical Exam:  Constitutional: Elderly appearing male sitting up in bed in NAD  HENT: NCAT, mucous membranes moist  Respiratory: Clear to auscultation bilaterally, nonlabored respirations on room air  Cardiovascular: RRR, no murmurs, rubs, or gallops, no bilateral ankle edema  Gastrointestinal: Positive bowel sounds, soft, nontender, nondistended  Musculoskeletal: ROBISON appropriately  Psychiatric: Appropriate affect, cooperative  Neurologic: Oriented x 3, speech clear  Skin: No rashes on exposed surfaces    Results Reviewed:  LAB RESULTS:      Lab 24  0559 24  1005   WBC 9.09 8.18   HEMOGLOBIN 11.8* 12.1*   HEMATOCRIT 36.4* 37.0*   PLATELETS 194 191   NEUTROS ABS  --  4.60   IMMATURE GRANS (ABS)  --  0.04   LYMPHS ABS  --  2.51   MONOS ABS  --  0.73   EOS ABS  --  0.24   .0* 103.6*   PROTIME  --  13.7   APTT  --  27.0   HSTROP T  --  44*         Lab 24  1323 24  0559 24  1005   SODIUM  --  139 139   POTASSIUM 5.8* 5.6* 4.6   CHLORIDE  --  102 104   CO2  --  24.0 22.0   ANION GAP  --  13.0 13.0   BUN  --  31* 26*   CREATININE  --  1.69* 1.64*   EGFR  --  37.2* 38.5*   GLUCOSE  --  115* 106*   CALCIUM  --  9.5 9.1   HEMOGLOBIN A1C  --  5.60  --          Lab 24  1005   ALT (SGPT) 11   AST (SGOT) 19         Lab 24  1005    HSTROP T 44*   PROTIME 13.7   INR 1.04         Lab 11/24/24  0559   CHOLESTEROL 197   LDL CHOL 136*   HDL CHOL 48   TRIGLYCERIDES 72             Brief Urine Lab Results  (Last result in the past 365 days)        Color   Clarity   Blood   Leuk Est   Nitrite   Protein   CREAT   Urine HCG        11/23/24 1434 Yellow   Clear   Negative   Negative   Negative   Negative                   Microbiology Results Abnormal       None            No radiology results from the last 24 hrs    Results for orders placed during the hospital encounter of 11/23/24    Adult Transthoracic Echo Complete W/ Cont if Necessary Per Protocol (With Agitated Saline)    Interpretation Summary    Left ventricular ejection fraction appears to be 56 - 60%.    Left ventricular wall thickness is consistent with mild concentric hypertrophy.    Left ventricular diastolic function is consistent with (grade II w/high LAP) pseudonormalization.    Mildly reduced right ventricular systolic function noted.    The left atrial cavity is mildly dilated.    There is mild calcification of the aortic valve mainly affecting the non-coronary, left coronary and right coronary cusp(s).    Aortic valve maximum pressure gradient is 11 mmHg. Aortic valve mean pressure gradient is 6 mmHg.    Calculated right ventricular systolic pressure from tricuspid regurgitation is 30 mmHg.      Current medications:  Scheduled Meds:apixaban, 2.5 mg, Oral, Q12H  aspirin, 81 mg, Oral, Daily   Or  aspirin, 300 mg, Rectal, Daily  atorvastatin, 40 mg, Oral, Nightly  gabapentin, 300 mg, Oral, BID  magnesium oxide, 400 mg, Oral, Daily  mirtazapine, 15 mg, Oral, Nightly  pantoprazole, 40 mg, Oral, Q AM  sertraline, 50 mg, Oral, Daily  sodium chloride, 10 mL, Intravenous, Q12H  sodium chloride, 10 mL, Intravenous, Q12H  [Held by provider] torsemide, 20 mg, Oral, Daily      Continuous Infusions:   PRN Meds:.  senna-docusate sodium **AND** polyethylene glycol **AND** bisacodyl **AND**  bisacodyl    sodium chloride    sodium chloride    sodium chloride    sodium chloride    sodium chloride    Assessment & Plan   Assessment & Plan     Active Hospital Problems    Diagnosis  POA    **TIA (transient ischemic attack) [G45.9]  Yes    Orthostatic hypotension [I95.1]  Yes    Headache [R51.9]  Yes    Left-sided weakness [R53.1]  Yes    Presence of cardiac pacemaker [Z95.0]  Yes    Chronic HFrEF (heart failure with reduced ejection fraction) [I50.22]  Yes    Stage 3b chronic kidney disease [N18.32]  Yes    Paroxysmal atrial fibrillation [I48.0]  Yes      Resolved Hospital Problems   No resolved problems to display.        Brief Hospital Course to date:  Mukund Pool is a 89 y.o. male w/ HFrEF, paroxysmal A-fib, bradycardia s/p PPM, CAD s/p CABG, CKD 3, HTN, HLD, H&N cancer, prostate cancer, migraines, GERD, who presented to the ED for evaluation of inability to walk, LT sided weakness, speech difficulty with drooling and difficulty handling secretions, in the setting of headache; he was admitted for stroke evaluation, initial CT imaging was without acute process, stroke neurology followed and felt that symptoms were attributed to complex migraine, recommended MRI not necessary as it would not alter their management, they recommended continue home oral AC and follow-up with Dr. Sam/CYNDEE Garcia for headache clinic, and cleared him for discharge; he remains hospitalized for orthostatic hypotension    This patient's problems and plans were partially entered by my partner and updated as appropriate by me 11/27/24.     Assessment/plan    Orthostatic hypotension  - My practice partner d/w OT, patient with orthostatic hypotension (73/43 mmHg) when standing  - Hold torsemide, 1L IVF ordered  - Orthostatic BP positive, continue to check daily  - Midodrine 2.5 once daily    Acute headache with LT weakness, speech difficulty  Hx migraines  - Initial CT imaging without acute process  - Seen by stroke  neuro, diagnosed with complex migraine, symptoms improved with migraine cocktail  - Per stroke service start magnesium glycinate, okay to discharge and follow-up in headache clinic  - CM follows for rehab placement    CKD3   Hyperkalemia, resolved  - Baseline Cr 1.4-1.7;  - Creatinine slightly elevated, encouraged PO intake of fluids. Will monitor with AM BMP  - Elevated serum potassium, given Lokelma 11/26/24 x 1 dose. Potassium WNL this AM    HLD  Paroxysmal A-fib  Chronic HFrEF, improved EF  CAD s/p CABG  Hx bradycardia s/p PPM - Continue Eliquis, aspirin, statin  GERD - Continue PPI    Hx H&N cancer  Hx prostate cancer    Expected Discharge Location and Transportation: [facility] pending CM eval/insurance pre-CERT  Expected Discharge   Expected Discharge Date: 11/27/2024; Expected Discharge Time:      VTE Prophylaxis:  Pharmacologic & mechanical VTE prophylaxis orders are present.         AM-PAC 6 Clicks Score (PT): 18 (11/26/24 2000)    CODE STATUS:   Code Status and Medical Interventions: CPR (Attempt to Resuscitate); Full Support   Ordered at: 11/23/24 1207     Code Status (Patient has no pulse and is not breathing):    CPR (Attempt to Resuscitate)     Medical Interventions (Patient has pulse or is breathing):    Full Support       Cherrie Lisa PA-C  11/27/24

## 2024-11-27 NOTE — PLAN OF CARE
Problem: Adult Inpatient Plan of Care  Goal: Plan of Care Review  Outcome: Progressing  Flowsheets (Taken 11/27/2024 1529 by Brandee Corado, PT)  Plan of Care Reviewed With: patient  Goal: Patient-Specific Goal (Individualized)  Outcome: Progressing  Goal: Absence of Hospital-Acquired Illness or Injury  Outcome: Progressing  Intervention: Identify and Manage Fall Risk  Recent Flowsheet Documentation  Taken 11/27/2024 1600 by Eulalia Bolivar RN  Safety Promotion/Fall Prevention:   activity supervised   assistive device/personal items within reach   clutter free environment maintained   fall prevention program maintained   lighting adjusted   nonskid shoes/slippers when out of bed   room organization consistent   safety round/check completed  Taken 11/27/2024 1400 by Eulalia Bolivar RN  Safety Promotion/Fall Prevention:   activity supervised   assistive device/personal items within reach   clutter free environment maintained   fall prevention program maintained   lighting adjusted   nonskid shoes/slippers when out of bed   room organization consistent   safety round/check completed  Taken 11/27/2024 1200 by Eulalia Bolivar RN  Safety Promotion/Fall Prevention:   activity supervised   assistive device/personal items within reach   clutter free environment maintained   fall prevention program maintained   lighting adjusted   nonskid shoes/slippers when out of bed   room organization consistent   safety round/check completed  Taken 11/27/2024 1000 by Eulalia Bolivar RN  Safety Promotion/Fall Prevention:   activity supervised   assistive device/personal items within reach   clutter free environment maintained   fall prevention program maintained   lighting adjusted   nonskid shoes/slippers when out of bed   room organization consistent   safety round/check completed  Taken 11/27/2024 0800 by Eulalia Bolivar RN  Safety Promotion/Fall Prevention:   activity supervised   assistive device/personal items  within reach   clutter free environment maintained   fall prevention program maintained   lighting adjusted   nonskid shoes/slippers when out of bed   room organization consistent   safety round/check completed  Intervention: Prevent Skin Injury  Recent Flowsheet Documentation  Taken 11/27/2024 1600 by Eulalia Bolivar RN  Body Position: position changed independently  Skin Protection:   drying agents applied   incontinence pads utilized   transparent dressing maintained  Taken 11/27/2024 1400 by Eulalia Bolivar RN  Body Position: position changed independently  Skin Protection:   drying agents applied   incontinence pads utilized   transparent dressing maintained  Taken 11/27/2024 1200 by Eulalia Bolivar RN  Body Position: position changed independently  Skin Protection:   drying agents applied   incontinence pads utilized   transparent dressing maintained  Taken 11/27/2024 1000 by Eulalia Bolivar RN  Body Position: position changed independently  Skin Protection:   drying agents applied   incontinence pads utilized   transparent dressing maintained  Taken 11/27/2024 0800 by Eulalia Bolivar RN  Body Position: position changed independently  Skin Protection:   drying agents applied   incontinence pads utilized   transparent dressing maintained  Intervention: Prevent and Manage VTE (Venous Thromboembolism) Risk  Recent Flowsheet Documentation  Taken 11/27/2024 0800 by Eulalia Bolivar RN  VTE Prevention/Management: (see MAR)   bilateral   SCDs (sequential compression devices) off   other (see comments)  Intervention: Prevent Infection  Recent Flowsheet Documentation  Taken 11/27/2024 1600 by Eulalia Bolivar RN  Infection Prevention:   single patient room provided   rest/sleep promoted   environmental surveillance performed  Taken 11/27/2024 1000 by Eulalia Bolivar RN  Infection Prevention:   environmental surveillance performed   single patient room provided   rest/sleep promoted  Taken  11/27/2024 0800 by Eulalia Bolivar, RN  Infection Prevention:   environmental surveillance performed   single patient room provided   rest/sleep promoted  Goal: Optimal Comfort and Wellbeing  Outcome: Progressing  Intervention: Provide Person-Centered Care  Recent Flowsheet Documentation  Taken 11/27/2024 0800 by Eulalia Bolivar, RN  Trust Relationship/Rapport:   care explained   choices provided   questions encouraged   questions answered   thoughts/feelings acknowledged  Goal: Readiness for Transition of Care  Outcome: Progressing   Goal Outcome Evaluation:

## 2024-11-27 NOTE — THERAPY TREATMENT NOTE
Patient Name: Mukund Pool  : 1930    MRN: [id number]                              Today's Date: 2024       Admit Date: 2024    Visit Dx:     ICD-10-CM ICD-9-CM   1. Dysphagia, unspecified type  R13.10 787.20   2. Acute nonintractable headache, unspecified headache type  R51.9 784.0   3. Ataxia  R27.0 781.3   4. Acute left-sided weakness  R53.1 728.87   5. 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)    A/C renal failure    GERD without esophagitis    Pleural effusion, bilateral    Moderate malnutrition    Presence of cardiac pacemaker    TIA (transient ischemic attack)    Headache    Left-sided weakness    Orthostatic hypotension     Past Medical History:   Diagnosis Date    Arthritis     Chronic kidney disease     kidney stones    Coronary artery disease     GERD (gastroesophageal reflux disease)     History of radiation therapy 2021    laryngeal mass    Prostate cancer     Vocal cord cancer      Past Surgical History:   Procedure Laterality Date    CARDIAC ELECTROPHYSIOLOGY PROCEDURE  2023    cardiac loop recorder    CARDIAC ELECTROPHYSIOLOGY PROCEDURE N/A 10/25/2023    Procedure: Pacemaker DC new;  Surgeon: Levy Pickett MD;  Location:  [facility];  Service: Cardiology;  Laterality: N/A;    CARDIAC ELECTROPHYSIOLOGY PROCEDURE N/A 10/25/2023    Procedure: AV node ablation;  Surgeon: Levy Pickett MD;  Location:  [facility];  Service: Cardiovascular;  Laterality: N/A;     CHOLECYSTECTOMY      CORONARY ARTERY BYPASS GRAFT      PROSTATE SURGERY      VOCAL CORD MASS EXCISION  03/22/2021      General Information       Row Name 11/27/24 1525          Physical Therapy Time and Intention    Document Type therapy note (daily note)  -AB     Mode of Treatment physical therapy  -AB       Row Name 11/27/24 1525          General Information    Patient Profile Reviewed yes  -AB     Existing Precautions/Restrictions fall;orthostatic hypotension  -AB     Barriers to Rehab medically complex  -AB       Row Name 11/27/24 1525          Cognition    Orientation Status (Cognition) oriented x 4  -AB       Row Name 11/27/24 1525          Safety Issues/Impairments Affecting Functional Mobility    Safety Issues Affecting Function (Mobility) awareness of need for assistance;insight into deficits/self-awareness;safety precaution awareness;safety precautions follow-through/compliance  -AB     Impairments Affecting Function (Mobility) balance;strength;endurance/activity tolerance  -AB               User Key  (r) = Recorded By, (t) = Taken By, (c) = Cosigned By      Initials Name Provider Type    AB Brandee Corado, PT Physical Therapist                   Mobility       Row Name 11/27/24 1525          Bed Mobility    Comment, (Bed Mobility) Pt received sitting EOB.  -AB       Row Name 11/27/24 1525          Transfers    Comment, (Transfers) Pt with symptomatic orthostatic hypotension limiting activity. BP dropped from 112/84 to 82/43 during STS. Pt quickly transferred to chair and legs elevated. BP improved to 117/65 in reclined position.  -AB       Row Name 11/27/24 1525          Bed-Chair Transfer    Bed-Chair [place] (Transfers) contact guard;1 person assist;verbal cues  -AB     Assistive Device (Bed-Chair Transfers) walker, front-wheeled  -AB       Row Name 11/27/24 1525          Sit-Stand Transfer    Sit-Stand [place] (Transfers) contact guard;verbal cues;1 person assist  -AB     Assistive Device  (Sit-Stand Transfers) walker, front-wheeled  -AB       Row Name 11/27/24 1525          Gait/Stairs (Locomotion)    Gillespie Level (Gait) unable to assess  -AB     Comment, (Gait/Stairs) Deferred this date d/t orthostatic hypotension  -AB               User Key  (r) = Recorded By, (t) = Taken By, (c) = Cosigned By      Initials Name Provider Type    AB Brandee Corado, PT Physical Therapist                   Obj/Interventions       Row Name 11/27/24 1527          Motor Skills    Therapeutic Exercise ankle;hip  -AB       Row Name 11/27/24 1527          Knee (Therapeutic Exercise)    Knee Strengthening (Therapeutic Exercise) bilateral;SLR (straight leg raise);10 repetitions  -AB       Row Name 11/27/24 1527          Ankle (Therapeutic Exercise)    Ankle (Therapeutic Exercise) AROM (active range of motion)  -AB     Ankle AROM (Therapeutic Exercise) bilateral;dorsiflexion;plantarflexion;10 repetitions  -AB       Row Name 11/27/24 1527          Balance    Balance Assessment sitting static balance;sitting dynamic balance;standing static balance;standing dynamic balance  -AB     Static Sitting Balance standby assist  -AB     Dynamic Sitting Balance contact guard  -AB     Position, Sitting Balance unsupported;sitting edge of bed  -AB     Static Standing Balance contact guard  -AB     Dynamic Standing Balance contact guard;1-person assist;verbal cues  -AB     Position/Device Used, Standing Balance supported;walker, front-wheeled  -AB     Balance Interventions sitting;standing;sit to stand;supported;static;dynamic;occupation based/functional task  -AB     Comment, Balance No overt LOB.  -AB               User Key  (r) = Recorded By, (t) = Taken By, (c) = Cosigned By      Initials Name Provider Type    AB Brandee Corado, PT Physical Therapist                   Goals/Plan    No documentation.                  Clinical Impression       Row Name 11/27/24 1529          Pain    Pretreatment Pain Rating 0/10 - no pain  -AB      Posttreatment Pain Rating 0/10 - no pain  -AB       Row Name 11/27/24 1529          Plan of Care Review    Plan of Care Reviewed With patient  -AB     Progress no change  -AB     Outcome Evaluation Activity limited by orthostatic hypotension this date. Bed>chair transfer completed with CGA and RW. Pt continues to present below baseline with weakness, impaired activity tolerance, and balance deficits. Further IPPT is warrented. PT will progress as able per POC.  -AB       Row Name 11/27/24 1529          Vital Signs    Pre Systolic BP Rehab 112  -AB     Pre Treatment Diastolic BP 84  -AB     Intra Systolic BP Rehab 82   STANDING  -AB     Intra Treatment Diastolic BP 43   -AB     Post Systolic BP Rehab 117  -AB     Post Treatment Diastolic BP 65  -AB     O2 Delivery Pre Treatment room air  -AB     O2 Delivery Intra Treatment room air  -AB     O2 Delivery Post Treatment room air  -AB       Row Name 11/27/24 1529          Positioning and Restraints    Pre-Treatment Position in bed  -AB     Post Treatment Position chair  -AB     In Chair notified nsg;reclined;sitting;call light within reach;encouraged to call for assist;exit alarm on;legs elevated;waffle cushion  -AB               User Key  (r) = Recorded By, (t) = Taken By, (c) = Cosigned By      Initials Name Provider Type    AB Brandee Corado, PT Physical Therapist                   Outcome Measures       Row Name 11/27/24 1531 11/27/24 0800       How much help from another person do you currently need...    Turning from your back to your side while in flat bed without using bedrails? 3  -AB 4  -KA    Moving from lying on back to sitting on the side of a flat bed without bedrails? 3  -AB 3  -KA    Moving to and from a bed to a chair (including a wheelchair)? 3  -AB 3  -KA    Standing up from a chair using your arms (e.g., wheelchair, bedside chair)? 3  -AB 3  -KA    Climbing 3-5 steps with a railing? 2  -AB 2  -KA    To walk in hospital room? 3  -AB 3  -KA    AM-PAC 6  Clicks Score (PT) 17  -AB 18  -KA    Highest Level of Mobility Goal 5 --> Static standing  -AB 6 --> Walk 10 steps or more  -KA      Row Name 11/27/24 1531 11/27/24 1517       Functional Assessment    Outcome Measure Options AM-PAC 6 Clicks Basic Mobility (PT)  -AB AM-PAC 6 Clicks Daily Activity (OT)  -MR              User Key  (r) = Recorded By, (t) = Taken By, (c) = Cosigned By      Initials Name Provider Type    Eulalia Maria, RN Registered Nurse    Grace Marc, OT Occupational Therapist    AB Brandee Corado, PT Physical Therapist                                 Physical Therapy Education       Title: PT OT SLP Therapies (In Progress)       Topic: Physical Therapy (Done)       Point: Mobility training (Done)       Learning Progress Summary            Patient Acceptance, E,D, VU,DU by AB at 11/27/2024 1532    Acceptance, E, VU,DU,NR by  at 11/26/2024 1604    Acceptance, E, VU,NR by CD at 11/24/2024 0937    Comment: BENEFITS OF OOB ACTIVITY, SAFETY WITH MOBILITY, PROGRESSION OF POC, D/C PLANNING,                      Point: Home exercise program (Done)       Learning Progress Summary            Patient Acceptance, E,D, VU,DU by AB at 11/27/2024 1532    Acceptance, E, VU,DU,NR by  at 11/26/2024 1604    Acceptance, E, VU,NR by CD at 11/24/2024 0937    Comment: BENEFITS OF OOB ACTIVITY, SAFETY WITH MOBILITY, PROGRESSION OF POC, D/C PLANNING,                      Point: Body mechanics (Done)       Learning Progress Summary            Patient Acceptance, E,D, VU,DU by AB at 11/27/2024 1532    Acceptance, E, VU,DU,NR by  at 11/26/2024 1604    Acceptance, E, VU,NR by CD at 11/24/2024 0937    Comment: BENEFITS OF OOB ACTIVITY, SAFETY WITH MOBILITY, PROGRESSION OF POC, D/C PLANNING,                      Point: Precautions (Done)       Learning Progress Summary            Patient Acceptance, E,D, VU,DU by AB at 11/27/2024 1532    Acceptance, E, VU,DU,NR by  at 11/26/2024 1604    Acceptance, E, VU,NR by  CD at 11/24/2024 0937    Comment: BENEFITS OF OOB ACTIVITY, SAFETY WITH MOBILITY, PROGRESSION OF POC, D/C PLANNING,                                      User Key       Initials Effective Dates Name Provider Type Discipline    CD 02/03/23 -  Claudia Ayoub, PT Physical Therapist PT    AB 09/22/22 -  Brandee Corado, PT Physical Therapist PT     09/21/23 -  Joan Villareal, PT Physical Therapist PT                  PT Recommendation and Plan     Progress: no change  Outcome Evaluation: Activity limited by orthostatic hypotension this date. Bed>chair transfer completed with CGA and RW. Pt continues to present below baseline with weakness, impaired activity tolerance, and balance deficits. Further IPPT is warrented. PT will progress as able per POC.     Time Calculation:         PT Charges       Row Name 11/27/24 1532             Time Calculation    Start Time 1425  -AB      PT Received On 11/27/24  -AB         Timed Charges    83655 - PT Therapeutic Activity Minutes 10  -AB         Total Minutes    Timed Charges Total Minutes 10  -AB       Total Minutes 10  -AB                User Key  (r) = Recorded By, (t) = Taken By, (c) = Cosigned By      Initials Name Provider Type    AB Brandee Corado, PT Physical Therapist                  Therapy Charges for Today       Code Description Service Date Service Provider Modifiers Qty    30935130852 HC PT THERAPEUTIC ACT EA 15 MIN 11/27/2024 Brandee Corado, PT GP, KX 1            PT G-Codes  Outcome Measure Options: AM-PAC 6 Clicks Basic Mobility (PT)  AM-PAC 6 Clicks Score (PT): 17  AM-PAC 6 Clicks Score (OT): 17  Modified Teresa Scale: 1 - No significant disability despite symptoms.  Able to carry out all usual duties and activities.  PT Discharge Summary  Anticipated Discharge Disposition (PT): inpatient rehabilitation facility    Brandee Corado PT  11/27/2024

## 2024-11-27 NOTE — THERAPY TREATMENT NOTE
Patient Name: Mukund Pool  : 1930    MRN: [id number]                              Today's Date: 2024       Admit Date: 2024    Visit Dx:     ICD-10-CM ICD-9-CM   1. Dysphagia, unspecified type  R13.10 787.20   2. Acute nonintractable headache, unspecified headache type  R51.9 784.0   3. Ataxia  R27.0 781.3   4. Acute left-sided weakness  R53.1 728.87   5. 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)    A/C renal failure    GERD without esophagitis    Pleural effusion, bilateral    Moderate malnutrition    Presence of cardiac pacemaker    TIA (transient ischemic attack)    Headache    Left-sided weakness    Orthostatic hypotension     Past Medical History:   Diagnosis Date    Arthritis     Chronic kidney disease     kidney stones    Coronary artery disease     GERD (gastroesophageal reflux disease)     History of radiation therapy 2021    laryngeal mass    Prostate cancer     Vocal cord cancer      Past Surgical History:   Procedure Laterality Date    CARDIAC ELECTROPHYSIOLOGY PROCEDURE  2023    cardiac loop recorder    CARDIAC ELECTROPHYSIOLOGY PROCEDURE N/A 10/25/2023    Procedure: Pacemaker DC new;  Surgeon: Levy Pickett MD;  Location:  [facility];  Service: Cardiology;  Laterality: N/A;    CARDIAC ELECTROPHYSIOLOGY PROCEDURE N/A 10/25/2023    Procedure: AV node ablation;  Surgeon: Levy Pickett MD;  Location:  [facility];  Service: Cardiovascular;  Laterality: N/A;     CHOLECYSTECTOMY      CORONARY ARTERY BYPASS GRAFT      PROSTATE SURGERY      VOCAL CORD MASS EXCISION  03/22/2021      General Information       Row Name 11/27/24 1504          OT Time and Intention    Document Type therapy note (daily note)  -MR     Mode of Treatment occupational therapy  -MR     Symptoms Noted During/After Treatment dizziness;significant change in vital signs  -MR       Row Name 11/27/24 1504          General Information    Patient Profile Reviewed yes  -MR     Existing Precautions/Restrictions fall;orthostatic hypotension  -MR     Barriers to Rehab medically complex  -MR       Row Name 11/27/24 1504          Cognition    Orientation Status (Cognition) oriented x 4  -MR       Row Name 11/27/24 1504          Safety Issues/Impairments Affecting Functional Mobility    Safety Issues Affecting Function (Mobility) awareness of need for assistance;insight into deficits/self-awareness;safety precaution awareness;safety precautions follow-through/compliance;sequencing abilities  -MR     Impairments Affecting Function (Mobility) balance;strength;endurance/activity tolerance  -MR     Cognitive Impairments, Mobility Safety/Performance insight into deficits/self-awareness;problem-solving/reasoning;safety precaution awareness;safety precaution follow-through  -MR               User Key  (r) = Recorded By, (t) = Taken By, (c) = Cosigned By      Initials Name Provider Type    MR Grace Upton, OT Occupational Therapist                     Mobility/ADL's       Row Name 11/27/24 1504          Bed Mobility    Bed Mobility supine-sit  -MR     Supine-Sit [place] (Bed Mobility) contact guard  -MR     Assistive Device (Bed Mobility) head of bed elevated;bed rails  -MR     Comment, (Bed Mobility) Pt completed bed mobility w/ CGA. /88 while supine, 112/84 sitting EOB.  -MR       Row Name 11/27/24 1504          Transfers    Transfers sit-stand transfer  -MR     Comment, (Transfers) v/c for hand placement for  STS. BP dropped from 112/84 to 82/43 in standing. Pt feeling symptomatic. Once in recliner BP improved to 117/65.  -MR       Row Name 11/27/24 1504          Sit-Stand Transfer    Sit-Stand [place] (Transfers) contact guard;verbal cues  -MR     Assistive Device (Sit-Stand Transfers) walker, front-wheeled  -MR       Row Name 11/27/24 1504          Activities of Daily Living    BADL Assessment/Intervention upper body dressing;grooming  -MR       Row Name 11/27/24 1504          Grooming Assessment/Training    [place] Level (Grooming) hair care, combing/brushing;set up  -MR     Position (Grooming) edge of bed sitting  -MR       Row Name 11/27/24 1504          Upper Body Dressing Assessment/Training    [place] (Upper Body Dressing) don;minimum assist (75% patient effort)  -MR     Position (Upper Body Dressing) edge of bed sitting  -MR               User Key  (r) = Recorded By, (t) = Taken By, (c) = Cosigned By      Initials Name Provider Type    Grace Marc OT Occupational Therapist                   Obj/Interventions       Row Name 11/27/24 1507          Balance    Balance Assessment sitting static balance;sitting dynamic balance;standing static balance;standing dynamic balance  -MR     Static Sitting Balance standby assist  -MR     Dynamic Sitting Balance contact guard  -MR     Position, Sitting Balance unsupported;sitting edge of bed  -MR     Static Standing Balance contact guard  -MR     Dynamic Standing Balance contact guard  -MR     Position/Device Used, Standing Balance supported;walker, front-wheeled  -MR     Balance Interventions sitting;standing;sit to stand;supported;static;dynamic;minimal challenge;occupation based/functional task  -MR               User Key  (r) = Recorded By, (t) = Taken By, (c) = Cosigned By      Initials Name Provider Type    Grace Marc, RAFFI Occupational Therapist                   Goals/Plan    No documentation.                  Clinical Impression        Row Name 11/27/24 1508          Pain Assessment    Pretreatment Pain Rating 0/10 - no pain  -MR     Posttreatment Pain Rating 0/10 - no pain  -MR       Row Name 11/27/24 1508          Plan of Care Review    Plan of Care Reviewed With patient  -MR     Progress improving  -MR     Outcome Evaluation Pt motivated to participate in therapy session. Limited by symptomatic orthostatic hypotension w/ drop from 112/84 sitting to 82/43. Improvements noted w/ transfers this date. RN notified of pt's BP. Continue to recommend per current POC as able.  -MR       Row Name 11/27/24 1508          Therapy Plan Review/Discharge Plan (OT)    Anticipated Discharge Disposition (OT) inpatient rehabilitation facility  -MR       Row Name 11/27/24 1508          Vital Signs    Pre Systolic BP Rehab 110  -MR     Pre Treatment Diastolic BP 88  -MR     Intra Systolic BP Rehab 112  -MR     Intra Treatment Diastolic BP 84  -MR     Post Systolic BP Rehab 82  standing  -MR     Post Treatment Diastolic BP 43  Once reclined in recliner 117/65  -MR     O2 Delivery Pre Treatment room air  -MR     O2 Delivery Intra Treatment room air  -MR     O2 Delivery Post Treatment room air  -MR     Pre Patient Position Supine  -MR     Intra Patient Position Standing  -MR     Post Patient Position Sitting  -MR       Row Name 11/27/24 1508          Positioning and Restraints    Pre-Treatment Position in bed  -MR     Post Treatment Position chair  -MR     In Chair notified nsg;reclined;sitting;call light within reach;encouraged to call for assist;exit alarm on;waffle cushion;legs elevated  -MR               User Key  (r) = Recorded By, (t) = Taken By, (c) = Cosigned By      Initials Name Provider Type    MR Grace Upton, OT Occupational Therapist                   Outcome Measures       Row Name 11/27/24 1517          How much help from another is currently needed...    Putting on and taking off regular lower body clothing? 2  -MR     Bathing (including washing,  rinsing, and drying) 2  -MR     Toileting (which includes using toilet bed pan or urinal) 3  -MR     Putting on and taking off regular upper body clothing 3  -MR     Taking care of personal grooming (such as brushing teeth) 3  -MR     Eating meals 4  -MR     AM-PAC 6 Clicks Score (OT) 17  -MR       Row Name 11/27/24 0800          How much help from another person do you currently need...    Turning from your back to your side while in flat bed without using bedrails? 4  -KA     Moving from lying on back to sitting on the side of a flat bed without bedrails? 3  -KA     Moving to and from a bed to a chair (including a wheelchair)? 3  -KA     Standing up from a chair using your arms (e.g., wheelchair, bedside chair)? 3  -KA     Climbing 3-5 steps with a railing? 2  -KA     To walk in hospital room? 3  -KA     AM-[facility] 6 Clicks Score (PT) 18  -KA     Highest Level of Mobility Goal 6 --> Walk 10 steps or more  -KA       Row Name 11/27/24 1517          Functional Assessment    Outcome Measure Options AM-PAC 6 Clicks Daily Activity (OT)  -MR               User Key  (r) = Recorded By, (t) = Taken By, (c) = Cosigned By      Initials Name Provider Type    Eulalia Maria RN Registered Nurse    Grace Marc, OT Occupational Therapist                    Occupational Therapy Education       Title: PT OT SLP Therapies (In Progress)       Topic: Occupational Therapy (Done)       Point: ADL training (Done)       Description:   Instruct learner(s) on proper safety adaptation and remediation techniques during self care or transfers.   Instruct in proper use of assistive devices.                  Learning Progress Summary            Patient Acceptance, E, VU by MR at 11/27/2024 1517    Acceptance, E,D, VU,DU by CS at 11/26/2024 1137                      Point: Home exercise program (Done)       Description:   Instruct learner(s) on appropriate technique for monitoring, assisting and/or progressing therapeutic  exercises/activities.                  Learning Progress Summary            Patient Acceptance, E, VU by MR at 11/27/2024 1517    Acceptance, E,D, VU,DU by  at 11/26/2024 1137                      Point: Precautions (Done)       Description:   Instruct learner(s) on prescribed precautions during self-care and functional transfers.                  Learning Progress Summary            Patient Acceptance, E, VU by MR at 11/27/2024 1517    Acceptance, E,D, VU,DU by  at 11/26/2024 1137                      Point: Body mechanics (Done)       Description:   Instruct learner(s) on proper positioning and spine alignment during self-care, functional mobility activities and/or exercises.                  Learning Progress Summary            Patient Acceptance, E, VU by MR at 11/27/2024 1517    Acceptance, E,D, VU,DU by  at 11/26/2024 1137                                      User Key       Initials Effective Dates Name Provider Type Discipline     06/16/21 -  Rigoberto Kennedy, OT Occupational Therapist OT     09/22/22 -  Grace Upton OT Occupational Therapist OT                  OT Recommendation and Plan     Plan of Care Review  Plan of Care Reviewed With: patient  Progress: improving  Outcome Evaluation: Pt motivated to participate in therapy session. Limited by symptomatic orthostatic hypotension w/ drop from 112/84 sitting to 82/43. Improvements noted w/ transfers this date. RN notified of pt's BP. Continue to recommend per current POC as able.     Time Calculation:         Time Calculation- OT       Row Name 11/27/24 1517             Time Calculation- OT    OT Start Time 1415  -MR      OT Received On 11/27/24  -MR         Timed Charges    38296 - OT Therapeutic Activity Minutes 10  -MR      42943 - OT Self Care/Mgmt Minutes 5  -MR         Total Minutes    Timed Charges Total Minutes 15  -MR       Total Minutes 15  -MR                User Key  (r) = Recorded By, (t) = Taken By, (c) = Cosigned By      Initials  Name Provider Type    MR Grace Upton OT Occupational Therapist                  Therapy Charges for Today       Code Description Service Date Service Provider Modifiers Qty    23612345005 HC OT THERAPEUTIC ACT EA 15 MIN 11/27/2024 Grace Upton OT GO, KX 1                 Grace Upton OT  11/27/2024

## 2024-11-28 LAB
ANION GAP SERPL CALCULATED.3IONS-SCNC: 11 MMOL/L (ref 5–15)
BUN SERPL-MCNC: 48 MG/DL (ref 8–23)
BUN/CREAT SERPL: 25.1 (ref 7–25)
CALCIUM SPEC-SCNC: 9.2 MG/DL (ref 8.2–9.6)
CHLORIDE SERPL-SCNC: 102 MMOL/L (ref 98–107)
CO2 SERPL-SCNC: 24 MMOL/L (ref 22–29)
CREAT SERPL-MCNC: 1.91 MG/DL (ref 0.76–1.27)
EGFRCR SERPLBLD CKD-EPI 2021: 32.1 ML/MIN/1.73
GLUCOSE SERPL-MCNC: 112 MG/DL (ref 65–99)
POTASSIUM SERPL-SCNC: 4.5 MMOL/L (ref 3.5–5.2)
SODIUM SERPL-SCNC: 137 MMOL/L (ref 136–145)

## 2024-11-28 PROCEDURE — 80048 BASIC METABOLIC PNL TOTAL CA: CPT

## 2024-11-28 PROCEDURE — 99232 SBSQ HOSP IP/OBS MODERATE 35: CPT | Performed by: INTERNAL MEDICINE

## 2024-11-28 RX ORDER — MIDODRINE HYDROCHLORIDE 5 MG/1
2.5 TABLET ORAL
Status: DISCONTINUED | OUTPATIENT
Start: 2024-11-28 | End: 2024-11-29 | Stop reason: HOSPADM

## 2024-11-28 RX ADMIN — ASPIRIN 81 MG 81 MG: 81 TABLET ORAL at 08:58

## 2024-11-28 RX ADMIN — PANTOPRAZOLE SODIUM 40 MG: 40 TABLET, DELAYED RELEASE ORAL at 05:24

## 2024-11-28 RX ADMIN — SERTRALINE HYDROCHLORIDE 50 MG: 50 TABLET ORAL at 08:58

## 2024-11-28 RX ADMIN — APIXABAN 2.5 MG: 2.5 TABLET, FILM COATED ORAL at 08:58

## 2024-11-28 RX ADMIN — ATORVASTATIN CALCIUM 40 MG: 40 TABLET, FILM COATED ORAL at 20:40

## 2024-11-28 RX ADMIN — MAGNESIUM OXIDE TAB 400 MG (241.3 MG ELEMENTAL MG) 400 MG: 400 (241.3 MG) TAB at 08:58

## 2024-11-28 RX ADMIN — GABAPENTIN 300 MG: 100 CAPSULE ORAL at 20:39

## 2024-11-28 RX ADMIN — MIDODRINE HYDROCHLORIDE 2.5 MG: 5 TABLET ORAL at 18:24

## 2024-11-28 RX ADMIN — MIRTAZAPINE 15 MG: 15 TABLET, FILM COATED ORAL at 20:40

## 2024-11-28 RX ADMIN — Medication 10 ML: at 09:00

## 2024-11-28 RX ADMIN — APIXABAN 2.5 MG: 2.5 TABLET, FILM COATED ORAL at 20:40

## 2024-11-28 RX ADMIN — MIDODRINE HYDROCHLORIDE 2.5 MG: 5 TABLET ORAL at 08:58

## 2024-11-28 RX ADMIN — Medication 10 ML: at 20:40

## 2024-11-28 RX ADMIN — GABAPENTIN 300 MG: 100 CAPSULE ORAL at 08:58

## 2024-11-28 NOTE — PROGRESS NOTES
[facility] Medicine Services  PROGRESS NOTE    Patient Name: Mukund Pool  : 1930  MRN: [id number]    Date of Admission: 2024  Primary Care Physician: Slick Hubbard MD    Subjective   Subjective     CC:  F/u TIA/migraine    HPI:  No complaints this AM, eating breakfast. Family planning to appeal rehab denial.    Per patient, he has longstanding Hx of orthostatic symptoms, has self learned mitigation techniques to manage.      Objective   Objective     Vital Signs:   Temp:  [97.3 °F (36.3 °C)-98.3 °F (36.8 °C)] 98 °F (36.7 °C)  Heart Rate:  [70-81] 74  Resp:  [16-18] 16  BP: (106-144)/(53-75) 144/67     Physical Exam:  Constitutional: Awake, alert, elderly male sitting up in bed in NAD eating breakfast  Respiratory: Clear to auscultation bilaterally, respiratory effort normal   Cardiovascular: RRR, palpable radial pulse  Gastrointestinal: Positive bowel sounds, soft, nontender, nondistended  Psychiatric: Appropriate affect, cooperative  Neurologic: Speech clear and fluent    Results Reviewed:  LAB RESULTS:      Lab 24  0559 24  1005   WBC 9.09 8.18   HEMOGLOBIN 11.8* 12.1*   HEMATOCRIT 36.4* 37.0*   PLATELETS 194 191   NEUTROS ABS  --  4.60   IMMATURE GRANS (ABS)  --  0.04   LYMPHS ABS  --  2.51   MONOS ABS  --  0.73   EOS ABS  --  0.24   .0* 103.6*   PROTIME  --  13.7   APTT  --  27.0   HSTROP T  --  44*         Lab 24  0612 24  0937 24  1323 24  0559 24  1005   SODIUM 137 140  --  139 139   POTASSIUM 4.5 4.6 5.8* 5.6* 4.6   CHLORIDE 102 105  --  102 104   CO2 24.0 25.0  --  24.0 22.0   ANION GAP 11.0 10.0  --  13.0 13.0   BUN 48* 44*  --  31* 26*   CREATININE 1.91* 1.77*  --  1.69* 1.64*   EGFR 32.1* 35.1*  --  37.2* 38.5*   GLUCOSE 112* 130*  --  115* 106*   CALCIUM 9.2 8.6  --  9.5 9.1   MAGNESIUM  --  2.1  --   --   --    HEMOGLOBIN A1C  --   --   --  5.60  --          Lab 24  1005   ALT (SGPT) 11    AST (SGOT) 19         Lab 11/23/24  1005   HSTROP T 44*   PROTIME 13.7   INR 1.04         Lab 11/24/24  0559   CHOLESTEROL 197   LDL CHOL 136*   HDL CHOL 48   TRIGLYCERIDES 72             Brief Urine Lab Results  (Last result in the past 365 days)        Color   Clarity   Blood   Leuk Est   Nitrite   Protein   CREAT   Urine HCG        11/23/24 1434 Yellow   Clear   Negative   Negative   Negative   Negative                   Microbiology Results Abnormal       None            No radiology results from the last 24 hrs    Results for orders placed during the hospital encounter of 11/23/24    Adult Transthoracic Echo Complete W/ Cont if Necessary Per Protocol (With Agitated Saline)    Interpretation Summary    Left ventricular ejection fraction appears to be 56 - 60%.    Left ventricular wall thickness is consistent with mild concentric hypertrophy.    Left ventricular diastolic function is consistent with (grade II w/high LAP) pseudonormalization.    Mildly reduced right ventricular systolic function noted.    The left atrial cavity is mildly dilated.    There is mild calcification of the aortic valve mainly affecting the non-coronary, left coronary and right coronary cusp(s).    Aortic valve maximum pressure gradient is 11 mmHg. Aortic valve mean pressure gradient is 6 mmHg.    Calculated right ventricular systolic pressure from tricuspid regurgitation is 30 mmHg.      Current medications:  Scheduled Meds:apixaban, 2.5 mg, Oral, Q12H  aspirin, 81 mg, Oral, Daily   Or  aspirin, 300 mg, Rectal, Daily  atorvastatin, 40 mg, Oral, Nightly  gabapentin, 300 mg, Oral, BID  magnesium oxide, 400 mg, Oral, Daily  midodrine, 2.5 mg, Oral, BID AC  mirtazapine, 15 mg, Oral, Nightly  pantoprazole, 40 mg, Oral, Q AM  sertraline, 50 mg, Oral, Daily  sodium chloride, 10 mL, Intravenous, Q12H  sodium chloride, 10 mL, Intravenous, Q12H  [Held by provider] torsemide, 20 mg, Oral, Daily      Continuous Infusions:   PRN Meds:.   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        Brief Hospital Course to date:  Mukund Pool is a 89 y.o. male w/ HFrEF, paroxysmal A-fib, bradycardia s/p PPM, CAD s/p CABG, CKD 3, HTN, HLD, H&N cancer, prostate cancer, migraines, GERD, who presented to the ED for evaluation of inability to walk, LT sided weakness, speech difficulty with drooling and difficulty handling secretions, in the setting of headache; he was admitted for stroke evaluation, initial CT imaging was without acute process, stroke neurology followed and felt that symptoms were attributed to complex migraine, recommended MRI not necessary as it would not alter their management, they recommended continue home oral AC and follow-up with Dr. Sam/CYNDEE Garcia for headache clinic, and cleared him for discharge; orthostatic vitals were found to be positive during hospital stay; rehab attempt was declined, per CM family is planning to appeal    Assessment/plan    Orthostatic hypotension  -pt reports chronic orthostatic syx, has learned mitigation techniques, syx usually resolve after a short waiting period  -Holding torsemide, started on midodrine, drop in BP less dramatic this AM, will adjust to BID dosing  -PT/OT, rehab denied, family planning to appeal    Acute headache with LT  weakness, speech difficulty, improved  Hx migraines  -Initial CT imaging without acute process  -Seen by stroke neuro, diagnosed with complex migraine, symptoms improved with migraine cocktail  -Per stroke service start magnesium glycinate, okay to discharge and follow-up in headache clinic    HTN/HLD  Paroxysmal A-fib  Chronic HFrEF, improved EF  CAD s/p CABG  Hx bradycardia s/p PPM  -Eliquis, aspirin, statin    CKD3 - baseline Cr 1.4-1.7; eGFR 30's; hyperkalemia improved, Cr trending slightly above baseline, repeat labs tomorrow  GERD - PPI    Hx H&N cancer  Hx prostate cancer    Expected Discharge Location and Transportation: [facility] pending family appeal  Expected Discharge   Expected Discharge Date: 12/2/2024; Expected Discharge Time:      VTE Prophylaxis:  Pharmacologic & mechanical VTE prophylaxis orders are present.         AM-PAC 6 Clicks Score (PT): 17 (11/28/24 1200)    CODE STATUS:   Code Status and Medical Interventions: CPR (Attempt to Resuscitate); Full Support   Ordered at: 11/23/24 1207     Code Status (Patient has no pulse and is not breathing):    CPR (Attempt to Resuscitate)     Medical Interventions (Patient has pulse or is breathing):    Full Support       Rickie Fontana, DO  11/28/24

## 2024-11-29 ENCOUNTER — READMISSION MANAGEMENT (OUTPATIENT)
Dept: CALL CENTER | Facility: HOSPITAL | Age: 89
End: 2024-11-29
Payer: MEDICARE

## 2024-11-29 ENCOUNTER — APPOINTMENT (OUTPATIENT)
Facility: HOSPITAL | Age: 89
End: 2024-11-29
Payer: MEDICARE

## 2024-11-29 VITALS
TEMPERATURE: 97.6 F | RESPIRATION RATE: 16 BRPM | HEART RATE: 70 BPM | HEIGHT: 73 IN | BODY MASS INDEX: 23.86 KG/M2 | OXYGEN SATURATION: 99 % | SYSTOLIC BLOOD PRESSURE: 110 MMHG | WEIGHT: 180 LBS | DIASTOLIC BLOOD PRESSURE: 60 MMHG

## 2024-11-29 LAB
ANION GAP SERPL CALCULATED.3IONS-SCNC: 8 MMOL/L (ref 5–15)
BUN SERPL-MCNC: 43 MG/DL (ref 8–23)
BUN/CREAT SERPL: 24.9 (ref 7–25)
CALCIUM SPEC-SCNC: 9.4 MG/DL (ref 8.2–9.6)
CHLORIDE SERPL-SCNC: 105 MMOL/L (ref 98–107)
CO2 SERPL-SCNC: 28 MMOL/L (ref 22–29)
CREAT SERPL-MCNC: 1.73 MG/DL (ref 0.76–1.27)
EGFRCR SERPLBLD CKD-EPI 2021: 36.1 ML/MIN/1.73
GLUCOSE SERPL-MCNC: 136 MG/DL (ref 65–99)
POTASSIUM SERPL-SCNC: 5.9 MMOL/L (ref 3.5–5.2)
SODIUM SERPL-SCNC: 141 MMOL/L (ref 136–145)

## 2024-11-29 PROCEDURE — 97530 THERAPEUTIC ACTIVITIES: CPT

## 2024-11-29 PROCEDURE — 80048 BASIC METABOLIC PNL TOTAL CA: CPT | Performed by: INTERNAL MEDICINE

## 2024-11-29 PROCEDURE — 97110 THERAPEUTIC EXERCISES: CPT

## 2024-11-29 PROCEDURE — 99239 HOSP IP/OBS DSCHRG MGMT >30: CPT | Performed by: INTERNAL MEDICINE

## 2024-11-29 PROCEDURE — 93294 REM INTERROG EVL PM/LDLS PM: CPT | Performed by: STUDENT IN AN ORGANIZED HEALTH CARE EDUCATION/TRAINING PROGRAM

## 2024-11-29 PROCEDURE — 93296 REM INTERROG EVL PM/IDS: CPT | Performed by: STUDENT IN AN ORGANIZED HEALTH CARE EDUCATION/TRAINING PROGRAM

## 2024-11-29 RX ORDER — MAGNESIUM GLYCINATE 100 MG
400 CAPSULE ORAL NIGHTLY
Qty: 120 CAPSULE | Refills: 0 | Status: SHIPPED | OUTPATIENT
Start: 2024-11-29

## 2024-11-29 RX ORDER — MIDODRINE HYDROCHLORIDE 2.5 MG/1
2.5 TABLET ORAL
Qty: 60 TABLET | Refills: 0 | Status: SHIPPED | OUTPATIENT
Start: 2024-11-29

## 2024-11-29 RX ORDER — ATORVASTATIN CALCIUM 40 MG/1
40 TABLET, FILM COATED ORAL NIGHTLY
Qty: 30 TABLET | Refills: 0 | Status: SHIPPED | OUTPATIENT
Start: 2024-11-29

## 2024-11-29 RX ADMIN — MIDODRINE HYDROCHLORIDE 2.5 MG: 5 TABLET ORAL at 08:55

## 2024-11-29 RX ADMIN — ASPIRIN 81 MG 81 MG: 81 TABLET ORAL at 08:55

## 2024-11-29 RX ADMIN — APIXABAN 2.5 MG: 2.5 TABLET, FILM COATED ORAL at 08:55

## 2024-11-29 RX ADMIN — GABAPENTIN 300 MG: 100 CAPSULE ORAL at 08:55

## 2024-11-29 RX ADMIN — MAGNESIUM OXIDE TAB 400 MG (241.3 MG ELEMENTAL MG) 400 MG: 400 (241.3 MG) TAB at 08:55

## 2024-11-29 RX ADMIN — SERTRALINE HYDROCHLORIDE 50 MG: 50 TABLET ORAL at 08:55

## 2024-11-29 RX ADMIN — Medication 10 ML: at 08:56

## 2024-11-29 RX ADMIN — SODIUM ZIRCONIUM CYCLOSILICATE 10 G: 10 POWDER, FOR SUSPENSION ORAL at 14:41

## 2024-11-29 RX ADMIN — PANTOPRAZOLE SODIUM 40 MG: 40 TABLET, DELAYED RELEASE ORAL at 05:29

## 2024-11-29 NOTE — DISCHARGE SUMMARY
[facility] Medicine Services  DISCHARGE SUMMARY    Patient Name: Mukund Pool  : 1930  MRN: [id number]    Date of Admission: 2024  9:54 AM  Date of Discharge: 2024  Primary Care Physician: Slick Hubbard MD    Consults       Date and Time Order Name Status Description    2024  1:46 PM Inpatient Cardiology Consult Completed     2024  9:55 AM Inpatient Neurology Consult Stroke Completed             Hospital Course       Active Hospital Problems    Diagnosis  POA   • **TIA (transient ischemic attack) [G45.9]  Yes   • Orthostatic hypotension [I95.1]  Yes   • Headache [R51.9]  Yes   • Left-sided weakness [R53.1]  Yes   • Presence of cardiac pacemaker [Z95.0]  Yes   • Chronic HFrEF (heart failure with reduced ejection fraction) [I50.22]  Yes   • Stage 3b chronic kidney disease [N18.32]  Yes   • Paroxysmal atrial fibrillation [I48.0]  Yes      Resolved Hospital Problems   No resolved problems to display.          Hospital Course:  Mukund Pool is a 89 y.o. male with HFrEF, paroxysmal A-fib, bradycardia s/p PPM, CAD s/p CABG, CKD 3, HTN, HLD, H&N cancer, prostate cancer, migraines, GERD, who initially presented to the ED for evaluation of inability to walk, LT sided weakness, speech difficulties with drooling, and difficulty handling secretions.  He reported headache in conjunction with other symptoms.  He was initially admitted for stroke evaluation, CT imaging was without acute process, stroke neurology followed and felt that symptoms were most consistent with complex migraine, stated MRI was not required as it would not alter their management.  He is to continue oral AC, start oral magnesium supplement, stop his Zetia and start atorvastatin, and follow-up with neurology/headache clinic.  Prior to discharging he was found to have positive orthostatic vitals and he remained hospitalized for further evaluation of the same, he has improved  with midodrine 2.5 mg bid added to his regimen, still with drop in SBP but less profound.  He does report chronic issues with the same iron has self learned mitigation strategies.  He was awaiting rehab discharge, insurance initially declined and family wish to appeal, however they have now decided to take him back to his independent living facility.  Case management has arranged home health and he is discharging this afternoon.    Acute headache with LT weakness, speech difficulty, improved  Hx migraines  -Initial CT imaging without acute process  -Seen by stroke neuro, diagnosed with complex migraine, symptoms improved with migraine cocktail  -Per stroke service start magnesium glycinate, okay to discharge and follow-up in headache clinic    Orthostatic hypotension  -pt reports chronic orthostatic syx, has learned mitigation techniques, syx usually resolve after a short waiting period  -restart home torsemide at 10mg, started on midodrine 2.5mg BID this admit w/ less dramatic drop in BP w/ position changes     HTN/HLD  Paroxysmal A-fib  Chronic HFrEF, improved EF  CAD s/p CABG  Hx bradycardia s/p PPM  -Eliquis, aspirin, statin (replacing zetia)     CKD3 - baseline Cr 1.4-1.7; eGFR 30's; renal function stable, K has been intermittently slightly above ULN, recommend low K diet  GERD - PPI     Hx H&N cancer  Hx prostate cancer      Discharge Follow Up Recommendations for outpatient labs/diagnostics:  PCP 1-2 weeks  Ambulatory referral to neurology/headache clinic placed under ADT tab    Day of Discharge     HPI:   No complaints this morning, states that he and his wife have decided for him to just go back to independent living without going to rehab.  He feels strong enough to get around and do his basic requirements.    Vital Signs:   Temp:  [97.6 °F (36.4 °C)-98 °F (36.7 °C)] 97.6 °F (36.4 °C)  Heart Rate:  [70-86] 70  Resp:  [16-18] 16  BP: (110-144)/(56-80) 110/60      Physical Exam:  Constitutional: Awake,  alert, elderly male laying in bed, NAD  Respiratory: Clear to auscultation bilaterally, respiratory effort normal   Cardiovascular: RRR, palpable radial pulse  Gastrointestinal: Positive bowel sounds, soft, nontender, nondistended  Psychiatric: Appropriate affect, cooperative  Neurologic: Speech clear and fluent, answering questions appropriately    Pertinent  and/or Most Recent Results     LAB RESULTS:      Lab 11/24/24  0559 11/23/24  1005   WBC 9.09 8.18   HEMOGLOBIN 11.8* 12.1*   HEMATOCRIT 36.4* 37.0*   PLATELETS 194 191   NEUTROS ABS  --  4.60   IMMATURE GRANS (ABS)  --  0.04   LYMPHS ABS  --  2.51   MONOS ABS  --  0.73   EOS ABS  --  0.24   .0* 103.6*   PROTIME  --  13.7   APTT  --  27.0         Lab 11/29/24  0852 11/28/24  0612 11/27/24  0937 11/26/24  1323 11/24/24  0559 11/23/24  1005   SODIUM 141 137 140  --  139 139   POTASSIUM 5.9* 4.5 4.6 5.8* 5.6* 4.6   CHLORIDE 105 102 105  --  102 104   CO2 28.0 24.0 25.0  --  24.0 22.0   ANION GAP 8.0 11.0 10.0  --  13.0 13.0   BUN 43* 48* 44*  --  31* 26*   CREATININE 1.73* 1.91* 1.77*  --  1.69* 1.64*   EGFR 36.1* 32.1* 35.1*  --  37.2* 38.5*   GLUCOSE 136* 112* 130*  --  115* 106*   CALCIUM 9.4 9.2 8.6  --  9.5 9.1   MAGNESIUM  --   --  2.1  --   --   --    HEMOGLOBIN A1C  --   --   --   --  5.60  --          Lab 11/23/24  1005   ALT (SGPT) 11   AST (SGOT) 19         Lab 11/23/24  1005   HSTROP T 44*   PROTIME 13.7   INR 1.04         Lab 11/24/24  0559   CHOLESTEROL 197   LDL CHOL 136*   HDL CHOL 48   TRIGLYCERIDES 72             Brief Urine Lab Results  (Last result in the past 365 days)        Color   Clarity   Blood   Leuk Est   Nitrite   Protein   CREAT   Urine HCG        11/23/24 1434 Yellow   Clear   Negative   Negative   Negative   Negative                 Microbiology Results (last 10 days)       ** No results found for the last 240 hours. **            XR Chest 1 View    Result Date: 11/23/2024  XR CHEST 1 VW Date of Exam: 11/23/2024 10:36 AM EST  Indication: Acute Stroke Protocol (onset < 12 hrs) Comparison: 6/16/2024 Findings: Prior sternotomy. Lungs are without consolidation. No pneumothorax or pleural effusion. Aortic arch atherosclerosis. Left-sided AICD and cardiac loop recorder noted.     Impression: No acute process. Electronically Signed: Soto Matos MD  11/23/2024 11:05 AM EST  Workstation ID: [id number]    CT Angiogram Head w AI Analysis of LVO    Result Date: 11/23/2024  CT ANGIOGRAM HEAD W AI ANALYSIS OF LVO, CT ANGIOGRAM NECK Date of Exam: 11/23/2024 10:06 AM EST Indication: Neuro Deficit, acute, Stroke suspected Neuro deficit, acute stroke suspected. Comparison: None available. Technique: CTA of the head and neck was performed after the uneventful intravenous administration of 75 mL Isovue-370. Reconstructed coronal and sagittal images were also obtained. In addition, a 3-D volume rendered image was created for interpretation. Automated exposure control and iterative reconstruction methods were used. Findings: CTA head: Dominant right vertebral artery system with the left V4 segment terminating as the PICA origin, normal variant, with the basilar artery arising solely from the right V4 segment. There is some atherosclerosis at the carotid siphons with mild luminal irregularity without flow-limiting stenosis. No abrupt cut off/large vessel occlusion, flow-limiting stenosis, dissection, or aneurysm. The cerebral veins and dural venous sinuses appear grossly patent. CTA neck: Dominant right vertebral artery system with decreased caliber of the entire left cervical vertebral artery. No abrupt cut off/large vessel occlusion, flow-limiting stenosis, dissection, or aneurysm. There is moderate calcified and noncalcified atherosclerosis of the bilateral carotid bifurcations and proximal cervical ICAs which results in luminal irregularity and mild stenosis, without significant or flow-limiting stenosis (<50% narrowing per NASCET criteria). Extravascular  findings: Partial visualization of median sternotomy wires and cardiac pacer with left chest pulse generator. Upper lungs are grossly clear. Unremarkable appearance of the pharyngeal and laryngeal structures. No acute or suspicious bony findings.     Impression: No abrupt cut off/large vessel occlusion, flow-limiting stenosis, dissection, or aneurysm. Moderate atherosclerosis of the bilateral carotid bifurcations and proximal cervical ICAs with some luminal irregularity and mild stenosis, without flow-limiting stenosis per NASCET criteria (less than 50% narrowing). Electronically Signed: Madi Pena MD  11/23/2024 10:29 AM EST  Workstation ID: [id number]    CT Angiogram Neck    Result Date: 11/23/2024  CT ANGIOGRAM HEAD W AI ANALYSIS OF LVO, CT ANGIOGRAM NECK Date of Exam: 11/23/2024 10:06 AM EST Indication: Neuro Deficit, acute, Stroke suspected Neuro deficit, acute stroke suspected. Comparison: None available. Technique: CTA of the head and neck was performed after the uneventful intravenous administration of 75 mL Isovue-370. Reconstructed coronal and sagittal images were also obtained. In addition, a 3-D volume rendered image was created for interpretation. Automated exposure control and iterative reconstruction methods were used. Findings: CTA head: Dominant right vertebral artery system with the left V4 segment terminating as the PICA origin, normal variant, with the basilar artery arising solely from the right V4 segment. There is some atherosclerosis at the carotid siphons with mild luminal irregularity without flow-limiting stenosis. No abrupt cut off/large vessel occlusion, flow-limiting stenosis, dissection, or aneurysm. The cerebral veins and dural venous sinuses appear grossly patent. CTA neck: Dominant right vertebral artery system with decreased caliber of the entire left cervical vertebral artery. No abrupt cut off/large vessel occlusion, flow-limiting stenosis, dissection, or aneurysm. There is  moderate calcified and noncalcified atherosclerosis of the bilateral carotid bifurcations and proximal cervical ICAs which results in luminal irregularity and mild stenosis, without significant or flow-limiting stenosis (<50% narrowing per NASCET criteria). Extravascular findings: Partial visualization of median sternotomy wires and cardiac pacer with left chest pulse generator. Upper lungs are grossly clear. Unremarkable appearance of the pharyngeal and laryngeal structures. No acute or suspicious bony findings.     Impression: No abrupt cut off/large vessel occlusion, flow-limiting stenosis, dissection, or aneurysm. Moderate atherosclerosis of the bilateral carotid bifurcations and proximal cervical ICAs with some luminal irregularity and mild stenosis, without flow-limiting stenosis per NASCET criteria (less than 50% narrowing). Electronically Signed: Madi Pena MD  11/23/2024 10:29 AM EST  Workstation ID: [id number]    CT Head Without Contrast Stroke Protocol    Result Date: 11/23/2024  CT HEAD WO CONTRAST STROKE PROTOCOL Date of Exam: 11/23/2024 9:55 AM EST Indication: Neuro deficit, acute, stroke suspected Neuro Deficit, acute, Stroke suspected. Comparison: Head CT 6/16/2024 Technique: Axial CT images were obtained of the head without contrast administration.  Reconstructed coronal images were also obtained. Automated exposure control and iterative construction methods were used. Scan Time: 9:55 a.m. 11/23/2024 Results discussed with stroke navigator by Dr. Madi Pena via telephone at 10:00 a.m. 11/23/2024. Findings: No acute intracranial hemorrhage. No acute large territory infarct. There is some senescent mineralization of the basal ganglia. There are scattered subcortical and periventricular white matter hypodensities which are nonspecific and can be seen in the setting of chronic small vessel ischemic change. No extra-axial collections. No midline shift or herniation. Normal size and configuration  of the ventricles. Unremarkable appearance of the orbits. The paranasal sinuses and mastoid air cells are grossly clear. No acute or suspicious bony findings.     Impression: No acute intracranial findings. Chronic and senescent changes as above. Electronically Signed: Madi Pena MD  11/23/2024 10:03 AM EST  Workstation ID: [id number]             Results for orders placed during the hospital encounter of 11/23/24    Adult Transthoracic Echo Complete W/ Cont if Necessary Per Protocol (With Agitated Saline)    Interpretation Summary  •  Left ventricular ejection fraction appears to be 56 - 60%.  •  Left ventricular wall thickness is consistent with mild concentric hypertrophy.  •  Left ventricular diastolic function is consistent with (grade II w/high LAP) pseudonormalization.  •  Mildly reduced right ventricular systolic function noted.  •  The left atrial cavity is mildly dilated.  •  There is mild calcification of the aortic valve mainly affecting the non-coronary, left coronary and right coronary cusp(s).  •  Aortic valve maximum pressure gradient is 11 mmHg. Aortic valve mean pressure gradient is 6 mmHg.  •  Calculated right ventricular systolic pressure from tricuspid regurgitation is 30 mmHg.        Discharge Details        Discharge Medications        New Medications        Instructions Start Date   atorvastatin 40 MG tablet  Commonly known as: LIPITOR   40 mg, Oral, Nightly      Magnesium Glycinate 100 MG capsule   400 mg, Oral, Nightly      midodrine 2.5 MG tablet  Commonly known as: PROAMATINE   2.5 mg, Oral, 2 Times Daily Before Meals             Changes to Medications        Instructions Start Date   torsemide 20 MG tablet  Commonly known as: DEMADEX  What changed: how much to take   10 mg, Oral, Daily             Continue These Medications        Instructions Start Date   apixaban 2.5 MG tablet tablet  Commonly known as: ELIQUIS   2.5 mg, Oral, 2 Times Daily      aspirin 81 MG EC tablet   81 mg,  Daily      gabapentin 300 MG capsule  Commonly known as: NEURONTIN   300 mg, Oral, 2 Times Daily      mirtazapine 15 MG tablet  Commonly known as: REMERON   15 mg, Nightly      multivitamin with minerals tablet tablet   1 tablet, Daily      nitroglycerin 0.4 MG SL tablet  Commonly known as: NITROSTAT   0.4 mg, Sublingual, Every 5 Minutes PRN      omeprazole 20 MG capsule  Commonly known as: priLOSEC   1 capsule, Daily      sertraline 50 MG tablet  Commonly known as: ZOLOFT   Take 1 tablet by mouth Daily.             Stop These Medications      ezetimibe 10 MG tablet  Commonly known as: ZETIA              No Known Allergies      Discharge Disposition:  Home-Health Care [facility] [place]    Diet:  Hospital:  Diet Order   Procedures   • Diet: Regular/House; Texture: Regular (IDDSI 7); Fluid Consistency: Thin (IDDSI 0)   Recommend low potassium diet         CODE STATUS:    Code Status and Medical Interventions: CPR (Attempt to Resuscitate); Full Support   Ordered at: 11/23/24 1207     Code Status (Patient has no pulse and is not breathing):    CPR (Attempt to Resuscitate)     Medical Interventions (Patient has pulse or is breathing):    Full Support       Future Appointments   Date Time Provider Department Center   11/29/2024  3:00 PM MED [address] [place] [address] EMS S None   1/14/2025 10:15 AM Wallace Espinoza IV, MD [facility]   7/22/2025  9:45 AM Levy Pickett MD [facility]       Additional Instructions for the Follow-ups that You Need to Schedule       Ambulatory Referral to Home Health   As directed      Face to Face Visit Date: 11/29/2024   Follow-up provider for Plan of Care?: I treated the patient in an acute care facility and will not continue treatment after discharge.   Follow-up provider: SHAYLA BARTLETT [[id number]]   Reason/Clinical Findings: S/P hospitalization   Describe mobility limitations that make leaving home difficult: Impaired functional mobility, balance, gait and endurance   Nursing/Therapeutic  Services Requested: Physical Therapy Occupational Therapy   PT orders: Strengthening Home safety assessment   Occupational orders: Activities of daily living Home safety assessment   Frequency: 1 Week 1        Ambulatory Referral to Neurology   As directed      Recurrent migraines, next available follow-up    Order Comments: Recurrent migraines, next available follow-up         Discharge Follow-up with PCP   As directed       Currently Documented PCP:    Slick Hubbard MD    PCP Phone Number:    [phone]     Follow Up Details: 1-2 weeks                      Rickie Fontana DO  11/29/24      Time Spent on Discharge:  I spent  40  minutes on this discharge activity which included: face-to-face encounter with the patient, reviewing the data in the system, coordination of the care with the nursing staff as well as consultants, documentation, and entering orders.

## 2024-11-29 NOTE — PLAN OF CARE
Goal Outcome Evaluation:  Plan of Care Reviewed With: patient        Progress: no change  Outcome Evaluation: Good effort during during ther-ex, transfer training, and self-care activities this date. Will cont IPOT per POC as tolerated. Rec d/c to IRF when medically appropriate.    Anticipated Discharge Disposition (OT): inpatient rehabilitation facility

## 2024-11-29 NOTE — CASE MANAGEMENT/SOCIAL WORK
Case Management Discharge Note    Final Note: Mr. Pool is being discharged home today with home health if medically ready. Updated Ms. Pool and she is agreeable with discharge plan. He will be transported by [facility] at 3:00pm. PCS form has been submitted to the dropbox. Spoke with Brandee with [facility] and they are able to accept patient for PT and OT. They will contact patient with an appointment for Tuesday or Wednesday 12/3 or 12/4. No need to call report or fax the discharge summary.                Home Medical Care Coordination complete.      Service Provider Services Address Phone Fax Patient Preferred    [facility] [address], [address], [place] [id number] [phone] -- --                      Transportation Services  Ambulance: [facility]    Final Discharge Disposition Code: 06 - home with home health care

## 2024-11-29 NOTE — NURSING NOTE
Patient discharged via ambulance back to [facility]. Patient verbalized understanding to instructions given and wife was given instructions as well via phone per MD today- questions answered.

## 2024-11-29 NOTE — THERAPY TREATMENT NOTE
Patient Name: Mukund Pool  : 1930    MRN: [id number]                              Today's Date: 2024       Admit Date: 2024    Visit Dx:     ICD-10-CM ICD-9-CM   1. Dysphagia, unspecified type  R13.10 787.20   2. Acute nonintractable headache, unspecified headache type  R51.9 784.0   3. Ataxia  R27.0 781.3   4. Acute left-sided weakness  R53.1 728.87   5. 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)    A/C renal failure    GERD without esophagitis    Pleural effusion, bilateral    Moderate malnutrition    Presence of cardiac pacemaker    TIA (transient ischemic attack)    Headache    Left-sided weakness    Orthostatic hypotension     Past Medical History:   Diagnosis Date    Arthritis     Chronic kidney disease     kidney stones    Coronary artery disease     GERD (gastroesophageal reflux disease)     History of radiation therapy 2021    laryngeal mass    Prostate cancer     Vocal cord cancer      Past Surgical History:   Procedure Laterality Date    CARDIAC ELECTROPHYSIOLOGY PROCEDURE  2023    cardiac loop recorder    CARDIAC ELECTROPHYSIOLOGY PROCEDURE N/A 10/25/2023    Procedure: Pacemaker DC new;  Surgeon: Levy Pickett MD;  Location:  [facility];  Service: Cardiology;  Laterality: N/A;    CARDIAC ELECTROPHYSIOLOGY PROCEDURE N/A 10/25/2023    Procedure: AV node ablation;  Surgeon: Levy Pickett MD;  Location:  [facility];  Service: Cardiovascular;  Laterality: N/A;     CHOLECYSTECTOMY      CORONARY ARTERY BYPASS GRAFT      PROSTATE SURGERY      VOCAL CORD MASS EXCISION  03/22/2021      General Information       Row Name 11/29/24 1116          OT Time and Intention    Document Type therapy note (daily note)  -CS     Mode of Treatment occupational therapy  -CS     Patient Effort good  -CS       Row Name 11/29/24 1116          General Information    Patient Profile Reviewed yes  -CS     Existing Precautions/Restrictions fall;orthostatic hypotension;other (see comments)  intermittent confusion  -CS     Barriers to Rehab medically complex  -CS       Row Name 11/29/24 1116          Cognition    Orientation Status (Cognition) oriented x 3  -CS       Row Name 11/29/24 1116          Safety Issues/Impairments Affecting Functional Mobility    Impairments Affecting Function (Mobility) balance;strength;endurance/activity tolerance;cognition  -CS     Cognitive Impairments, Mobility Safety/Performance insight into deficits/self-awareness;safety precaution awareness;safety precaution follow-through  -CS               User Key  (r) = Recorded By, (t) = Taken By, (c) = Cosigned By      Initials Name Provider Type    CS Rigoberto Kennedy OT Occupational Therapist                     Mobility/ADL's       Row Name 11/29/24 1116          Bed Mobility    Bed Mobility supine-sit;scooting/bridging  -CS     Scooting/Bridging [place] (Bed Mobility) standby assist  -CS     Supine-Sit [place] (Bed Mobility) contact guard  -CS     Assistive Device (Bed Mobility) head of bed elevated;repositioning sheet  -CS       Row Name 11/29/24 1116          Transfers    Transfers sit-stand transfer;stand-sit transfer  -CS     Comment, (Transfers) STS x 3 w/ mild dizziness reported,  -CS       Row Name 11/29/24 1116          Bed-Chair Transfer    Bed-Chair [place] (Transfers) contact guard;1 person assist;verbal cues  -CS     Assistive Device (Bed-Chair Transfers) walker, front-wheeled  -CS       Row Name  11/29/24 1116          Functional Mobility    Patient was able to Ambulate yes  -CS       Row Name 11/29/24 1116          Activities of Daily Living    BADL Assessment/Intervention upper body dressing;lower body dressing;grooming;feeding  -CS       Row Name 11/29/24 1116          Lower Body Dressing Assessment/Training    [place] Level (Lower Body Dressing) don;socks;shoes/slippers;minimum assist (75% patient effort);moderate assist (50% patient effort)  -CS     Position (Lower Body Dressing) edge of bed sitting;unsupported sitting  -CS       Row Name 11/29/24 1116          Grooming Assessment/Training    [place] Level (Grooming) hair care, combing/brushing;wash face, hands;set up;verbal cues  -CS     Position (Grooming) supported sitting  -CS       Row Name 11/29/24 1116          Upper Body Dressing Assessment/Training    [place] Level (Upper Body Dressing) don;minimum assist (75% patient effort)  -CS     Position (Upper Body Dressing) edge of bed sitting  -               User Key  (r) = Recorded By, (t) = Taken By, (c) = Cosigned By      Initials Name Provider Type    CS Rigoberto Kennedy, OT Occupational Therapist                   Obj/Interventions       Row Name 11/29/24 1119          Sensory Assessment (Somatosensory)    Sensory Assessment (Somatosensory) UE sensation intact  -[facility] Name 11/29/24 1119          Vision Assessment/Intervention    Visual Impairment/Limitations WFL;corrective lenses for reading  -       Row Name 11/29/24 1119          Motor Skills    Motor Skills functional endurance;motor control/coordination interventions  -     Functional Endurance improving  -     Motor Control/Coordination Interventions gross motor coordination activities;therapeutic exercise/ROM  -CS     Therapeutic Exercise shoulder;elbow/forearm;other (see comments)  BUE AROM/BLE AROM performed prior to mobility to mitigate ortho hypo  -       Row Name 11/29/24 1119          Balance    Balance  Assessment sitting static balance;sitting dynamic balance;standing static balance;standing dynamic balance  -CS     Static Sitting Balance standby assist  -CS     Dynamic Sitting Balance contact guard  -CS     Position, Sitting Balance unsupported;sitting edge of bed  -CS     Static Standing Balance contact guard  -CS     Dynamic Standing Balance minimal assist  -CS     Position/Device Used, Standing Balance supported  -CS     Balance Interventions sitting;standing;sit to stand;occupation based/functional task  -CS               User Key  (r) = Recorded By, (t) = Taken By, (c) = Cosigned By      Initials Name Provider Type     Rigobreto Kennedy, OT Occupational Therapist                   Goals/Plan    No documentation.                  Clinical Impression       Row Name 11/29/24 1122          Pain Assessment    Additional Documentation Pain Scale: FACES Pre/Post-Treatment (Group)  -       Row Name 11/29/24 1122          Pain Scale: FACES Pre/Post-Treatment    Pain: FACES Scale, Pretreatment 0-->no hurt  -CS     Posttreatment Pain Rating 0-->no hurt  -CS       Row Name 11/29/24 1122          Plan of Care Review    Plan of Care Reviewed With patient  -CS     Progress no change  -CS     Outcome Evaluation Good effort during during ther-ex, transfer training, and self-care activities this date. Will cont IPOT per POC as tolerated. Rec d/c to IRF when medically appropriate.  -       Row Name 11/29/24 1122          Therapy Plan Review/Discharge Plan (OT)    Anticipated Discharge Disposition (OT) inpatient rehabilitation facility  -       Row Name 11/29/24 1122          Vital Signs    Pre Systolic BP Rehab --  RN cleared for tx  -CS     O2 Delivery Pre Treatment room air  -CS     O2 Delivery Intra Treatment room air  -CS     O2 Delivery Post Treatment room air  -CS     Pre Patient Position Supine  -CS     Intra Patient Position Standing  -CS     Post Patient Position Sitting  -CS       Row Name 11/29/24 1122           Positioning and Restraints    Pre-Treatment Position in bed  -CS     Post Treatment Position chair  -CS     In Chair notified nsg;reclined;sitting;call light within reach;encouraged to call for assist;exit alarm on;legs elevated  -CS               User Key  (r) = Recorded By, (t) = Taken By, (c) = Cosigned By      Initials Name Provider Type    CS Rigoberto Kennedy OT Occupational Therapist                   Outcome Measures       Row Name 11/29/24 1134          How much help from another is currently needed...    Putting on and taking off regular lower body clothing? 2  -CS     Bathing (including washing, rinsing, and drying) 2  -CS     Toileting (which includes using toilet bed pan or urinal) 2  -CS     Putting on and taking off regular upper body clothing 3  -CS     Taking care of personal grooming (such as brushing teeth) 3  -CS     Eating meals 3  -CS     AM-PAC 6 Clicks Score (OT) 15  -CS       Row Name 11/29/24 0859          How much help from another person do you currently need...    Turning from your back to your side while in flat bed without using bedrails? 3  -DT     Moving from lying on back to sitting on the side of a flat bed without bedrails? 3  -DT     Moving to and from a bed to a chair (including a wheelchair)? 3  -DT     Standing up from a chair using your arms (e.g., wheelchair, bedside chair)? 3  -DT     Climbing 3-5 steps with a railing? 2  -DT     To walk in hospital room? 3  -DT     AM-PAC 6 Clicks Score (PT) 17  -DT     Highest Level of Mobility Goal 5 --> Static standing  -DT       Row Name 11/29/24 1134          Functional Assessment    Outcome Measure Options AM-PAC 6 Clicks Daily Activity (OT);Modified Teresa  -CS               User Key  (r) = Recorded By, (t) = Taken By, (c) = Cosigned By      Initials Name Provider Type    Rigoberto Macias OT Occupational Therapist    Spenser Collado RN Registered Nurse                    Occupational Therapy Education       Title: PT  OT SLP Therapies (In Progress)       Topic: Occupational Therapy (Done)       Point: ADL training (Done)       Description:   Instruct learner(s) on proper safety adaptation and remediation techniques during self care or transfers.   Instruct in proper use of assistive devices.                  Learning Progress Summary            Patient Acceptance, E,D, NR,VU by  at 11/29/2024 1135    Acceptance, E, VU by MR at 11/27/2024 1517    Acceptance, E,D, VU,DU by  at 11/26/2024 1137                      Point: Home exercise program (Done)       Description:   Instruct learner(s) on appropriate technique for monitoring, assisting and/or progressing therapeutic exercises/activities.                  Learning Progress Summary            Patient Acceptance, E,D, NR,VU by  at 11/29/2024 1135    Acceptance, E, VU by MR at 11/27/2024 1517    Acceptance, E,D, VU,DU by  at 11/26/2024 1137                      Point: Precautions (Done)       Description:   Instruct learner(s) on prescribed precautions during self-care and functional transfers.                  Learning Progress Summary            Patient Acceptance, E,D, NR,VU by  at 11/29/2024 1135    Acceptance, E, VU by MR at 11/27/2024 1517    Acceptance, E,D, VU,DU by  at 11/26/2024 1137                      Point: Body mechanics (Done)       Description:   Instruct learner(s) on proper positioning and spine alignment during self-care, functional mobility activities and/or exercises.                  Learning Progress Summary            Patient Acceptance, E,D, NR,VU by  at 11/29/2024 1135    Acceptance, E, VU by MR at 11/27/2024 1517    Acceptance, E,D, VU,DU by  at 11/26/2024 1137                                      User Key       Initials Effective Dates Name Provider Type Discipline     06/16/21 -  Rigoberto Kennedy OT Occupational Therapist OT    MR 09/22/22 -  Grace Upton OT Occupational Therapist OT                  OT Recommendation and  Plan  Planned Therapy Interventions (OT): activity tolerance training, functional balance retraining, ROM/therapeutic exercise, strengthening exercise, occupation/activity based interventions, transfer/mobility retraining, patient/caregiver education/training, neuromuscular control/coordination retraining, adaptive equipment training  Therapy Frequency (OT): daily  Plan of Care Review  Plan of Care Reviewed With: patient  Progress: no change  Outcome Evaluation: Good effort during during ther-ex, transfer training, and self-care activities this date. Will cont IPOT per POC as tolerated. Rec d/c to IRF when medically appropriate.     Time Calculation:   Evaluation Complexity (OT)  Review Occupational Profile/Medical/Therapy History Complexity: brief/low complexity  Assessment, Occupational Performance/Identification of Deficit Complexity: 1-3 performance deficits  Clinical Decision Making Complexity (OT): problem focused assessment/low complexity  Overall Complexity of Evaluation (OT): low complexity     Time Calculation- OT       Row Name 11/29/24 1135             Time Calculation- OT    OT Start Time 1044  -CS      OT Received On 11/29/24  -CS      OT Goal Re-Cert Due Date 12/09/24  -CS         Timed Charges    92034 - OT Therapeutic Exercise Minutes 6  -CS      25804 - OT Therapeutic Activity Minutes 16  -CS      66735 - OT Self Care/Mgmt Minutes 6  -CS         Total Minutes    Timed Charges Total Minutes 28  -CS       Total Minutes 28  -CS                User Key  (r) = Recorded By, (t) = Taken By, (c) = Cosigned By      Initials Name Provider Type    CS Rigoberto Kennedy, OT Occupational Therapist                  Therapy Charges for Today       Code Description Service Date Service Provider Modifiers Qty    73897342469 HC OT THER PROC EA 15 MIN 11/29/2024 Rigoberto Kennedy, OT TRISH, KX 1    12337362473 HC OT THERAPEUTIC ACT EA 15 MIN 11/29/2024 Rigoberto Kennedy, OT GO, KX 1                 Rigoberto Kennedy  OT  11/29/2024

## 2024-11-29 NOTE — PLAN OF CARE
Problem: Adult Inpatient Plan of Care  Goal: Plan of Care Review  Outcome: Progressing  Flowsheets (Taken 11/27/2024 1529 by Brandee Corado, PT)  Progress: no change  Outcome Evaluation: Activity limited by orthostatic hypotension this date. Bed>chair transfer completed with CGA and RW. Pt continues to present below baseline with weakness, impaired activity tolerance, and balance deficits. Further IPPT is warrented. PT will progress as able per POC.  Plan of Care Reviewed With: patient  Goal: Patient-Specific Goal (Individualized)  Outcome: Progressing  Goal: Absence of Hospital-Acquired Illness or Injury  Outcome: Progressing  Intervention: Identify and Manage Fall Risk  Recent Flowsheet Documentation  Taken 11/29/2024 0612 by Feng Baron, RN  Safety Promotion/Fall Prevention:   activity supervised   fall prevention program maintained   safety round/check completed  Taken 11/29/2024 0400 by Feng Baron RN  Safety Promotion/Fall Prevention:   activity supervised   fall prevention program maintained   safety round/check completed  Taken 11/29/2024 0203 by Feng Baron RN  Safety Promotion/Fall Prevention:   activity supervised   fall prevention program maintained   safety round/check completed  Taken 11/29/2024 0000 by Feng Baron RN  Safety Promotion/Fall Prevention:   activity supervised   fall prevention program maintained   safety round/check completed  Taken 11/28/2024 2205 by Feng Baron RN  Safety Promotion/Fall Prevention:   activity supervised   fall prevention program maintained   safety round/check completed  Taken 11/28/2024 2003 by Feng Baron RN  Safety Promotion/Fall Prevention:   activity supervised   fall prevention program maintained   safety round/check completed  Intervention: Prevent Skin Injury  Recent Flowsheet Documentation  Taken 11/29/2024 0612 by Feng Baron, RN  Body Position: position changed independently  Taken 11/29/2024  0400 by Feng Baron RN  Body Position: position changed independently  Taken 11/29/2024 0203 by Feng Baron RN  Body Position: position changed independently  Taken 11/29/2024 0000 by Feng Baron RN  Body Position: position changed independently  Taken 11/28/2024 2205 by Feng Baron RN  Body Position: position changed independently  Taken 11/28/2024 2003 by Feng Baron RN  Body Position: position changed independently  Intervention: Prevent Infection  Recent Flowsheet Documentation  Taken 11/29/2024 0612 by Feng Baron, RN  Infection Prevention:   environmental surveillance performed   hand hygiene promoted   rest/sleep promoted  Taken 11/29/2024 0400 by Feng Baron RN  Infection Prevention:   environmental surveillance performed   hand hygiene promoted   rest/sleep promoted  Taken 11/29/2024 0203 by Feng Baron RN  Infection Prevention:   environmental surveillance performed   hand hygiene promoted   rest/sleep promoted  Taken 11/29/2024 0000 by Feng Baron RN  Infection Prevention:   environmental surveillance performed   hand hygiene promoted   rest/sleep promoted  Taken 11/28/2024 2205 by Feng Baron RN  Infection Prevention:   environmental surveillance performed   hand hygiene promoted   rest/sleep promoted  Taken 11/28/2024 2003 by Feng Baron RN  Infection Prevention:   environmental surveillance performed   hand hygiene promoted   rest/sleep promoted  Goal: Optimal Comfort and Wellbeing  Outcome: Progressing  Intervention: Provide Person-Centered Care  Recent Flowsheet Documentation  Taken 11/29/2024 0612 by Feng Baron RN  Trust Relationship/Rapport:   care explained   choices provided  Taken 11/29/2024 0400 by Feng Baron RN  Trust Relationship/Rapport:   care explained   choices provided  Taken 11/29/2024 0203 by Feng Baron RN  Trust Relationship/Rapport:   care explained    choices provided  Taken 11/29/2024 0000 by Feng Baron RN  Trust Relationship/Rapport:   care explained   choices provided  Taken 11/28/2024 2205 by Feng Baron RN  Trust Relationship/Rapport:   care explained   choices provided  Taken 11/28/2024 2003 by Feng Baron RN  Trust Relationship/Rapport:   care explained   choices provided  Goal: Readiness for Transition of Care  Outcome: Progressing     Problem: Fall Injury Risk  Goal: Absence of Fall and Fall-Related Injury  Outcome: Progressing  Intervention: Identify and Manage Contributors  Recent Flowsheet Documentation  Taken 11/29/2024 0612 by Feng Baron RN  Medication Review/Management: medications reviewed  Taken 11/29/2024 0400 by Feng Baron RN  Medication Review/Management: medications reviewed  Taken 11/29/2024 0203 by Feng Baron RN  Medication Review/Management: medications reviewed  Taken 11/29/2024 0000 by Feng Baron RN  Medication Review/Management: medications reviewed  Taken 11/28/2024 2205 by Feng Baron RN  Medication Review/Management: medications reviewed  Taken 11/28/2024 2003 by Feng Baron RN  Medication Review/Management: medications reviewed  Intervention: Promote Injury-Free Environment  Recent Flowsheet Documentation  Taken 11/29/2024 0612 by Feng Baron RN  Safety Promotion/Fall Prevention:   activity supervised   fall prevention program maintained   safety round/check completed  Taken 11/29/2024 0400 by Feng Baron RN  Safety Promotion/Fall Prevention:   activity supervised   fall prevention program maintained   safety round/check completed  Taken 11/29/2024 0203 by Feng Baron RN  Safety Promotion/Fall Prevention:   activity supervised   fall prevention program maintained   safety round/check completed  Taken 11/29/2024 0000 by Feng Baron RN  Safety Promotion/Fall Prevention:   activity supervised   fall prevention  program maintained   safety round/check completed  Taken 11/28/2024 2205 by Feng Baron, RN  Safety Promotion/Fall Prevention:   activity supervised   fall prevention program maintained   safety round/check completed  Taken 11/28/2024 2003 by Feng Baron RN  Safety Promotion/Fall Prevention:   activity supervised   fall prevention program maintained   safety round/check completed     Problem: Skin Injury Risk Increased  Goal: Skin Health and Integrity  Outcome: Progressing  Intervention: Optimize Skin Protection  Recent Flowsheet Documentation  Taken 11/29/2024 0612 by Feng Baron, RN  Activity Management: activity encouraged  Head of Bed (HOB) Positioning: HOB elevated  Taken 11/29/2024 0400 by Feng Baron, RN  Activity Management: activity encouraged  Head of Bed (HOB) Positioning: HOB elevated  Taken 11/29/2024 0203 by Feng Baron RN  Activity Management: activity encouraged  Head of Bed (HOB) Positioning: HOB elevated  Taken 11/29/2024 0000 by Feng Baron RN  Activity Management: activity encouraged  Head of Bed (HOB) Positioning: HOB elevated  Taken 11/28/2024 2205 by Feng Baron, RN  Activity Management: activity encouraged  Head of Bed (HOB) Positioning: HOB elevated  Taken 11/28/2024 2003 by Feng Baron, RN  Activity Management: activity encouraged  Head of Bed (HOB) Positioning: HOB elevated     Problem: Stroke, Ischemic (Includes Transient Ischemic Attack)  Goal: Optimal Coping  Outcome: Progressing  Intervention: Support Psychosocial Response to Stroke  Recent Flowsheet Documentation  Taken 11/29/2024 0612 by Feng Baron, RN  Supportive Measures: active listening utilized  Family/Support System Care: support provided  Taken 11/29/2024 0400 by Feng Baron, RN  Supportive Measures: active listening utilized  Family/Support System Care: support provided  Taken 11/29/2024 0203 by Feng Baron, RN  Supportive Measures:  active listening utilized  Family/Support System Care: support provided  Taken 11/29/2024 0000 by Feng Baron RN  Supportive Measures: active listening utilized  Family/Support System Care: support provided  Taken 11/28/2024 2205 by Feng Baron RN  Supportive Measures: active listening utilized  Family/Support System Care: support provided  Taken 11/28/2024 2003 by Feng Baron RN  Supportive Measures: active listening utilized  Family/Support System Care: support provided  Goal: Effective Bowel Elimination  Outcome: Progressing  Goal: Optimal Cerebral Tissue Perfusion  Outcome: Progressing  Intervention: Protect and Optimize Cerebral Perfusion  Recent Flowsheet Documentation  Taken 11/29/2024 0612 by Feng Baron RN  Sensory Stimulation Regulation: care clustered  Cerebral Perfusion Promotion: blood pressure monitored  Taken 11/29/2024 0400 by Feng Baron RN  Sensory Stimulation Regulation: care clustered  Cerebral Perfusion Promotion: blood pressure monitored  Taken 11/29/2024 0203 by Feng Baron RN  Sensory Stimulation Regulation: care clustered  Cerebral Perfusion Promotion: blood pressure monitored  Taken 11/29/2024 0000 by Feng Baron RN  Sensory Stimulation Regulation: care clustered  Cerebral Perfusion Promotion: blood pressure monitored  Taken 11/28/2024 2205 by Feng Baron RN  Sensory Stimulation Regulation: care clustered  Cerebral Perfusion Promotion: blood pressure monitored  Taken 11/28/2024 2003 by Feng Baron RN  Sensory Stimulation Regulation: care clustered  Cerebral Perfusion Promotion: blood pressure monitored  Goal: Improved Communication Skills  Outcome: Progressing  Intervention: Optimize Communication Skills  Recent Flowsheet Documentation  Taken 11/29/2024 0612 by Feng Baron RN  Communication Enhancement Strategies: call light answered in person  Taken 11/29/2024 0400 by Feng Baron  RN  Communication Enhancement Strategies: call light answered in person  Taken 11/29/2024 0203 by Feng Baron RN  Communication Enhancement Strategies: call light answered in person  Taken 11/29/2024 0000 by Feng Baron RN  Communication Enhancement Strategies: call light answered in person  Taken 11/28/2024 2205 by Feng Baron RN  Communication Enhancement Strategies: call light answered in person  Taken 11/28/2024 2003 by Feng Baron RN  Communication Enhancement Strategies: call light answered in person  Goal: Optimal Functional Ability  Outcome: Progressing  Intervention: Optimize Functional Ability  Recent Flowsheet Documentation  Taken 11/29/2024 0612 by Feng Baron, RN  Activity Management: activity encouraged  Taken 11/29/2024 0400 by Feng Baron RN  Activity Management: activity encouraged  Taken 11/29/2024 0203 by Feng Baron RN  Activity Management: activity encouraged  Taken 11/29/2024 0000 by Feng Baron, RN  Activity Management: activity encouraged  Taken 11/28/2024 2205 by Feng Baron RN  Activity Management: activity encouraged  Taken 11/28/2024 2003 by Feng Baron, RN  Activity Management: activity encouraged  Goal: Optimal Nutrition Intake  Outcome: Progressing  Goal: Improved Sensorimotor Function  Outcome: Progressing  Intervention: Optimize Range of Motion, Motor Control and Function  Recent Flowsheet Documentation  Taken 11/29/2024 0612 by Feng Baron RN  Positioning/Transfer Devices:   pillows   in use  Taken 11/29/2024 0400 by Feng Baron RN  Positioning/Transfer Devices:   pillows   in use  Taken 11/29/2024 0203 by Feng Baron RN  Positioning/Transfer Devices:   pillows   in use  Taken 11/29/2024 0000 by Feng Baron, DANIELITO  Positioning/Transfer Devices:   pillows   in use  Taken 11/28/2024 2205 by Feng Baron RN  Positioning/Transfer Devices:   pillows   in  use  Taken 11/28/2024 2003 by Feng Baron, RN  Positioning/Transfer Devices:   pillows   in use   Goal Outcome Evaluation:

## 2024-11-30 NOTE — OUTREACH NOTE
Prep Survey      Flowsheet Row Responses   [facility] facility patient discharged from? [place]   Is LACE score < 7 ? No   Eligibility Readm Mgmt   Discharge diagnosis TIA   Does the patient have one of the following disease processes/diagnoses(primary or secondary)? Stroke   Does the patient have Home health ordered? No   Is there a DME ordered? No   Prep survey completed? Yes            CAROLYNN SANDOVAL - Registered Nurse

## 2024-12-03 ENCOUNTER — READMISSION MANAGEMENT (OUTPATIENT)
Dept: CALL CENTER | Facility: HOSPITAL | Age: 89
End: 2024-12-03
Payer: MEDICARE

## 2024-12-03 NOTE — OUTREACH NOTE
Stroke Week 1 Survey      Flowsheet Row Responses   [facility] [place] patient discharged from? [place]   Does the patient have one of the following disease processes/diagnoses(primary or secondary)? Stroke   Week 1 attempt successful? Yes   Call start time 0852   Call end time 0858   Discharge diagnosis TIA/ migraine   Person spoke with today (if not patient) and relationship wife   Meds reviewed with patient/caregiver? Yes   Is the patient having any side effects they believe may be caused by any medication additions or changes? No   Does the patient have all medications ordered at discharge? Yes   Is the patient taking all medications as directed (includes completed medication regime)? Yes   Medication comments one of meds will be delivered today   [facility] requests you follow up with them within 30 days for important follow up care. Please call [phone] to schedule this appointment. Thank you. Yes   What is the Home health agency?  [facility]   Has home health visited the patient within 72 hours of discharge? Yes   Psychosocial issues? No   Does the patient require any assistance with activities of daily living such as eating, bathing, dressing, walking, etc.? Yes   ADL comments Walker   Does the patient have any residual symptoms from stroke/TIA? No   Did the patient receive a copy of their discharge instructions? Yes   Nursing interventions Reviewed instructions with patient   What is the patient's perception of their health status since discharge? Improving   Nursing interventions Nurse provided patient education   Is the patient/caregiver able to teach back the risk factors for a stroke? High blood pressure-goal below 120/80, History of TIAs, High Cholesterol   Is the patient/caregiver able to teach back signs and symptoms related to disease process for when to call PCP? Yes   Is the patient/caregiver able to teach back signs and symptoms related to disease process for  when to call 911? Yes   Is the patient/caregiver able to teach back the hierarchy of who to call/visit for symptoms/problems? PCP, Specialist, Home health nurse, Urgent Care, ED, 911 Yes   Is the patient able to teach back FAST for Stroke? B alance: Watch for sudden loss of balance, E yes: Check for vision loss, F ace: Look for an uneven smile, A rm: Check if one arm is weak, S peech: Listen for slurred speech, T jayy: Call 9-1-1 right away   Week 1 call completed? Yes   Wrap up additional comments Wife reports improvement.   Call end time 0858            ENRIQUE AJ - Registered Nurse

## 2024-12-11 ENCOUNTER — READMISSION MANAGEMENT (OUTPATIENT)
Dept: CALL CENTER | Facility: HOSPITAL | Age: 89
End: 2024-12-11
Payer: MEDICARE

## 2024-12-11 NOTE — OUTREACH NOTE
Stroke Week 2 Survey      Flowsheet Row Responses   [facility] patient discharged from? [place]   Does the patient have one of the following disease processes/diagnoses(primary or secondary)? Stroke   Week 2 attempt successful? Yes   Call start time 1621   Call end time 1626   Discharge diagnosis TIA/ migraine   Is patient permission given to speak with other caregiver? Yes   List who call center can speak with Grisel- wife   Person spoke with today (if not patient) and relationship wife   Meds reviewed with patient/caregiver? Yes   Is the patient having any side effects they believe may be caused by any medication additions or changes? No   Does the patient have all medications ordered at discharge? Yes   Is the patient taking all medications as directed (includes completed medication regime)? Yes   Has the patient kept scheduled appointments due by today? Yes   What is the Home health agency?  [facility]   Has home health visited the patient within 72 hours of discharge? Yes   Does the patient require any assistance with activities of daily living such as eating, bathing, dressing, walking, etc.? No   Does the patient have any residual symptoms from stroke/TIA? No   Comments Pt using walker for ambulation now per wife's report.Monitoring BP at home, wife reports.   What is the patient's perception of their health status since discharge? Improving   Nursing interventions Nurse provided patient education   Is the patient/caregiver able to teach back the risk factors for a stroke? High Cholesterol   Is the patient/caregiver able to teach back signs and symptoms related to disease process for when to call PCP? Yes   Is the patient/caregiver able to teach back signs and symptoms related to disease process for when to call 911? Yes   If the patient is a current smoker, are they able to teach back resources for cessation? Not a smoker   Is the patient/caregiver able to teach back the hierarchy of who to  call/visit for symptoms/problems? PCP, Specialist, Home health nurse, Urgent Care, ED, 911 Yes   Week 2 call completed? Yes   Revoked No further contact(revokes)-requires comment   Call end time 0980            Brigida VARGHESE - Registered Nurse

## 2025-01-10 NOTE — PROGRESS NOTES
"    Cardiology Outpatient Visit      Identification: Mukund Pool is a 89 y.o. male who resides in [place], [place].     Reason for visit:  Heart failure with improved ejection fraction  Permanent atrial fibrillation status post AV node ablation and PPM  CAD        Subjective      Mukund returns to the office today accompanied by his son.  Patient states that he has been weak over the last 2 weeks.  He was hospitalized in November with left-sided weakness and speech difficulties.  Stroke was excluded and neurology felt symptoms consistent with complex migraine.  He was also noted to have orthostatic hypotension.  Notably he was discharged home on midodrine 2.5 mg twice daily as well as torsemide.    Review of Systems   Constitutional: Positive for weight loss.   Cardiovascular: Negative.    Neurological:  Positive for weakness.       No Known Allergies      Current Outpatient Medications   Medication Instructions    apixaban (ELIQUIS) 2.5 mg, Oral, 2 Times Daily    atorvastatin (LIPITOR) 10 mg, Oral, Nightly    gabapentin (NEURONTIN) 300 mg, 2 Times Daily    Magnesium Glycinate 400 mg, Oral, Nightly    mirtazapine (REMERON) 15 mg, Nightly    multivitamin with minerals tablet tablet 1 tablet, Daily    nitroglycerin (NITROSTAT) 0.4 mg, Sublingual, Every 5 Minutes PRN    omeprazole (priLOSEC) 20 MG capsule 1 capsule, Daily    sertraline (ZOLOFT) 50 MG tablet Take 1 tablet by mouth Daily.         Objective     BP 98/52 (BP Location: Right arm, Patient Position: Sitting, Cuff Size: Adult)   Pulse 73   Ht 185.4 cm (73\")   Wt 87.4 kg (192 lb 9.6 oz)   SpO2 97%   BMI 25.41 kg/m²       Constitutional:       Appearance: Healthy appearance.   Eyes:      General: No scleral icterus.  Neck:      Thyroid: No thyroid mass.      Vascular: No carotid bruit or JVD. JVD normal.   Pulmonary:      Effort: Pulmonary effort is normal.      Breath sounds: Normal breath sounds.   Cardiovascular:      Normal rate. Regular rhythm.      " Murmurs: There is no murmur.      No gallop.    Edema:     Peripheral edema absent.   Skin:     General: Skin is warm. There is no cyanosis.   Neurological:      General: No focal deficit present.      Mental Status: Alert.   Psychiatric:         Attention and Perception: Attention normal.         Result Review  (reviewed with patient):            Lab Results   Component Value Date    GLUCOSE 136 (H) 11/29/2024    BUN 43 (H) 11/29/2024    CREATININE 1.73 (H) 11/29/2024     11/29/2024    K 5.9 (H) 11/29/2024     11/29/2024    CALCIUM 9.4 11/29/2024    PROTEINTOT 6.8 06/16/2024    ALBUMIN 3.4 (L) 06/16/2024    ALT 11 11/23/2024    AST 19 11/23/2024    ALKPHOS 89 06/16/2024    BILITOT 0.6 06/16/2024    GLOB 3.4 06/16/2024    AGRATIO 1.0 06/16/2024    BCR 24.9 11/29/2024    ANIONGAP 8.0 11/29/2024    EGFR 36.1 (L) 11/29/2024     Lab Results   Component Value Date    WBC 9.09 11/24/2024    HGB 11.8 (L) 11/24/2024    HCT 36.4 (L) 11/24/2024    .0 (H) 11/24/2024     11/24/2024     Lab Results   Component Value Date    CHOL 197 11/24/2024    TRIG 72 11/24/2024    HDL 48 11/24/2024     (H) 11/24/2024     Lab Results   Component Value Date    HGBA1C 5.60 11/24/2024             Assessment     Diagnoses and all orders for this visit:    1. Permanent atrial fibrillation (Primary)  Overview:  [id number] 4 (age >75, CAD, HTN)  4% A-fib burden on recent loop recorder interrogation  AV chaz ablation and placement of dual-chamber pacemaker for tachybradycardia syndrome/sick sinus syndrome 10/25/2023  Echo (11/24/2024): LVEF 58%.  Mild LVH.  Grade 2 diastolic dysfunction.  Aortic calcification with no stenosis.    Assessment & Plan:  Rate controlled from AV node ablation  Discontinue metoprolol  Continue apixaban 2.5 mg twice daily    Orders:  -     apixaban (ELIQUIS) 2.5 MG tablet tablet; Take 1 tablet by mouth 2 (Two) Times a Day. Indications: Atrial Fibrillation  Dispense: 180 tablet; Refill:  3  -     CBC (No Diff); Future    2. Orthostatic hypotension    3. Heart failure with improved ejection fraction (HFimpEF)  Overview:  Echo (2/23): LVEF EF 60%.  LVH with grade 2 DD.  No significant valvular disease  [facility] admission for HFpEF, 9/30/2023  Echo (9/30/2023): LVEF 25%.  Moderate MR.  RVSP <35 mmHg.  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  Mild LVH.  Grade 2 diastolic dysfunction.  Aortic calcification with no stenosis.    Assessment & Plan:  Stable symptoms  Discontinue torsemide  Discontinue midodrine    Orders:  -     proBNP; Future    4. Hyperlipidemia LDL goal <70  Assessment & Plan:  Reduce atorvastatin dose to 10 mg nightly due to age    Orders:  -     atorvastatin (LIPITOR) 10 MG tablet; Take 1 tablet by mouth Every Night.  Dispense: 90 tablet; Refill: 3  -     Comprehensive Metabolic Panel; Future  -     LDL Cholesterol, Direct; Future    5. Coronary artery disease involving native coronary artery of native heart with angina pectoris  Overview:  CABG 1980  Echo (2/23): LVEF EF 60%.  LVH with grade 2 DD.  No significant valvular disease  Pharmacologic nuclear stress (2/10/2023): Inferior infarct.  No ischemia.  LVEF 55%   Echo (9/30/2023): LVEF 25%.  Moderate MR.  RVSP <35 mmHg    Assessment & Plan:  No angina  Discontinue aspirin due to concomitant NOAC  Discontinue metoprolol due to low blood pressure    Orders:  -     nitroglycerin (NITROSTAT) 0.4 MG SL tablet; Place 1 tablet under the tongue Every 5 (Five) Minutes As Needed for Chest Pain. Indications: Acute Angina Pectoris  Dispense: 25 tablet; Refill: 3    6. Stage 3b chronic kidney disease    7. Presence of cardiac pacemaker  Overview:  AV chaz ablation and placement of dual-chamber pacemaker (left bundle pacing in RV) by Dr. Pickett for tachybradycardia syndrome, 10/25/2023    Assessment & Plan:  Follows with Dr. Pickett who he is seen today            Plan   Discontinue metoprolol  Discontinue  midodrine  Discontinue torsemide  Reduce atorvastatin to 10 mg nightly      Follow-up   Return in about 6 months (around 7/14/2025) for Follow-up with cardiology APRN/PA.        Kaden Espinoza MD, [facility], [facility]  1/14/2025

## 2025-01-14 ENCOUNTER — LAB (OUTPATIENT)
Dept: LAB | Facility: HOSPITAL | Age: OVER 89
End: 2025-01-14
Payer: MEDICARE

## 2025-01-14 ENCOUNTER — OFFICE VISIT (OUTPATIENT)
Dept: CARDIOLOGY | Facility: CLINIC | Age: OVER 89
End: 2025-01-14
Payer: MEDICARE

## 2025-01-14 VITALS
DIASTOLIC BLOOD PRESSURE: 52 MMHG | WEIGHT: 192.6 LBS | OXYGEN SATURATION: 97 % | SYSTOLIC BLOOD PRESSURE: 98 MMHG | HEART RATE: 73 BPM | HEIGHT: 73 IN | BODY MASS INDEX: 25.53 KG/M2

## 2025-01-14 DIAGNOSIS — I48.21 PERMANENT ATRIAL FIBRILLATION: ICD-10-CM

## 2025-01-14 DIAGNOSIS — I95.1 ORTHOSTATIC HYPOTENSION: ICD-10-CM

## 2025-01-14 DIAGNOSIS — I48.21 PERMANENT ATRIAL FIBRILLATION: Primary | ICD-10-CM

## 2025-01-14 DIAGNOSIS — Z95.0 PRESENCE OF CARDIAC PACEMAKER: ICD-10-CM

## 2025-01-14 DIAGNOSIS — E78.5 HYPERLIPIDEMIA LDL GOAL <70: ICD-10-CM

## 2025-01-14 DIAGNOSIS — I25.119 CORONARY ARTERY DISEASE INVOLVING NATIVE CORONARY ARTERY OF NATIVE HEART WITH ANGINA PECTORIS: ICD-10-CM

## 2025-01-14 DIAGNOSIS — I50.32 HEART FAILURE WITH IMPROVED EJECTION FRACTION (HFIMPEF): ICD-10-CM

## 2025-01-14 DIAGNOSIS — N18.32 STAGE 3B CHRONIC KIDNEY DISEASE: ICD-10-CM

## 2025-01-14 PROBLEM — G45.9 TIA (TRANSIENT ISCHEMIC ATTACK): Status: RESOLVED | Noted: 2024-11-23 | Resolved: 2025-01-14

## 2025-01-14 PROBLEM — J90 PLEURAL EFFUSION, BILATERAL: Status: RESOLVED | Noted: 2023-10-18 | Resolved: 2025-01-14

## 2025-01-14 PROBLEM — R53.1 LEFT-SIDED WEAKNESS: Status: RESOLVED | Noted: 2024-11-23 | Resolved: 2025-01-14

## 2025-01-14 PROBLEM — N17.9 AKI (ACUTE KIDNEY INJURY): Status: RESOLVED | Noted: 2023-10-18 | Resolved: 2025-01-14

## 2025-01-14 LAB
ALBUMIN SERPL-MCNC: 3.6 G/DL (ref 3.5–5.2)
ALBUMIN/GLOB SERPL: 0.9 G/DL
ALP SERPL-CCNC: 117 U/L (ref 39–117)
ALT SERPL W P-5'-P-CCNC: 14 U/L (ref 1–41)
ANION GAP SERPL CALCULATED.3IONS-SCNC: 10.6 MMOL/L (ref 5–15)
ARTICHOKE IGE QN: 70 MG/DL (ref 0–100)
AST SERPL-CCNC: 19 U/L (ref 1–40)
BILIRUB SERPL-MCNC: 0.6 MG/DL (ref 0–1.2)
BUN SERPL-MCNC: 32 MG/DL (ref 8–23)
BUN/CREAT SERPL: 16.8 (ref 7–25)
CALCIUM SPEC-SCNC: 9.4 MG/DL (ref 8.2–9.6)
CHLORIDE SERPL-SCNC: 104 MMOL/L (ref 98–107)
CO2 SERPL-SCNC: 25.4 MMOL/L (ref 22–29)
CREAT SERPL-MCNC: 1.91 MG/DL (ref 0.76–1.27)
DEPRECATED RDW RBC AUTO: 42.8 FL (ref 37–54)
EGFRCR SERPLBLD CKD-EPI 2021: 32.1 ML/MIN/1.73
ERYTHROCYTE [DISTWIDTH] IN BLOOD BY AUTOMATED COUNT: 11.8 % (ref 12.3–15.4)
GLOBULIN UR ELPH-MCNC: 4 GM/DL
GLUCOSE SERPL-MCNC: 108 MG/DL (ref 65–99)
HCT VFR BLD AUTO: 34.5 % (ref 37.5–51)
HGB BLD-MCNC: 11.5 G/DL (ref 13–17.7)
MCH RBC QN AUTO: 33.7 PG (ref 26.6–33)
MCHC RBC AUTO-ENTMCNC: 33.3 G/DL (ref 31.5–35.7)
MCV RBC AUTO: 101.2 FL (ref 79–97)
NT-PROBNP SERPL-MCNC: 2392 PG/ML (ref 0–1800)
PLATELET # BLD AUTO: 208 10*3/MM3 (ref 140–450)
PMV BLD AUTO: 9.5 FL (ref 6–12)
POTASSIUM SERPL-SCNC: 5.5 MMOL/L (ref 3.5–5.2)
PROT SERPL-MCNC: 7.6 G/DL (ref 6–8.5)
RBC # BLD AUTO: 3.41 10*6/MM3 (ref 4.14–5.8)
SODIUM SERPL-SCNC: 140 MMOL/L (ref 136–145)
WBC NRBC COR # BLD AUTO: 8.35 10*3/MM3 (ref 3.4–10.8)

## 2025-01-14 PROCEDURE — 80053 COMPREHEN METABOLIC PANEL: CPT

## 2025-01-14 PROCEDURE — 83721 ASSAY OF BLOOD LIPOPROTEIN: CPT

## 2025-01-14 PROCEDURE — 36415 COLL VENOUS BLD VENIPUNCTURE: CPT

## 2025-01-14 PROCEDURE — 85027 COMPLETE CBC AUTOMATED: CPT

## 2025-01-14 PROCEDURE — 83880 ASSAY OF NATRIURETIC PEPTIDE: CPT

## 2025-01-14 RX ORDER — NITROGLYCERIN 0.4 MG/1
0.4 TABLET SUBLINGUAL
Qty: 25 TABLET | Refills: 3 | Status: SHIPPED | OUTPATIENT
Start: 2025-01-14

## 2025-01-14 RX ORDER — ATORVASTATIN CALCIUM 10 MG/1
10 TABLET, FILM COATED ORAL NIGHTLY
Qty: 90 TABLET | Refills: 3 | Status: SHIPPED | OUTPATIENT
Start: 2025-01-14

## 2025-01-14 RX ORDER — METOPROLOL SUCCINATE 50 MG/1
TABLET, EXTENDED RELEASE ORAL
COMMUNITY
End: 2025-01-14

## 2025-01-14 RX ORDER — TORSEMIDE 20 MG/1
10 TABLET ORAL DAILY
Qty: 30 TABLET | Refills: 0 | Status: CANCELLED | OUTPATIENT
Start: 2025-01-14

## 2025-01-14 RX ORDER — METOPROLOL SUCCINATE 25 MG/1
TABLET, EXTENDED RELEASE ORAL
COMMUNITY
End: 2025-01-14

## 2025-01-14 NOTE — ASSESSMENT & PLAN NOTE
Rate controlled from AV node ablation  Discontinue metoprolol  Continue apixaban 2.5 mg twice daily

## 2025-01-14 NOTE — ASSESSMENT & PLAN NOTE
No angina  Discontinue aspirin due to concomitant NOAC  Discontinue metoprolol due to low blood pressure

## 2025-01-17 ENCOUNTER — TELEPHONE (OUTPATIENT)
Dept: CARDIOLOGY | Facility: CLINIC | Age: OVER 89
End: 2025-01-17
Payer: MEDICARE

## 2025-01-17 NOTE — TELEPHONE ENCOUNTER
----- Message from Wallace Espinoza sent at 1/17/2025 12:06 PM EST -----  Labs presently stable.  Agree with plan in office.

## 2025-01-17 NOTE — TELEPHONE ENCOUNTER
Spoke to patients wife regarding lab work and what medications were discontinued at last office visit with . She verbalizes understandings and ask if we can mail her patients lab results. Will do this.

## 2025-01-17 NOTE — LETTER
January 17, 2025      Mukund Pool    [address] [address]        Dear Mr. Pool    Below are the results from your recent visit:    Lab on 01/14/2025   Component Date Value Ref Range Status   • Glucose 01/14/2025 108 (H)  65 - 99 mg/dL Final   • BUN 01/14/2025 32 (H)  8 - 23 mg/dL Final   • Creatinine 01/14/2025 1.91 (H)  0.76 - 1.27 mg/dL Final   • Sodium 01/14/2025 140  136 - 145 mmol/L Final   • Potassium 01/14/2025 5.5 (H)  3.5 - 5.2 mmol/L Final   • Chloride 01/14/2025 104  98 - 107 mmol/L Final   • CO2 01/14/2025 25.4  22.0 - 29.0 mmol/L Final   • Calcium 01/14/2025 9.4  8.2 - 9.6 mg/dL Final   • Total Protein 01/14/2025 7.6  6.0 - 8.5 g/dL Final   • Albumin 01/14/2025 3.6  3.5 - 5.2 g/dL Final   • ALT (SGPT) 01/14/2025 14  1 - 41 U/L Final   • AST (SGOT) 01/14/2025 19  1 - 40 U/L Final   • Alkaline Phosphatase 01/14/2025 117  39 - 117 U/L Final   • Total Bilirubin 01/14/2025 0.6  0.0 - 1.2 mg/dL Final   • Globulin 01/14/2025 4.0  gm/dL Final   • A/G Ratio 01/14/2025 0.9  g/dL Final   • BUN/Creatinine Ratio 01/14/2025 16.8  7.0 - 25.0 Final   • Anion Gap 01/14/2025 10.6  5.0 - 15.0 mmol/L Final   • eGFR 01/14/2025 32.1 (L)  >60.0 mL/min/1.73 Final   • LDL Cholesterol  01/14/2025 70  0 - 100 mg/dL Final   • proBNP 01/14/2025 2,392.0 (H)  0.0 - 1,800.0 pg/mL Final   • WBC 01/14/2025 8.35  3.40 - 10.80 10*3/mm3 Final   • RBC 01/14/2025 3.41 (L)  4.14 - 5.80 10*6/mm3 Final   • Hemoglobin 01/14/2025 11.5 (L)  13.0 - 17.7 g/dL Final   • Hematocrit 01/14/2025 34.5 (L)  37.5 - 51.0 % Final   • MCV 01/14/2025 101.2 (H)  79.0 - 97.0 fL Final   • MCH 01/14/2025 33.7 (H)  26.6 - 33.0 pg Final   • MCHC 01/14/2025 33.3  31.5 - 35.7 g/dL Final   • RDW 01/14/2025 11.8 (L)  12.3 - 15.4 % Final   • RDW-SD 01/14/2025 42.8  37.0 - 54.0 fl Final   • MPV 01/14/2025 9.5  6.0 - 12.0 fL Final   • Platelets 01/14/2025 208  140 - 450 10*3/mm3 Final                 Sincerely,      Wallace GARBER  Alexis BRIONES MD

## 2025-07-22 ENCOUNTER — TELEPHONE (OUTPATIENT)
Dept: CARDIOLOGY | Facility: CLINIC | Age: OVER 89
End: 2025-07-22

## 2025-07-22 ENCOUNTER — OFFICE VISIT (OUTPATIENT)
Dept: CARDIOLOGY | Facility: CLINIC | Age: OVER 89
End: 2025-07-22
Payer: MEDICARE

## 2025-07-22 VITALS
HEART RATE: 66 BPM | OXYGEN SATURATION: 100 % | WEIGHT: 180 LBS | SYSTOLIC BLOOD PRESSURE: 130 MMHG | HEIGHT: 73 IN | BODY MASS INDEX: 23.86 KG/M2 | DIASTOLIC BLOOD PRESSURE: 64 MMHG

## 2025-07-22 DIAGNOSIS — Z95.0 PRESENCE OF CARDIAC PACEMAKER: Primary | Chronic | ICD-10-CM

## 2025-07-22 DIAGNOSIS — I48.21 PERMANENT ATRIAL FIBRILLATION: Chronic | ICD-10-CM

## 2025-07-22 RX ORDER — POTASSIUM CHLORIDE 750 MG/1
10 CAPSULE, EXTENDED RELEASE ORAL DAILY
COMMUNITY
Start: 2025-04-18

## 2025-07-22 RX ORDER — FUROSEMIDE 20 MG/1
20 TABLET ORAL DAILY
COMMUNITY
Start: 2025-04-18

## 2025-07-22 RX ORDER — EZETIMIBE 10 MG/1
10 TABLET ORAL DAILY
COMMUNITY

## 2025-07-22 RX ORDER — METOPROLOL SUCCINATE 25 MG/1
25 TABLET, EXTENDED RELEASE ORAL DAILY
COMMUNITY

## 2025-07-22 NOTE — PROGRESS NOTES
Cardiac Electrophysiology Outpatient Note  [facility]    Office Visit     Mukund Pool  [id number]  07/09/2024    Primary Care Physician: Slick Hubbard MD    Referred By: No ref. provider found    Subjective     Chief Complaint   Patient presents with    Permanent atrial fibrillation    Presence of cardiac pacemaker       History of Present Illness:   Mukund Pool is a 89 y.o. male who presents to my electrophysiology clinic for follow up of persistent atrial fibrillation status post AV node ablation and pacemaker implantation.  Lead was placed in the left bundle branch area pacing position.      The patient was last seen in our office in July 2024.  He has recently been told he cannot drive.  This was really upsetting to him and he had some chest pain associated with his anxiety a couple weeks ago.  This seems like an isolated incident.  He is overall highly functioning and independent outside of a little bit of support from his family.  He walks around with a walker.  Otherwise, no significant medical changes.      Past Medical History:   Diagnosis Date    Arthritis     Chronic kidney disease     kidney stones    Coronary artery disease     GERD (gastroesophageal reflux disease)     History of radiation therapy 05/24/2021    laryngeal mass    Prostate cancer     TIA (transient ischemic attack) 11/23/2024    Vocal cord cancer        Past Surgical History:   Procedure Laterality Date    CARDIAC ELECTROPHYSIOLOGY PROCEDURE  04/2023    cardiac loop recorder    CARDIAC ELECTROPHYSIOLOGY PROCEDURE N/A 10/25/2023    Procedure: Pacemaker DC new;  Surgeon: Levy Pickett MD;  Location:  [facility];  Service: Cardiology;  Laterality: N/A;    CARDIAC ELECTROPHYSIOLOGY PROCEDURE N/A 10/25/2023    Procedure: AV node ablation;  Surgeon: Levy Pickett MD;  Location:  [facility];  Service: Cardiovascular;  Laterality: N/A;     CHOLECYSTECTOMY      CORONARY ARTERY BYPASS GRAFT      PROSTATE SURGERY      VOCAL CORD MASS EXCISION  03/22/2021       Family History   Problem Relation Age of Onset    Cancer Mother     Breast cancer Mother     Heart attack Father     Ovarian cancer Sister        Social History     Socioeconomic History    Marital status:    Tobacco Use    Smoking status: Never     Passive exposure: Never    Smokeless tobacco: Never   Vaping Use    Vaping status: Never Used   Substance and Sexual Activity    Alcohol use: No    Drug use: No    Sexual activity: Defer         Current Outpatient Medications:     apixaban (ELIQUIS) 2.5 MG tablet tablet, Take 1 tablet by mouth 2 (Two) Times a Day. Indications: Atrial Fibrillation, Disp: 180 tablet, Rfl: 3    atorvastatin (LIPITOR) 10 MG tablet, Take 1 tablet by mouth Every Night., Disp: 90 tablet, Rfl: 3    ezetimibe (ZETIA) 10 MG tablet, Take 1 tablet by mouth Daily., Disp: , Rfl:     furosemide (LASIX) 20 MG tablet, Take 1 tablet by mouth Daily., Disp: , Rfl:     gabapentin (NEURONTIN) 300 MG capsule, Take 1 capsule by mouth 2 (Two) Times a Day. Indications: Neuropathic Pain, Disp: , Rfl:     Magnesium Glycinate 100 MG capsule, Take 400 mg by mouth Every Night., Disp: 120 capsule, Rfl: 0    metoprolol succinate XL (TOPROL-XL) 25 MG 24 hr tablet, Take 1 tablet by mouth Daily., Disp: , Rfl:     mirtazapine (REMERON) 15 MG tablet, Take 1 tablet by mouth Every Night., Disp: , Rfl:     multivitamin with minerals tablet tablet, Take 1 tablet by mouth Daily. OTC  Indications: vitamin deficiency, Disp: , Rfl:     nitroglycerin (NITROSTAT) 0.4 MG SL tablet, Place 1 tablet under the tongue Every 5 (Five) Minutes As Needed for Chest Pain. Indications: Acute Angina Pectoris, Disp: 25 tablet, Rfl: 3    omeprazole (priLOSEC) 20 MG capsule, Take 1 capsule by mouth Daily. Indications: Stomach Ulcer, Disp: , Rfl:     potassium chloride (MICRO-K) 10 MEQ CR capsule, Take 1 capsule by mouth  "Daily., Disp: , Rfl:     sertraline (ZOLOFT) 50 MG tablet, Take 1 tablet by mouth Daily., Disp: , Rfl:     Allergies:   No Known Allergies    Objective   Vital Signs: Blood pressure 130/64, pulse 66, height 185.4 cm (73\"), weight 81.6 kg (180 lb), SpO2 100%.    PHYSICAL EXAM  General appearance: Awake, alert, cooperative  Head: Normocephalic, without obvious abnormality, atraumatic  Lungs: Clear to ascultation bilaterally  Heart: Regular rate and rhythm, no murmurs, no lower extremity swelling  Skin: Skin color, turgor normal, no rashes or lesions  Neurologic: Grossly normal     Lab Results   Component Value Date    GLUCOSE 108 (H) 01/14/2025    CALCIUM 9.4 01/14/2025     01/14/2025    K 5.5 (H) 01/14/2025    CO2 25.4 01/14/2025     01/14/2025    BUN 32 (H) 01/14/2025    CREATININE 1.91 (H) 01/14/2025    BCR 16.8 01/14/2025    ANIONGAP 10.6 01/14/2025     Lab Results   Component Value Date    WBC 8.35 01/14/2025    HGB 11.5 (L) 01/14/2025    HCT 34.5 (L) 01/14/2025    .2 (H) 01/14/2025     01/14/2025     Lab Results   Component Value Date    INR 1.04 11/23/2024    INR 1.05 10/25/2023    INR 0.98 04/20/2023    PROTIME 13.7 11/23/2024    PROTIME 13.8 10/25/2023    PROTIME 10.7 04/20/2023     Lab Results   Component Value Date    TSH 2.010 10/18/2023          Results for orders placed during the hospital encounter of 11/23/24    Adult Transthoracic Echo Complete W/ Cont if Necessary Per Protocol (With Agitated Saline) 11/24/2024 12:40 PM    Interpretation Summary    Left ventricular ejection fraction appears to be 56 - 60%.    Left ventricular wall thickness is consistent with mild concentric hypertrophy.    Left ventricular diastolic function is consistent with (grade II w/high LAP) pseudonormalization.    Mildly reduced right ventricular systolic function noted.    The left atrial cavity is mildly dilated.    There is mild calcification of the aortic valve mainly affecting the non-coronary, " left coronary and right coronary cusp(s).    Aortic valve maximum pressure gradient is 11 mmHg. Aortic valve mean pressure gradient is 6 mmHg.    Calculated right ventricular systolic pressure from tricuspid regurgitation is 30 mmHg.         I personally viewed and interpreted the patient's EKG/Telemetry/lab data    Procedures    Mukund Mcghee Valliant  reports that he has never smoked. He has never been exposed to tobacco smoke. He has never used smokeless tobacco.     Advance Care Planning   Advance Care Planning: ACP discussion was held with the patient during this visit. Patient does not have an advance directive, information provided.     Assessment & Plan    1. Presence of cardiac pacemaker  [place] dual-chamber pacemaker was interrogated and functioning well.  RV lead is placed in the left bundle branch area pacing position.      8 years left on the battery, <1% atrial pacing, >99% LV AP pacing, acceptable threshold PINS values with 98% mode switching.  No high ventricular rates.    2. Permanent atrial fibrillation  He has a history of quite persistent atrial fibrillation at this point is status post AV node ablation.  Tolerating Eliquis well.  Will continue.       Follow Up:  Return in about 1 year (around 7/22/2026).      Thank you for allowing me to participate in the care of your patient. Please do not hesitate to contact me with additional questions or concerns.      Sudeep Bobo PA-C  Cardiac Electrophysiology  [facility]

## 2025-08-14 LAB
MDC_IDC_MSMT_BATTERY_REMAINING_LONGEVITY: 89 MO
MDC_IDC_MSMT_BATTERY_REMAINING_PERCENTAGE: 81 %
MDC_IDC_MSMT_BATTERY_RRT_TRIGGER: 2.6
MDC_IDC_MSMT_BATTERY_STATUS: NORMAL
MDC_IDC_MSMT_BATTERY_VOLTAGE: 2.98
MDC_IDC_MSMT_LEADCHNL_RA_IMPEDANCE_VALUE: 390
MDC_IDC_MSMT_LEADCHNL_RA_PACING_THRESHOLD_POLARITY: NORMAL
MDC_IDC_MSMT_LEADCHNL_RA_SENSING_INTR_AMPL: 1.2
MDC_IDC_MSMT_LEADCHNL_RV_IMPEDANCE_VALUE: 450
MDC_IDC_MSMT_LEADCHNL_RV_PACING_THRESHOLD_POLARITY: NORMAL
MDC_IDC_MSMT_LEADCHNL_RV_SENSING_INTR_AMPL: 12
MDC_IDC_PG_IMPLANT_DTM: NORMAL
MDC_IDC_PG_MFG: NORMAL
MDC_IDC_PG_MODEL: NORMAL
MDC_IDC_PG_SERIAL: NORMAL
MDC_IDC_PG_TYPE: NORMAL
MDC_IDC_SESS_DTM: NORMAL
MDC_IDC_SESS_TYPE: NORMAL
MDC_IDC_SET_BRADY_AT_MODE_SWITCH_RATE: 150
MDC_IDC_SET_BRADY_LOWRATE: 60
MDC_IDC_SET_BRADY_MAX_SENSOR_RATE: 120
MDC_IDC_SET_BRADY_MAX_TRACKING_RATE: 120
MDC_IDC_SET_BRADY_MODE: NORMAL
MDC_IDC_SET_BRADY_PAV_DELAY: 180
MDC_IDC_SET_BRADY_SAV_DELAY: 150
MDC_IDC_SET_LEADCHNL_RA_PACING_AMPLITUDE: 2
MDC_IDC_SET_LEADCHNL_RA_PACING_POLARITY: NORMAL
MDC_IDC_SET_LEADCHNL_RA_PACING_PULSEWIDTH: 0.5
MDC_IDC_SET_LEADCHNL_RA_SENSING_POLARITY: NORMAL
MDC_IDC_SET_LEADCHNL_RA_SENSING_SENSITIVITY: 0.5
MDC_IDC_SET_LEADCHNL_RV_PACING_AMPLITUDE: 2
MDC_IDC_SET_LEADCHNL_RV_PACING_POLARITY: NORMAL
MDC_IDC_SET_LEADCHNL_RV_PACING_PULSEWIDTH: 0.5
MDC_IDC_SET_LEADCHNL_RV_SENSING_POLARITY: NORMAL
MDC_IDC_SET_LEADCHNL_RV_SENSING_SENSITIVITY: 2
MDC_IDC_STAT_AT_BURDEN_PERCENT: 99
MDC_IDC_STAT_BRADY_RA_PERCENT_PACED: 1
MDC_IDC_STAT_BRADY_RV_PERCENT_PACED: 99

## (undated) DEVICE — ST EXT IV SMRTSTE 2VLV FIX M LL 6ML 41

## (undated) DEVICE — TUBING, SUCTION, 1/4" X 10', STRAIGHT: Brand: MEDLINE

## (undated) DEVICE — LEX ELECTRO PHYSIOLOGY: Brand: MEDLINE INDUSTRIES, INC.

## (undated) DEVICE — ADULT NASAL CO2 SAMPLING WITH O2 DELIVERY CANNULA FOR CAPNOFLEX MODULE: Brand: VITAL SIGNS™

## (undated) DEVICE — ELECTRD RETRN/GRND MEGADYNE SGL/PLT W/CORD 9FT DISP

## (undated) DEVICE — Device: Brand: REFERENCE PATCH CARTO 3

## (undated) DEVICE — ADULT, W/LG. BACK PAD, RADIOTRANSPARENT ELEMENT AND LEAD WIRE COMPATIBLE W/: Brand: DEFIBRILLATION ELECTRODES

## (undated) DEVICE — PINNACLE INTRODUCER SHEATH: Brand: PINNACLE

## (undated) DEVICE — TRAP FLD MINIVAC MEGADYNE 100ML

## (undated) DEVICE — GW DIAG .032

## (undated) DEVICE — LIMB HOLDER, WRIST/ANKLE: Brand: DEROYAL

## (undated) DEVICE — NDL PERC 1PART ECHOTIP WO/BASEPLT 18G 7CM

## (undated) DEVICE — INTRO TEAR AWAY/LVD W/SD PRT 6F 13CM

## (undated) DEVICE — CAUTERY TIP POLISHER: Brand: DEVON

## (undated) DEVICE — IRRIGATOR BULB ASEPTO 60CC STRL

## (undated) DEVICE — MEDI-VAC YANKAUER SUCTION HANDLE W/BULBOUS TIP: Brand: CARDINAL HEALTH

## (undated) DEVICE — DECANT BG O JET

## (undated) DEVICE — INTRO TEAR AWAY/LVD W/SD PRT 9.5F 13CM

## (undated) DEVICE — SYS CLS VASC/VENI VASCADE MVP 6TO12F

## (undated) DEVICE — ST INF PRI SMRTSTE 20DRP 2VLV 24ML 117

## (undated) DEVICE — TOOL LD/FIX LK HELIX

## (undated) DEVICE — Device: Brand: THERMOCOOL SMARTTOUCH SF

## (undated) DEVICE — SLITTER AGILISHISPRO FOR/CPSLOCATOR/3D/CATH

## (undated) DEVICE — DRSNG SURG AQUACEL AG/ADVNTGE 9X15CM 3.5X6IN

## (undated) DEVICE — TBG PENCL TELESCP MEGADYNE SMOKE EVAC 10FT